# Patient Record
Sex: MALE | Race: WHITE | NOT HISPANIC OR LATINO | Employment: FULL TIME | URBAN - METROPOLITAN AREA
[De-identification: names, ages, dates, MRNs, and addresses within clinical notes are randomized per-mention and may not be internally consistent; named-entity substitution may affect disease eponyms.]

---

## 2017-02-23 ENCOUNTER — OFFICE VISIT (OUTPATIENT)
Dept: INTERNAL MEDICINE | Facility: CLINIC | Age: 52
End: 2017-02-23
Payer: COMMERCIAL

## 2017-02-23 VITALS
HEART RATE: 94 BPM | WEIGHT: 237.13 LBS | DIASTOLIC BLOOD PRESSURE: 79 MMHG | OXYGEN SATURATION: 95 % | BODY MASS INDEX: 35.01 KG/M2 | SYSTOLIC BLOOD PRESSURE: 122 MMHG | TEMPERATURE: 99 F

## 2017-02-23 DIAGNOSIS — Z12.11 SCREENING FOR COLON CANCER: ICD-10-CM

## 2017-02-23 DIAGNOSIS — E78.5 HYPERLIPIDEMIA, UNSPECIFIED HYPERLIPIDEMIA TYPE: ICD-10-CM

## 2017-02-23 DIAGNOSIS — E11.40 TYPE 2 DIABETES MELLITUS WITH DIABETIC NEUROPATHY, WITH LONG-TERM CURRENT USE OF INSULIN: Primary | ICD-10-CM

## 2017-02-23 DIAGNOSIS — Z79.4 TYPE 2 DIABETES MELLITUS WITH DIABETIC NEUROPATHY, WITH LONG-TERM CURRENT USE OF INSULIN: Primary | ICD-10-CM

## 2017-02-23 DIAGNOSIS — I10 HYPERTENSION, ESSENTIAL: ICD-10-CM

## 2017-02-23 LAB
ALBUMIN SERPL BCP-MCNC: 3.7 G/DL
ALP SERPL-CCNC: 77 U/L
ALT SERPL W/O P-5'-P-CCNC: 33 U/L
ANION GAP SERPL CALC-SCNC: 8 MMOL/L
AST SERPL-CCNC: 29 U/L
BASOPHILS # BLD AUTO: 0.01 K/UL
BASOPHILS NFR BLD: 0.1 %
BILIRUB SERPL-MCNC: 0.6 MG/DL
BUN SERPL-MCNC: 12 MG/DL
CALCIUM SERPL-MCNC: 9.5 MG/DL
CHLORIDE SERPL-SCNC: 105 MMOL/L
CHOLEST/HDLC SERPL: 4.5 {RATIO}
CO2 SERPL-SCNC: 28 MMOL/L
CREAT SERPL-MCNC: 0.9 MG/DL
CREAT UR-MCNC: 52 MG/DL
DIFFERENTIAL METHOD: ABNORMAL
EOSINOPHIL # BLD AUTO: 0.1 K/UL
EOSINOPHIL NFR BLD: 1.1 %
ERYTHROCYTE [DISTWIDTH] IN BLOOD BY AUTOMATED COUNT: 13.7 %
EST. GFR  (AFRICAN AMERICAN): >60 ML/MIN/1.73 M^2
EST. GFR  (NON AFRICAN AMERICAN): >60 ML/MIN/1.73 M^2
GLUCOSE SERPL-MCNC: 63 MG/DL
HCT VFR BLD AUTO: 45.5 %
HDL/CHOLESTEROL RATIO: 22.4 %
HDLC SERPL-MCNC: 170 MG/DL
HDLC SERPL-MCNC: 38 MG/DL
HGB BLD-MCNC: 15.8 G/DL
LDLC SERPL CALC-MCNC: 111.4 MG/DL
LYMPHOCYTES # BLD AUTO: 2.6 K/UL
LYMPHOCYTES NFR BLD: 35.7 %
MCH RBC QN AUTO: 31.7 PG
MCHC RBC AUTO-ENTMCNC: 34.7 %
MCV RBC AUTO: 91 FL
MICROALBUMIN UR DL<=1MG/L-MCNC: 4 UG/ML
MICROALBUMIN/CREATININE RATIO: 7.7 UG/MG
MONOCYTES # BLD AUTO: 0.5 K/UL
MONOCYTES NFR BLD: 6.2 %
NEUTROPHILS # BLD AUTO: 4.1 K/UL
NEUTROPHILS NFR BLD: 56.8 %
NONHDLC SERPL-MCNC: 132 MG/DL
PLATELET # BLD AUTO: 176 K/UL
PMV BLD AUTO: 12.2 FL
POTASSIUM SERPL-SCNC: 4.2 MMOL/L
PROT SERPL-MCNC: 7.3 G/DL
RBC # BLD AUTO: 4.98 M/UL
SODIUM SERPL-SCNC: 141 MMOL/L
TRIGL SERPL-MCNC: 103 MG/DL
WBC # BLD AUTO: 7.22 K/UL

## 2017-02-23 PROCEDURE — 80053 COMPREHEN METABOLIC PANEL: CPT

## 2017-02-23 PROCEDURE — 99999 PR PBB SHADOW E&M-EST. PATIENT-LVL IV: CPT | Mod: PBBFAC,,, | Performed by: FAMILY MEDICINE

## 2017-02-23 PROCEDURE — 82570 ASSAY OF URINE CREATININE: CPT

## 2017-02-23 PROCEDURE — 3074F SYST BP LT 130 MM HG: CPT | Mod: S$GLB,,, | Performed by: FAMILY MEDICINE

## 2017-02-23 PROCEDURE — 4010F ACE/ARB THERAPY RXD/TAKEN: CPT | Mod: S$GLB,,, | Performed by: FAMILY MEDICINE

## 2017-02-23 PROCEDURE — 80061 LIPID PANEL: CPT

## 2017-02-23 PROCEDURE — 85025 COMPLETE CBC W/AUTO DIFF WBC: CPT

## 2017-02-23 PROCEDURE — 3078F DIAST BP <80 MM HG: CPT | Mod: S$GLB,,, | Performed by: FAMILY MEDICINE

## 2017-02-23 PROCEDURE — 99214 OFFICE O/P EST MOD 30 MIN: CPT | Mod: S$GLB,,, | Performed by: FAMILY MEDICINE

## 2017-02-23 PROCEDURE — 3046F HEMOGLOBIN A1C LEVEL >9.0%: CPT | Mod: S$GLB,,, | Performed by: FAMILY MEDICINE

## 2017-02-23 PROCEDURE — 2022F DILAT RTA XM EVC RTNOPTHY: CPT | Mod: S$GLB,,, | Performed by: FAMILY MEDICINE

## 2017-02-23 PROCEDURE — 1160F RVW MEDS BY RX/DR IN RCRD: CPT | Mod: S$GLB,,, | Performed by: FAMILY MEDICINE

## 2017-02-23 PROCEDURE — 83036 HEMOGLOBIN GLYCOSYLATED A1C: CPT

## 2017-02-23 RX ORDER — INSULIN GLARGINE 100 [IU]/ML
INJECTION, SOLUTION SUBCUTANEOUS
Qty: 2 BOX | Refills: 5 | Status: SHIPPED | OUTPATIENT
Start: 2017-02-23 | End: 2017-09-22 | Stop reason: ALTCHOICE

## 2017-02-23 RX ORDER — INSULIN ASPART 100 [IU]/ML
INJECTION, SOLUTION INTRAVENOUS; SUBCUTANEOUS
Qty: 3 BOX | Refills: 5 | Status: SHIPPED | OUTPATIENT
Start: 2017-02-23 | End: 2018-03-02

## 2017-02-23 RX ORDER — PEN NEEDLE, DIABETIC 30 GX3/16"
NEEDLE, DISPOSABLE MISCELLANEOUS
Qty: 100 EACH | Refills: 12 | Status: SHIPPED | OUTPATIENT
Start: 2017-02-23 | End: 2018-05-06 | Stop reason: SDUPTHER

## 2017-02-23 RX ORDER — INSULIN GLULISINE 100 [IU]/ML
INJECTION, SOLUTION SUBCUTANEOUS
Qty: 15 SYRINGE | Refills: 3 | OUTPATIENT
Start: 2017-02-23
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
## 2017-02-23 NOTE — MR AVS SNAPSHOT
"    [facility]  [address]  [place] [address]  Phone: [phone]  Fax: [phone]                  Brett Garcia   2017 2:20 PM   Office Visit    Description:  Male : 1965   Provider:  Sonia Estrella MD   Department:  [facility]           Reason for Visit     Annual Exam           Diagnoses this Visit        Comments    Type 2 diabetes mellitus with diabetic neuropathy, with long-term current use of insulin    -  Primary     Hyperlipidemia, unspecified hyperlipidemia type         Screening for colon cancer                To Do List           Goals (5 Years of Data)     None       These Medications        Disp Refills Start End    pen needle, diabetic (BD INSULIN PEN NEEDLE UF MINI) 31 gauge x 3/16" Ndle 100 each 12 2017     Use 4 x daily as directed    Pharmacy: [facility]/pharmacy #[id number]  [place] - [address] [facility] [address] Ph #: [phone]       blood sugar diagnostic Strp 100 each 11 2017     1 strip by Misc.(Non-Drug; Combo Route) route 4 (four) times daily. One touch Meter Strips - Misc.(Non-Drug; Combo Route)    Pharmacy: [facility]/pharmacy #[id number] [place] - [address] [facility] Ph #: [phone]       insulin glargine (LANTUS SOLOSTAR) 100 unit/mL (3 mL) InPn pen 2 Box 5 2017     INJECT 70 UNITS UNDER THE SKIN EVERY MORNING// at night    Pharmacy: [facility]/pharmacy #[id number] [place] - [address] [facility] Ph #: [phone]       insulin aspart (NOVOLOG) 100 unit/mL InPn pen 3 Box 5 2017     25 U SQ before meals up to 3 x daily    Pharmacy: [facility]/pharmacy #[id number] [place] - [address] [address] Ph #: [phone]         [facility] On [facility]     [facility] On Call Nurse Care Line -  Assistance  Registered nurses in the [facility] provide clinical advisement, " "health education, appointment booking, and other advisory services.  Call for this free service at [phone].             Medications           Message regarding Medications     Verify the changes and/or additions to your medication regime listed below are the same as discussed with your clinician today.  If any of these changes or additions are incorrect, please notify your healthcare provider.        START taking these NEW medications        Refills    pen needle, diabetic (BD INSULIN PEN NEEDLE UF MINI) 31 gauge x 3/16" Ndle 12    Sig: Use 4 x daily as directed    Class: Normal    insulin aspart (NOVOLOG) 100 unit/mL InPn pen 5    Si U SQ before meals up to 3 x daily    Class: Normal      CHANGE how you are taking these medications     Start Taking Instead of    insulin glargine (LANTUS SOLOSTAR) 100 unit/mL (3 mL) InPn pen insulin glargine (LANTUS SOLOSTAR) 100 unit/mL (3 mL) InPn pen    Dosage:  INJECT 70 UNITS UNDER THE SKIN EVERY MORNING// at night Dosage:  INJECT 50 UNITS UNDER THE SKIN EVERY MORNING// at night    Reason for Change:  Reorder       STOP taking these medications     APIDRA SOLOSTAR 100 unit/mL InPn pen INJECT 15 UNITS BEFORE EACH MEAL           Verify that the below list of medications is an accurate representation of the medications you are currently taking.  If none reported, the list may be blank. If incorrect, please contact your healthcare provider. Carry this list with you in case of emergency.           Current Medications     amitriptyline (ELAVIL) 50 MG tablet TAKE 1 TABLET (50 MG TOTAL) BY MOUTH EVERY EVENING. MUST SEE MD FOR REFILL    aspirin (ECOTRIN) 81 MG EC tablet Take 81 mg by mouth once daily.    blood sugar diagnostic Strp 1 strip by Misc.(Non-Drug; Combo Route) route 4 (four) times daily. One touch Meter Strips    fluticasone (FLONASE) 50 mcg/actuation nasal spray 2 SPRAYS BY EACH NARE ROUTE ONCE DAILY.    FREESTYLE LANCETS 28 gauge lancets TEST BLOOD SUGAR 4 TIMES " "DAILY AS INSTRUCTED    insulin glargine (LANTUS SOLOSTAR) 100 unit/mL (3 mL) InPn pen INJECT 70 UNITS UNDER THE SKIN EVERY MORNING// at night    lisinopril (PRINIVIL,ZESTRIL) 5 MG tablet Take 1 tablet (5 mg total) by mouth once daily.    loratadine-pseudoephedrine 5-120 mg (CLARITIN-D 12-HOUR) 5-120 mg per tablet Take 1 tablet by mouth daily as needed.     metformin (GLUCOPHAGE) 1000 MG tablet TAKE 1 TABLET (1,000 MG TOTAL) BY MOUTH 2 (TWO) TIMES DAILY.    pen needle, diabetic (BD INSULIN PEN NEEDLE UF MINI) 31 gauge x 3/16" Ndle Use 4 x daily as directed    pravastatin (PRAVACHOL) 40 MG tablet TAKE 1 TABLET (40 MG TOTAL) BY MOUTH ONCE DAILY.    insulin aspart (NOVOLOG) 100 unit/mL InPn pen 25 U SQ before meals up to 3 x daily           Clinical Reference Information           Your Vitals Were     BP Pulse Temp    122/79 (BP Location: Right arm, Patient Position: Sitting, BP Method: Automatic) 94 98.6 °F (37 °C) (Tympanic)    Weight SpO2 BMI    107.6 kg (237 lb 1.7 oz) 95% 35.01 kg/m2      Blood Pressure          Most Recent Value    BP  122/79      Allergies as of 2/23/2017     No Known Allergies      Immunizations Administered on Date of Encounter - 2/23/2017     None      Orders Placed During Today's Visit      Normal Orders This Visit    Ambulatory referral to Optometry     Case request GI: COLONOSCOPY     CBC auto differential     Comprehensive metabolic panel     Hemoglobin A1c     Lipid panel     Microalbumin/creatinine urine ratio       [facility] Sign-Up     Activating your [facility] account is as easy as 1-2-3!     1) Visit [email or web address], select Sign Up Now, enter this activation code and your date of birth, then select Next.  [email or web address]  Expires: 4/9/2017  4:09 PM      2) Create a username and password to use when you visit [facility] in the future and select a security question in case you lose your password and select Next.    3) Enter your e-mail address and click Sign Up!    Additional " Information  If you have questions, please e-mail [email or web address] or call [phone] to talk to our [facility] staff. Remember, [facility] is NOT to be used for urgent needs. For medical emergencies, dial 911.         Language Assistance Services     ATTENTION: Language assistance services are available, free of charge. Please call [phone].      ATENCIÓN: Si habla español, tiene a osorio disposición servicios gratuitos de asistencia lingüística. Llame al [phone].     [facility] Ý: N?u b?n nói Ti?ng Vi?t, có các d?ch v? h? tr? ngôn ng? mi?n phí dành cho b?n. G?i s? [phone].         [facility] complies with applicable Federal civil rights laws and does not discriminate on the basis of race, color, national origin, age, disability, or sex.

## 2017-02-24 LAB
ESTIMATED AVG GLUCOSE: 301 MG/DL
HBA1C MFR BLD HPLC: 12.1 %

## 2017-02-26 DIAGNOSIS — Z79.4 TYPE 2 DIABETES MELLITUS WITH DIABETIC NEUROPATHY, WITH LONG-TERM CURRENT USE OF INSULIN: Primary | ICD-10-CM

## 2017-02-26 DIAGNOSIS — E11.40 TYPE 2 DIABETES MELLITUS WITH DIABETIC NEUROPATHY, WITH LONG-TERM CURRENT USE OF INSULIN: Primary | ICD-10-CM

## 2017-03-07 ENCOUNTER — OFFICE VISIT (OUTPATIENT)
Dept: OPHTHALMOLOGY | Facility: CLINIC | Age: 52
End: 2017-03-07
Payer: COMMERCIAL

## 2017-03-07 ENCOUNTER — TELEPHONE (OUTPATIENT)
Dept: INTERNAL MEDICINE | Facility: CLINIC | Age: 52
End: 2017-03-07

## 2017-03-07 DIAGNOSIS — H52.03 HYPEROPIA, BILATERAL: ICD-10-CM

## 2017-03-07 DIAGNOSIS — H52.4 BILATERAL PRESBYOPIA: ICD-10-CM

## 2017-03-07 DIAGNOSIS — E11.9 DIABETES MELLITUS TYPE 2 WITHOUT RETINOPATHY: Primary | ICD-10-CM

## 2017-03-07 PROCEDURE — 92014 COMPRE OPH EXAM EST PT 1/>: CPT | Mod: S$GLB,,, | Performed by: OPTOMETRIST

## 2017-03-07 PROCEDURE — 92015 DETERMINE REFRACTIVE STATE: CPT | Mod: S$GLB,,, | Performed by: OPTOMETRIST

## 2017-03-07 PROCEDURE — 99999 PR PBB SHADOW E&M-EST. PATIENT-LVL I: CPT | Mod: PBBFAC,,, | Performed by: OPTOMETRIST

## 2017-03-07 NOTE — PROGRESS NOTES
HPI     Pt states eyes are okay.  He has recently been having great fluctuations   in blood sugar.       Last edited by Tessy Bear on 3/7/2017  3:45 PM.         Assessment /Plan     For exam results, see Encounter Report.    Diabetes mellitus type 2 without retinopathy    Hyperopia, bilateral    Bilateral presbyopia      No Background Diabetic Retinopathy    Dispense Final Rx for glasses.  RTC 1 year

## 2017-03-07 NOTE — TELEPHONE ENCOUNTER
Returned pt phone call. Pt was calling in regards to his lab results. Gave pt lab results, pt verbalized understanding. Pt also scheduled follow up appt for 3/24/17 for 1:40pm

## 2017-03-07 NOTE — TELEPHONE ENCOUNTER
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

## 2017-03-07 NOTE — TELEPHONE ENCOUNTER
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

## 2017-03-09 DIAGNOSIS — E78.2 MIXED HYPERLIPIDEMIA: Primary | ICD-10-CM

## 2017-03-09 RX ORDER — ATORVASTATIN CALCIUM 40 MG/1
40 TABLET, FILM COATED ORAL DAILY
Qty: 90 TABLET | Refills: 0 | Status: SHIPPED | OUTPATIENT
Start: 2017-03-09 | End: 2017-06-09 | Stop reason: SDUPTHER

## 2017-03-09 RX ORDER — PRAVASTATIN SODIUM 40 MG/1
40 TABLET ORAL DAILY
Qty: 30 TABLET | Refills: 3 | OUTPATIENT
Start: 2017-03-09

## 2017-03-09 NOTE — TELEPHONE ENCOUNTER
----- Message from Javier Cardenas sent at 3/9/2017  4:40 PM CST -----  Contact: Patient  Pt needs refills on  insulin Lantus, (Fast-acting insulin) and  cholesterol medication- pravastatin.  Please call pt back at ..[phone] (home) [phone] (work)         ..  [facility]/pharmacy #[id number] - [place] - [address] AT [address]  [place] [address]  Phone: [phone] Fax: [phone]

## 2017-03-10 ENCOUNTER — TELEPHONE (OUTPATIENT)
Dept: INTERNAL MEDICINE | Facility: CLINIC | Age: 52
End: 2017-03-10

## 2017-03-10 NOTE — TELEPHONE ENCOUNTER
----- Message from Susana Crowe sent at 3/10/2017  9:55 AM CST -----  Contact: Patient  Patient returned call about a prescription that changed. He can be contacted at [phone].    Thanks,  Susana

## 2017-03-10 NOTE — TELEPHONE ENCOUNTER
Rx for pravastatin was refused and a new prescription for atorvastatin 40 mg is being sent (see last lab results).    pls inform pt  -

## 2017-03-13 ENCOUNTER — PATIENT OUTREACH (OUTPATIENT)
Dept: ADMINISTRATIVE | Facility: HOSPITAL | Age: 52
End: 2017-03-13

## 2017-03-13 NOTE — LETTER
March 13, 2017    Brett [place]  [address] [place] [address]             [facility]  [address]  [facility] [address]  Phone: [phone] Dear Mr. Garcia:    [facility] is committed to your overall health.  To help you get the most out of each of your visits, we will review your information to make sure you are up to date on all of your recommended tests and/or procedures.      Brandt Laurent MD has found that you may be due for   Health Maintenance Due   Topic    Colonoscopy     Influenza Vaccine         If you have had any of the above done at another facility, please bring the records or information with you so that your record at [facility] will be complete.    If you are currently taking medication, please bring it with you to your appointment for review.    We will be happy to assist you with scheduling any necessary appointments or you may contact the [facility] appointment desk at [phone] to schedule at your convenience.     Thank you for choosing [facility] for your healthcare needs.  If you have any questions or concerns, please don't hesitate to call.    Sincerely,  Maliha BARNES LPN Care Coordinator  [facility]

## 2017-03-15 RX ORDER — LISINOPRIL 5 MG/1
5 TABLET ORAL DAILY
Qty: 90 TABLET | Refills: 1 | Status: SHIPPED | OUTPATIENT
Start: 2017-03-15 | End: 2017-09-12 | Stop reason: SDUPTHER

## 2017-03-24 ENCOUNTER — OFFICE VISIT (OUTPATIENT)
Dept: INTERNAL MEDICINE | Facility: CLINIC | Age: 52
End: 2017-03-24
Payer: COMMERCIAL

## 2017-03-24 VITALS
HEIGHT: 70 IN | OXYGEN SATURATION: 98 % | BODY MASS INDEX: 33.39 KG/M2 | HEART RATE: 92 BPM | DIASTOLIC BLOOD PRESSURE: 79 MMHG | TEMPERATURE: 98 F | WEIGHT: 233.25 LBS | SYSTOLIC BLOOD PRESSURE: 120 MMHG

## 2017-03-24 DIAGNOSIS — Z12.11 SCREENING FOR COLON CANCER: ICD-10-CM

## 2017-03-24 DIAGNOSIS — J30.2 SEASONAL ALLERGIC RHINITIS, UNSPECIFIED ALLERGIC RHINITIS TRIGGER: ICD-10-CM

## 2017-03-24 DIAGNOSIS — I10 HYPERTENSION, ESSENTIAL: ICD-10-CM

## 2017-03-24 DIAGNOSIS — Z79.4 TYPE 2 DIABETES MELLITUS WITH DIABETIC NEUROPATHY, WITH LONG-TERM CURRENT USE OF INSULIN: Primary | ICD-10-CM

## 2017-03-24 DIAGNOSIS — E11.40 TYPE 2 DIABETES MELLITUS WITH DIABETIC NEUROPATHY, WITH LONG-TERM CURRENT USE OF INSULIN: Primary | ICD-10-CM

## 2017-03-24 DIAGNOSIS — E78.2 MIXED HYPERLIPIDEMIA: ICD-10-CM

## 2017-03-24 PROCEDURE — 99999 PR PBB SHADOW E&M-EST. PATIENT-LVL III: CPT | Mod: PBBFAC,,, | Performed by: FAMILY MEDICINE

## 2017-03-24 PROCEDURE — 3074F SYST BP LT 130 MM HG: CPT | Mod: S$GLB,,, | Performed by: FAMILY MEDICINE

## 2017-03-24 PROCEDURE — 3046F HEMOGLOBIN A1C LEVEL >9.0%: CPT | Mod: S$GLB,,, | Performed by: FAMILY MEDICINE

## 2017-03-24 PROCEDURE — 99214 OFFICE O/P EST MOD 30 MIN: CPT | Mod: S$GLB,,, | Performed by: FAMILY MEDICINE

## 2017-03-24 PROCEDURE — 1160F RVW MEDS BY RX/DR IN RCRD: CPT | Mod: S$GLB,,, | Performed by: FAMILY MEDICINE

## 2017-03-24 PROCEDURE — 3078F DIAST BP <80 MM HG: CPT | Mod: S$GLB,,, | Performed by: FAMILY MEDICINE

## 2017-03-24 PROCEDURE — 4010F ACE/ARB THERAPY RXD/TAKEN: CPT | Mod: S$GLB,,, | Performed by: FAMILY MEDICINE

## 2017-03-24 RX ORDER — FLUTICASONE PROPIONATE 50 MCG
2 SPRAY, SUSPENSION (ML) NASAL DAILY
Qty: 16 G | Refills: 11 | Status: SHIPPED | OUTPATIENT
Start: 2017-03-24 | End: 2018-04-06 | Stop reason: SDUPTHER

## 2017-03-24 NOTE — MR AVS SNAPSHOT
[facility]  [address]  [place] [address]  Phone: [phone]  Fax: [phone]                  Brett Garcia   3/24/2017 1:40 PM   Office Visit    Description:  Male : 1965   Provider:  Sonia Estrella MD   Department:  [facility]           Reason for Visit     Diabetes           Diagnoses this Visit        Comments    Type 2 diabetes mellitus with diabetic neuropathy, with long-term current use of insulin    -  Primary     Seasonal allergic rhinitis, unspecified allergic rhinitis trigger         Screening for colon cancer         Mixed hyperlipidemia         Hypertension, essential                To Do List           Future Appointments        Provider Department Dept Phone    2017 1:30 PM Greta Kearney, NP-C, [facility] - Diabetes Management [phone]    2017 8:20 AM PRIMARY CARE NURSE, [facility] [phone]    2017 1:00 PM Sonia Estrella MD [facility] [phone]    3/13/2018 4:00 PM Aayush Austin OD [facility] - Ophthalmology [phone]      Goals (5 Years of Data)     None       These Medications        Disp Refills Start End    fluticasone (FLONASE) 50 mcg/actuation nasal spray 16 g 11 3/24/2017     2 sprays by Each Nare route once daily. Each nare - Each Nare    Pharmacy: [facility]/pharmacy #[id number] - [place] - [address] [address] Ph #: [phone]         [facility] On Call     [facility] On Call Nurse [facility] Line -  Assistance  Registered nurses in the [facility] provide clinical advisement, health education, appointment booking, and other advisory services.  Call for this free service at [phone].             Medications           Message regarding Medications     Verify the changes and/or additions to your medication regime listed below are the same as discussed with your clinician today.  If any of these changes or  "additions are incorrect, please notify your healthcare provider.        CHANGE how you are taking these medications     Start Taking Instead of    fluticasone (FLONASE) 50 mcg/actuation nasal spray fluticasone (FLONASE) 50 mcg/actuation nasal spray    Dosage:  2 sprays by Each Nare route once daily. Each nare Dosage:  2 SPRAYS BY EACH NARE ROUTE ONCE DAILY.    Reason for Change:  Reorder            Verify that the below list of medications is an accurate representation of the medications you are currently taking.  If none reported, the list may be blank. If incorrect, please contact your healthcare provider. Carry this list with you in case of emergency.           Current Medications     amitriptyline (ELAVIL) 50 MG tablet TAKE 1 TABLET (50 MG TOTAL) BY MOUTH EVERY EVENING. MUST SEE MD FOR REFILL    aspirin (ECOTRIN) 81 MG EC tablet Take 81 mg by mouth once daily.    atorvastatin (LIPITOR) 40 MG tablet Take 1 tablet (40 mg total) by mouth once daily.    blood sugar diagnostic Strp 1 strip by Misc.(Non-Drug; Combo Route) route 4 (four) times daily. One touch Meter Strips    fluticasone (FLONASE) 50 mcg/actuation nasal spray 2 sprays by Each Nare route once daily. Each nare    FREESTYLE LANCETS 28 gauge lancets TEST BLOOD SUGAR 4 TIMES DAILY AS INSTRUCTED    insulin aspart (NOVOLOG) 100 unit/mL InPn pen 25 U SQ before meals up to 3 x daily    insulin glargine (LANTUS SOLOSTAR) 100 unit/mL (3 mL) InPn pen INJECT 70 UNITS UNDER THE SKIN EVERY MORNING// at night    lisinopril (PRINIVIL,ZESTRIL) 5 MG tablet Take 1 tablet (5 mg total) by mouth once daily.    loratadine-pseudoephedrine 5-120 mg (CLARITIN-D 12-HOUR) 5-120 mg per tablet Take 1 tablet by mouth daily as needed.     metformin (GLUCOPHAGE) 1000 MG tablet TAKE 1 TABLET (1,000 MG TOTAL) BY MOUTH 2 (TWO) TIMES DAILY.    pen needle, diabetic (BD INSULIN PEN NEEDLE UF MINI) 31 gauge x 3/16" Ndle Use 4 x daily as directed           Clinical Reference Information    " "       Your Vitals Were     BP Pulse Temp Height Weight SpO2    120/79 (BP Location: Right arm, Patient Position: Sitting, BP Method: Automatic) 92 97.8 °F (36.6 °C) (Oral) 5' 9.5" (1.765 m) 105.8 kg (233 lb 4 oz) 98%    BMI                33.95 kg/m2          Blood Pressure          Most Recent Value    BP  120/79      Allergies as of 3/24/2017     No Known Allergies      Immunizations Administered on Date of Encounter - 3/24/2017     None      Orders Placed During Today's Visit      Normal Orders This Visit    Case request GI: COLONOSCOPY     Future Labs/Procedures Expected by Expires    Hemoglobin A1c  6/23/2017 (Approximate) 3/25/2018    Lipid panel  6/23/2017 (Approximate) 3/25/2018    Basic metabolic panel  6/24/2017 3/25/2018      [facility] Sign-Up     Activating your [facility] account is as easy as 1-2-3!     1) Visit [email or web address], select Sign Up Now, enter this activation code and your date of birth, then select Next.  [email or web address]  Expires: 4/9/2017  5:09 PM      2) Create a username and password to use when you visit [facility] in the future and select a security question in case you lose your password and select Next.    3) Enter your e-mail address and click Sign Up!    Additional Information  If you have questions, please e-mail [email or web address] or call [phone] to talk to our [facility] staff. Remember, [facility] is NOT to be used for urgent needs. For medical emergencies, dial 911.         Language Assistance Services     ATTENTION: Language assistance services are available, free of charge. Please call [phone].      ATENCIÓN: Si habla español, tiene a osorio disposición servicios gratuitos de asistencia lingüística. Llame al [phone].     [facility] Ý: N?u b?n nói Ti?ng Vi?t, có các d?ch v? h? tr? ngôn ng? mi?n phí dành cho b?n. G?i s? [phone].         [facility] complies with applicable Federal civil rights laws and does not discriminate on the basis of race, " color, national origin, age, disability, or sex.

## 2017-03-24 NOTE — PROGRESS NOTES
Subjective:       Patient ID: Brett Garcia is a 51 y.o. male.    Chief Complaint: Diabetes    HPI Comments: Here for review of his recent labs and adjustment of his medication.  His Apidra is not covered and we tried to get him switched to NovoLog.  He is still taking 50 units twice a day of his Lantus.  His A1c is up to 12.1 from 10.4 at last check 1 year ago. He is using Lantus 50 daily. Going up to 65 but not regularly. Long discussion re eating habits. Difficulty due to mother's expectations and cooking habits.  His a.m. and lunch time habits were good in the past.  Now he is eating leftovers from the previous night's dinner for lunch.  Long discussion regarding how he is taking his insulin recommendations for adjusting.    Sent atorvastatin 40 to his pharmacy to replace his pravastatin 40.  I also had sent a referral to the diabetes educators for medication management.  He has an appointment scheduled 4/21/17.    Diabetes   He presents for his follow-up diabetic visit. He has type 2 diabetes mellitus. Pertinent negatives for diabetes include no fatigue. Meal planning includes avoidance of concentrated sweets. He participates in exercise daily (Running 2 miles in the a.m.). His dinner blood glucose is taken after 8 pm. His dinner blood glucose range is generally >200 mg/dl. His highest blood glucose is >200 mg/dl. An ACE inhibitor/angiotensin II receptor blocker is being taken. He does not see a podiatrist.Eye exam is current.     Review of Systems   Constitutional: Negative for fatigue.   HENT: Positive for rhinorrhea. Negative for nosebleeds.    Cardiovascular: Negative for leg swelling.   Neurological: Positive for numbness (relieved with amitriptyline).       Objective:      Physical Exam   Constitutional: He is oriented to person, place, and time. He appears well-developed.   HENT:   Head: Normocephalic and atraumatic.   Cardiovascular: Normal rate, regular rhythm and normal heart sounds.     Pulmonary/Chest: Effort normal and breath sounds normal.   Neurological: He is alert and oriented to person, place, and time.   Skin: Skin is warm and dry.   Psychiatric: He has a normal mood and affect. His behavior is normal.       Assessment:       1. Type 2 diabetes mellitus with diabetic neuropathy, with long-term current use of insulin    2. Seasonal allergic rhinitis, unspecified allergic rhinitis trigger    3. Screening for colon cancer    4. Mixed hyperlipidemia    5. Hypertension, essential        Plan:     1. Type 2 diabetes mellitus with diabetic neuropathy, with long-term current use of insulin  Hemoglobin A1c   2. Seasonal allergic rhinitis, unspecified allergic rhinitis trigger  fluticasone (FLONASE) 50 mcg/actuation nasal spray   3. Screening for colon cancer  Case request GI: COLONOSCOPY   4. Mixed hyperlipidemia  Lipid panel   5. Hypertension, essential  Basic metabolic panel     Recheck labs in June.  Reviewed changes to insulin dosing and timing.

## 2017-04-21 ENCOUNTER — LAB VISIT (OUTPATIENT)
Dept: LAB | Facility: HOSPITAL | Age: 52
End: 2017-04-21
Attending: NURSE PRACTITIONER
Payer: COMMERCIAL

## 2017-04-21 ENCOUNTER — OFFICE VISIT (OUTPATIENT)
Dept: DIABETES | Facility: CLINIC | Age: 52
End: 2017-04-21
Payer: COMMERCIAL

## 2017-04-21 VITALS
HEIGHT: 70 IN | BODY MASS INDEX: 33.89 KG/M2 | DIASTOLIC BLOOD PRESSURE: 78 MMHG | WEIGHT: 236.75 LBS | SYSTOLIC BLOOD PRESSURE: 144 MMHG

## 2017-04-21 DIAGNOSIS — E11.40 TYPE 2 DIABETES MELLITUS WITH DIABETIC NEUROPATHY, UNSPECIFIED LONG TERM INSULIN USE STATUS: Primary | ICD-10-CM

## 2017-04-21 DIAGNOSIS — E11.59 HYPERTENSION ASSOCIATED WITH DIABETES: ICD-10-CM

## 2017-04-21 DIAGNOSIS — I15.2 HYPERTENSION ASSOCIATED WITH DIABETES: ICD-10-CM

## 2017-04-21 DIAGNOSIS — E11.40 TYPE 2 DIABETES MELLITUS WITH DIABETIC NEUROPATHY, UNSPECIFIED LONG TERM INSULIN USE STATUS: ICD-10-CM

## 2017-04-21 DIAGNOSIS — E66.9 OBESITY (BMI 30-39.9): ICD-10-CM

## 2017-04-21 DIAGNOSIS — E78.5 HYPERLIPIDEMIA, UNSPECIFIED HYPERLIPIDEMIA TYPE: ICD-10-CM

## 2017-04-21 LAB — GLUCOSE SERPL-MCNC: 133 MG/DL (ref 70–110)

## 2017-04-21 PROCEDURE — 1160F RVW MEDS BY RX/DR IN RCRD: CPT | Mod: S$GLB,,, | Performed by: NURSE PRACTITIONER

## 2017-04-21 PROCEDURE — 86337 INSULIN ANTIBODIES: CPT

## 2017-04-21 PROCEDURE — 3078F DIAST BP <80 MM HG: CPT | Mod: S$GLB,,, | Performed by: NURSE PRACTITIONER

## 2017-04-21 PROCEDURE — 86341 ISLET CELL ANTIBODY: CPT

## 2017-04-21 PROCEDURE — 3046F HEMOGLOBIN A1C LEVEL >9.0%: CPT | Mod: S$GLB,,, | Performed by: NURSE PRACTITIONER

## 2017-04-21 PROCEDURE — 84681 ASSAY OF C-PEPTIDE: CPT

## 2017-04-21 PROCEDURE — 3077F SYST BP >= 140 MM HG: CPT | Mod: S$GLB,,, | Performed by: NURSE PRACTITIONER

## 2017-04-21 PROCEDURE — 82948 REAGENT STRIP/BLOOD GLUCOSE: CPT | Mod: S$GLB,,, | Performed by: NURSE PRACTITIONER

## 2017-04-21 PROCEDURE — 99214 OFFICE O/P EST MOD 30 MIN: CPT | Mod: S$GLB,,, | Performed by: NURSE PRACTITIONER

## 2017-04-21 PROCEDURE — 99999 PR PBB SHADOW E&M-EST. PATIENT-LVL III: CPT | Mod: PBBFAC,,, | Performed by: NURSE PRACTITIONER

## 2017-04-21 PROCEDURE — 4010F ACE/ARB THERAPY RXD/TAKEN: CPT | Mod: S$GLB,,, | Performed by: NURSE PRACTITIONER
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
## 2017-04-21 NOTE — MR AVS SNAPSHOT
[facility]  [address] [facility] [address]  [place] [address]  Phone: [phone]  Fax: [phone]                  Brett Garcia   2017 1:30 PM   Office Visit    Description:  Male : 1965   Provider:  GERRY Benitez, DENNY   Department:  [facility]           Reason for Visit     Diabetes           Diagnoses this Visit        Comments    Type 2 diabetes mellitus with diabetic neuropathy, unspecified long term insulin use status    -  Primary     Hyperlipidemia, unspecified hyperlipidemia type                To Do List           Future Appointments        Provider Department Dept Phone    2017 2:30 PM GERRY Benitez, DENNY [facility] [phone]    2017 8:20 AM PRIMARY CARE NURSE, JAKOB Perez  Primary Care [phone]    2017 1:00 PM Sonia Estrella MD [facility] [phone]    3/13/2018 4:00 PM Aayush Austin OD [facility] [phone]      Goals (5 Years of Data)     None      Follow-Up and Disposition     Return in about 1 month (around 2017) for [facility] teaching.       These Medications        Disp Refills Start End    dulaglutide (TRULICITY) 1.5 mg/0.5 mL PnIj 4 Syringe 0 2017     Inject 1.5 mg into the skin every 7 days. - Subcutaneous    Pharmacy: [facility]/pharmacy #[id number] - [facility] [address] [address] [facility] Ph #: [phone]         [facility]     [facility] Nurse Care Line - 24/ Assistance  Unless otherwise directed by your provider, please contact [facility], our nurse care line that is available for 24/7 assistance.     Registered nurses in the [facility] Center provide: appointment scheduling, clinical advisement, health education, and other advisory services.  Call: [phone] (toll free)               Medications           Message regarding Medications     Verify the  "changes and/or additions to your medication regime listed below are the same as discussed with your clinician today.  If any of these changes or additions are incorrect, please notify your healthcare provider.        START taking these NEW medications        Refills    dulaglutide (TRULICITY) 1.5 mg/0.5 mL PnIj 0    Sig: Inject 1.5 mg into the skin every 7 days.    Class: Print    Route: Subcutaneous           Verify that the below list of medications is an accurate representation of the medications you are currently taking.  If none reported, the list may be blank. If incorrect, please contact your healthcare provider. Carry this list with you in case of emergency.           Current Medications     amitriptyline (ELAVIL) 50 MG tablet TAKE 1 TABLET (50 MG TOTAL) BY MOUTH EVERY EVENING. MUST SEE MD FOR REFILL    aspirin (ECOTRIN) 81 MG EC tablet Take 81 mg by mouth once daily.    atorvastatin (LIPITOR) 40 MG tablet Take 1 tablet (40 mg total) by mouth once daily.    blood sugar diagnostic Strp 1 strip by Misc.(Non-Drug; Combo Route) route 4 (four) times daily. One touch Meter Strips    fluticasone (FLONASE) 50 mcg/actuation nasal spray 2 sprays by Each Nare route once daily. Each nare    FREESTYLE LANCETS 28 gauge lancets TEST BLOOD SUGAR 4 TIMES DAILY AS INSTRUCTED    insulin aspart (NOVOLOG) 100 unit/mL InPn pen 25 U SQ before meals up to 3 x daily    insulin glargine (LANTUS SOLOSTAR) 100 unit/mL (3 mL) InPn pen INJECT 70 UNITS UNDER THE SKIN EVERY MORNING// at night    lisinopril (PRINIVIL,ZESTRIL) 5 MG tablet Take 1 tablet (5 mg total) by mouth once daily.    loratadine-pseudoephedrine 5-120 mg (CLARITIN-D 12-HOUR) 5-120 mg per tablet Take 1 tablet by mouth daily as needed.     metformin (GLUCOPHAGE) 1000 MG tablet TAKE 1 TABLET (1,000 MG TOTAL) BY MOUTH 2 (TWO) TIMES DAILY.    pen needle, diabetic (BD INSULIN PEN NEEDLE UF MINI) 31 gauge x 3/16" Ndle Use 4 x daily as directed    dulaglutide (TRULICITY) 1.5 " "mg/0.5 mL PnIj Inject 1.5 mg into the skin every 7 days.           Clinical Reference Information           Your Vitals Were     BP Height Weight BMI       144/78 5' 10" (1.778 m) 107.4 kg (236 lb 12.4 oz) 33.97 kg/m2       Blood Pressure          Most Recent Value    BP  (!)  144/78      Allergies as of 4/21/2017     No Known Allergies      Immunizations Administered on Date of Encounter - 4/21/2017     None      Orders Placed During Today's Visit     Future Labs/Procedures Expected by Expires    C-peptide  4/21/2017 6/20/2018    Glutamic acid decarboxylase  4/21/2017 6/20/2018    Insulin antibody  4/21/2017 6/20/2018      Language Assistance Services     ATTENTION: Language assistance services are available, free of charge. Please call [phone].      ATENCIÓN: Si habla jacob, tiene a osorio disposición servicios gratuitos de asistencia lingüística. Llame al [phone].     CHÚ Ý: N?u b?n nói Ti?ng Vi?t, có các d?ch v? h? tr? ngôn ng? mi?n phí dành cho b?n. G?i s? [phone].         [facility] complies with applicable Federal civil rights laws and does not discriminate on the basis of race, color, national origin, age, disability, or sex.        "

## 2017-04-24 LAB — C PEPTIDE SERPL-MCNC: 2.4 NG/ML

## 2017-04-25 LAB — GAD65 AB SER-SCNC: 0 NMOL/L

## 2017-04-26 LAB — INSULIN AB SER-SCNC: 0.02 NMOL/L (ref 0–0.02)

## 2017-05-02 RX ORDER — LANCETS 28 GAUGE
1 EACH MISCELLANEOUS 3 TIMES DAILY
Qty: 100 EACH | Refills: 2 | Status: SHIPPED | OUTPATIENT
Start: 2017-05-02 | End: 2017-09-22 | Stop reason: SDUPTHER

## 2017-05-17 DIAGNOSIS — E11.40 TYPE 2 DIABETES MELLITUS WITH DIABETIC NEUROPATHY, UNSPECIFIED LONG TERM INSULIN USE STATUS: ICD-10-CM

## 2017-05-17 RX ORDER — DULAGLUTIDE 1.5 MG/.5ML
INJECTION, SOLUTION SUBCUTANEOUS
Qty: 2 SYRINGE | Refills: 0 | Status: SHIPPED | OUTPATIENT
Start: 2017-05-17 | End: 2017-06-13 | Stop reason: SDUPTHER

## 2017-06-09 DIAGNOSIS — E78.2 MIXED HYPERLIPIDEMIA: ICD-10-CM

## 2017-06-09 RX ORDER — ATORVASTATIN CALCIUM 40 MG/1
40 TABLET, FILM COATED ORAL DAILY
Qty: 90 TABLET | Refills: 0 | Status: SHIPPED | OUTPATIENT
Start: 2017-06-09 | End: 2017-11-20 | Stop reason: SDUPTHER

## 2017-06-13 DIAGNOSIS — E11.40 TYPE 2 DIABETES MELLITUS WITH DIABETIC NEUROPATHY, UNSPECIFIED LONG TERM INSULIN USE STATUS: ICD-10-CM

## 2017-06-14 RX ORDER — DULAGLUTIDE 1.5 MG/.5ML
INJECTION, SOLUTION SUBCUTANEOUS
Qty: 4 SYRINGE | Refills: 1 | Status: SHIPPED | OUTPATIENT
Start: 2017-06-14 | End: 2017-09-22 | Stop reason: SDUPTHER

## 2017-06-16 ENCOUNTER — PATIENT OUTREACH (OUTPATIENT)
Dept: ADMINISTRATIVE | Facility: HOSPITAL | Age: 52
End: 2017-06-16
Payer: COMMERCIAL

## 2017-06-16 DIAGNOSIS — E78.5 HYPERLIPIDEMIA, UNSPECIFIED HYPERLIPIDEMIA TYPE: ICD-10-CM

## 2017-06-16 DIAGNOSIS — E11.59 HYPERTENSION ASSOCIATED WITH DIABETES: ICD-10-CM

## 2017-06-16 DIAGNOSIS — E66.9 OBESITY (BMI 30-39.9): ICD-10-CM

## 2017-06-16 DIAGNOSIS — E11.40 TYPE 2 DIABETES MELLITUS WITH DIABETIC NEUROPATHY, UNSPECIFIED LONG TERM INSULIN USE STATUS: Primary | ICD-10-CM

## 2017-06-16 DIAGNOSIS — I15.2 HYPERTENSION ASSOCIATED WITH DIABETES: ICD-10-CM

## 2017-06-17 ENCOUNTER — OUTPATIENT CASE MANAGEMENT (OUTPATIENT)
Dept: ADMINISTRATIVE | Facility: OTHER | Age: 52
End: 2017-06-17

## 2017-06-17 NOTE — PROGRESS NOTES
Please note the following patient has been assigned to Shayla Cruz RN  in Outpatient Case Management for Diabetes Disease Management with a hbA1C greater than 10.0.    Please contact [facility] at Ext. [phone] with any questions.    Thank you,    Mariia Le, SSC

## 2017-06-20 ENCOUNTER — OUTPATIENT CASE MANAGEMENT (OUTPATIENT)
Dept: ADMINISTRATIVE | Facility: OTHER | Age: 52
End: 2017-06-20

## 2017-06-20 NOTE — PROGRESS NOTES
6/20/17  Call to pt home and cell phone number messages left requesting call back for OPCM services

## 2017-06-26 ENCOUNTER — OUTPATIENT CASE MANAGEMENT (OUTPATIENT)
Dept: ADMINISTRATIVE | Facility: OTHER | Age: 52
End: 2017-06-26

## 2017-06-26 ENCOUNTER — TELEPHONE (OUTPATIENT)
Dept: INTERNAL MEDICINE | Facility: CLINIC | Age: 52
End: 2017-06-26

## 2017-06-26 ENCOUNTER — PATIENT OUTREACH (OUTPATIENT)
Dept: ADMINISTRATIVE | Facility: HOSPITAL | Age: 52
End: 2017-06-26

## 2017-06-26 NOTE — LETTER
June 26, 2017        Brett Estevezno  [address] [place] [address]      Dear Mr. Garcia,    In the process of reviewing your chart for your upcoming appointment, Brandt Laurent MD has selected you for enrollment in [facility] Outpatient [facility] Management Program.  Brandt Laurent MD feels you may benefit from this ENTIRELY FREE SERVICE due to your chronic health conditions and multiple medications.     Here at [facility], we care about all of your healthcare needs.  A team of nurses and social workers will work closely with you and your doctors to develop an individualized care plan that can assist you in achieving your healthcare goals. This may include teaching you about your health conditions and medications, discussions about diet and beneficial lifestyle changes, as well as connections to helpful community resources and support services for you and your caregivers.     If you are interested in this FREE SERVICE, you may reply to this message through your [facility] portal and I will have a  you to enroll in the program.  If you have any questions or concerns, please do not hesitate to contact me or you can talk to your Physician during your upcoming appointment.     As always, thank you for choosing [facility] for your healthcare needs.     Sincerely,   Maliha BARNES LPN Care Coordinator  [facility] [place] [facility]  [phone]

## 2017-06-26 NOTE — PROGRESS NOTES
6/26/17  Call to pt home and cell phone and messages left requesting call back.  Message sent via portal as well.  Will send message to pcp as pt has appointment on 6/30/17

## 2017-06-30 ENCOUNTER — OFFICE VISIT (OUTPATIENT)
Dept: INTERNAL MEDICINE | Facility: CLINIC | Age: 52
End: 2017-06-30
Payer: COMMERCIAL

## 2017-06-30 VITALS
DIASTOLIC BLOOD PRESSURE: 79 MMHG | HEIGHT: 70 IN | HEART RATE: 72 BPM | OXYGEN SATURATION: 97 % | WEIGHT: 230.94 LBS | BODY MASS INDEX: 33.06 KG/M2 | SYSTOLIC BLOOD PRESSURE: 125 MMHG | TEMPERATURE: 97 F

## 2017-06-30 DIAGNOSIS — F43.0 STRESS REACTION: ICD-10-CM

## 2017-06-30 DIAGNOSIS — Z00.00 WELLNESS EXAMINATION: Primary | ICD-10-CM

## 2017-06-30 DIAGNOSIS — E11.40 TYPE 2 DIABETES MELLITUS WITH DIABETIC NEUROPATHY, WITH LONG-TERM CURRENT USE OF INSULIN: ICD-10-CM

## 2017-06-30 DIAGNOSIS — E78.2 MIXED HYPERLIPIDEMIA: ICD-10-CM

## 2017-06-30 DIAGNOSIS — Z79.4 TYPE 2 DIABETES MELLITUS WITH DIABETIC NEUROPATHY, WITH LONG-TERM CURRENT USE OF INSULIN: ICD-10-CM

## 2017-06-30 DIAGNOSIS — I10 HYPERTENSION, ESSENTIAL: ICD-10-CM

## 2017-06-30 LAB
ANION GAP SERPL CALC-SCNC: 8 MMOL/L
BUN SERPL-MCNC: 16 MG/DL
CALCIUM SERPL-MCNC: 9.1 MG/DL
CHLORIDE SERPL-SCNC: 107 MMOL/L
CHOLEST/HDLC SERPL: 3.9 {RATIO}
CO2 SERPL-SCNC: 25 MMOL/L
CREAT SERPL-MCNC: 0.9 MG/DL
EST. GFR  (AFRICAN AMERICAN): >60 ML/MIN/1.73 M^2
EST. GFR  (NON AFRICAN AMERICAN): >60 ML/MIN/1.73 M^2
GLUCOSE SERPL-MCNC: 132 MG/DL
HDL/CHOLESTEROL RATIO: 25.3 %
HDLC SERPL-MCNC: 150 MG/DL
HDLC SERPL-MCNC: 38 MG/DL
LDLC SERPL CALC-MCNC: 94 MG/DL
NONHDLC SERPL-MCNC: 112 MG/DL
POTASSIUM SERPL-SCNC: 4.7 MMOL/L
SODIUM SERPL-SCNC: 140 MMOL/L
TRIGL SERPL-MCNC: 90 MG/DL

## 2017-06-30 PROCEDURE — 99999 PR PBB SHADOW E&M-EST. PATIENT-LVL IV: CPT | Mod: PBBFAC,,, | Performed by: FAMILY MEDICINE

## 2017-06-30 PROCEDURE — 83036 HEMOGLOBIN GLYCOSYLATED A1C: CPT

## 2017-06-30 PROCEDURE — 80048 BASIC METABOLIC PNL TOTAL CA: CPT

## 2017-06-30 PROCEDURE — 99396 PREV VISIT EST AGE 40-64: CPT | Mod: S$GLB,,, | Performed by: FAMILY MEDICINE

## 2017-06-30 PROCEDURE — 80061 LIPID PANEL: CPT

## 2017-06-30 NOTE — PROGRESS NOTES
Subjective:       Patient ID: Brett Garcia is a 51 y.o. male.    Chief Complaint: Diabetes (follow up)    He is here for follow-up on diabetes and is due for annual exam.  He is due for labs - we will obtain today - he missed his lab appt. He is not receiving any [facility] communications - spam folder? 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 Therefore communication has been difficult. He did get to DM Ed for med mgmt x 1 and was given Trulicity. He has been unable to see her again - problems with scheduling.  Colonoscopy - he is to call them back and plans to do so.  He lives with his parents who depend on him.  It is very stressful environment.      ANNUAL EXAM:  Preventative Health Checklist 24-64 years of age    Brett Garcia presents today with no complaints for an annual exam.    Colonoscopy due on 12/26/2013    Health Maintenance Summary                Colonoscopy Overdue 12/26/2013     Influenza Vaccine Next Due 8/1/2017      Done 12/18/2015 Imm Admin: influenza - Quadrivalent - PF (ADULT)     Done 11/16/2013 Imm Admin: Influenza Split     Done 12/12/2011 Imm Admin: Influenza Split    Hemoglobin A1c Next Due 8/23/2017      Done 2/23/2017 Hemoglobin A1C (A)     Done 3/1/2016 Hemoglobin A1C (A)     Done 12/18/2015 Hemoglobin A1C (A)     Done 8/19/2015 Hemoglobin A1C (A)     Done 8/4/2014 Hemoglobin A1C (A)     Patient has more history with this topic...    Lipid Panel Next Due 2/23/2018      Done 2/23/2017 LIPID PANEL (A)     Done 6/30/2016 LIPID PANEL     Done 8/19/2015 LIPID PANEL (A)     Done 7/25/2014 LIPID PANEL     Done 11/16/2013     Eye Exam Next Due 3/7/2018      Done 3/7/2017      Done 3/7/2017 [id number] MA EYE EXAM, EST PATIENT,COMPREHESV     Done 12/18/2015 [id number] MA EYE EXAM, EST PATIENT,COMPREHESV     Done 12/18/2015      Done 12/18/2015 DR SARITA ALVAREZ. No Background Diabetic Retinopathy     Patient has more history with this topic...    Foot Exam Next Due 4/21/2018      Done  4/21/2017 [employer]: WORKFLOW - DIABETES - DIABETIC FOOT EXAM   PERFORMED     Done 2/23/2017 [employer]: WORKFLOW - DIABETES - DIABETIC FOOT EXAM   PERFORMED     Done 6/30/2016 [employer]: WORKFLOW - DIABETES - DIABETIC FOOT EXAM   PERFORMED     Done 8/19/2015      Done 11/16/2013      Patient has more history with this topic...    TETANUS VACCINE Next Due 7/25/2024      Done 7/25/2014 Imm Admin: Tdap    Pneumococcal PPSV23 (Medium Risk) Next Due 12/26/2028      Done 12/2/2011 Imm Admin: Pneumococcal Polysaccharide - 23 Valent    Hepatitis C Screening Completed      Done 8/19/2015 HEPATITIS C ANTIBODY        Counseling:  Nutrition: Encouraged to take 5-10 servings fruits and vegetables daily   Exercise:  Physical activity recommendations reviewed  Avoid Tobacco Use: reviewed  Avoid ETOH/Drug Use:  reviewed  STD Prevention/Abstinence:   Avoid High Risk Behavior:   Unintended Pregnancy:    Lap-shoulder Belts:  yes  Bike/ATV Motorcycle Helmets:  N/A  Smoke Detector:  yes  Safe Storage/Removal of Firearms: reviewed    Regular Dental Visits:  yes  Floss, Brush and Fluoride: yes      Diabetes   He presents for his follow-up diabetic visit. He has type 2 diabetes mellitus. Associated symptoms include polyuria. Pertinent negatives for diabetes include no chest pain, no foot paresthesias, no foot ulcerations and no weight loss. His home blood glucose trend is fluctuating dramatically. His breakfast blood glucose range is generally 140-180 mg/dl.     Review of Systems   Constitutional: Positive for activity change (increased activity at work). Negative for weight loss.   HENT: Positive for sinus pressure (flonase is working welloral OTCs don't seem to help).    Eyes: Positive for itching (due to allergies).   Respiratory: Negative.    Cardiovascular: Negative.  Negative for chest pain.   Gastrointestinal: Negative.    Endocrine: Positive for polyuria.   Genitourinary: Positive for frequency.   Musculoskeletal: Positive for  myalgias (from increased activity at work.).   Skin: Negative.    Allergic/Immunologic: Negative.    Neurological: Negative.    Hematological: Negative.    Psychiatric/Behavioral: Negative.        Objective:      Physical Exam   Constitutional: He is oriented to person, place, and time. He appears well-developed and well-nourished.   HENT:   Head: Normocephalic and atraumatic.   Right Ear: Tympanic membrane, external ear and ear canal normal.   Left Ear: Tympanic membrane, external ear and ear canal normal.   Nose: Nose normal.   Mouth/Throat: Oropharynx is clear and moist.   Eyes: Conjunctivae and EOM are normal.   Neck: Normal range of motion. Neck supple. No thyromegaly present.   Cardiovascular: Normal rate, regular rhythm and normal heart sounds.    Pulmonary/Chest: Effort normal and breath sounds normal.   Abdominal: Soft. He exhibits no distension. There is no tenderness.   Musculoskeletal: Normal range of motion. He exhibits no edema.   Lymphadenopathy:     He has no cervical adenopathy.   Neurological: He is alert and oriented to person, place, and time.   Skin: Skin is warm and dry.   Psychiatric: He has a normal mood and affect. His behavior is normal.         Assessment/Plan:     1. Wellness examination     2. Type 2 diabetes mellitus with diabetic neuropathy, with long-term current use of insulin  Hemoglobin A1c    Hemoglobin A1c   3. Mixed hyperlipidemia  Lipid panel   4. Hypertension, essential  Basic metabolic panel   5. Stress reaction  Ambulatory referral to Psychiatry    recommend counseling with Mr. Coronado or other counselor for tools/coping.  Colonoscopy scheduling - staff to assist with this.  He is on med mgmt with Ms Kearney - new start on trulicity. Stay on it! He will reschedule with DM ed.

## 2017-07-01 DIAGNOSIS — E78.2 MIXED HYPERLIPIDEMIA: ICD-10-CM

## 2017-07-01 LAB
ESTIMATED AVG GLUCOSE: 309 MG/DL
HBA1C MFR BLD HPLC: 12.4 %

## 2017-07-03 ENCOUNTER — OUTPATIENT CASE MANAGEMENT (OUTPATIENT)
Dept: ADMINISTRATIVE | Facility: OTHER | Age: 52
End: 2017-07-03

## 2017-07-03 RX ORDER — ATORVASTATIN CALCIUM 40 MG/1
40 TABLET, FILM COATED ORAL DAILY
Qty: 90 TABLET | Refills: 0 | Status: SHIPPED | OUTPATIENT
Start: 2017-07-03 | End: 2017-11-20 | Stop reason: SDUPTHER

## 2017-07-03 NOTE — PROGRESS NOTES
7/3/17  Chart reviewed in epic and read note from PCP that was seen last week.  He states pt cannot communicate due to cannot receive calls during work and it is a big problem.  Pt was states he is not getting information from [facility] but I am not sure why he cannot log on and view any messages in place.  Plan is for the pt to go back to diabetes nurse educator and he told pcp that he is taking the new medication trulicity.  No response from message sent via pt portal.  Call to pt today and again left message requesting call back to assist with diabetes management.  Case closed due to unable to contact    6/26/17  Call to pt home and cell phone and messages left requesting call back.  Message sent via portal as well.  Will send message to pcp as pt has appointment on 6/30/17

## 2017-09-12 RX ORDER — LISINOPRIL 5 MG/1
5 TABLET ORAL DAILY
Qty: 90 TABLET | Refills: 1 | Status: SHIPPED | OUTPATIENT
Start: 2017-09-12 | End: 2018-03-15 | Stop reason: SDUPTHER

## 2017-09-15 ENCOUNTER — PATIENT OUTREACH (OUTPATIENT)
Dept: ADMINISTRATIVE | Facility: HOSPITAL | Age: 52
End: 2017-09-15

## 2017-09-22 ENCOUNTER — CLINICAL SUPPORT (OUTPATIENT)
Dept: INTERNAL MEDICINE | Facility: CLINIC | Age: 52
End: 2017-09-22
Payer: COMMERCIAL

## 2017-09-22 ENCOUNTER — OFFICE VISIT (OUTPATIENT)
Dept: DIABETES | Facility: CLINIC | Age: 52
End: 2017-09-22
Payer: COMMERCIAL

## 2017-09-22 VITALS
BODY MASS INDEX: 33.14 KG/M2 | SYSTOLIC BLOOD PRESSURE: 138 MMHG | WEIGHT: 223.75 LBS | HEIGHT: 69 IN | DIASTOLIC BLOOD PRESSURE: 88 MMHG

## 2017-09-22 DIAGNOSIS — E11.59 HYPERTENSION ASSOCIATED WITH DIABETES: ICD-10-CM

## 2017-09-22 DIAGNOSIS — E11.40 TYPE 2 DIABETES MELLITUS WITH DIABETIC NEUROPATHY, UNSPECIFIED LONG TERM INSULIN USE STATUS: Primary | ICD-10-CM

## 2017-09-22 DIAGNOSIS — E66.9 OBESITY (BMI 30-39.9): ICD-10-CM

## 2017-09-22 DIAGNOSIS — E11.40 TYPE 2 DIABETES MELLITUS WITH DIABETIC NEUROPATHY, WITH LONG-TERM CURRENT USE OF INSULIN: ICD-10-CM

## 2017-09-22 DIAGNOSIS — I15.2 HYPERTENSION ASSOCIATED WITH DIABETES: ICD-10-CM

## 2017-09-22 DIAGNOSIS — Z79.4 TYPE 2 DIABETES MELLITUS WITH DIABETIC NEUROPATHY, WITH LONG-TERM CURRENT USE OF INSULIN: ICD-10-CM

## 2017-09-22 DIAGNOSIS — E78.5 HYPERLIPIDEMIA, UNSPECIFIED HYPERLIPIDEMIA TYPE: ICD-10-CM

## 2017-09-22 DIAGNOSIS — E11.40 TYPE 2 DIABETES MELLITUS WITH DIABETIC NEUROPATHY, UNSPECIFIED LONG TERM INSULIN USE STATUS: ICD-10-CM

## 2017-09-22 LAB — GLUCOSE SERPL-MCNC: 331 MG/DL (ref 70–110)

## 2017-09-22 PROCEDURE — 99214 OFFICE O/P EST MOD 30 MIN: CPT | Mod: S$GLB,,, | Performed by: NURSE PRACTITIONER

## 2017-09-22 PROCEDURE — 36415 COLL VENOUS BLD VENIPUNCTURE: CPT | Mod: S$GLB,,, | Performed by: FAMILY MEDICINE

## 2017-09-22 PROCEDURE — 83036 HEMOGLOBIN GLYCOSYLATED A1C: CPT

## 2017-09-22 PROCEDURE — 4010F ACE/ARB THERAPY RXD/TAKEN: CPT | Mod: S$GLB,,, | Performed by: NURSE PRACTITIONER

## 2017-09-22 PROCEDURE — 3008F BODY MASS INDEX DOCD: CPT | Mod: S$GLB,,, | Performed by: NURSE PRACTITIONER

## 2017-09-22 PROCEDURE — 99999 PR PBB SHADOW E&M-EST. PATIENT-LVL II: CPT | Mod: PBBFAC,,,

## 2017-09-22 PROCEDURE — 3075F SYST BP GE 130 - 139MM HG: CPT | Mod: S$GLB,,, | Performed by: NURSE PRACTITIONER

## 2017-09-22 PROCEDURE — 3046F HEMOGLOBIN A1C LEVEL >9.0%: CPT | Mod: S$GLB,,, | Performed by: NURSE PRACTITIONER

## 2017-09-22 PROCEDURE — 3079F DIAST BP 80-89 MM HG: CPT | Mod: S$GLB,,, | Performed by: NURSE PRACTITIONER

## 2017-09-22 PROCEDURE — 99999 PR PBB SHADOW E&M-EST. PATIENT-LVL III: CPT | Mod: PBBFAC,,, | Performed by: NURSE PRACTITIONER

## 2017-09-22 PROCEDURE — 82948 REAGENT STRIP/BLOOD GLUCOSE: CPT | Mod: S$GLB,,, | Performed by: NURSE PRACTITIONER

## 2017-09-22 RX ORDER — LANCETS 28 GAUGE
1 EACH MISCELLANEOUS 3 TIMES DAILY
Qty: 100 EACH | Refills: 11 | Status: SHIPPED | OUTPATIENT
Start: 2017-09-22 | End: 2018-08-24

## 2017-09-22 RX ORDER — INSULIN GLARGINE 300 [IU]/ML
65 INJECTION, SOLUTION SUBCUTANEOUS DAILY
Qty: 6 SYRINGE | Refills: 3 | Status: SHIPPED | OUTPATIENT
Start: 2017-09-22 | End: 2018-05-04 | Stop reason: SDUPTHER

## 2017-09-22 RX ORDER — METFORMIN HYDROCHLORIDE 1000 MG/1
1000 TABLET ORAL 2 TIMES DAILY WITH MEALS
Qty: 60 TABLET | Refills: 3 | Status: SHIPPED | OUTPATIENT
Start: 2017-09-22 | End: 2018-01-18 | Stop reason: SDUPTHER
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
## 2017-09-22 NOTE — LETTER
September 22, 2017      Sonia Estrella MD  [address] [place] [address] [address]ACI[address] [id number]           [facility]  [address] [place] [address]  [place] [address]  Phone: [phone]  Fax: [phone]          Patient: Brett Garcia   MR Number: [id number]   YOB: 1965   Date of Visit: 9/22/2017       Dear Dr. Sonia Estrella:    Thank you for referring Brett Garcia to me for evaluation. Attached you will find relevant portions of my assessment and plan of care.    If you have questions, please do not hesitate to call me. I look forward to following Brett Garcia along with you.    Sincerely,    Greta Kearney, PhD, NP-C    Enclosure  CC:  No Recipients    If you would like to receive this communication electronically, please contact [email or web address] or [phone] to request more information on [employer] access.    For providers and/or their staff who would like to refer a patient to [facility], please contact us through our one-stop-shop provider referral line, [facility], at [phone].    If you feel you have received this communication in error or would no longer like to receive these types of communications, please e-mail [email or web address]

## 2017-09-23 LAB
ESTIMATED AVG GLUCOSE: 335 MG/DL
HBA1C MFR BLD HPLC: 13.3 %

## 2017-09-27 ENCOUNTER — TELEPHONE (OUTPATIENT)
Dept: DIABETES | Facility: CLINIC | Age: 52
End: 2017-09-27

## 2017-09-27 NOTE — TELEPHONE ENCOUNTER
Initiated PA via cover my meds, and message popped up stating PA was not applicable for drug. Will contact pharmacy to follow up.

## 2017-09-27 NOTE — TELEPHONE ENCOUNTER
----- Message from Greta Kearney, PhD, NP-C sent at 9/26/2017  5:05 PM CDT -----  Contact: Pt  Will you please check on this.  Sounds like we need to do a PA.    Greta    ----- Message -----  From: Ashtyn Henry  Sent: 9/26/2017   5:00 PM  To: Greta Kearney, PhD, NP-C    Pt says he is having trouble getting his prescription    Pt says [facility] pharmacy says it was not approved     dulaglutide (TRULICITY) 1.5 mg/0.5 mL PnIj    Pt can be reached at [phone]    Thanks

## 2017-09-29 ENCOUNTER — OFFICE VISIT (OUTPATIENT)
Dept: INTERNAL MEDICINE | Facility: CLINIC | Age: 52
End: 2017-09-29
Payer: COMMERCIAL

## 2017-09-29 VITALS
DIASTOLIC BLOOD PRESSURE: 79 MMHG | OXYGEN SATURATION: 97 % | WEIGHT: 224 LBS | TEMPERATURE: 98 F | HEART RATE: 91 BPM | SYSTOLIC BLOOD PRESSURE: 129 MMHG | BODY MASS INDEX: 33.08 KG/M2

## 2017-09-29 DIAGNOSIS — Z23 IMMUNIZATION DUE: ICD-10-CM

## 2017-09-29 DIAGNOSIS — E11.42 PERIPHERAL SENSORY NEUROPATHY DUE TO TYPE 2 DIABETES MELLITUS: ICD-10-CM

## 2017-09-29 DIAGNOSIS — Z12.11 COLON CANCER SCREENING: ICD-10-CM

## 2017-09-29 DIAGNOSIS — Z79.4 TYPE 2 DIABETES MELLITUS WITH DIABETIC NEUROPATHY, WITH LONG-TERM CURRENT USE OF INSULIN: Primary | ICD-10-CM

## 2017-09-29 DIAGNOSIS — E11.40 TYPE 2 DIABETES MELLITUS WITH DIABETIC NEUROPATHY, WITH LONG-TERM CURRENT USE OF INSULIN: Primary | ICD-10-CM

## 2017-09-29 DIAGNOSIS — E78.2 MIXED HYPERLIPIDEMIA: ICD-10-CM

## 2017-09-29 PROCEDURE — 90686 IIV4 VACC NO PRSV 0.5 ML IM: CPT | Mod: S$GLB,,, | Performed by: FAMILY MEDICINE

## 2017-09-29 PROCEDURE — 99213 OFFICE O/P EST LOW 20 MIN: CPT | Mod: 25,S$GLB,, | Performed by: FAMILY MEDICINE

## 2017-09-29 PROCEDURE — 90471 IMMUNIZATION ADMIN: CPT | Mod: S$GLB,,, | Performed by: FAMILY MEDICINE

## 2017-09-29 PROCEDURE — 3008F BODY MASS INDEX DOCD: CPT | Mod: S$GLB,,, | Performed by: FAMILY MEDICINE

## 2017-09-29 PROCEDURE — 3074F SYST BP LT 130 MM HG: CPT | Mod: S$GLB,,, | Performed by: FAMILY MEDICINE

## 2017-09-29 PROCEDURE — 99999 PR PBB SHADOW E&M-EST. PATIENT-LVL IV: CPT | Mod: PBBFAC,,, | Performed by: FAMILY MEDICINE

## 2017-09-29 PROCEDURE — 3078F DIAST BP <80 MM HG: CPT | Mod: S$GLB,,, | Performed by: FAMILY MEDICINE

## 2017-09-29 NOTE — PROGRESS NOTES
Subjective:       Patient ID: Brett Garcia is a 51 y.o. male.    Chief Complaint: f/u labs    Here for recheck - he was off insulin/Trulicity for 3 months.  Had a recent visit with diabetes for medication management.  They're working on obtaining prior authorizations for history lists the and affordable short acting insulin.  His recent labs are reviewed.  A1c has climbed to 13.3.  Lab Results       Component                Value               Date                       WBC                      7.22                02/23/2017                 HGB                      15.8                02/23/2017                 HCT                      45.5                02/23/2017                 PLT                      176                 02/23/2017                 CHOL                     150                 06/30/2017                 TRIG                     90                  06/30/2017                 HDL                      38 (L)              06/30/2017                 LDLCALC                  94.0                06/30/2017                 ALT                      33                  02/23/2017                 AST                      29                  02/23/2017                 NA                       140                 06/30/2017                 K                        4.7                 06/30/2017                 CL                       107                 06/30/2017                 CALCIUM                  9.1                 06/30/2017                 CREATININE               0.9                 06/30/2017                 BUN                      16                  06/30/2017                 CO2                      25                  06/30/2017                 TSH                      0.659               06/30/2016                 PSA                      0.53                06/30/2016                 GLU                      132 (H)             06/30/2017                 ESTGFRAFRICA             >60.0                06/30/2017                 EGFRNONAA                >60.0               06/30/2017                 HGBA1C                   13.3 (H)            09/22/2017                 MICALBCREAT              7.7                 02/23/2017            Also on lipitor still, not on ACE for a month - wasn't sure what it was, and was not taking it.      Review of Systems   Cardiovascular: Negative for leg swelling.   Gastrointestinal: Negative for diarrhea.   Neurological: Positive for numbness (to toes - resolved on TCA).   Psychiatric/Behavioral: Negative for sleep disturbance.       Objective:      Physical Exam   Constitutional: He is oriented to person, place, and time. He appears well-developed.   HENT:   Head: Normocephalic and atraumatic.   Cardiovascular: Normal rate, regular rhythm and normal heart sounds.    Pulmonary/Chest: Effort normal and breath sounds normal.   Neurological: He is alert and oriented to person, place, and time.   Skin: Skin is warm and dry.   Psychiatric: He has a normal mood and affect. His behavior is normal.         Assessment/Plan:     1. Type 2 diabetes mellitus with diabetic neuropathy, with long-term current use of insulin  CBC auto differential    Comprehensive metabolic panel    Hemoglobin A1c    Microalbumin/creatinine urine ratio   2. Colon cancer screening  Case request GI: COLONOSCOPY   3. Mixed hyperlipidemia     4. Peripheral sensory neuropathy due to type 2 diabetes mellitus     5. Immunization due  Influenza - Quadrivalent (3 years & older) (PF)   DM working with him on affordable meds  He states he has plenty of amitriptyline 48 uses it nightly.  Recheck 4 months.

## 2017-10-04 ENCOUNTER — TELEPHONE (OUTPATIENT)
Dept: DIABETES | Facility: CLINIC | Age: 52
End: 2017-10-04

## 2017-10-04 NOTE — TELEPHONE ENCOUNTER
----- Message from Alma Pereira sent at 10/3/2017  4:55 PM CDT -----  Contact: Patient   Patient is calling to check on the status of the Rx Trulicity, Please call him at [phone].    Thanks  td

## 2017-11-03 ENCOUNTER — OFFICE VISIT (OUTPATIENT)
Dept: DIABETES | Facility: CLINIC | Age: 52
End: 2017-11-03
Payer: COMMERCIAL

## 2017-11-03 VITALS
BODY MASS INDEX: 33.18 KG/M2 | DIASTOLIC BLOOD PRESSURE: 70 MMHG | WEIGHT: 224 LBS | HEIGHT: 69 IN | SYSTOLIC BLOOD PRESSURE: 122 MMHG

## 2017-11-03 DIAGNOSIS — Z79.4 TYPE 2 DIABETES MELLITUS WITH DIABETIC NEUROPATHY, WITH LONG-TERM CURRENT USE OF INSULIN: Primary | ICD-10-CM

## 2017-11-03 DIAGNOSIS — E78.5 HYPERLIPIDEMIA, UNSPECIFIED HYPERLIPIDEMIA TYPE: ICD-10-CM

## 2017-11-03 DIAGNOSIS — I15.2 HYPERTENSION ASSOCIATED WITH DIABETES: ICD-10-CM

## 2017-11-03 DIAGNOSIS — E66.9 OBESITY (BMI 30-39.9): ICD-10-CM

## 2017-11-03 DIAGNOSIS — E11.40 TYPE 2 DIABETES MELLITUS WITH DIABETIC NEUROPATHY, WITH LONG-TERM CURRENT USE OF INSULIN: Primary | ICD-10-CM

## 2017-11-03 DIAGNOSIS — E11.59 HYPERTENSION ASSOCIATED WITH DIABETES: ICD-10-CM

## 2017-11-03 LAB — GLUCOSE SERPL-MCNC: 131 MG/DL (ref 70–110)

## 2017-11-03 PROCEDURE — 99999 PR PBB SHADOW E&M-EST. PATIENT-LVL III: CPT | Mod: PBBFAC,,, | Performed by: NURSE PRACTITIONER

## 2017-11-03 PROCEDURE — 82948 REAGENT STRIP/BLOOD GLUCOSE: CPT | Mod: S$GLB,,, | Performed by: NURSE PRACTITIONER

## 2017-11-03 PROCEDURE — 99214 OFFICE O/P EST MOD 30 MIN: CPT | Mod: S$GLB,,, | Performed by: NURSE PRACTITIONER
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
## 2017-11-19 DIAGNOSIS — E78.2 MIXED HYPERLIPIDEMIA: ICD-10-CM

## 2017-11-20 RX ORDER — ATORVASTATIN CALCIUM 40 MG/1
40 TABLET, FILM COATED ORAL DAILY
Qty: 90 TABLET | Refills: 0 | Status: SHIPPED | OUTPATIENT
Start: 2017-11-20 | End: 2018-03-07 | Stop reason: SDUPTHER

## 2017-12-22 ENCOUNTER — LAB VISIT (OUTPATIENT)
Dept: LAB | Facility: HOSPITAL | Age: 52
End: 2017-12-22
Attending: NURSE PRACTITIONER
Payer: COMMERCIAL

## 2017-12-22 DIAGNOSIS — Z79.4 TYPE 2 DIABETES MELLITUS WITH DIABETIC NEUROPATHY, WITH LONG-TERM CURRENT USE OF INSULIN: ICD-10-CM

## 2017-12-22 DIAGNOSIS — E11.40 TYPE 2 DIABETES MELLITUS WITH DIABETIC NEUROPATHY, WITH LONG-TERM CURRENT USE OF INSULIN: ICD-10-CM

## 2017-12-22 LAB
ALBUMIN SERPL BCP-MCNC: 3.5 G/DL
ALP SERPL-CCNC: 84 U/L
ALT SERPL W/O P-5'-P-CCNC: 44 U/L
ANION GAP SERPL CALC-SCNC: 8 MMOL/L
AST SERPL-CCNC: 34 U/L
BILIRUB SERPL-MCNC: 0.6 MG/DL
BUN SERPL-MCNC: 15 MG/DL
CALCIUM SERPL-MCNC: 9.2 MG/DL
CHLORIDE SERPL-SCNC: 104 MMOL/L
CO2 SERPL-SCNC: 26 MMOL/L
CREAT SERPL-MCNC: 0.8 MG/DL
EST. GFR  (AFRICAN AMERICAN): >60 ML/MIN/1.73 M^2
EST. GFR  (NON AFRICAN AMERICAN): >60 ML/MIN/1.73 M^2
ESTIMATED AVG GLUCOSE: 192 MG/DL
GLUCOSE SERPL-MCNC: 127 MG/DL
HBA1C MFR BLD HPLC: 8.3 %
POTASSIUM SERPL-SCNC: 4.4 MMOL/L
PROT SERPL-MCNC: 7.1 G/DL
SODIUM SERPL-SCNC: 138 MMOL/L

## 2017-12-22 PROCEDURE — 80053 COMPREHEN METABOLIC PANEL: CPT

## 2017-12-22 PROCEDURE — 83036 HEMOGLOBIN GLYCOSYLATED A1C: CPT

## 2017-12-22 PROCEDURE — 36415 COLL VENOUS BLD VENIPUNCTURE: CPT

## 2018-01-16 DIAGNOSIS — E11.40 TYPE 2 DIABETES MELLITUS WITH DIABETIC NEUROPATHY, UNSPECIFIED LONG TERM INSULIN USE STATUS: ICD-10-CM

## 2018-01-16 RX ORDER — DULAGLUTIDE 1.5 MG/.5ML
1.5 INJECTION, SOLUTION SUBCUTANEOUS WEEKLY
Qty: 2 SYRINGE | Refills: 3 | Status: SHIPPED | OUTPATIENT
Start: 2018-01-16 | End: 2018-05-17 | Stop reason: SDUPTHER

## 2018-01-18 DIAGNOSIS — E11.40 TYPE 2 DIABETES MELLITUS WITH DIABETIC NEUROPATHY, UNSPECIFIED LONG TERM INSULIN USE STATUS: ICD-10-CM

## 2018-01-18 RX ORDER — METFORMIN HYDROCHLORIDE 1000 MG/1
TABLET ORAL
Qty: 60 TABLET | Refills: 3 | Status: SHIPPED | OUTPATIENT
Start: 2018-01-18 | End: 2018-05-31 | Stop reason: SDUPTHER

## 2018-01-26 ENCOUNTER — CLINICAL SUPPORT (OUTPATIENT)
Dept: INTERNAL MEDICINE | Facility: CLINIC | Age: 53
End: 2018-01-26
Payer: COMMERCIAL

## 2018-01-26 DIAGNOSIS — Z79.4 TYPE 2 DIABETES MELLITUS WITH DIABETIC NEUROPATHY, WITH LONG-TERM CURRENT USE OF INSULIN: ICD-10-CM

## 2018-01-26 DIAGNOSIS — E11.40 TYPE 2 DIABETES MELLITUS WITH DIABETIC NEUROPATHY, WITH LONG-TERM CURRENT USE OF INSULIN: ICD-10-CM

## 2018-01-26 LAB
ALBUMIN SERPL BCP-MCNC: 3.6 G/DL
ALP SERPL-CCNC: 74 U/L
ALT SERPL W/O P-5'-P-CCNC: 41 U/L
ANION GAP SERPL CALC-SCNC: 7 MMOL/L
AST SERPL-CCNC: 33 U/L
BASOPHILS # BLD AUTO: 0.02 K/UL
BASOPHILS NFR BLD: 0.4 %
BILIRUB SERPL-MCNC: 0.6 MG/DL
BUN SERPL-MCNC: 16 MG/DL
CALCIUM SERPL-MCNC: 9.2 MG/DL
CHLORIDE SERPL-SCNC: 107 MMOL/L
CO2 SERPL-SCNC: 28 MMOL/L
CREAT SERPL-MCNC: 0.9 MG/DL
CREAT UR-MCNC: 368 MG/DL
DIFFERENTIAL METHOD: ABNORMAL
EOSINOPHIL # BLD AUTO: 0.2 K/UL
EOSINOPHIL NFR BLD: 3 %
ERYTHROCYTE [DISTWIDTH] IN BLOOD BY AUTOMATED COUNT: 14.9 %
EST. GFR  (AFRICAN AMERICAN): >60 ML/MIN/1.73 M^2
EST. GFR  (NON AFRICAN AMERICAN): >60 ML/MIN/1.73 M^2
ESTIMATED AVG GLUCOSE: 192 MG/DL
GLUCOSE SERPL-MCNC: 143 MG/DL
HBA1C MFR BLD HPLC: 8.3 %
HCT VFR BLD AUTO: 41.8 %
HGB BLD-MCNC: 14.2 G/DL
IMM GRANULOCYTES # BLD AUTO: 0.01 K/UL
IMM GRANULOCYTES NFR BLD AUTO: 0.2 %
LYMPHOCYTES # BLD AUTO: 1.8 K/UL
LYMPHOCYTES NFR BLD: 32 %
MCH RBC QN AUTO: 31.6 PG
MCHC RBC AUTO-ENTMCNC: 34 G/DL
MCV RBC AUTO: 93 FL
MICROALBUMIN UR DL<=1MG/L-MCNC: 36 UG/ML
MICROALBUMIN/CREATININE RATIO: 9.8 UG/MG
MONOCYTES # BLD AUTO: 0.4 K/UL
MONOCYTES NFR BLD: 7.2 %
NEUTROPHILS # BLD AUTO: 3.3 K/UL
NEUTROPHILS NFR BLD: 57.2 %
NRBC BLD-RTO: 0 /100 WBC
PLATELET # BLD AUTO: 179 K/UL
PMV BLD AUTO: 11.5 FL
POTASSIUM SERPL-SCNC: 4.5 MMOL/L
PROT SERPL-MCNC: 7 G/DL
RBC # BLD AUTO: 4.5 M/UL
SODIUM SERPL-SCNC: 142 MMOL/L
WBC # BLD AUTO: 5.69 K/UL

## 2018-01-26 PROCEDURE — 83036 HEMOGLOBIN GLYCOSYLATED A1C: CPT

## 2018-01-26 PROCEDURE — 85025 COMPLETE CBC W/AUTO DIFF WBC: CPT

## 2018-01-26 PROCEDURE — 82043 UR ALBUMIN QUANTITATIVE: CPT

## 2018-01-26 PROCEDURE — 99999 PR PBB SHADOW E&M-EST. PATIENT-LVL I: CPT | Mod: PBBFAC,,,

## 2018-01-26 PROCEDURE — 80053 COMPREHEN METABOLIC PANEL: CPT

## 2018-03-02 ENCOUNTER — OFFICE VISIT (OUTPATIENT)
Dept: DIABETES | Facility: CLINIC | Age: 53
End: 2018-03-02
Payer: COMMERCIAL

## 2018-03-02 ENCOUNTER — OFFICE VISIT (OUTPATIENT)
Dept: INTERNAL MEDICINE | Facility: CLINIC | Age: 53
End: 2018-03-02
Payer: COMMERCIAL

## 2018-03-02 VITALS
TEMPERATURE: 98 F | HEART RATE: 75 BPM | WEIGHT: 223.56 LBS | BODY MASS INDEX: 33.01 KG/M2 | DIASTOLIC BLOOD PRESSURE: 77 MMHG | SYSTOLIC BLOOD PRESSURE: 134 MMHG | OXYGEN SATURATION: 98 %

## 2018-03-02 VITALS
SYSTOLIC BLOOD PRESSURE: 116 MMHG | BODY MASS INDEX: 32.98 KG/M2 | HEIGHT: 69 IN | DIASTOLIC BLOOD PRESSURE: 76 MMHG | WEIGHT: 222.69 LBS

## 2018-03-02 DIAGNOSIS — Z79.4 TYPE 2 DIABETES MELLITUS WITH DIABETIC NEUROPATHY, WITH LONG-TERM CURRENT USE OF INSULIN: Primary | ICD-10-CM

## 2018-03-02 DIAGNOSIS — Z12.11 COLON CANCER SCREENING: ICD-10-CM

## 2018-03-02 DIAGNOSIS — E11.40 TYPE 2 DIABETES MELLITUS WITH DIABETIC NEUROPATHY, WITH LONG-TERM CURRENT USE OF INSULIN: Primary | ICD-10-CM

## 2018-03-02 DIAGNOSIS — E78.5 HYPERLIPIDEMIA, UNSPECIFIED HYPERLIPIDEMIA TYPE: ICD-10-CM

## 2018-03-02 DIAGNOSIS — I10 HYPERTENSION, ESSENTIAL: ICD-10-CM

## 2018-03-02 DIAGNOSIS — E11.59 HYPERTENSION ASSOCIATED WITH DIABETES: ICD-10-CM

## 2018-03-02 DIAGNOSIS — E66.9 OBESITY (BMI 30-39.9): ICD-10-CM

## 2018-03-02 DIAGNOSIS — E78.2 MIXED HYPERLIPIDEMIA: ICD-10-CM

## 2018-03-02 DIAGNOSIS — I15.2 HYPERTENSION ASSOCIATED WITH DIABETES: ICD-10-CM

## 2018-03-02 LAB — GLUCOSE SERPL-MCNC: >450 MG/DL (ref 70–110)

## 2018-03-02 PROCEDURE — 99214 OFFICE O/P EST MOD 30 MIN: CPT | Mod: S$GLB,,, | Performed by: NURSE PRACTITIONER

## 2018-03-02 PROCEDURE — 82948 REAGENT STRIP/BLOOD GLUCOSE: CPT | Mod: S$GLB,,, | Performed by: NURSE PRACTITIONER

## 2018-03-02 PROCEDURE — 3078F DIAST BP <80 MM HG: CPT | Mod: S$GLB,,, | Performed by: NURSE PRACTITIONER

## 2018-03-02 PROCEDURE — 99999 PR PBB SHADOW E&M-EST. PATIENT-LVL III: CPT | Mod: PBBFAC,,, | Performed by: NURSE PRACTITIONER

## 2018-03-02 PROCEDURE — 3078F DIAST BP <80 MM HG: CPT | Mod: S$GLB,,, | Performed by: FAMILY MEDICINE

## 2018-03-02 PROCEDURE — 3074F SYST BP LT 130 MM HG: CPT | Mod: S$GLB,,, | Performed by: NURSE PRACTITIONER

## 2018-03-02 PROCEDURE — 99213 OFFICE O/P EST LOW 20 MIN: CPT | Mod: S$GLB,,, | Performed by: FAMILY MEDICINE

## 2018-03-02 PROCEDURE — 99999 PR PBB SHADOW E&M-EST. PATIENT-LVL III: CPT | Mod: PBBFAC,,, | Performed by: FAMILY MEDICINE

## 2018-03-02 PROCEDURE — 3075F SYST BP GE 130 - 139MM HG: CPT | Mod: S$GLB,,, | Performed by: FAMILY MEDICINE

## 2018-03-02 RX ORDER — DEXTROSE 4 G
TABLET,CHEWABLE ORAL
Qty: 1 EACH | Refills: 0 | Status: SHIPPED | OUTPATIENT
Start: 2018-03-02 | End: 2023-01-30
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
## 2018-03-02 NOTE — PROGRESS NOTES
Subjective:       Patient ID: Brett Garcia is a 52 y.o. male.    Chief Complaint: f/u on lab work and diabetes check up    Patient with type 2 diabetes, hypertension, hyperlipidemia.  He has been seeing diabetes education for DM med adjustments.  He is here for follow-up to lab work obtained last month.  Reviewed his labs in depth.  Lab Results       Component                Value               Date                       WBC                      5.69                01/26/2018                 HGB                      14.2                01/26/2018                 HCT                      41.8                01/26/2018                 PLT                      179                 01/26/2018                 CHOL                     150                 06/30/2017                 TRIG                     90                  06/30/2017                 HDL                      38 (L)              06/30/2017                 LDLCALC                  94.0                06/30/2017                 ALT                      41                  01/26/2018                 AST                      33                  01/26/2018                 NA                       142                 01/26/2018                 K                        4.5                 01/26/2018                 CL                       107                 01/26/2018                 CALCIUM                  9.2                 01/26/2018                 CREATININE               0.9                 01/26/2018                 BUN                      16                  01/26/2018                 CO2                      28                  01/26/2018                 TSH                      0.659               06/30/2016                 PSA                      0.53                06/30/2016                 GLU                      143 (H)             01/26/2018                 ESTGFRAFRICA             >60.0               01/26/2018                  EGFRNONAA                >60.0               01/26/2018                 HGBA1C                   8.3 (H)             01/26/2018                 MICALBCREAT              9.8                 01/26/2018            Reviewed med changes - seeing DM Ed for med changes.  He has decreased pain to feet now.  Feels he is handling diet through portion control.  He plans to have more physical activity with warmer weather.      Diabetes   He presents for his follow-up diabetic visit. He has type 2 diabetes mellitus. No MedicAlert identification noted. The initial diagnosis of diabetes was made 8 years ago. Hypoglycemia symptoms include sweats (not assoc with low sugar - just with usual outdoor work). Pertinent negatives for hypoglycemia include no confusion, dizziness, headaches, hunger, mood changes, nervousness/anxiousness, pallor, seizures, sleepiness, speech difficulty or tremors. Associated symptoms include weight loss. Pertinent negatives for diabetes include no blurred vision, no chest pain, no fatigue, no foot paresthesias, no foot ulcerations, no polydipsia, no polyphagia, no polyuria, no visual change and no weakness. Pertinent negatives for hypoglycemia complications include no blackouts, no hospitalization, no nocturnal hypoglycemia, no required assistance and no required glucagon injection. Symptoms are improving. Pertinent negatives for diabetic complications include no autonomic neuropathy, CVA, heart disease, impotence, nephropathy, peripheral neuropathy, PVD or retinopathy. Risk factors for coronary artery disease include obesity, diabetes mellitus and male sex. Current diabetic treatment includes diet, insulin injections and oral agent (dual therapy). He is compliant with treatment all of the time. He is currently taking insulin at bedtime. Insulin injections are given by patient. Rotation sites for injection include the abdominal wall. His weight is decreasing steadily. He is following a generally healthy  diet. Meal planning includes avoidance of concentrated sweets and carbohydrate counting. He has had a previous visit with a dietitian. Exercise: Mon - Thurs. He monitors blood glucose at home 1-2 x per day. Blood glucose monitoring compliance is good. His home blood glucose trend is decreasing steadily. His breakfast blood glucose range is generally 130-140 mg/dl. (AM - 110 - 149. AC -180.  - 190.) An ACE inhibitor/angiotensin II receptor blocker is being taken. He sees a podiatrist.Eye exam is current.     Review of Systems   Constitutional: Positive for weight loss. Negative for fatigue.   Eyes: Negative for blurred vision.   Cardiovascular: Negative for chest pain.   Endocrine: Negative for polydipsia, polyphagia and polyuria.   Genitourinary: Negative for impotence.   Skin: Negative for pallor.   Neurological: Negative for dizziness, tremors, seizures, speech difficulty, weakness, numbness and headaches.   Psychiatric/Behavioral: Negative for confusion. The patient is not nervous/anxious.        Objective:      Physical Exam   Constitutional: He is oriented to person, place, and time. He appears well-developed.   HENT:   Head: Normocephalic and atraumatic.   Cardiovascular: Normal rate, regular rhythm and normal heart sounds.    Pulmonary/Chest: Effort normal and breath sounds normal.   Musculoskeletal: He exhibits no edema.   Neurological: He is alert and oriented to person, place, and time.   Skin: Skin is warm and dry.   Psychiatric: He has a normal mood and affect. His behavior is normal.         Assessment/Plan:     1. Type 2 diabetes mellitus with diabetic neuropathy, with long-term current use of insulin  blood-glucose meter Misc    blood sugar diagnostic Strp    Hemoglobin A1c   2. Colon cancer screening  Case request GI: COLONOSCOPY   3. Mixed hyperlipidemia  Lipid panel   4. Hypertension, essential      uncontrolled type 2 diabetes. He will set appt with DM ed and med adjustments through  them. Check with lipids and A1C in 4 months.  Fair control on atorvastatin 40.  Blood pressure controlled.  No changes to medications at this time.

## 2018-03-04 RX ORDER — AMITRIPTYLINE HYDROCHLORIDE 50 MG/1
TABLET, FILM COATED ORAL
Qty: 30 TABLET | Refills: 11 | Status: SHIPPED | OUTPATIENT
Start: 2018-03-04 | End: 2018-07-03 | Stop reason: RX

## 2018-03-07 ENCOUNTER — DOCUMENTATION ONLY (OUTPATIENT)
Dept: ENDOSCOPY | Facility: HOSPITAL | Age: 53
End: 2018-03-07

## 2018-03-07 DIAGNOSIS — E78.2 MIXED HYPERLIPIDEMIA: ICD-10-CM

## 2018-03-07 RX ORDER — ATORVASTATIN CALCIUM 40 MG/1
TABLET, FILM COATED ORAL
Qty: 90 TABLET | Refills: 3 | Status: SHIPPED | OUTPATIENT
Start: 2018-03-07 | End: 2019-03-16 | Stop reason: SDUPTHER

## 2018-03-07 NOTE — PROGRESS NOTES
3/7/2018 Message sent via [employer]    3/6/2018 Per [employer], Message was delivered on an answering machine

## 2018-03-15 RX ORDER — LISINOPRIL 5 MG/1
5 TABLET ORAL DAILY
Qty: 90 TABLET | Refills: 1 | Status: SHIPPED | OUTPATIENT
Start: 2018-03-15 | End: 2018-09-14 | Stop reason: SDUPTHER

## 2018-04-06 DIAGNOSIS — J30.2 SEASONAL ALLERGIC RHINITIS: ICD-10-CM

## 2018-04-06 RX ORDER — FLUTICASONE PROPIONATE 50 MCG
SPRAY, SUSPENSION (ML) NASAL
Qty: 1 BOTTLE | Refills: 12 | Status: SHIPPED | OUTPATIENT
Start: 2018-04-06 | End: 2019-05-05 | Stop reason: SDUPTHER

## 2018-05-04 DIAGNOSIS — E11.40 TYPE 2 DIABETES MELLITUS WITH DIABETIC NEUROPATHY: ICD-10-CM

## 2018-05-06 DIAGNOSIS — E11.40 TYPE 2 DIABETES MELLITUS WITH DIABETIC NEUROPATHY, WITH LONG-TERM CURRENT USE OF INSULIN: ICD-10-CM

## 2018-05-06 DIAGNOSIS — Z79.4 TYPE 2 DIABETES MELLITUS WITH DIABETIC NEUROPATHY, WITH LONG-TERM CURRENT USE OF INSULIN: ICD-10-CM

## 2018-05-06 RX ORDER — INSULIN GLARGINE 300 U/ML
65 INJECTION, SOLUTION SUBCUTANEOUS DAILY
Qty: 9 SYRINGE | Refills: 3 | Status: SHIPPED | OUTPATIENT
Start: 2018-05-06 | End: 2018-11-05 | Stop reason: SDUPTHER

## 2018-05-07 NOTE — TELEPHONE ENCOUNTER
Please contact patient.  This is for needles, not strips.  Is he actually looking for strips?    He gives himself insulin once a day, so he should have plenty of needles.  The sig is incorrect in the request.

## 2018-05-07 NOTE — TELEPHONE ENCOUNTER
Called and left a message for the patient to call the office back       My [facility] message sent

## 2018-05-08 RX ORDER — PEN NEEDLE, DIABETIC 30 GX3/16"
NEEDLE, DISPOSABLE MISCELLANEOUS
Qty: 100 EACH | Refills: 4 | Status: SHIPPED | OUTPATIENT
Start: 2018-05-08 | End: 2020-09-02 | Stop reason: SDUPTHER

## 2018-05-08 NOTE — TELEPHONE ENCOUNTER
Spoke with the patient.  This will be for needles.  I will rewrite the prescription.  It's once a day.

## 2018-05-08 NOTE — TELEPHONE ENCOUNTER
Called and left a detailed message for the patient asking to clarified which script he was needing.  We have not heard back

## 2018-05-17 DIAGNOSIS — E11.40 TYPE 2 DIABETES MELLITUS WITH DIABETIC NEUROPATHY: ICD-10-CM

## 2018-05-17 RX ORDER — DULAGLUTIDE 1.5 MG/.5ML
1.5 INJECTION, SOLUTION SUBCUTANEOUS WEEKLY
Qty: 4 SYRINGE | Refills: 3 | Status: SHIPPED | OUTPATIENT
Start: 2018-05-17 | End: 2018-05-21

## 2018-05-20 DIAGNOSIS — E11.40 TYPE 2 DIABETES MELLITUS WITH DIABETIC NEUROPATHY: ICD-10-CM

## 2018-05-21 RX ORDER — DULAGLUTIDE 1.5 MG/.5ML
1.5 INJECTION, SOLUTION SUBCUTANEOUS WEEKLY
Qty: 4 SYRINGE | Refills: 3 | Status: SHIPPED | OUTPATIENT
Start: 2018-05-21 | End: 2018-09-07 | Stop reason: SDUPTHER

## 2018-05-23 ENCOUNTER — TELEPHONE (OUTPATIENT)
Dept: DIABETES | Facility: CLINIC | Age: 53
End: 2018-05-23

## 2018-05-23 NOTE — TELEPHONE ENCOUNTER
Contacted pharmacy. Informed them fax was received from [facility] about patient's Trulicity not being covered, but med is actually covered by insurance company. Pharmacy rep said med went through, and no PA is required at this time.

## 2018-05-31 DIAGNOSIS — E11.40 TYPE 2 DIABETES MELLITUS WITH DIABETIC NEUROPATHY: ICD-10-CM

## 2018-05-31 RX ORDER — METFORMIN HYDROCHLORIDE 1000 MG/1
TABLET ORAL
Qty: 60 TABLET | Refills: 3 | Status: SHIPPED | OUTPATIENT
Start: 2018-05-31 | End: 2018-09-21 | Stop reason: SDUPTHER

## 2018-06-19 ENCOUNTER — PATIENT OUTREACH (OUTPATIENT)
Dept: ADMINISTRATIVE | Facility: HOSPITAL | Age: 53
End: 2018-06-19

## 2018-06-29 ENCOUNTER — PATIENT OUTREACH (OUTPATIENT)
Dept: ADMINISTRATIVE | Facility: HOSPITAL | Age: 53
End: 2018-06-29

## 2018-06-29 DIAGNOSIS — Z79.4 TYPE 2 DIABETES MELLITUS WITH DIABETIC NEUROPATHY, WITH LONG-TERM CURRENT USE OF INSULIN: ICD-10-CM

## 2018-06-29 DIAGNOSIS — E11.40 TYPE 2 DIABETES MELLITUS WITH DIABETIC NEUROPATHY, WITH LONG-TERM CURRENT USE OF INSULIN: ICD-10-CM

## 2018-06-29 NOTE — TELEPHONE ENCOUNTER
[facility] pharmacy sent a fax refill request for Contour Test strips.  Please advise.

## 2018-07-03 ENCOUNTER — TELEPHONE (OUTPATIENT)
Dept: INTERNAL MEDICINE | Facility: CLINIC | Age: 53
End: 2018-07-03

## 2018-07-03 DIAGNOSIS — G47.00 INSOMNIA, UNSPECIFIED TYPE: Primary | ICD-10-CM

## 2018-07-03 RX ORDER — AMITRIPTYLINE HYDROCHLORIDE 25 MG/1
50 TABLET, FILM COATED ORAL NIGHTLY PRN
Qty: 60 TABLET | Refills: 5 | Status: SHIPPED | OUTPATIENT
Start: 2018-07-03 | End: 2019-02-05 | Stop reason: SDUPTHER

## 2018-07-03 NOTE — TELEPHONE ENCOUNTER
Tried contacting the pt to inform of medication dose change. Unable to leave a message for the pt.

## 2018-07-06 ENCOUNTER — TELEPHONE (OUTPATIENT)
Dept: INTERNAL MEDICINE | Facility: CLINIC | Age: 53
End: 2018-07-06

## 2018-07-06 NOTE — TELEPHONE ENCOUNTER
----- Message from Rebeca Leslie sent at 7/5/2018  4:47 PM CDT -----  Contact: Self  Type:  Patient Returning Call    Who Called:  Patient   Who Left Message for Patient:  Sonia Cabrera  Does the patient know what this is regarding?:  No   Best Call Back Number:  [phone]   Additional Information:

## 2018-08-17 ENCOUNTER — OFFICE VISIT (OUTPATIENT)
Dept: INTERNAL MEDICINE | Facility: CLINIC | Age: 53
End: 2018-08-17
Payer: COMMERCIAL

## 2018-08-17 VITALS
DIASTOLIC BLOOD PRESSURE: 73 MMHG | OXYGEN SATURATION: 98 % | SYSTOLIC BLOOD PRESSURE: 118 MMHG | WEIGHT: 216.5 LBS | BODY MASS INDEX: 32.07 KG/M2 | TEMPERATURE: 98 F | HEART RATE: 88 BPM | HEIGHT: 69 IN

## 2018-08-17 DIAGNOSIS — E11.40 TYPE 2 DIABETES MELLITUS WITH DIABETIC NEUROPATHY, WITH LONG-TERM CURRENT USE OF INSULIN: ICD-10-CM

## 2018-08-17 DIAGNOSIS — Z79.4 TYPE 2 DIABETES MELLITUS WITH DIABETIC NEUROPATHY, WITH LONG-TERM CURRENT USE OF INSULIN: ICD-10-CM

## 2018-08-17 DIAGNOSIS — E78.2 MIXED HYPERLIPIDEMIA: Primary | ICD-10-CM

## 2018-08-17 DIAGNOSIS — Z12.11 SCREENING FOR COLORECTAL CANCER: ICD-10-CM

## 2018-08-17 DIAGNOSIS — Z12.12 SCREENING FOR COLORECTAL CANCER: ICD-10-CM

## 2018-08-17 DIAGNOSIS — L98.9 SKIN LESIONS: ICD-10-CM

## 2018-08-17 PROCEDURE — 99999 PR PBB SHADOW E&M-EST. PATIENT-LVL IV: CPT | Mod: PBBFAC,,, | Performed by: FAMILY MEDICINE

## 2018-08-17 PROCEDURE — 3074F SYST BP LT 130 MM HG: CPT | Mod: CPTII,S$GLB,, | Performed by: FAMILY MEDICINE

## 2018-08-17 PROCEDURE — 3078F DIAST BP <80 MM HG: CPT | Mod: CPTII,S$GLB,, | Performed by: FAMILY MEDICINE

## 2018-08-17 PROCEDURE — 99213 OFFICE O/P EST LOW 20 MIN: CPT | Mod: S$GLB,,, | Performed by: FAMILY MEDICINE

## 2018-08-17 PROCEDURE — 3045F PR MOST RECENT HEMOGLOBIN A1C LEVEL 7.0-9.0%: CPT | Mod: CPTII,S$GLB,, | Performed by: FAMILY MEDICINE
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
## 2018-08-24 ENCOUNTER — OFFICE VISIT (OUTPATIENT)
Dept: DIABETES | Facility: CLINIC | Age: 53
End: 2018-08-24
Payer: COMMERCIAL

## 2018-08-24 ENCOUNTER — CLINICAL SUPPORT (OUTPATIENT)
Dept: DIABETES | Facility: CLINIC | Age: 53
End: 2018-08-24
Payer: COMMERCIAL

## 2018-08-24 VITALS
WEIGHT: 212.5 LBS | HEIGHT: 69 IN | BODY MASS INDEX: 31.47 KG/M2 | SYSTOLIC BLOOD PRESSURE: 104 MMHG | DIASTOLIC BLOOD PRESSURE: 66 MMHG

## 2018-08-24 DIAGNOSIS — Z79.4 DIABETES MELLITUS DUE TO UNDERLYING CONDITION WITH DIABETIC NEUROPATHY, WITH LONG-TERM CURRENT USE OF INSULIN: Primary | ICD-10-CM

## 2018-08-24 DIAGNOSIS — E08.40 DIABETES MELLITUS DUE TO UNDERLYING CONDITION WITH DIABETIC NEUROPATHY, WITH LONG-TERM CURRENT USE OF INSULIN: Primary | ICD-10-CM

## 2018-08-24 LAB — GLUCOSE SERPL-MCNC: 125 MG/DL (ref 70–110)

## 2018-08-24 PROCEDURE — 99999 PR PBB SHADOW E&M-EST. PATIENT-LVL III: CPT | Mod: PBBFAC,,, | Performed by: PHYSICIAN ASSISTANT

## 2018-08-24 PROCEDURE — 3008F BODY MASS INDEX DOCD: CPT | Mod: CPTII,S$GLB,, | Performed by: PHYSICIAN ASSISTANT

## 2018-08-24 PROCEDURE — 3074F SYST BP LT 130 MM HG: CPT | Mod: CPTII,S$GLB,, | Performed by: PHYSICIAN ASSISTANT

## 2018-08-24 PROCEDURE — 95250 CONT GLUC MNTR PHYS/QHP EQP: CPT | Mod: S$GLB,,, | Performed by: PHYSICIAN ASSISTANT

## 2018-08-24 PROCEDURE — 3078F DIAST BP <80 MM HG: CPT | Mod: CPTII,S$GLB,, | Performed by: PHYSICIAN ASSISTANT

## 2018-08-24 PROCEDURE — 3045F PR MOST RECENT HEMOGLOBIN A1C LEVEL 7.0-9.0%: CPT | Mod: CPTII,S$GLB,, | Performed by: PHYSICIAN ASSISTANT

## 2018-08-24 PROCEDURE — 82948 REAGENT STRIP/BLOOD GLUCOSE: CPT | Mod: S$GLB,,, | Performed by: PHYSICIAN ASSISTANT

## 2018-08-24 PROCEDURE — 99214 OFFICE O/P EST MOD 30 MIN: CPT | Mod: S$GLB,,, | Performed by: PHYSICIAN ASSISTANT

## 2018-08-24 RX ORDER — LANCETS 33 GAUGE
1 EACH MISCELLANEOUS 3 TIMES DAILY
Qty: 100 EACH | Refills: 11 | Status: SHIPPED | OUTPATIENT
Start: 2018-08-24 | End: 2018-08-31 | Stop reason: SDUPTHER

## 2018-08-24 NOTE — PROGRESS NOTES
"Patient is here in clinic today for placement of continuous glucose monitoring system. Patient was provided a Freestyle Unique sensor.     A detailed explanation of CGMS was provided. Patient informed that this is a blind procedure and they will not actually see the blood sugar tracing in real time. Reviewed with the patient the Freestyle Unique Pro Sensor education handout, "What you need to know"  to review self-care during the study to avoid sensor loosening or removal ie...bathing, swimming, dressing, and exercising.      Instructed patient to check blood sugar using home glucometer and to record the following on provided patient log sheets: blood sugar taken at home, meals and snacks, activity, and diabetes medications taken and dosage      Sensor was inserted on back of left upper arm.  "

## 2018-08-24 NOTE — PROGRESS NOTES
Subjective:      Patient ID: Brett Garcia is a 52 y.o. male.    PCP: Brandt Laurent MD      Brett Garcia is a pleasant 52 y.o. male presenting to follow up on diabetes mellitus. Also, pertinent to this visit in decision making is hypertension, hyperlipidemia, and compliance. He has had diabetes for 4 or more years. His last visit in Diabetes Management was Visit date not found. Since that time he has had mild improvement in his glycemia, current A1c is 8.3%. His blood sugar range fasting has been 150-160 and fed has been 200+, and he has been monitoring 2-3 times per day. His current concerns are glycemic control.    He denies any hospital admissions, emergency room visits, hypoglycemia, syncope, diaphoresis, chest pain, or dyspnea.    He has lost 2 pounds since last visit. His BMI is 31.38 kg/m².    His blood sugar in the clinic today was:   Lab Results   Component Value Date    POCGLU 125 08/24/2018       We discussed the American diabetes Association recommendations:  hemoglobin A1c below 7.0%; all diabetics should be on statins unless contraindicated; one aspirin daily unless contraindicated; fasting blood sugar between 80 and 130 mg/dL; postprandial blood sugar below 180 mg/dl; prevention of hypoglycemia, may adjust goals to higher levels if persistent; ACE or ARB therapy if not contraindicated; and maintain in an ideal body weight with BMI below 25.    Brett is compliant most of the time with DM medications.     Brett is compliant most of the time with lifestyle modifications to include activity and meal planning.       Current Outpatient Medications:     amitriptyline (ELAVIL) 25 MG tablet, Take 2 tablets (50 mg total) by mouth nightly as needed for Insomnia., Disp: 60 tablet, Rfl: 5    aspirin (ECOTRIN) 81 MG EC tablet, Take 81 mg by mouth once daily., Disp: , Rfl:     atorvastatin (LIPITOR) 40 MG tablet, TAKE 1 TABLET EVERY DAY, Disp: 90 tablet, Rfl: 3    blood-glucose meter Misc, TID. Fill  "with any meter preferred by formulary., Disp: 1 each, Rfl: 0    fluticasone (FLONASE) 50 mcg/actuation nasal spray, USE 2 SPRAYS BY EACH NARE ROUTE ONCE DAILY. EACH NARE, Disp: 1 Bottle, Rfl: 12    lisinopril (PRINIVIL,ZESTRIL) 5 MG tablet, TAKE 1 TABLET (5 MG TOTAL) BY MOUTH ONCE DAILY., Disp: 90 tablet, Rfl: 1    metFORMIN (GLUCOPHAGE) 1000 MG tablet, TAKE 1 TABLET BY MOUTH TWICE A DAY WITH MEALS, Disp: 60 tablet, Rfl: 3    pen needle, diabetic (BD ULTRA-FINE MINI PEN NEEDLE) 31 gauge x 3/16" Ndle, Use once daily with insulin pen as directed, Disp: 100 each, Rfl: 4    TOUJEO SOLOSTAR U-300 INSULIN 300 unit/mL (1.5 mL) InPn pen, INJECT 65 UNITS INTO THE SKIN ONCE DAILY., Disp: 9 Syringe, Rfl: 3    TRULICITY 1.5 mg/0.5 mL PnIj, INJECT 1.5 MG INTO THE SKIN ONCE A WEEK., Disp: 4 Syringe, Rfl: 3    STANDARDS OF CARE:  Current Ophthalmologist / Optometrist: Dr. Austin,  Last exam as noted, 3 / 2017    Current Podiatrist:  None  Recommend regular exams and denies gums bleeding.  ACE / ARB: Yes   Statin: Yes     ACTIVITY LEVEL: Moderately Active  EXERCISE:  None  MEAL PLANNING: Patient reports number of meals per day to be 3 and number of snacks per day to be 2.   Breakfast can be light Yoplait yogurt, banana, protein bar.  Mid morning snack, celery stick, humus, carrots. Lunch can be wheat bread, 6 slices of turkey meat, rudolph, lettuce, tomato, cucumber ( vinegar, oil ).  Dinner can be leftovers OR mexican rice, potatoes.   Beverages include water, crystal light packet.     Health Maintenance   Topic Date Due    Colonoscopy  12/26/2013    Eye Exam  03/07/2018    Lipid Panel  06/30/2018    Hemoglobin A1c  07/26/2018    Influenza Vaccine  08/01/2018    Foot Exam  11/03/2018    TETANUS VACCINE  07/25/2024    Pneumococcal PPSV23 (Medium Risk) (2) 12/26/2028    Hepatitis C Screening  Completed       Diabetes Status:   Diabetes Management Status    Statin: Taking  ACE/ARB: Taking    Screening or Prevention " Patient's value Goal Complete/Controlled?   HgA1C Testing and Control   Lab Results   Component Value Date    HGBA1C 8.3 (H) 01/26/2018      Annually/Less than 8% No   Lipid profile : 06/30/2017 Annually No   LDL control Lab Results   Component Value Date    LDLCALC 94.0 06/30/2017    Annually/Less than 100 mg/dl  No   Nephropathy screening Lab Results   Component Value Date    LABMICR 36.0 01/26/2018     Lab Results   Component Value Date    PROTEINUA Trace (A) 06/30/2016    Annually Yes   Blood pressure BP Readings from Last 1 Encounters:   08/31/18 122/66    Less than 140/90 Yes   Dilated retinal exam : 03/07/2017 Annually No   Foot exam   : 08/24/2018 Annually Yes     The following results were reviewed with patient.    Lab Results   Component Value Date    WBC 5.69 01/26/2018    HGB 14.2 01/26/2018    HCT 41.8 01/26/2018     01/26/2018    CHOL 150 06/30/2017    TRIG 90 06/30/2017    HDL 38 (L) 06/30/2017    LDLCALC 94.0 06/30/2017    ALT 41 01/26/2018    AST 33 01/26/2018     01/26/2018    K 4.5 01/26/2018     01/26/2018    ANIONGAP 7 (L) 01/26/2018    CREATININE 0.9 01/26/2018    ESTGFRAFRICA >60.0 01/26/2018    EGFRNONAA >60.0 01/26/2018    BUN 16 01/26/2018    CO2 28 01/26/2018    TSH 0.659 06/30/2016    PSA 0.53 06/30/2016     (H) 01/26/2018       Lab Results   Component Value Date    HGBA1C 8.3 (H) 01/26/2018    HGBA1C 8.3 (H) 12/22/2017    HGBA1C 13.3 (H) 09/22/2017       Lab Results   Component Value Date    GLUTAMICACID 0.00 04/21/2017    CPEPTIDE 2.4 04/21/2017     Lab Results   Component Value Date    TSH 0.659 06/30/2016     Lab Results   Component Value Date    CALCIUM 9.2 01/26/2018     Review of patient's allergies indicates:  No Known Allergies    Past Medical History:   Diagnosis Date    Diabetes mellitus with neuropathy 2009    BS 57 am 12/18/2015    Hyperlipidemia 2009    Hypertension associated with diabetes 4/21/2017       Review of Systems   Constitutional:  Negative.  Negative for activity change, appetite change, chills, diaphoresis, fatigue, fever and unexpected weight change.   HENT: Negative.  Negative for dental problem, facial swelling, hearing loss, nosebleeds, trouble swallowing and voice change.    Eyes: Negative.  Negative for photophobia, pain, discharge, redness, itching and visual disturbance.   Respiratory: Negative.  Negative for cough, choking, chest tightness, shortness of breath and wheezing.    Cardiovascular: Negative.  Negative for chest pain, palpitations and leg swelling.   Gastrointestinal: Negative.  Negative for abdominal distention, abdominal pain, blood in stool, constipation, diarrhea, nausea and vomiting.   Endocrine: Negative.  Negative for cold intolerance, heat intolerance, polydipsia, polyphagia and polyuria.   Genitourinary: Negative.  Negative for decreased urine volume, difficulty urinating, dysuria, frequency, scrotal swelling, testicular pain and urgency.   Musculoskeletal: Negative.  Negative for arthralgias, back pain, gait problem, joint swelling, myalgias, neck pain and neck stiffness.   Skin: Negative.  Negative for color change, pallor, rash and wound.   Allergic/Immunologic: Negative.  Negative for environmental allergies, food allergies and immunocompromised state.   Neurological: Negative.  Negative for dizziness, tremors, seizures, syncope, facial asymmetry, speech difficulty, weakness, light-headedness, numbness and headaches.   Hematological: Negative.  Negative for adenopathy. Does not bruise/bleed easily.   Psychiatric/Behavioral: Negative.  Negative for agitation, behavioral problems, confusion, decreased concentration, dysphoric mood, hallucinations, self-injury, sleep disturbance and suicidal ideas. The patient is not nervous/anxious and is not hyperactive.       Objective:     Vitals - 1 value per visit 3/2/2018 8/17/2018 8/24/2018   SYSTOLIC 116 118 104   DIASTOLIC 76 73 66   PULSE - 88 -   TEMPERATURE - 98.1 -  "  RESPIRATIONS - - -   SPO2 - 98 -   Weight (lb) 222.66 216.49 212.52   Weight (kg) 101 98.2 96.4   HEIGHT 5' 9" 5' 9" 5' 9"   BODY MASS INDEX 32.88 31.97 31.38   VISIT REPORT - - -   Pain Score  0 0 0       Physical Exam   Constitutional: He is oriented to person, place, and time. He appears well-developed and well-nourished. He is cooperative.  Non-toxic appearance. He does not have a sickly appearance. He does not appear ill. No distress. He is not intubated.   HENT:   Head: Normocephalic and atraumatic. Not macrocephalic and not microcephalic. Head is without raccoon's eyes, without Huntley's sign, without abrasion, without contusion, without laceration, without right periorbital erythema and without left periorbital erythema. Hair is normal.   Right Ear: External ear normal. No lacerations. No drainage, swelling or tenderness. No foreign bodies. No mastoid tenderness. Tympanic membrane is not injected, not scarred, not perforated, not erythematous, not retracted and not bulging. Tympanic membrane mobility is normal. No middle ear effusion. No hemotympanum. No decreased hearing is noted.   Left Ear: External ear normal. No lacerations. No drainage, swelling or tenderness. No foreign bodies. No mastoid tenderness. Tympanic membrane is not injected, not scarred, not perforated, not erythematous, not retracted and not bulging. Tympanic membrane mobility is normal.  No middle ear effusion. No hemotympanum. No decreased hearing is noted.   Nose: Nose normal.   Mouth/Throat: Oropharynx is clear and moist.   Eyes: EOM and lids are normal. Pupils are equal, round, and reactive to light. Right eye exhibits no chemosis, no discharge, no exudate and no hordeolum. No foreign body present in the right eye. Left eye exhibits no chemosis, no discharge, no exudate and no hordeolum. No foreign body present in the left eye. Right conjunctiva is not injected. Right conjunctiva has no hemorrhage. Left conjunctiva is not injected. " Left conjunctiva has no hemorrhage. No scleral icterus. Right eye exhibits normal extraocular motion and no nystagmus. Left eye exhibits normal extraocular motion and no nystagmus. Right pupil is round and reactive. Left pupil is round and reactive. Pupils are equal.   Neck: Normal range of motion, full passive range of motion without pain and phonation normal. Neck supple. Normal carotid pulses, no hepatojugular reflux and no JVD present. No tracheal tenderness, no spinous process tenderness and no muscular tenderness present. Carotid bruit is not present. No neck rigidity. No tracheal deviation, no edema, no erythema and normal range of motion present. No thyroid mass and no thyromegaly present.   Cardiovascular: Normal rate, regular rhythm, normal heart sounds and intact distal pulses.  No extrasystoles are present. PMI is not displaced. Exam reveals no gallop, no friction rub and no decreased pulses.   No murmur heard.  Pulses:       Carotid pulses are 2+ on the right side, and 2+ on the left side.       Radial pulses are 2+ on the right side, and 2+ on the left side.        Dorsalis pedis pulses are 2+ on the right side, and 2+ on the left side.        Posterior tibial pulses are 2+ on the right side, and 2+ on the left side.   Pulmonary/Chest: Effort normal and breath sounds normal. No accessory muscle usage or stridor. No apnea, no tachypnea and no bradypnea. He is not intubated. No respiratory distress. He has no decreased breath sounds. He has no wheezes. He has no rhonchi. He has no rales. He exhibits no tenderness.   Abdominal: Soft. Bowel sounds are normal. He exhibits no shifting dullness, no distension, no pulsatile liver, no fluid wave, no abdominal bruit, no ascites, no pulsatile midline mass and no mass. There is no hepatosplenomegaly, splenomegaly or hepatomegaly. There is no tenderness. There is no rigidity, no rebound, no guarding, no CVA tenderness and no tenderness at McBurney's point. No  hernia. Hernia confirmed negative in the ventral area.   Musculoskeletal: Normal range of motion. He exhibits no edema or tenderness.        Right foot: There is normal range of motion and no deformity.        Left foot: There is normal range of motion and no deformity.   Feet:   Right Foot:   Protective Sensation: 6 sites tested. 6 sites sensed.   Skin Integrity: Negative for ulcer, blister, skin breakdown, erythema, warmth, callus or dry skin.   Left Foot:   Protective Sensation: 6 sites tested. 6 sites sensed.   Skin Integrity: Negative for ulcer, blister, skin breakdown, erythema, warmth, callus or dry skin.   Lymphadenopathy:        Head (right side): No submental, no submandibular, no tonsillar, no preauricular, no posterior auricular and no occipital adenopathy present.        Head (left side): No submental, no submandibular, no tonsillar, no preauricular, no posterior auricular and no occipital adenopathy present.     He has no cervical adenopathy.        Right cervical: No superficial cervical, no deep cervical and no posterior cervical adenopathy present.       Left cervical: No superficial cervical, no deep cervical and no posterior cervical adenopathy present.   Neurological: He is alert and oriented to person, place, and time. He has normal reflexes. He displays no atrophy and no tremor. No cranial nerve deficit or sensory deficit. He exhibits normal muscle tone. He displays no seizure activity. Coordination and gait normal.   Reflex Scores:       Bicep reflexes are 2+ on the right side and 2+ on the left side.       Brachioradialis reflexes are 2+ on the right side and 2+ on the left side.       Patellar reflexes are 2+ on the right side and 2+ on the left side.  Skin: Skin is warm and dry. No rash noted. No erythema. No pallor.   Psychiatric: He has a normal mood and affect. His mood appears not anxious. His affect is not angry, not blunt, not labile and not inappropriate. His speech is not rapid  and/or pressured, not delayed, not tangential and not slurred. He is not agitated, not aggressive, not hyperactive, not slowed, not withdrawn, not actively hallucinating and not combative. Thought content is not paranoid and not delusional. Cognition and memory are not impaired. He does not express impulsivity or inappropriate judgment. He does not exhibit a depressed mood. He expresses no homicidal and no suicidal ideation. He expresses no suicidal plans and no homicidal plans. He is communicative. He exhibits normal recent memory and normal remote memory. He is attentive.     Assessment:     1. Diabetes mellitus due to underlying condition with diabetic neuropathy, with long-term current use of insulin       Plan:     Brett Garcia is seen today for   1. Diabetes mellitus due to underlying condition with diabetic neuropathy, with long-term current use of insulin      We have discussed the etiology and treatment options associated with the diagnosis as well as alternatives. He has elected the following treatments.     Diabetes mellitus due to underlying condition with diabetic neuropathy, with long-term current use of insulin  -     POCT glucose  -     GLUCOSE MONITORING CONTINUOUS MIN 72 HOURS; Future  -     Hemoglobin A1c; Future; Expected date: 08/24/2018  - Continue with D&E, reduce portion size, and restrict carbohydrates (no more that 45 grams ) per meal.  - F/U in 3 months    1.) Patient was instructed to continue to monitor blood glucose 4 times daily. Reminded to bring BG meter or record to each visit for review.  2.) Reviewed pathophysiology of type 2 diabetes, complications related to the disease, importance of annual dilated eye exam and self daily foot examination.  3.) Continue medications as prescribed Toujeo; Trulicity; Metformin. [facility] MyChart or Phone review in 1 week with BG records for adjustment of medication.  4.) Dietician/CDE evaluation for ongoing meal planning, carbohydrate counting,  and diabetic education.  5.) Discussed activity, benefits, methods, and precautions. Recommended patient start/continue some form of exercise and increase as tolerated to 60 minutes per day to facilitate weight loss and aid in control of BGs. Also reminded patient of WHO recommendation of 10,000 steps daily as a goal.   6.) A1C, TSH, Lipid Panel, CMP/Renal panel with eGFR and Micro/Creatinine.  7.) Return to clinic in 12 weeks for follow up. Advised patient to call clinic with any questions or concerns.     A total of 30 minutes was spent in face to face time, of which 50 % was spent in counseling patient on disease process, complications, treatment, and side effects of medications.    The patient was explained the above plan and given opportunity to ask questions. He understands, chooses and consents to this plan and accepts all the risks, which include but are not limited to the risks mentioned above. He understands the alternative of having no testing, interventions or treatments at this time. He left content and without further questions.     Disclaimer:  This note is prepared using voice recognition software and as such is likely to have errors and has not been proof read. Please contact me for questions.

## 2018-08-31 ENCOUNTER — OFFICE VISIT (OUTPATIENT)
Dept: GASTROENTEROLOGY | Facility: CLINIC | Age: 53
End: 2018-08-31
Payer: COMMERCIAL

## 2018-08-31 VITALS
HEIGHT: 69 IN | HEART RATE: 77 BPM | WEIGHT: 216.25 LBS | SYSTOLIC BLOOD PRESSURE: 122 MMHG | DIASTOLIC BLOOD PRESSURE: 66 MMHG | BODY MASS INDEX: 32.03 KG/M2

## 2018-08-31 DIAGNOSIS — Z12.11 COLON CANCER SCREENING: Primary | ICD-10-CM

## 2018-08-31 PROCEDURE — 99999 PR PBB SHADOW E&M-EST. PATIENT-LVL III: CPT | Mod: PBBFAC,,, | Performed by: NURSE PRACTITIONER

## 2018-08-31 PROCEDURE — 99499 UNLISTED E&M SERVICE: CPT | Mod: S$GLB,,, | Performed by: NURSE PRACTITIONER

## 2018-08-31 RX ORDER — SODIUM, POTASSIUM,MAG SULFATES 17.5-3.13G
SOLUTION, RECONSTITUTED, ORAL ORAL
Qty: 354 ML | Refills: 0 | Status: ON HOLD | OUTPATIENT
Start: 2018-08-31 | End: 2018-11-01 | Stop reason: ALTCHOICE

## 2018-08-31 RX ORDER — BLOOD-GLUCOSE CONTROL, NORMAL
1 EACH MISCELLANEOUS 3 TIMES DAILY
Qty: 100 EACH | Refills: 11 | Status: SHIPPED | OUTPATIENT
Start: 2018-08-31 | End: 2023-01-29 | Stop reason: SDUPTHER

## 2018-08-31 NOTE — LETTER
August 31, 2018      Kushal Ramirez MD  [address] [facility] [address]           [facility]  [address] [place] [id number]  Phone: [phone]  Fax: [phone]          Patient: Brett Garcia   MR Number: [id number]   YOB: 1965   Date of Visit: 8/31/2018       Dear Dr. Kushal Ramirez:    Thank you for referring Brett Garcia to me for evaluation. Attached you will find relevant portions of my assessment and plan of care.    If you have questions, please do not hesitate to call me. I look forward to following Brett Garcia along with you.    Sincerely,    Jeniffer Barnett, NP    Enclosure  CC:  No Recipients    If you would like to receive this communication electronically, please contact [email or web address] or [phone] to request more information on [facility] [employer] access.    For providers and/or their staff who would like to refer a patient to [facility], please contact us through our one-stop-shop provider referral line, [facility], at [phone].    If you feel you have received this communication in error or would no longer like to receive these types of communications, please e-mail [email or web address]

## 2018-08-31 NOTE — TELEPHONE ENCOUNTER
----- Message from Mona Wiggins sent at 8/31/2018 10:45 AM CDT -----  Pt at [phone]//states his insurance does not cover the prescription that was sent in for him//please call//angel/lee ann

## 2018-08-31 NOTE — PROGRESS NOTES
Clinic Consult:  [facility] Consultation Note    Reason for Consult:  The encounter diagnosis was Colon cancer screening.    PCP: Brandt Laurent   [address] / [facility] [address]    HPI:  This is a 52 y.o. male here for evaluation for colon cancer screening. He was referred to me by Dr. Ramirez. He has never had a colonoscopy before. He denies having any current GI issues at this time. No abdominal pain, hematochezia, melena, nausea, vomiting, or weight loss.  No family history of colon cancer.     Review of Systems   Constitutional: Negative for fever, malaise/fatigue and weight loss.   HENT: Negative for sore throat.    Respiratory: Negative for cough and wheezing.    Cardiovascular: Negative for chest pain and palpitations.   Gastrointestinal: Negative for abdominal pain, blood in stool, constipation, diarrhea, heartburn, melena, nausea and vomiting.   Genitourinary: Negative for dysuria and frequency.   Musculoskeletal: Negative for back pain, joint pain, myalgias and neck pain.   Skin: Negative for itching and rash.   Neurological: Negative for dizziness, speech change, seizures, loss of consciousness and headaches.   Psychiatric/Behavioral: Negative for depression and substance abuse. The patient is not nervous/anxious.        Medical History:  has a past medical history of Diabetes mellitus with neuropathy (2009), Hyperlipidemia (2009), and Hypertension associated with diabetes (4/21/2017).    Surgical History:  has no past surgical history on file.    Family History: family history includes Cataracts in his father; Diabetes in his father; Heart attack in his father; Hypertension in his father, mother, and sister..     Social History:  reports that  has never smoked. He has quit using smokeless tobacco. He reports that he drinks about 1.2 oz of alcohol per week. He reports that he does not use drugs.    Allergies: Reviewed    Home Medications:   Current Outpatient Medications on File Prior  "to Visit   Medication Sig Dispense Refill    amitriptyline (ELAVIL) 25 MG tablet Take 2 tablets (50 mg total) by mouth nightly as needed for Insomnia. 60 tablet 5    aspirin (ECOTRIN) 81 MG EC tablet Take 81 mg by mouth once daily.      atorvastatin (LIPITOR) 40 MG tablet TAKE 1 TABLET EVERY DAY 90 tablet 3    blood sugar diagnostic Strp 1 strip by Misc.(Non-Drug; Combo Route) route 3 (three) times daily. True metrix test strips 100 strip 11    blood-glucose meter Misc TID. Fill with any meter preferred by formulary. 1 each 0    fluticasone (FLONASE) 50 mcg/actuation nasal spray USE 2 SPRAYS BY EACH NARE ROUTE ONCE DAILY. EACH NARE 1 Bottle 12    lancets 33 gauge Misc 1 lancet by Misc.(Non-Drug; Combo Route) route 3 (three) times daily. True metrix lancets 100 each 11    lisinopril (PRINIVIL,ZESTRIL) 5 MG tablet TAKE 1 TABLET (5 MG TOTAL) BY MOUTH ONCE DAILY. 90 tablet 1    metFORMIN (GLUCOPHAGE) 1000 MG tablet TAKE 1 TABLET BY MOUTH TWICE A DAY WITH MEALS 60 tablet 3    pen needle, diabetic (BD ULTRA-FINE MINI PEN NEEDLE) 31 gauge x 3/16" Ndle Use once daily with insulin pen as directed 100 each 4    TOUJEO SOLOSTAR U-300 INSULIN 300 unit/mL (1.5 mL) InPn pen INJECT 65 UNITS INTO THE SKIN ONCE DAILY. 9 Syringe 3    TRULICITY 1.5 mg/0.5 mL PnIj INJECT 1.5 MG INTO THE SKIN ONCE A WEEK. 4 Syringe 3     No current facility-administered medications on file prior to visit.        Physical Exam:  /66   Pulse 77   Ht 5' 9" (1.753 m)   Wt 98.1 kg (216 lb 4.3 oz)   BMI 31.94 kg/m²   Body mass index is 31.94 kg/m².  Physical Exam   Constitutional: He is oriented to person, place, and time and well-developed, well-nourished, and in no distress. No distress.   HENT:   Head: Normocephalic.   Eyes: Conjunctivae are normal. Pupils are equal, round, and reactive to light.   Cardiovascular: Normal rate, regular rhythm and normal heart sounds.   Pulmonary/Chest: Effort normal and breath sounds normal. No " respiratory distress.   Abdominal: Soft. Bowel sounds are normal. He exhibits no distension. There is no tenderness.   Neurological: He is alert and oriented to person, place, and time. No cranial nerve deficit.   Skin: Skin is warm and dry. No rash noted.   Psychiatric: Mood and affect normal.       Labs: Pertinent labs reviewed.    Assessment:  1. Colon cancer screening         Recommendations:  -     Case request GI: COLONOSCOPY  -     SUPREP BOWEL PREP KIT 17.5-3.13-1.6 gram SolR; Use as directed  Dispense: 354 mL; Refill: 0    Follow up to be determined after procedure.    Thank you so much for allowing me to participate in the care of JANE Malik

## 2018-09-07 ENCOUNTER — LAB VISIT (OUTPATIENT)
Dept: LAB | Facility: HOSPITAL | Age: 53
End: 2018-09-07
Attending: PHYSICIAN ASSISTANT
Payer: COMMERCIAL

## 2018-09-07 ENCOUNTER — CLINICAL SUPPORT (OUTPATIENT)
Dept: DIABETES | Facility: CLINIC | Age: 53
End: 2018-09-07
Payer: COMMERCIAL

## 2018-09-07 ENCOUNTER — OFFICE VISIT (OUTPATIENT)
Dept: DIABETES | Facility: CLINIC | Age: 53
End: 2018-09-07
Payer: COMMERCIAL

## 2018-09-07 ENCOUNTER — DOCUMENTATION ONLY (OUTPATIENT)
Dept: ENDOSCOPY | Facility: HOSPITAL | Age: 53
End: 2018-09-07

## 2018-09-07 VITALS
SYSTOLIC BLOOD PRESSURE: 108 MMHG | WEIGHT: 210.75 LBS | BODY MASS INDEX: 31.21 KG/M2 | DIASTOLIC BLOOD PRESSURE: 72 MMHG | HEIGHT: 69 IN

## 2018-09-07 DIAGNOSIS — Z79.4 DIABETES MELLITUS DUE TO UNDERLYING CONDITION WITH DIABETIC NEUROPATHY, WITH LONG-TERM CURRENT USE OF INSULIN: ICD-10-CM

## 2018-09-07 DIAGNOSIS — Z79.4 TYPE 2 DIABETES MELLITUS WITH DIABETIC NEUROPATHY, WITH LONG-TERM CURRENT USE OF INSULIN: ICD-10-CM

## 2018-09-07 DIAGNOSIS — E08.40 DIABETES MELLITUS DUE TO UNDERLYING CONDITION WITH DIABETIC NEUROPATHY, WITH LONG-TERM CURRENT USE OF INSULIN: ICD-10-CM

## 2018-09-07 DIAGNOSIS — E11.40 TYPE 2 DIABETES MELLITUS WITH DIABETIC NEUROPATHY, WITH LONG-TERM CURRENT USE OF INSULIN: ICD-10-CM

## 2018-09-07 LAB
ESTIMATED AVG GLUCOSE: 338 MG/DL
GLUCOSE SERPL-MCNC: 264 MG/DL (ref 70–110)
HBA1C MFR BLD HPLC: 13.4 %

## 2018-09-07 PROCEDURE — 99999 PR PBB SHADOW E&M-EST. PATIENT-LVL III: CPT | Mod: PBBFAC,,, | Performed by: PHYSICIAN ASSISTANT

## 2018-09-07 PROCEDURE — 95251 CONT GLUC MNTR ANALYSIS I&R: CPT | Mod: S$GLB,,, | Performed by: PHYSICIAN ASSISTANT

## 2018-09-07 PROCEDURE — 99214 OFFICE O/P EST MOD 30 MIN: CPT | Mod: S$GLB,,, | Performed by: PHYSICIAN ASSISTANT

## 2018-09-07 PROCEDURE — 83036 HEMOGLOBIN GLYCOSYLATED A1C: CPT

## 2018-09-07 PROCEDURE — 3074F SYST BP LT 130 MM HG: CPT | Mod: CPTII,S$GLB,, | Performed by: PHYSICIAN ASSISTANT

## 2018-09-07 PROCEDURE — 36415 COLL VENOUS BLD VENIPUNCTURE: CPT | Mod: PO

## 2018-09-07 PROCEDURE — 3046F HEMOGLOBIN A1C LEVEL >9.0%: CPT | Mod: CPTII,S$GLB,, | Performed by: PHYSICIAN ASSISTANT

## 2018-09-07 PROCEDURE — 3078F DIAST BP <80 MM HG: CPT | Mod: CPTII,S$GLB,, | Performed by: PHYSICIAN ASSISTANT

## 2018-09-07 PROCEDURE — 82948 REAGENT STRIP/BLOOD GLUCOSE: CPT | Mod: S$GLB,,, | Performed by: PHYSICIAN ASSISTANT

## 2018-09-07 PROCEDURE — 3008F BODY MASS INDEX DOCD: CPT | Mod: CPTII,S$GLB,, | Performed by: PHYSICIAN ASSISTANT

## 2018-09-07 RX ORDER — REPAGLINIDE 2 MG/1
2 TABLET ORAL
Qty: 270 TABLET | Refills: 3 | Status: SHIPPED | OUTPATIENT
Start: 2018-09-07 | End: 2020-09-04

## 2018-09-07 NOTE — PROGRESS NOTES
Patient returned to clinic today to return Glucose Sensor,.   The CGMS Sensor was scanned and downloaded. All reports were imported into the patient's electronic medical record. Physician Assistant will complete data interpretation and make recommendations at visit today.

## 2018-09-14 ENCOUNTER — OFFICE VISIT (OUTPATIENT)
Dept: PODIATRY | Facility: CLINIC | Age: 53
End: 2018-09-14
Payer: COMMERCIAL

## 2018-09-14 ENCOUNTER — TELEPHONE (OUTPATIENT)
Dept: DIABETES | Facility: CLINIC | Age: 53
End: 2018-09-14

## 2018-09-14 ENCOUNTER — INITIAL CONSULT (OUTPATIENT)
Dept: DERMATOLOGY | Facility: CLINIC | Age: 53
End: 2018-09-14
Payer: COMMERCIAL

## 2018-09-14 VITALS
SYSTOLIC BLOOD PRESSURE: 122 MMHG | HEIGHT: 69 IN | BODY MASS INDEX: 32.43 KG/M2 | HEART RATE: 90 BPM | DIASTOLIC BLOOD PRESSURE: 78 MMHG | WEIGHT: 218.94 LBS

## 2018-09-14 DIAGNOSIS — L81.4 LENTIGO: ICD-10-CM

## 2018-09-14 DIAGNOSIS — Z12.83 SCREENING, MALIGNANT NEOPLASM, SKIN: ICD-10-CM

## 2018-09-14 DIAGNOSIS — L82.1 SEBORRHEIC KERATOSES: ICD-10-CM

## 2018-09-14 DIAGNOSIS — Z79.4 TYPE 2 DIABETES MELLITUS WITH DIABETIC NEUROPATHY, WITH LONG-TERM CURRENT USE OF INSULIN: Primary | ICD-10-CM

## 2018-09-14 DIAGNOSIS — B35.3 TINEA PEDIS OF BOTH FEET: ICD-10-CM

## 2018-09-14 DIAGNOSIS — E11.40 TYPE 2 DIABETES MELLITUS WITH DIABETIC NEUROPATHY, WITH LONG-TERM CURRENT USE OF INSULIN: Primary | ICD-10-CM

## 2018-09-14 DIAGNOSIS — D22.9 BENIGN NEVUS: Primary | ICD-10-CM

## 2018-09-14 DIAGNOSIS — L30.4 INTERTRIGO: ICD-10-CM

## 2018-09-14 PROCEDURE — 3074F SYST BP LT 130 MM HG: CPT | Mod: CPTII,S$GLB,, | Performed by: STUDENT IN AN ORGANIZED HEALTH CARE EDUCATION/TRAINING PROGRAM

## 2018-09-14 PROCEDURE — 3046F HEMOGLOBIN A1C LEVEL >9.0%: CPT | Mod: CPTII,S$GLB,, | Performed by: PODIATRIST

## 2018-09-14 PROCEDURE — 3074F SYST BP LT 130 MM HG: CPT | Mod: CPTII,S$GLB,, | Performed by: PODIATRIST

## 2018-09-14 PROCEDURE — 3078F DIAST BP <80 MM HG: CPT | Mod: CPTII,S$GLB,, | Performed by: STUDENT IN AN ORGANIZED HEALTH CARE EDUCATION/TRAINING PROGRAM

## 2018-09-14 PROCEDURE — 99203 OFFICE O/P NEW LOW 30 MIN: CPT | Mod: S$GLB,,, | Performed by: PODIATRIST

## 2018-09-14 PROCEDURE — 3078F DIAST BP <80 MM HG: CPT | Mod: CPTII,S$GLB,, | Performed by: PODIATRIST

## 2018-09-14 PROCEDURE — 3008F BODY MASS INDEX DOCD: CPT | Mod: CPTII,S$GLB,, | Performed by: PODIATRIST

## 2018-09-14 PROCEDURE — 99203 OFFICE O/P NEW LOW 30 MIN: CPT | Mod: 25,S$GLB,, | Performed by: STUDENT IN AN ORGANIZED HEALTH CARE EDUCATION/TRAINING PROGRAM

## 2018-09-14 PROCEDURE — 99999 PR PBB SHADOW E&M-EST. PATIENT-LVL III: CPT | Mod: PBBFAC,,, | Performed by: STUDENT IN AN ORGANIZED HEALTH CARE EDUCATION/TRAINING PROGRAM

## 2018-09-14 PROCEDURE — 99999 PR PBB SHADOW E&M-EST. PATIENT-LVL III: CPT | Mod: PBBFAC,,, | Performed by: PODIATRIST

## 2018-09-14 PROCEDURE — 17110 DESTRUCTION B9 LES UP TO 14: CPT | Mod: S$GLB,,, | Performed by: STUDENT IN AN ORGANIZED HEALTH CARE EDUCATION/TRAINING PROGRAM

## 2018-09-14 RX ORDER — MICONAZOLE NITRATE 2 %
POWDER (GRAM) TOPICAL
Qty: 85 G | Refills: 3 | Status: SHIPPED | OUTPATIENT
Start: 2018-09-14 | End: 2022-06-14 | Stop reason: SDUPTHER

## 2018-09-14 RX ORDER — KETOCONAZOLE 20 MG/G
CREAM TOPICAL
Qty: 60 G | Refills: 3 | Status: SHIPPED | OUTPATIENT
Start: 2018-09-14 | End: 2021-08-04

## 2018-09-14 NOTE — PATIENT INSTRUCTIONS
Patient instructed to spray all shoes with Lysol disinfectant spray and let dry before wearing.   Patient instructed to wash all socks in hot water and bleach.   Patient instructed to purchase over the counter anti-fungal cream (Lotrimin or Lamisil) and apply to bottom of feet twice daily. Patient instructed not to place cream in between toes.

## 2018-09-14 NOTE — LETTER
September 22, 2018      Kushal Ramirez MD  [address]  [place] [address]           [facility]  [address]  [place] [address]  Phone: [phone]  Fax: [phone]          Patient: Brett Garcia   MR Number: [id number]   YOB: 1965   Date of Visit: 9/14/2018       Dear Dr. Kushal Ramirez:    Thank you for referring Brett Garcia to me for evaluation. Attached you will find relevant portions of my assessment and plan of care.    If you have questions, please do not hesitate to call me. I look forward to following Brett Garcia along with you.    Sincerely,    Reza Aguillon DPM    Enclosure  CC:  No Recipients    If you would like to receive this communication electronically, please contact [email or web address] or [phone] to request more information on [employer] access.    For providers and/or their staff who would like to refer a patient to [facility], please contact us through our one-stop-shop provider referral line, [facility], at [phone].    If you feel you have received this communication in error or would no longer like to receive these types of communications, please e-mail [email or web address]

## 2018-09-14 NOTE — TELEPHONE ENCOUNTER
----- Message from Rojelio Cabello sent at 9/14/2018  1:12 PM CDT -----  Contact: pt   Pt calling to give updates on blood sugar level.     Blood has gone done, pt has increased insulin to 80 units, blood sugar coming done 180/190,   Pt plan to start new script today will call in next Friday          [phone] (work)

## 2018-09-14 NOTE — PROGRESS NOTES
Subjective:       Patient ID:  Brett Garcia is a 52 y.o. male who presents for   Chief Complaint   Patient presents with    Skin Check     no concerns      History of Present Illness: The patient presents with chief complaint of skin check. Denies any rapidly growing, bleeding, or non healing skin lesions.   Location: entire body  Duration: n/a  Signs/Symptoms: n/a  Prior treatments: n/a    No history of NMSC and MM. No history of family of skin cancer. Does complain of a warty growth in groin that is often itchy, present for years.         Review of Systems   Constitutional: Negative for fever and chills.   Skin: Positive for activity-related sunscreen use (wears long sleeves when outside). Negative for itching, rash, daily sunscreen use and recent sunburn.   Hematologic/Lymphatic: Does not bruise/bleed easily.        Objective:    Physical Exam   Constitutional: He appears well-developed and well-nourished. No distress.   Neurological: He is alert and oriented to person, place, and time. He is not disoriented.   Psychiatric: He has a normal mood and affect.   Skin:   Areas Examined (abnormalities noted in diagram):   Scalp / Hair Palpated and Inspected  Head / Face Inspection Performed  Neck Inspection Performed  Chest / Axilla Inspection Performed  Abdomen Inspection Performed  Genitals / Buttocks / Groin Inspection Performed  Back Inspection Performed  RUE Inspected  LUE Inspection Performed  RLE Inspected  LLE Inspection Performed  Nails and Digits Inspection Performed                       Diagram Legend     Erythematous scaling macule/papule c/w actinic keratosis       Vascular papule c/w angioma      Pigmented verrucoid papule/plaque c/w seborrheic keratosis      Yellow umbilicated papule c/w sebaceous hyperplasia      Irregularly shaped tan macule c/w lentigo     1-2 mm smooth white papules consistent with Milia      Movable subcutaneous cyst with punctum c/w epidermal inclusion cyst       Subcutaneous movable cyst c/w pilar cyst      Firm pink to brown papule c/w dermatofibroma      Pedunculated fleshy papule(s) c/w skin tag(s)      Evenly pigmented macule c/w junctional nevus     Mildly variegated pigmented, slightly irregular-bordered macule c/w mildly atypical nevus      Flesh colored to evenly pigmented papule c/w intradermal nevus       Pink pearly papule/plaque c/w basal cell carcinoma      Erythematous hyperkeratotic cursted plaque c/w SCC      Surgical scar with no sign of skin cancer recurrence      Open and closed comedones      Inflammatory papules and pustules      Verrucoid papule consistent consistent with wart     Erythematous eczematous patches and plaques     Dystrophic onycholytic nail with subungual debris c/w onychomycosis     Umbilicated papule    Erythematous-base heme-crusted tan verrucoid plaque consistent with inflamed seborrheic keratosis     Erythematous Silvery Scaling Plaque c/w Psoriasis     See annotation      Assessment / Plan:        Benign nevus  total body skin examination performed today including at least 12 points as noted in physical examination. No lesions suspicious for malignancy noted.  Reassurance provided.  Instructed patient to observe lesion(s) for changes and follow up in clinic if changes are noted. Discussed ABCDE's of moles and brochure provided.    Lentigo  This is a benign hyperpigmented sun induced lesion. Daily sun protection will reduce the number of new lesions. Treatment of these benign lesions are considered cosmetic.    Intertrigo  -     ketoconazole (NIZORAL) 2 % cream; AAA bid  Dispense: 60 g; Refill: 3  -     miconazole NITRATE 2 % (ZEASORB AF) 2 % top powder; Apply topically as needed for Itching.  Dispense: 85 g; Refill: 3    Seborrheic keratoses  Cryosurgery Procedure Note    Verbal consent from the patient is obtained including, but not limited to, risk of hypopigmentation/hyperpigmentation, scar, recurrence of lesion. The patient is  aware of the precancerous quality and need for treatment of these lesions. Liquid nitrogen cryosurgery is applied to the 1 seborrheic keratoses, as detailed in the physical exam, to produce a freeze injury. The patient is aware that blisters may form and is instructed on wound care with gentle cleansing and use of vaseline ointment to keep moist until healed. The patient is supplied a handout on cryosurgery and is instructed to call if lesions do not completely resolve.      Screening, malignant neoplasm, skin  Total body skin examination performed today including at least 12 points as noted in physical examination. No lesions suspicious for malignancy noted.         Follow-up in about 1 year (around 9/14/2019).

## 2018-09-14 NOTE — PATIENT INSTRUCTIONS

## 2018-09-17 RX ORDER — LISINOPRIL 5 MG/1
TABLET ORAL
Qty: 90 TABLET | Refills: 1 | Status: SHIPPED | OUTPATIENT
Start: 2018-09-17 | End: 2019-03-20 | Stop reason: SDUPTHER

## 2018-09-21 ENCOUNTER — OFFICE VISIT (OUTPATIENT)
Dept: INTERNAL MEDICINE | Facility: CLINIC | Age: 53
End: 2018-09-21
Payer: COMMERCIAL

## 2018-09-21 VITALS
HEART RATE: 65 BPM | OXYGEN SATURATION: 98 % | WEIGHT: 221.44 LBS | TEMPERATURE: 98 F | BODY MASS INDEX: 32.7 KG/M2 | SYSTOLIC BLOOD PRESSURE: 131 MMHG | DIASTOLIC BLOOD PRESSURE: 76 MMHG

## 2018-09-21 DIAGNOSIS — E78.5 HYPERLIPIDEMIA ASSOCIATED WITH TYPE 2 DIABETES MELLITUS: Primary | ICD-10-CM

## 2018-09-21 DIAGNOSIS — Z79.4 TYPE 2 DIABETES MELLITUS WITH DIABETIC NEUROPATHY, WITH LONG-TERM CURRENT USE OF INSULIN: ICD-10-CM

## 2018-09-21 DIAGNOSIS — N48.89 PENILE CURVATURE, ACQUIRED: ICD-10-CM

## 2018-09-21 DIAGNOSIS — E11.42 DIABETIC POLYNEUROPATHY ASSOCIATED WITH TYPE 2 DIABETES MELLITUS: ICD-10-CM

## 2018-09-21 DIAGNOSIS — Z23 IMMUNIZATION DUE: ICD-10-CM

## 2018-09-21 DIAGNOSIS — E11.40 TYPE 2 DIABETES MELLITUS WITH DIABETIC NEUROPATHY, WITH LONG-TERM CURRENT USE OF INSULIN: ICD-10-CM

## 2018-09-21 DIAGNOSIS — F43.0 STRESS REACTION: ICD-10-CM

## 2018-09-21 DIAGNOSIS — N44.2 TESTICULAR CYST: ICD-10-CM

## 2018-09-21 DIAGNOSIS — E11.69 HYPERLIPIDEMIA ASSOCIATED WITH TYPE 2 DIABETES MELLITUS: Primary | ICD-10-CM

## 2018-09-21 LAB
ALBUMIN SERPL BCP-MCNC: 3.6 G/DL
ALP SERPL-CCNC: 70 U/L
ALT SERPL W/O P-5'-P-CCNC: 44 U/L
ANION GAP SERPL CALC-SCNC: 7 MMOL/L
AST SERPL-CCNC: 35 U/L
BILIRUB SERPL-MCNC: 0.6 MG/DL
BUN SERPL-MCNC: 12 MG/DL
CALCIUM SERPL-MCNC: 9.5 MG/DL
CHLORIDE SERPL-SCNC: 105 MMOL/L
CHOLEST SERPL-MCNC: 99 MG/DL
CHOLEST/HDLC SERPL: 2.2 {RATIO}
CO2 SERPL-SCNC: 23 MMOL/L
CREAT SERPL-MCNC: 0.8 MG/DL
EST. GFR  (AFRICAN AMERICAN): >60 ML/MIN/1.73 M^2
EST. GFR  (NON AFRICAN AMERICAN): >60 ML/MIN/1.73 M^2
GLUCOSE SERPL-MCNC: 136 MG/DL
HDLC SERPL-MCNC: 45 MG/DL
HDLC SERPL: 45.5 %
LDLC SERPL CALC-MCNC: 40.4 MG/DL
NONHDLC SERPL-MCNC: 54 MG/DL
POTASSIUM SERPL-SCNC: 4.3 MMOL/L
PROT SERPL-MCNC: 6.8 G/DL
SODIUM SERPL-SCNC: 135 MMOL/L
TRIGL SERPL-MCNC: 68 MG/DL

## 2018-09-21 PROCEDURE — 80053 COMPREHEN METABOLIC PANEL: CPT

## 2018-09-21 PROCEDURE — 3078F DIAST BP <80 MM HG: CPT | Mod: CPTII,S$GLB,, | Performed by: FAMILY MEDICINE

## 2018-09-21 PROCEDURE — 90686 IIV4 VACC NO PRSV 0.5 ML IM: CPT | Mod: S$GLB,,, | Performed by: FAMILY MEDICINE

## 2018-09-21 PROCEDURE — 99999 PR PBB SHADOW E&M-EST. PATIENT-LVL IV: CPT | Mod: PBBFAC,,, | Performed by: FAMILY MEDICINE

## 2018-09-21 PROCEDURE — 3008F BODY MASS INDEX DOCD: CPT | Mod: CPTII,S$GLB,, | Performed by: FAMILY MEDICINE

## 2018-09-21 PROCEDURE — 3075F SYST BP GE 130 - 139MM HG: CPT | Mod: CPTII,S$GLB,, | Performed by: FAMILY MEDICINE

## 2018-09-21 PROCEDURE — 80061 LIPID PANEL: CPT

## 2018-09-21 PROCEDURE — 90471 IMMUNIZATION ADMIN: CPT | Mod: S$GLB,,, | Performed by: FAMILY MEDICINE

## 2018-09-21 PROCEDURE — 99214 OFFICE O/P EST MOD 30 MIN: CPT | Mod: 25,S$GLB,, | Performed by: FAMILY MEDICINE

## 2018-09-21 PROCEDURE — 3046F HEMOGLOBIN A1C LEVEL >9.0%: CPT | Mod: CPTII,S$GLB,, | Performed by: FAMILY MEDICINE

## 2018-09-21 RX ORDER — METFORMIN HYDROCHLORIDE 1000 MG/1
1000 TABLET ORAL 2 TIMES DAILY WITH MEALS
Qty: 180 TABLET | Refills: 3 | Status: SHIPPED | OUTPATIENT
Start: 2018-09-21 | End: 2019-09-30 | Stop reason: SDUPTHER
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
## 2018-09-23 NOTE — PROGRESS NOTES
Subjective:     Patient ID: Brett Garcia is a 52 y.o. male.    Chief Complaint: Diabetic Foot Exam (PCP: MARTITA Estrella 3/2/2018 )    Brett is a 52 y.o. male who presents to the clinic for evaluation and treatment of high risk feet. Brett has a past medical history of Diabetes mellitus with neuropathy (2009), Hyperlipidemia (2009), and Hypertension associated with diabetes (4/21/2017). The patient's chief complaint is dry skin on feet. This patient has documented high risk feet requiring routine maintenance secondary to diabetes mellitis and those secondary complications of diabetes, as mentioned..    PCP: Sonia Estrella MD    Date Last Seen by PCP: 3/2/18    Current shoe gear:  Affected Foot: Tennis shoes     Unaffected Foot: Tennis shoes    Hemoglobin A1C   Date Value Ref Range Status   09/07/2018 13.4 (H) 4.0 - 5.6 % Final     Comment:     ADA Screening Guidelines:  5.7-6.4%  Consistent with prediabetes  >or=6.5%  Consistent with diabetes  High levels of fetal hemoglobin interfere with the HbA1C  assay. Heterozygous hemoglobin variants (HbS, HgC, etc)do  not significantly interfere with this assay.   However, presence of multiple variants may affect accuracy.     01/26/2018 8.3 (H) 4.0 - 5.6 % Final     Comment:     According to ADA guidelines, hemoglobin A1c <7.0% represents  optimal control in non-pregnant diabetic patients. Different  metrics may apply to specific patient populations.   Standards of Medical Care in Diabetes-2016.  For the purpose of screening for the presence of diabetes:  <5.7%     Consistent with the absence of diabetes  5.7-6.4%  Consistent with increasing risk for diabetes   (prediabetes)  >or=6.5%  Consistent with diabetes  Currently, no consensus exists for use of hemoglobin A1c  for diagnosis of diabetes for children.  This Hemoglobin A1c assay has significant interference with fetal   hemoglobin   (HbF). The results are invalid for patients with abnormal amounts of   HbF,    including those with known Hereditary Persistence   of Fetal Hemoglobin. Heterozygous hemoglobin variants (HbAS, HbAC,   HbAD, HbAE, HbA2) do not significantly interfere with this assay;   however, presence of multiple variants in a sample may impact the %   interference.     12/22/2017 8.3 (H) 4.0 - 5.6 % Final     Comment:     According to ADA guidelines, hemoglobin A1c <7.0% represents  optimal control in non-pregnant diabetic patients. Different  metrics may apply to specific patient populations.   Standards of Medical Care in Diabetes-2016.  For the purpose of screening for the presence of diabetes:  <5.7%     Consistent with the absence of diabetes  5.7-6.4%  Consistent with increasing risk for diabetes   (prediabetes)  >or=6.5%  Consistent with diabetes  Currently, no consensus exists for use of hemoglobin A1c  for diagnosis of diabetes for children.  This Hemoglobin A1c assay has significant interference with fetal   hemoglobin   (HbF). The results are invalid for patients with abnormal amounts of   HbF,   including those with known Hereditary Persistence   of Fetal Hemoglobin. Heterozygous hemoglobin variants (HbAS, HbAC,   HbAD, HbAE, HbA2) do not significantly interfere with this assay;   however, presence of multiple variants in a sample may impact the %   interference.             Patient Active Problem List   Diagnosis    Hyperlipidemia    Obesity (BMI 30-39.9)    Hypertension associated with diabetes    Skin lesions    Diabetic polyneuropathy associated with type 2 diabetes mellitus    Stress reaction    Hyperlipidemia associated with type 2 diabetes mellitus    Type 2 diabetes mellitus with diabetic neuropathy, with long-term current use of insulin          Medication List           Accurate as of 9/14/18 11:59 PM. If you have any questions, ask your nurse or doctor.               START taking these medications    ketoconazole 2 % cream  Commonly known as:  NIZORAL  APPLY TO THE AFFECTED  "AREA TWICE DAILY  Started by:  Fadi Betancourt MD     miconazole NITRATE 2 % 2 % top powder  Commonly known as:  ZEASORB AF  Apply topically as needed for Itching.  Started by:  Fadi Betancourt MD        CHANGE how you take these medications    lisinopril 5 MG tablet  Commonly known as:  PRINIVIL,ZESTRIL  TAKE 1 TABLET BY MOUTH EVERY DAY  What changed:  See the new instructions.  Changed by:  Brandt Laurent MD        CONTINUE taking these medications    amitriptyline 25 MG tablet  Commonly known as:  ELAVIL  Take 2 tablets (50 mg total) by mouth nightly as needed for Insomnia.     aspirin 81 MG EC tablet  Commonly known as:  ECOTRIN     atorvastatin 40 MG tablet  Commonly known as:  LIPITOR  TAKE 1 TABLET EVERY DAY     blood-glucose meter Misc  TID. Fill with any meter preferred by formulary.     fluticasone 50 mcg/actuation nasal spray  Commonly known as:  FLONASE  USE 2 SPRAYS BY EACH NARE ROUTE ONCE DAILY. EACH NARE     metFORMIN 1000 MG tablet  Commonly known as:  GLUCOPHAGE  TAKE 1 TABLET BY MOUTH TWICE A DAY WITH MEALS     pen needle, diabetic 31 gauge x 3/16" Ndle  Commonly known as:  BD ULTRA-FINE MINI PEN NEEDLE  Use once daily with insulin pen as directed     repaglinide 2 MG tablet  Commonly known as:  PRANDIN  Take 1 tablet (2 mg total) by mouth 3 (three) times daily before meals.     SUPREP BOWEL PREP KIT 17.5-3.13-1.6 gram Solr  Generic drug:  sodium,potassium,mag sulfates  Use as directed     TOUJEO SOLOSTAR U-300 INSULIN 300 unit/mL (1.5 mL) Inpn pen  Generic drug:  insulin glargine (TOUJEO)  INJECT 65 UNITS INTO THE SKIN ONCE DAILY.     TRUE METRIX GLUCOSE TEST STRIP Strp  Generic drug:  blood sugar diagnostic  1 strip  three times a day True metrix test strips     TRUEPLUS LANCETS 30 gauge Misc  Generic drug:  lancets  use one  3 (three) times daily.     TRULICITY 1.5 mg/0.5 mL Pnij  Generic drug:  dulaglutide  Inject 1.5 mg into the skin once a week.           Where to Get Your Medications    " "  These medications were sent to [id number] - [place] - [address] [facility] [address]  [address] [address] [place] [address]    Phone:  [phone]   · lisinopril 5 MG tablet     These medications were sent to [facility] [address] [place] [address]    Hours:  Mon-Fri, 8a-5:30p Phone:  [phone]   · ketoconazole 2 % cream  · miconazole NITRATE 2 % 2 % top powder         Review of patient's allergies indicates:  No Known Allergies    History reviewed. No pertinent surgical history.    Family History   Problem Relation Age of Onset    Diabetes Father     Hypertension Father     Heart attack Father     Cataracts Father     Hypertension Mother     Hypertension Sister        Social History     Socioeconomic History    Marital status:      Spouse name: Not on file    Number of children: 2    Years of education: Not on file    Highest education level: Not on file   Social Needs    Financial resource strain: Not on file    Food insecurity - worry: Not on file    Food insecurity - inability: Not on file    Transportation needs - medical: Not on file    Transportation needs - non-medical: Not on file   Occupational History    Occupation: Plant work      Employer: [employer]     Comment: [employer]   Tobacco Use    Smoking status: Never Smoker    Smokeless tobacco: Former User   Substance and Sexual Activity    Alcohol use: Yes     Alcohol/week: 1.2 oz     Types: 2 Cans of beer per week    Drug use: No    Sexual activity: Yes     Partners: Female   Other Topics Concern    Not on file   Social History Narrative    Not on file       Vitals:    09/14/18 0903   BP: 122/78   Pulse: 90   Weight: 99.3 kg (218 lb 14.7 oz)   Height: 5' 9" (1.753 m)   PainSc: 0-No pain       Hemoglobin A1C   Date Value Ref Range Status   09/07/2018 13.4 (H) 4.0 - 5.6 % Final     Comment:     ADA Screening Guidelines:  5.7-6.4%  " Consistent with prediabetes  >or=6.5%  Consistent with diabetes  High levels of fetal hemoglobin interfere with the HbA1C  assay. Heterozygous hemoglobin variants (HbS, HgC, etc)do  not significantly interfere with this assay.   However, presence of multiple variants may affect accuracy.     01/26/2018 8.3 (H) 4.0 - 5.6 % Final     Comment:     According to ADA guidelines, hemoglobin A1c <7.0% represents  optimal control in non-pregnant diabetic patients. Different  metrics may apply to specific patient populations.   Standards of Medical Care in Diabetes-2016.  For the purpose of screening for the presence of diabetes:  <5.7%     Consistent with the absence of diabetes  5.7-6.4%  Consistent with increasing risk for diabetes   (prediabetes)  >or=6.5%  Consistent with diabetes  Currently, no consensus exists for use of hemoglobin A1c  for diagnosis of diabetes for children.  This Hemoglobin A1c assay has significant interference with fetal   hemoglobin   (HbF). The results are invalid for patients with abnormal amounts of   HbF,   including those with known Hereditary Persistence   of Fetal Hemoglobin. Heterozygous hemoglobin variants (HbAS, HbAC,   HbAD, HbAE, HbA2) do not significantly interfere with this assay;   however, presence of multiple variants in a sample may impact the %   interference.     12/22/2017 8.3 (H) 4.0 - 5.6 % Final     Comment:     According to ADA guidelines, hemoglobin A1c <7.0% represents  optimal control in non-pregnant diabetic patients. Different  metrics may apply to specific patient populations.   Standards of Medical Care in Diabetes-2016.  For the purpose of screening for the presence of diabetes:  <5.7%     Consistent with the absence of diabetes  5.7-6.4%  Consistent with increasing risk for diabetes   (prediabetes)  >or=6.5%  Consistent with diabetes  Currently, no consensus exists for use of hemoglobin A1c  for diagnosis of diabetes for children.  This Hemoglobin A1c assay has  significant interference with fetal   hemoglobin   (HbF). The results are invalid for patients with abnormal amounts of   HbF,   including those with known Hereditary Persistence   of Fetal Hemoglobin. Heterozygous hemoglobin variants (HbAS, HbAC,   HbAD, HbAE, HbA2) do not significantly interfere with this assay;   however, presence of multiple variants in a sample may impact the %   interference.         Review of Systems   Constitutional: Negative for chills and fever.   Respiratory: Negative for shortness of breath.    Cardiovascular: Negative for chest pain, palpitations, orthopnea, claudication and leg swelling.   Gastrointestinal: Negative for diarrhea, nausea and vomiting.   Musculoskeletal: Negative for joint pain.   Skin: Negative for rash.   Neurological: Positive for sensory change. Negative for dizziness, tingling, focal weakness and weakness.   Psychiatric/Behavioral: Negative.              Objective:   PHYSICAL EXAM: Apperance: Alert and orient in no distress,well developed, and with good attention to grooming and body habits  Patient presents ambulating in tennis shoes.   LOWER EXTREMITY EXAM:  VASCULAR: Dorsalis pedis pulses 2/4 bilateral and Posterior Tibial pulses 2/4 bilateral. Capillary fill time <4 seconds bilateral. No edema observed bilateral. Varicosities absent bilateral. Skin temperature of the lower extremities is warm to warm, proximal to distal. Hair growth dim bilateral.  DERMATOLOGICAL: No skin rashes, subcutaneous nodules, lesions, or ulcers observed bilateral. Nails 1,2,3,4,5 bilateral normal length. Webspaces 1,2,3,4 bilateral clean, dry and without evidence of break in skin integrity. Dry and scaly skin noted plantarly bilateral.   NEUROLOGICAL: Light touch, sharp-dull, proprioception all present and equal bilaterally.  Vibratory sensation absent at bilateral hallux and navicular. Protective sensation absent at sites as tested with a [place] 5.07 monofilament.    MUSCULOSKELETAL: Muscle strength is 5/5 for foot inverters, everters, plantarflexors, and dorsiflexors. Muscle tone is normal.       Assessment:   Type 2 diabetes mellitus with diabetic neuropathy, with long-term current use of insulin    Tinea pedis of both feet          Plan:   Type 2 diabetes mellitus with diabetic neuropathy, with long-term current use of insulin    Tinea pedis of both feet      I counseled the patient on his conditions, regarding findings of my examination, my impressions, and usual treatment plan.   Greater than 50% of this visit spent on counseling and coordination of care.  Greater than 15 minutes of a 20 minute appointment spent on education about the diabetic foot, neuropathy, and prevention of limb loss.  Shoe inspection. Diabetic Foot Education. Patient reminded of the importance of good nutrition and blood sugar control to help prevent podiatric complications of diabetes. Patient instructed on proper foot hygeine. We discussed wearing proper shoe gear, daily foot inspections, never walking without protective shoe gear, never putting sharp instruments to feet.    Discuss treatment options for athletes feet.  I explained that fungus lives in a warm dark moist environment and therefore patient should make every attempt to keep feet clean and dry.  We discussed drying feet thoroughly after shower particularly between the toes.   Patient instructed to spray all shoes with Lysol disinfectant spray and let dry before wearing. Patient instructed to wash all socks in hot water and bleach.  Patient instructed to purchase over the counter anti-fungal cream (Lotrimin or Lamisil) and apply to bottom of feet twice daily. Patient instructed not to place cream in between toes.  Patient  will continue to monitor the areas daily, inspect feet, wear protective shoe gear when ambulatory, moisturizer to maintain skin integrity. Patient reminded of the importance of good nutrition and blood sugar control  to help prevent podiatric complications of diabetes.  Patient to return 12 months or sooner if needed.                 Reza Aguillon DPM  [facility]

## 2018-10-19 ENCOUNTER — TELEPHONE (OUTPATIENT)
Dept: UROLOGY | Facility: CLINIC | Age: 53
End: 2018-10-19

## 2018-10-19 NOTE — TELEPHONE ENCOUNTER
----- Message from Leona Ferguson sent at 10/19/2018 11:20 AM CDT -----  Contact: self  Pt was referred to urologist by Dr Estrella. Pt would like to be seen by Dr Rivera, but the next appt I can see isn't until 2019, pt would like to be seen sooner if possible. Please call pt back at [phone].    Thanks,   Leona Ferguson

## 2018-10-31 PROBLEM — Z12.11 COLON CANCER SCREENING: Status: ACTIVE | Noted: 2018-10-31

## 2018-11-01 ENCOUNTER — HOSPITAL ENCOUNTER (OUTPATIENT)
Facility: HOSPITAL | Age: 53
Discharge: HOME | End: 2018-11-01
Attending: INTERNAL MEDICINE | Admitting: INTERNAL MEDICINE
Payer: COMMERCIAL

## 2018-11-01 ENCOUNTER — ANESTHESIA (OUTPATIENT)
Dept: ENDOSCOPY | Facility: HOSPITAL | Age: 53
End: 2018-11-01
Payer: COMMERCIAL

## 2018-11-01 ENCOUNTER — ANESTHESIA EVENT (OUTPATIENT)
Dept: ENDOSCOPY | Facility: HOSPITAL | Age: 53
End: 2018-11-01
Payer: COMMERCIAL

## 2018-11-01 VITALS
WEIGHT: 226 LBS | RESPIRATION RATE: 18 BRPM | HEIGHT: 69 IN | HEART RATE: 72 BPM | TEMPERATURE: 99 F | DIASTOLIC BLOOD PRESSURE: 90 MMHG | OXYGEN SATURATION: 100 % | BODY MASS INDEX: 33.47 KG/M2 | SYSTOLIC BLOOD PRESSURE: 132 MMHG

## 2018-11-01 DIAGNOSIS — Z12.11 COLON CANCER SCREENING: Primary | ICD-10-CM

## 2018-11-01 LAB — POCT GLUCOSE: 135 MG/DL (ref 70–110)

## 2018-11-01 PROCEDURE — 45385 COLONOSCOPY W/LESION REMOVAL: CPT | Performed by: INTERNAL MEDICINE

## 2018-11-01 PROCEDURE — 63600175 PHARM REV CODE 636 W HCPCS: Performed by: NURSE ANESTHETIST, CERTIFIED REGISTERED

## 2018-11-01 PROCEDURE — 37000008 HC ANESTHESIA 1ST 15 MINUTES: Performed by: INTERNAL MEDICINE

## 2018-11-01 PROCEDURE — 45385 COLONOSCOPY W/LESION REMOVAL: CPT | Mod: 33,,, | Performed by: INTERNAL MEDICINE

## 2018-11-01 PROCEDURE — 27201089 HC SNARE, DISP (ANY): Performed by: INTERNAL MEDICINE

## 2018-11-01 PROCEDURE — 88305 TISSUE EXAM BY PATHOLOGIST: CPT | Performed by: PATHOLOGY

## 2018-11-01 PROCEDURE — 25000003 PHARM REV CODE 250: Performed by: INTERNAL MEDICINE

## 2018-11-01 PROCEDURE — 37000009 HC ANESTHESIA EA ADD 15 MINS: Performed by: INTERNAL MEDICINE

## 2018-11-01 PROCEDURE — 88305 TISSUE EXAM BY PATHOLOGIST: CPT | Mod: 26,,, | Performed by: PATHOLOGY

## 2018-11-01 RX ORDER — SODIUM CHLORIDE, SODIUM LACTATE, POTASSIUM CHLORIDE, CALCIUM CHLORIDE 600; 310; 30; 20 MG/100ML; MG/100ML; MG/100ML; MG/100ML
INJECTION, SOLUTION INTRAVENOUS CONTINUOUS
Status: DISCONTINUED | OUTPATIENT
Start: 2018-11-01 | End: 2018-11-01 | Stop reason: HOSPADM

## 2018-11-01 RX ORDER — PROPOFOL 10 MG/ML
VIAL (ML) INTRAVENOUS
Status: DISCONTINUED | OUTPATIENT
Start: 2018-11-01 | End: 2018-11-01

## 2018-11-01 RX ORDER — LIDOCAINE HCL/PF 100 MG/5ML
SYRINGE (ML) INTRAVENOUS
Status: DISCONTINUED | OUTPATIENT
Start: 2018-11-01 | End: 2018-11-01

## 2018-11-01 RX ADMIN — PROPOFOL 80 MG: 10 INJECTION, EMULSION INTRAVENOUS at 10:11

## 2018-11-01 RX ADMIN — PROPOFOL 40 MG: 10 INJECTION, EMULSION INTRAVENOUS at 10:11

## 2018-11-01 RX ADMIN — SODIUM CHLORIDE, SODIUM LACTATE, POTASSIUM CHLORIDE, AND CALCIUM CHLORIDE: .6; .31; .03; .02 INJECTION, SOLUTION INTRAVENOUS at 09:11

## 2018-11-01 RX ADMIN — LIDOCAINE HYDROCHLORIDE 80 ML: 20 INJECTION, SOLUTION INTRAVENOUS at 10:11

## 2018-11-01 NOTE — ANESTHESIA RELEASE NOTE
"Anesthesia Release from PACU Note    Patient: Brett Garcia    Procedure(s) Performed: Procedure(s) (LRB):  COLONOSCOPY (N/A)    Anesthesia type: MAC    Post pain: Adequate analgesia    Post assessment: no apparent anesthetic complications, tolerated procedure well and no evidence of recall    Last Vitals:   Visit Vitals  BP 97/64 (Patient Position: Lying)   Pulse 82   Temp 36.9 °C (98.5 °F) (Oral)   Resp 18   Ht 5' 9" (1.753 m)   Wt 102.5 kg (226 lb)   SpO2 95%   BMI 33.37 kg/m²       Post vital signs: stable    Level of consciousness: awake, alert  and oriented    Nausea/Vomiting: no nausea/no vomiting    Complications: none    Airway Patency: patent    Respiratory: unassisted, spontaneous ventilation, room air    Cardiovascular: stable and blood pressure at baseline    Hydration: euvolemic  "

## 2018-11-01 NOTE — ANESTHESIA PREPROCEDURE EVALUATION
11/01/2018  Brett Garcia is a 52 y.o., male.    Pre-op Assessment    I have reviewed the Patient Summary Reports.     I have reviewed the Nursing Notes.   I have reviewed the Medications.     Review of Systems  Anesthesia Hx:  No previous Anesthesia  Denies Family Hx of Anesthesia complications.   Denies Personal Hx of Anesthesia complications.   Social:  Non-Smoker, No Alcohol Use    Hematology/Oncology:  Hematology Normal   Oncology Normal     EENT/Dental:EENT/Dental Normal   Cardiovascular:   Hypertension, well controlled  Hypertension    Pulmonary:  Pulmonary Normal    Endocrine:   Diabetes  Diabetes, Type 2 Diabetes        Physical Exam  General:  Obesity    Airway/Jaw/Neck:  Airway Findings: Mouth Opening: Normal Tongue: Normal  General Airway Assessment: Adult  Mallampati: II  Improves to II with phonation.  TM Distance: 4 - 6 cm  Jaw/Neck Findings:  Neck ROM: Normal ROM      Dental:  Dental Findings: In tact        Mental Status:  Mental Status Findings:  Cooperative, Alert and Oriented         Anesthesia Plan  Type of Anesthesia, risks & benefits discussed:  Anesthesia Type:  MAC  Patient's Preference:   Intra-op Monitoring Plan:   Intra-op Monitoring Plan Comments:   Post Op Pain Control Plan:   Post Op Pain Control Plan Comments:   Induction:    Beta Blocker:         Informed Consent: Patient understands risks and agrees with Anesthesia plan.  Questions answered. Anesthesia consent signed with patient.  ASA Score: 2     Day of Surgery Review of History & Physical: I have interviewed and examined the patient. I have reviewed the patient's H&P dated:  There are no significant changes.

## 2018-11-01 NOTE — TRANSFER OF CARE
"Anesthesia Transfer of Care Note    Patient: Brett Garcia    Procedure(s) Performed: Procedure(s) (LRB):  COLONOSCOPY (N/A)    Post pain: adequate analgesia    Post assessment: no apparent anesthetic complications    Post vital signs: stable    Level of consciousness: awake    Nausea/Vomiting: no nausea/vomiting    Complications: none    Transfer of care protocol was followed      Last vitals:   Visit Vitals  BP 97/64 (Patient Position: Lying)   Pulse 82   Temp 36.9 °C (98.5 °F) (Oral)   Resp 18   Ht 5' 9" (1.753 m)   Wt 102.5 kg (226 lb)   SpO2 95%   BMI 33.37 kg/m²     "

## 2018-11-01 NOTE — ANESTHESIA POSTPROCEDURE EVALUATION
"Anesthesia Post Evaluation    Patient: Brett Garcia    Procedure(s) Performed: Procedure(s) (LRB):  COLONOSCOPY (N/A)    Final Anesthesia Type: MAC  Patient location during evaluation: GI PACU  Patient participation: Yes- Able to Participate  Level of consciousness: awake and alert  Post-procedure vital signs: reviewed and stable  Pain management: adequate  Airway patency: patent  PONV status at discharge: No PONV  Anesthetic complications: no      Cardiovascular status: blood pressure returned to baseline  Respiratory status: unassisted, spontaneous ventilation and room air  Hydration status: euvolemic  Follow-up not needed.        Visit Vitals  BP 97/64 (Patient Position: Lying)   Pulse 82   Temp 36.9 °C (98.5 °F) (Oral)   Resp 18   Ht 5' 9" (1.753 m)   Wt 102.5 kg (226 lb)   SpO2 95%   BMI 33.37 kg/m²       Pain/Rj Score: Pain Assessment Performed: Yes (11/1/2018 10:57 AM)  Presence of Pain: denies (11/1/2018 10:57 AM)  Rj Score: 9 (11/1/2018 10:57 AM)        "

## 2018-11-01 NOTE — OR NURSING
Final time out, anesthesia agrees, patient adequately sedated, view anesthesia for vital signs, medication/fluid documentation.

## 2018-11-01 NOTE — H&P
Short Stay Endoscopy History and Physical    PCP - Sonia Estrella MD    Procedure - Colonoscopy  ASA - II  Mallampati - per anesthesia  History of Anesthesia problems - no  Family history Anesthesia problems -  no     HPI:  This is a 52 y.o. male here for evaluation of :   Active Hospital Problems    Diagnosis  POA    *Colon cancer screening [Z12.11]  Not Applicable      Resolved Hospital Problems   No resolved problems to display.         Health Maintenance       Date Due Completion Date    Colonoscopy 12/26/2013 ---    Eye Exam 04/21/2018 4/21/2017    Override on 3/7/2017: Done (DR. SARITA ALVAREZ/No Diabetic Retinopathy)    Override on 11/27/2013: Done (Done at [facility] by Dr Flores)    Urine Microalbumin 01/26/2019 1/26/2018    Override on 5/24/2013: Done    Hemoglobin A1c 03/07/2019 9/7/2018    Override on 11/16/2013: Done    Foot Exam 09/14/2019 9/14/2018 (Done)    Override on 9/14/2018: Done    Override on 8/19/2015: Done    Override on 11/16/2013: Done    Override on 5/24/2013: Done    Override on 12/2/2011: Done    Lipid Panel 09/21/2019 9/21/2018    Override on 11/16/2013: Done    Low Dose Statin 11/01/2019 11/1/2018    TETANUS VACCINE 07/25/2024 7/25/2014    Pneumococcal PPSV23 (Medium Risk) (2) 12/26/2028 12/2/2011          Screening - no  History of polyps - no  Diarrhea - no  Anemia - no  Blood in stools - no  Abdominal pain - no  Other - no    ROS:  CONSTITUTIONAL: Denies weight change,  fatigue, fevers, chills, night sweats.  CARDIOVASCULAR: Denies chest pain, shortness of breath, orthopnea and edema.  RESPIRATORY: Denies cough, hemoptysis, dyspnea, and wheezing.  GI: See HPI.    Medical History:   Past Medical History:   Diagnosis Date    Diabetes mellitus with neuropathy 2009    BS 57 am 12/18/2015    Hyperlipidemia 2009    Hypertension associated with diabetes 4/21/2017       Surgical History:   History reviewed. No pertinent surgical history.    Family History:   Family History   Problem  Relation Age of Onset    Diabetes Father     Hypertension Father     Heart attack Father     Cataracts Father     Hypertension Mother     Hypertension Sister        Social History:   Social History     Tobacco Use    Smoking status: Never Smoker    Smokeless tobacco: Former User   Substance Use Topics    Alcohol use: Yes     Alcohol/week: 1.2 oz     Types: 2 Cans of beer per week    Drug use: No       Allergies:   Review of patient's allergies indicates:  No Known Allergies    Medications:   No current facility-administered medications on file prior to encounter.      Current Outpatient Medications on File Prior to Encounter   Medication Sig Dispense Refill    aspirin (ECOTRIN) 81 MG EC tablet Take 81 mg by mouth once daily.      blood-glucose meter Misc TID. Fill with any meter preferred by formulary. 1 each 0       Physical Exam:  Vital Signs:   Vitals:    11/01/18 1127   BP: (!) 132/90   Pulse: 72   Resp: 18   Temp:      General Appearance: Well appearing in no acute distress  ENT: OP clear  Chest: CTA B  CV: RRR, no m/r/g  Abd: s/nt/nd/nabs  Ext: no edema    Labs:Reviewed    IMP:  Active Hospital Problems    Diagnosis  POA    *Colon cancer screening [Z12.11]  Not Applicable      Resolved Hospital Problems   No resolved problems to display.         Plan:   I have explained the risks and benefits of colonoscopy to the patient including but not limited to bleeding, perforation, infection, and death. The patient wishes to proceed.

## 2018-11-01 NOTE — DISCHARGE INSTRUCTIONS
Understanding Colon and Rectal Polyps    The colon (also called the large intestine) is a muscular tube that forms the last part of the digestive tract. It absorbs water and stores food waste. The colon is about 4 to 6 feet long. The rectum is the last 6 inches of the colon. The colon and rectum have a smooth lining composed of millions of cells. Changes in these cells can lead to growths in the colon that can become cancerous and should be removed. Multiple tests are available to screen for colon cancer, but the colonoscopy is the most recommended test. During colonoscopy, these polyps can be removed. How often you need this test depends on many things including your condition, your family history, symptoms, and what the findings were at the previous colonoscopy.   When the colon lining changes  Changes that happen in the cells that line the colon or rectum can lead to growths called polyps. Over a period of years, polyps can turn cancerous. Removing polyps early may prevent cancer from ever forming.  Polyps  Polyps are fleshy clumps of tissue that form on the lining of the colon or rectum. Small polyps are usually benign (not cancerous). However, over time, cells in a polyp can change and become cancerous. Certain types of polyps known as adenomatous polyps are premalignant. The risk for invasive cancer increases with the size of the polyp and certain cell and gene features. This means that they can become cancerous if they're not removed. Hyperplastic polyps are benign. They can grow quite large and not turn cancerous.   Cancer  Almost all colorectal cancers start when polyp cells begin growing abnormally. As a cancerous tumor grows, it may involve more and more of the colon or rectum. In time, cancer can also grow beyond the colon or rectum and spread to nearby organs or to glands called lymph nodes. The cells can also travel to other parts of the body. This is known as metastasis. The earlier a cancerous  tumor is removed, the better the chance of preventing its spread.    Date Last Reviewed: 8/1/2016  © 1427-9101 [employer]. [address] [place] [address], [place] [address]. All rights reserved. This information is not intended as a substitute for professional medical care. Always follow your healthcare professional's instructions.        Diverticulosis    Diverticulosis means that small pouches have formed in the wall of your large intestine (colon). Most often, this problem causes no symptoms and is common as people age. But the pouches in the colon are at risk of becoming infected. When this happens, the condition is called diverticulitis. Although most people with diverticulosis never develop diverticulitis, it is still not uncommon. Rectal bleeding can also occur and in less common situations, a type of colon inflammation called colitis.  While most people do not have symptoms, some people with diverticulosis may have:  · Abdominal cramps and pain  · Bloating  · Constipation  · Change in bowel habits  Causes  The exact cause of diverticulosis (and diverticulitis) has not been proved, but a few things are associated with the condition:  · Low-fiber diet  · Constipation  · Lack of exercise  Your healthcare provider will talk with you about how to manage your condition. Diet changes may be all that are needed to help control diverticulosis and prevent progression to diverticulitis. If you develop diverticulitis, you will likely need other treatments.  Home care  You may be told to take fiber supplements daily. Fiber adds bulk to the stool so that it passes through the colon more easily. Stool softeners may be recommended. You may also be given medications for pain relief. Be sure to take all medications as directed.  In the past, people were told to avoid corn, nuts, and seeds. This is no longer necessary.  Follow these guidelines when caring for yourself at home:  · Eat unprocessed foods that are high in  fiber. Whole grains, fruits, and vegetables are good choices.  · Drink 6 to 8 glasses of water every day unless your healthcare provider has you limit how much fluid you should have.  · Watch for changes in your bowel movements. Tell your provider if you notice any changes.  · Begin an exercise program. Ask your provider how to get started. Generally, walking is the best.  · Get plenty of rest and sleep.  Follow-up care  Follow up with your healthcare provider, or as advised. Regular visits may be needed to check on your health. Sometimes special procedures such as colonoscopy, are needed after an episode of diverticulitis or blooding. Be sure to keep all your appointments.  If a stool sample was taken, or cultures were done, you should be told if they are positive, or if your treatment needs to be changed. You can call as directed for the results.  If X-rays were done, a radiologist will look at them. You will be told if there is a change in your treatment.  If antibiotics were prescribed, be sure to finish them all.  When to seek medical advice  Call your healthcare provider right away if any of these occur:  · Fever of 100.4°F (38°C) or higher, or as directed by your healthcare provider  · Severe cramps in the lower left side of the abdomen or pain that is getting worse  · Tenderness in the lower left side of the abdomen or worsening pain throughout the abdomen  · Diarrhea or constipation that doesn't get better within 24 hours  · Nausea and vomiting  · Bleeding from the rectum  Call 911  Call emergency services if any of the following occur:  · Trouble breathing  · Confusion  · Very drowsy or trouble awakening  · Fainting or loss of consciousness  · Rapid heart rate  · Chest pain  Date Last Reviewed: 12/30/2015  © 9350-9559 [employer]. [address] [place] [address], [place] [address]. All rights reserved. This information is not intended as a substitute for professional medical care. Always follow your  healthcare professional's instructions.

## 2018-11-01 NOTE — [employer] PATIENT INSTRUCTIONS
Discharge Summary/Instructions after an Endoscopic Procedure  Patient Name: Brett Garcia  Patient MRN: [id number]  Patient YOB: 1965 Thursday, November 01, 2018 Kristina Wilson MD  RESTRICTIONS:  During your procedure today, you received medications for sedation.  These   medications may affect your judgment, balance and coordination.  Therefore,   for 24 hours, you have the following restrictions:   - DO NOT drive a car, operate machinery, make legal/financial decisions,   sign important papers or drink alcohol.    ACTIVITY:  Today: no heavy lifting, straining or running due to procedural   sedation/anesthesia.  The following day: return to full activity including work.  DIET:  Eat and drink normally unless instructed otherwise.     TREATMENT FOR COMMON SIDE EFFECTS:  - Mild abdominal pain, nausea, belching, bloating or excessive gas:  rest,   eat lightly and use a heating pad.  - Sore Throat: treat with throat lozenges and/or gargle with warm salt   water.  - Because air was used during the procedure, expelling large amounts of air   from your rectum or belching is normal.  - If a bowel prep was taken, you may not have a bowel movement for 1-3 days.    This is normal.  SYMPTOMS TO WATCH FOR AND REPORT TO YOUR PHYSICIAN:  1. Abdominal pain or bloating, other than gas cramps.  2. Chest pain.  3. Back pain.  4. Signs of infection such as: chills or fever occurring within 24 hours   after the procedure.  5. Rectal bleeding, which would show as bright red, maroon, or black stools.   (A tablespoon of blood from the rectum is not serious, especially if   hemorrhoids are present.)  6. Vomiting.  7. Weakness or dizziness.  GO DIRECTLY TO THE NEAREST EMERGENCY ROOM IF YOU HAVE ANY OF THE FOLLOWING:      Difficulty breathing              Chills and/or fever over 101 F   Persistent vomiting and/or vomiting blood   Severe abdominal pain   Severe chest pain   Black, tarry stools   Bleeding- more than one  tablespoon   Any other symptom or condition that you feel may need urgent attention  Your doctor recommends these additional instructions:  If any biopsies were taken, your doctors clinic will contact you in 1 to 2   weeks with any results.  - Patient has a contact number available for emergencies.  The signs and   symptoms of potential delayed complications were discussed with the   patient.  Return to normal activities tomorrow.  Written discharge   instructions were provided to the patient.   - Resume previous diet today.   - Continue present medications.   - No aspirin, ibuprofen, naproxen, or other non-steroidal anti-inflammatory   drugs for 7 days after polyp removal.   - Await pathology results.   - Repeat colonoscopy in 5 years for surveillance.   - Discharge patient to home (via wheelchair).  For questions, problems or results please call your physician Kristina Wilson MD at Work:  [phone]  If you have any questions about the above instructions, call the GI   department at [phone] or call the endoscopy unit at [phone]   from 7am until 3 pm.  [facility] [place], EMERGENCY ROOM PHONE NUMBER:   [phone]  IF A COMPLICATION OR EMERGENCY SITUATION ARISES AND YOU ARE UNABLE TO REACH   YOUR PHYSICIAN - GO DIRECTLY TO THE EMERGENCY ROOM.  I have read or have had read to me these discharge instructions for my   procedure and have received a written copy.  I understand these   instructions and will follow-up with my physician if I have any questions.     __________________________________       _____________________________________  Nurse Signature                                          Patient/Designated   Responsible Party Signature  MD Kristina Arias MD  11/1/2018 11:00:09 AM  This report has been verified and signed electronically.  [employer]

## 2018-11-01 NOTE — DISCHARGE SUMMARY
Endoscopy Discharge Summary      Admit Date: 11/1/2018    Discharge Date and Time:  11/1/2018 11:01 AM    Attending Physician: Kristina Wilson MD     Discharge Physician: Kristina Wilson MD     Principal Admitting Diagnoses: Colon cancer screening         Discharge Diagnosis: The encounter diagnosis was Colon cancer screening.     Discharged Condition: Good    Indication for Admission: Colon cancer screening     Hospital Course: Patient was admitted for an inpatient procedure and tolerated the procedure well with no complications.    Significant Diagnostic Studies: colonoscopy with polypectomy     Pathology (if any):  Specimen (12h ago, onward)    Start     Ordered    11/01/18 1041  Specimen to Pathology - Surgery  Once     Comments:  #1 Ascending colon polyp     Start Status   11/01/18 1041 Collected (11/01/18 1054)       11/01/18 1053          Estimated Blood Loss: 1 ml.    Discussed with: patient.    Disposition: Home.    Follow Up/Patient Instructions:   Current Discharge Medication List      CONTINUE these medications which have NOT CHANGED    Details   amitriptyline (ELAVIL) 25 MG tablet Take 2 tablets (50 mg total) by mouth nightly as needed for Insomnia.  Qty: 60 tablet, Refills: 5    Associated Diagnoses: Insomnia, unspecified type      aspirin (ECOTRIN) 81 MG EC tablet Take 81 mg by mouth once daily.      atorvastatin (LIPITOR) 40 MG tablet TAKE 1 TABLET EVERY DAY  Qty: 90 tablet, Refills: 3    Associated Diagnoses: Mixed hyperlipidemia      blood sugar diagnostic Strp 1 strip  three times a day True metrix test strips  Qty: 100 strip, Refills: 11      blood-glucose meter Misc TID. Fill with any meter preferred by formulary.  Qty: 1 each, Refills: 0    Associated Diagnoses: Type 2 diabetes mellitus with diabetic neuropathy, with long-term current use of insulin      fluticasone (FLONASE) 50 mcg/actuation nasal spray USE 2 SPRAYS BY EACH NARE ROUTE ONCE DAILY. EACH NARE  Qty: 1 Bottle, Refills: 12     "Associated Diagnoses: Seasonal allergic rhinitis      ketoconazole (NIZORAL) 2 % cream APPLY TO THE AFFECTED AREA TWICE DAILY  Qty: 60 g, Refills: 3    Associated Diagnoses: Intertrigo      lancets 30 gauge Misc  use one  3 (three) times daily.  Qty: 100 each, Refills: 11      lisinopril (PRINIVIL,ZESTRIL) 5 MG tablet TAKE 1 TABLET BY MOUTH EVERY DAY  Qty: 90 tablet, Refills: 1      metFORMIN (GLUCOPHAGE) 1000 MG tablet Take 1 tablet (1,000 mg total) by mouth 2 (two) times daily with meals.  Qty: 180 tablet, Refills: 3    Associated Diagnoses: Type 2 diabetes mellitus with diabetic neuropathy, with long-term current use of insulin      miconazole NITRATE 2 % (ZEASORB AF) 2 % top powder Apply topically as needed for Itching.  Qty: 85 g, Refills: 3    Associated Diagnoses: Intertrigo      pen needle, diabetic (BD ULTRA-FINE MINI PEN NEEDLE) 31 gauge x 3/16" Ndle Use once daily with insulin pen as directed  Qty: 100 each, Refills: 4    Associated Diagnoses: Type 2 diabetes mellitus with diabetic neuropathy, with long-term current use of insulin      repaglinide (PRANDIN) 2 MG tablet Take 1 tablet (2 mg total) by mouth 3 (three) times daily before meals.  Qty: 270 tablet, Refills: 3    Associated Diagnoses: Uncontrolled type 2 diabetes mellitus with diabetic polyneuropathy, with long-term current use of insulin      TOUJEO SOLOSTAR U-300 INSULIN 300 unit/mL (1.5 mL) InPn pen INJECT 65 UNITS INTO THE SKIN ONCE DAILY.  Qty: 9 Syringe, Refills: 3    Associated Diagnoses: Type 2 diabetes mellitus with diabetic neuropathy      dulaglutide (TRULICITY) 1.5 mg/0.5 mL PnIj Inject 1.5 mg into the skin once a week.  Qty: 2 mL, Refills: 5    Associated Diagnoses: Type 2 diabetes mellitus with diabetic neuropathy, with long-term current use of insulin             Discharge Procedure Orders   Diet general     Call MD for:  temperature >100.4     Call MD for:  persistent nausea and vomiting     Call MD for:  severe uncontrolled pain "     Call MD for:  difficulty breathing, headache or visual disturbances     Activity as tolerated       Follow-up Information     Kristina Wilson. Call in 1 week.    Specialty:  Gastroenterology  Why:  For pathology results  Contact information:  [address]  [place] [address] [phone]

## 2018-11-01 NOTE — PROGRESS NOTES
Prior to dc, pt very adamant about driving, instructions given, safety specified to both pt and fmly, pt dc'd to vehicle via wc with mother in passenger seat. Charge nurse notified as well

## 2018-11-02 DIAGNOSIS — E11.40 TYPE 2 DIABETES MELLITUS WITH DIABETIC NEUROPATHY: ICD-10-CM

## 2018-11-02 RX ORDER — INSULIN GLARGINE 300 [IU]/ML
65 INJECTION, SOLUTION SUBCUTANEOUS DAILY
Qty: 9 SYRINGE | Refills: 3 | OUTPATIENT
Start: 2018-11-02

## 2018-11-05 ENCOUNTER — PATIENT MESSAGE (OUTPATIENT)
Dept: DIABETES | Facility: CLINIC | Age: 53
End: 2018-11-05

## 2018-11-05 DIAGNOSIS — E11.40 TYPE 2 DIABETES MELLITUS WITH DIABETIC NEUROPATHY: ICD-10-CM

## 2018-11-05 RX ORDER — INSULIN GLARGINE 300 [IU]/ML
65 INJECTION, SOLUTION SUBCUTANEOUS DAILY
Qty: 9 SYRINGE | Refills: 3 | Status: SHIPPED | OUTPATIENT
Start: 2018-11-05 | End: 2018-11-06 | Stop reason: SDUPTHER

## 2018-11-06 DIAGNOSIS — E11.40 TYPE 2 DIABETES MELLITUS WITH DIABETIC NEUROPATHY: ICD-10-CM

## 2018-11-06 RX ORDER — INSULIN GLARGINE 300 [IU]/ML
65 INJECTION, SOLUTION SUBCUTANEOUS DAILY
Qty: 9 SYRINGE | Refills: 3 | Status: SHIPPED | OUTPATIENT
Start: 2018-11-06 | End: 2019-10-29 | Stop reason: SDUPTHER

## 2018-11-30 ENCOUNTER — OFFICE VISIT (OUTPATIENT)
Dept: INTERNAL MEDICINE | Facility: CLINIC | Age: 53
End: 2018-11-30
Payer: COMMERCIAL

## 2018-11-30 VITALS
SYSTOLIC BLOOD PRESSURE: 120 MMHG | OXYGEN SATURATION: 95 % | WEIGHT: 223.44 LBS | DIASTOLIC BLOOD PRESSURE: 74 MMHG | BODY MASS INDEX: 33 KG/M2 | TEMPERATURE: 98 F | HEART RATE: 85 BPM

## 2018-11-30 DIAGNOSIS — J32.9 SINUSITIS, UNSPECIFIED CHRONICITY, UNSPECIFIED LOCATION: Primary | ICD-10-CM

## 2018-11-30 PROBLEM — Z12.11 COLON CANCER SCREENING: Status: RESOLVED | Noted: 2018-10-31 | Resolved: 2018-11-30

## 2018-11-30 PROCEDURE — 3074F SYST BP LT 130 MM HG: CPT | Mod: CPTII,S$GLB,, | Performed by: FAMILY MEDICINE

## 2018-11-30 PROCEDURE — 99999 PR PBB SHADOW E&M-EST. PATIENT-LVL III: CPT | Mod: PBBFAC,,, | Performed by: FAMILY MEDICINE

## 2018-11-30 PROCEDURE — 3078F DIAST BP <80 MM HG: CPT | Mod: CPTII,S$GLB,, | Performed by: FAMILY MEDICINE

## 2018-11-30 PROCEDURE — 3008F BODY MASS INDEX DOCD: CPT | Mod: CPTII,S$GLB,, | Performed by: FAMILY MEDICINE

## 2018-11-30 PROCEDURE — 99213 OFFICE O/P EST LOW 20 MIN: CPT | Mod: S$GLB,,, | Performed by: FAMILY MEDICINE

## 2018-11-30 NOTE — PATIENT INSTRUCTIONS
Phenylephrine oral tablet  What is this medicine?  PHENYLEPHRINE (fen il EF rin) is a decongestant. It is used to relieve a stuffy nose from allergies, colds, or sinus problems.  How should I use this medicine?  Take this medicine by mouth with a glass of water. Follow the directions on the prescription label. Take this medicine with food, water, or milk to prevent stomach upset. Take your medicine at regular intervals. Do not take it more often than directed.  Talk to your pediatrician regarding the use of this medicine in children. While this drug may be prescribed for children as young as 6 years old for selected conditions, precautions do apply.  Patients over 60 years old may have a stronger reaction and need a smaller dose.  What side effects may I notice from receiving this medicine?  Side effects that you should report to your doctor or health care professional as soon as possible:  · allergic reactions like skin rash, itching or hives, swelling of the face, lips, or tongue  · breathing problems  · chest pain  · fast, irregular heartbeat  · feeling faint or lightheaded, falls  · pain, tingling, numbness in the hands or feet  · unusually weak or tired  Side effects that usually do not require medical attention (report to your doctor or health care professional if they continue or are bothersome):  · difficulty sleeping  · headache  · loss of appetite  · nervousness  · stomach upset, nausea  What may interact with this medicine?  Do not take this medicine with any of the following medications:  · bromocriptine  · cocaine  · dihydroergotamine, ergotamine, ergoloid mesylates, methysergide, or ergot-type medication  · MAOIs like Carbex, Eldepryl, Marplan, Nardil, and Parnate  · other stimulant medicines  This medicine may also interact with the following medications:  · anesthesia  · medicines for blood pressure  · medicines for mental depression  What if I miss a dose?  If you miss a dose, take it as soon as  you can. If it is almost time for your next dose, take only that dose. Do not take double or extra doses.  Where should I keep my medicine?  Keep out of the reach of children.  Store at room temperature between 15 and 30 degrees C (59 and 86 degrees F). Protect from light. Throw away any unused medicine after the expiration date.  What should I tell my health care provider before I take this medicine?  They need to know if you have any of the following conditions:  · diabetes  · heart disease  · high blood pressure  · prostate problems  · taken an MAOI like Carbex, Eldepryl, Marplan, Nardil, or Parnate in last 14 days  · thyroid disease  · trouble passing urine or change in the amount of urine  · an unusual or allergic reaction to phenylephrine, other decongestants, other medicines, foods, dyes, or preservatives  · pregnant or trying to get pregnant  · breast-feeding  What should I watch for while using this medicine?  Tell your doctor or healthcare professional if your symptoms do not start to get better or if they get worse. See your doctor if you are not better in 7 days or if you have a fever.  NOTE:This sheet is a summary. It may not cover all possible information. If you have questions about this medicine, talk to your doctor, pharmacist, or health care provider. Copyright© 2017 [employer]

## 2018-11-30 NOTE — PROGRESS NOTES
Subjective:       Patient ID: Brett Garcia is a 52 y.o. male.    Chief Complaint: head cold    HPI  Onset 1.5 weeks ago. Denies fever but chills. Reports congestion, HA, trouble swallowing. Denies pos sick contacts. Sx worse one week ago and are more improved now having rested.    Good PO intake. Tried nyquil, dayquil, claritin-D, and flonase with moderate relief    Received influenza vaccine.    Reports bgl not elevated during this time.    Review of Systems   Constitutional: Positive for chills. Negative for activity change and unexpected weight change.   HENT: Positive for rhinorrhea and trouble swallowing. Negative for hearing loss.    Eyes: Negative for discharge and visual disturbance.   Respiratory: Positive for wheezing. Negative for chest tightness.    Cardiovascular: Negative for chest pain and palpitations.   Gastrointestinal: Negative for blood in stool, constipation, diarrhea and vomiting.   Endocrine: Negative for polydipsia and polyuria.   Genitourinary: Negative for difficulty urinating, hematuria and urgency.   Musculoskeletal: Negative for arthralgias, joint swelling and neck pain.   Neurological: Positive for headaches. Negative for weakness.   Psychiatric/Behavioral: Negative for confusion and dysphoric mood.        Objective:   /74   Pulse 85   Temp 97.7 °F (36.5 °C) (Oral)   Wt 101.4 kg (223 lb 7 oz)   SpO2 95%   BMI 33.00 kg/m²     Physical Exam   Constitutional: He appears well-developed and well-nourished.  Non-toxic appearance. He does not have a sickly appearance. He does not appear ill. No distress.   HENT:   Head: Normocephalic and atraumatic.   Right Ear: Tympanic membrane normal.   Left Ear: A middle ear effusion is present.   Nose: Mucosal edema (l>r) and rhinorrhea present.   Mouth/Throat: No oropharyngeal exudate, posterior oropharyngeal edema or posterior oropharyngeal erythema.   Eyes: Conjunctivae and EOM are normal.   Neck: Normal range of motion. Neck supple.    Cardiovascular: Normal rate and regular rhythm.   No murmur heard.  Pulmonary/Chest: Effort normal and breath sounds normal. He has no wheezes.   Abdominal: Soft. Bowel sounds are normal.   Lymphadenopathy:     He has no cervical adenopathy.   Vitals reviewed.    Assessment:     1. Sinusitis, unspecified chronicity, unspecified location      Plan:     Problem List Items Addressed This Visit     None      Visit Diagnoses     Sinusitis, unspecified chronicity, unspecified location    -  Primary      Advised correct techinque for nasal spray. No abx at this time. If sx worsen, advised RTC/call for possible abx/systemic steroids.      Follow-up if symptoms worsen or fail to improve.

## 2019-01-04 ENCOUNTER — PATIENT OUTREACH (OUTPATIENT)
Dept: ADMINISTRATIVE | Facility: HOSPITAL | Age: 54
End: 2019-01-04

## 2019-01-12 ENCOUNTER — OFFICE VISIT (OUTPATIENT)
Dept: DIABETES | Facility: CLINIC | Age: 54
End: 2019-01-12
Payer: COMMERCIAL

## 2019-01-12 DIAGNOSIS — E11.42 DIABETIC POLYNEUROPATHY ASSOCIATED WITH TYPE 2 DIABETES MELLITUS: Primary | ICD-10-CM

## 2019-01-12 DIAGNOSIS — E11.59 HYPERTENSION ASSOCIATED WITH DIABETES: ICD-10-CM

## 2019-01-12 DIAGNOSIS — E78.5 HYPERLIPIDEMIA ASSOCIATED WITH TYPE 2 DIABETES MELLITUS: ICD-10-CM

## 2019-01-12 DIAGNOSIS — E11.69 HYPERLIPIDEMIA ASSOCIATED WITH TYPE 2 DIABETES MELLITUS: ICD-10-CM

## 2019-01-12 DIAGNOSIS — I15.2 HYPERTENSION ASSOCIATED WITH DIABETES: ICD-10-CM

## 2019-01-12 LAB — GLUCOSE SERPL-MCNC: 71 MG/DL (ref 70–110)

## 2019-01-12 PROCEDURE — 99214 OFFICE O/P EST MOD 30 MIN: CPT | Mod: S$GLB,,, | Performed by: NURSE PRACTITIONER

## 2019-01-12 PROCEDURE — 82962 POCT GLUCOSE, HAND-HELD DEVICE: ICD-10-PCS | Mod: S$GLB,,, | Performed by: NURSE PRACTITIONER

## 2019-01-12 PROCEDURE — 99999 PR PBB SHADOW E&M-EST. PATIENT-LVL IV: CPT | Mod: PBBFAC,,, | Performed by: NURSE PRACTITIONER

## 2019-01-12 PROCEDURE — 99999 PR PBB SHADOW E&M-EST. PATIENT-LVL IV: ICD-10-PCS | Mod: PBBFAC,,, | Performed by: NURSE PRACTITIONER

## 2019-01-12 PROCEDURE — 99214 PR OFFICE/OUTPT VISIT, EST, LEVL IV, 30-39 MIN: ICD-10-PCS | Mod: S$GLB,,, | Performed by: NURSE PRACTITIONER

## 2019-01-12 PROCEDURE — 82962 GLUCOSE BLOOD TEST: CPT | Mod: S$GLB,,, | Performed by: NURSE PRACTITIONER
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
## 2019-01-13 VITALS
SYSTOLIC BLOOD PRESSURE: 120 MMHG | DIASTOLIC BLOOD PRESSURE: 71 MMHG | BODY MASS INDEX: 32.64 KG/M2 | HEIGHT: 69 IN | WEIGHT: 220.38 LBS

## 2019-01-18 ENCOUNTER — LAB VISIT (OUTPATIENT)
Dept: LAB | Facility: HOSPITAL | Age: 54
End: 2019-01-18
Attending: NURSE PRACTITIONER
Payer: COMMERCIAL

## 2019-01-18 DIAGNOSIS — E11.42 DIABETIC POLYNEUROPATHY ASSOCIATED WITH TYPE 2 DIABETES MELLITUS: ICD-10-CM

## 2019-01-18 LAB
ALBUMIN SERPL BCP-MCNC: 3.4 G/DL
ALP SERPL-CCNC: 73 U/L
ALT SERPL W/O P-5'-P-CCNC: 52 U/L
ANION GAP SERPL CALC-SCNC: 6 MMOL/L
AST SERPL-CCNC: 33 U/L
BILIRUB SERPL-MCNC: 0.5 MG/DL
BUN SERPL-MCNC: 18 MG/DL
CALCIUM SERPL-MCNC: 9.4 MG/DL
CHLORIDE SERPL-SCNC: 106 MMOL/L
CO2 SERPL-SCNC: 28 MMOL/L
CREAT SERPL-MCNC: 0.8 MG/DL
EST. GFR  (AFRICAN AMERICAN): >60 ML/MIN/1.73 M^2
EST. GFR  (NON AFRICAN AMERICAN): >60 ML/MIN/1.73 M^2
ESTIMATED AVG GLUCOSE: 280 MG/DL
GLUCOSE SERPL-MCNC: 159 MG/DL
HBA1C MFR BLD HPLC: 11.4 %
POTASSIUM SERPL-SCNC: 4 MMOL/L
PROT SERPL-MCNC: 6.7 G/DL
SODIUM SERPL-SCNC: 140 MMOL/L

## 2019-01-18 PROCEDURE — 80053 COMPREHEN METABOLIC PANEL: CPT

## 2019-01-18 PROCEDURE — 83036 HEMOGLOBIN GLYCOSYLATED A1C: CPT

## 2019-01-18 PROCEDURE — 36415 COLL VENOUS BLD VENIPUNCTURE: CPT

## 2019-01-21 DIAGNOSIS — E11.40 TYPE 2 DIABETES MELLITUS WITH DIABETIC NEUROPATHY: ICD-10-CM

## 2019-01-21 RX ORDER — DULAGLUTIDE 1.5 MG/.5ML
1.5 INJECTION, SOLUTION SUBCUTANEOUS WEEKLY
Qty: 4 SYRINGE | Refills: 3 | Status: SHIPPED | OUTPATIENT
Start: 2019-01-21 | End: 2019-05-29 | Stop reason: SDUPTHER

## 2019-02-04 ENCOUNTER — PATIENT MESSAGE (OUTPATIENT)
Dept: DIABETES | Facility: CLINIC | Age: 54
End: 2019-02-04

## 2019-02-05 DIAGNOSIS — G47.00 INSOMNIA, UNSPECIFIED TYPE: ICD-10-CM

## 2019-02-05 RX ORDER — AMITRIPTYLINE HYDROCHLORIDE 25 MG/1
50 TABLET, FILM COATED ORAL NIGHTLY PRN
Qty: 60 TABLET | Refills: 5 | Status: SHIPPED | OUTPATIENT
Start: 2019-02-05 | End: 2019-07-27 | Stop reason: SDUPTHER

## 2019-03-08 ENCOUNTER — PATIENT OUTREACH (OUTPATIENT)
Dept: ADMINISTRATIVE | Facility: HOSPITAL | Age: 54
End: 2019-03-08

## 2019-03-08 DIAGNOSIS — E78.5 HYPERLIPIDEMIA, UNSPECIFIED HYPERLIPIDEMIA TYPE: ICD-10-CM

## 2019-03-08 DIAGNOSIS — E11.59 HYPERTENSION ASSOCIATED WITH DIABETES: ICD-10-CM

## 2019-03-08 DIAGNOSIS — I15.2 HYPERTENSION ASSOCIATED WITH DIABETES: ICD-10-CM

## 2019-03-08 DIAGNOSIS — E11.42 DIABETIC POLYNEUROPATHY ASSOCIATED WITH TYPE 2 DIABETES MELLITUS: Primary | ICD-10-CM

## 2019-03-11 ENCOUNTER — OUTPATIENT CASE MANAGEMENT (OUTPATIENT)
Dept: ADMINISTRATIVE | Facility: OTHER | Age: 54
End: 2019-03-11

## 2019-03-11 NOTE — PROGRESS NOTES
Please note the following patients information has been forwarded to [facility] for Case Management and/or .    Please see the media section in patient's chart for additional details.    Please contact Outpatient Complex care Management at ext [phone] with any questions.    Thank you,    Mariia Le, [facility]  Outpatient Care Mgmt.  [phone]

## 2019-03-16 DIAGNOSIS — E78.2 MIXED HYPERLIPIDEMIA: ICD-10-CM

## 2019-03-16 RX ORDER — ATORVASTATIN CALCIUM 40 MG/1
TABLET, FILM COATED ORAL
Qty: 90 TABLET | Refills: 3 | Status: SHIPPED | OUTPATIENT
Start: 2019-03-16 | End: 2020-03-23

## 2019-03-20 RX ORDER — LISINOPRIL 5 MG/1
TABLET ORAL
Qty: 90 TABLET | Refills: 1 | Status: SHIPPED | OUTPATIENT
Start: 2019-03-20 | End: 2019-09-21 | Stop reason: SDUPTHER

## 2019-03-22 ENCOUNTER — OFFICE VISIT (OUTPATIENT)
Dept: INTERNAL MEDICINE | Facility: CLINIC | Age: 54
End: 2019-03-22
Payer: COMMERCIAL

## 2019-03-22 VITALS
DIASTOLIC BLOOD PRESSURE: 73 MMHG | SYSTOLIC BLOOD PRESSURE: 121 MMHG | HEART RATE: 75 BPM | OXYGEN SATURATION: 98 % | WEIGHT: 226.88 LBS | BODY MASS INDEX: 33.5 KG/M2 | TEMPERATURE: 97 F

## 2019-03-22 DIAGNOSIS — E11.69 HYPERLIPIDEMIA ASSOCIATED WITH TYPE 2 DIABETES MELLITUS: ICD-10-CM

## 2019-03-22 DIAGNOSIS — Z79.4 TYPE 2 DIABETES MELLITUS WITH DIABETIC NEUROPATHY, WITH LONG-TERM CURRENT USE OF INSULIN: Primary | ICD-10-CM

## 2019-03-22 DIAGNOSIS — E11.40 TYPE 2 DIABETES MELLITUS WITH DIABETIC NEUROPATHY, WITH LONG-TERM CURRENT USE OF INSULIN: Primary | ICD-10-CM

## 2019-03-22 DIAGNOSIS — E78.5 HYPERLIPIDEMIA ASSOCIATED WITH TYPE 2 DIABETES MELLITUS: ICD-10-CM

## 2019-03-22 PROCEDURE — 3046F HEMOGLOBIN A1C LEVEL >9.0%: CPT | Mod: CPTII,S$GLB,, | Performed by: FAMILY MEDICINE

## 2019-03-22 PROCEDURE — 99213 PR OFFICE/OUTPT VISIT, EST, LEVL III, 20-29 MIN: ICD-10-PCS | Mod: S$GLB,,, | Performed by: FAMILY MEDICINE

## 2019-03-22 PROCEDURE — 3078F PR MOST RECENT DIASTOLIC BLOOD PRESSURE < 80 MM HG: ICD-10-PCS | Mod: CPTII,S$GLB,, | Performed by: FAMILY MEDICINE

## 2019-03-22 PROCEDURE — 99999 PR PBB SHADOW E&M-EST. PATIENT-LVL III: CPT | Mod: PBBFAC,,, | Performed by: FAMILY MEDICINE

## 2019-03-22 PROCEDURE — 3008F PR BODY MASS INDEX (BMI) DOCUMENTED: ICD-10-PCS | Mod: CPTII,S$GLB,, | Performed by: FAMILY MEDICINE

## 2019-03-22 PROCEDURE — 3074F SYST BP LT 130 MM HG: CPT | Mod: CPTII,S$GLB,, | Performed by: FAMILY MEDICINE

## 2019-03-22 PROCEDURE — 99999 PR PBB SHADOW E&M-EST. PATIENT-LVL III: ICD-10-PCS | Mod: PBBFAC,,, | Performed by: FAMILY MEDICINE

## 2019-03-22 PROCEDURE — 3008F BODY MASS INDEX DOCD: CPT | Mod: CPTII,S$GLB,, | Performed by: FAMILY MEDICINE

## 2019-03-22 PROCEDURE — 3074F PR MOST RECENT SYSTOLIC BLOOD PRESSURE < 130 MM HG: ICD-10-PCS | Mod: CPTII,S$GLB,, | Performed by: FAMILY MEDICINE

## 2019-03-22 PROCEDURE — 3078F DIAST BP <80 MM HG: CPT | Mod: CPTII,S$GLB,, | Performed by: FAMILY MEDICINE

## 2019-03-22 PROCEDURE — 99213 OFFICE O/P EST LOW 20 MIN: CPT | Mod: S$GLB,,, | Performed by: FAMILY MEDICINE

## 2019-03-22 PROCEDURE — 3046F PR MOST RECENT HEMOGLOBIN A1C LEVEL > 9.0%: ICD-10-PCS | Mod: CPTII,S$GLB,, | Performed by: FAMILY MEDICINE

## 2019-03-22 NOTE — PROGRESS NOTES
Subjective:       Patient ID: Brett Garcia is a 53 y.o. male.    Chief Complaint: 6mth f/u    Here for scheduled recheck. DM, HLD. He is scheduled to see DM ed soon. He had adjustments to meds - Jardiance added -  and now is having some low BSs occurring.  Lab Results       Component                Value               Date                       WBC                      5.69                01/26/2018                 HGB                      14.2                01/26/2018                 HCT                      41.8                01/26/2018                 PLT                      179                 01/26/2018                 CHOL                     99 (L)              09/21/2018                 TRIG                     68                  09/21/2018                 HDL                      45                  09/21/2018                 LDLCALC                  40.4 (L)            09/21/2018                 ALT                      52 (H)              01/18/2019                 AST                      33                  01/18/2019                 NA                       140                 01/18/2019                 K                        4.0                 01/18/2019                 CL                       106                 01/18/2019                 CALCIUM                  9.4                 01/18/2019                 CREATININE               0.8                 01/18/2019                 BUN                      18                  01/18/2019                 CO2                      28                  01/18/2019                 TSH                      0.659               06/30/2016                 PSA                      0.53                06/30/2016                 GLU                      159 (H)             01/18/2019                 ESTGFRAFRICA             >60.0               01/18/2019                 EGFRNONAA                >60.0               01/18/2019                 HGBA1C                    11.4 (H)            01/18/2019                 MICALBCREAT              9.1                 01/18/2019            His parents have moved back into their house early Feb. This enables him to have better control of his diet/intake. Stress is decreased.  He has hx of candidal infections and is on a preventative program from derm. He has not had any increase in exacerbations since starting Jardiance.      Diabetes   He presents for his follow-up diabetic visit. He has type 2 diabetes mellitus. Hypoglycemia symptoms include tremors. Pertinent negatives for hypoglycemia include no hunger or sweats. Pertinent negatives for diabetes include no foot paresthesias and no foot ulcerations. There are no hypoglycemic complications. Symptoms are improving. Pertinent negatives for diabetic complications include no nephropathy or peripheral neuropathy. Risk factors for coronary artery disease include obesity, male sex, hypertension, diabetes mellitus and dyslipidemia. Current diabetic treatment includes insulin injections. He is following a generally healthy diet. He participates in exercise intermittently. His breakfast blood glucose range is generally 130-140 mg/dl. His dinner blood glucose range is generally 70-90 mg/dl. An ACE inhibitor/angiotensin II receptor blocker is being taken. Eye exam is current.     Review of Systems   Neurological: Positive for tremors. Negative for numbness.       Objective:      Physical Exam   Constitutional: He is oriented to person, place, and time. He appears well-developed.   HENT:   Head: Normocephalic and atraumatic.   Cardiovascular: Normal rate, regular rhythm and normal heart sounds.   Pulmonary/Chest: Effort normal and breath sounds normal.   Musculoskeletal: He exhibits no edema.   Neurological: He is alert and oriented to person, place, and time.   Skin: Skin is warm and dry.   Psychiatric: He has a normal mood and affect. His behavior is normal.         Assessment/Plan:     1.  Type 2 diabetes mellitus with diabetic neuropathy, with long-term current use of insulin  Diabetes Digital Medicine (DDMP) Enrollment Order    Diabetes Digital Medicine (DDMP): Assign Onboarding Questionnaires   2. Hyperlipidemia associated with type 2 diabetes mellitus     continue to f/u with Ms Caio.  Signing up for DIG DM.  Decrease to 60 U Tujeo until f/u with MsBates 2/2 lows.  Recheck with me in 6 mo with lipid check.

## 2019-03-31 PROBLEM — E66.9 OBESITY (BMI 30-39.9): Chronic | Status: ACTIVE | Noted: 2017-04-21
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
## 2019-04-02 ENCOUNTER — LAB VISIT (OUTPATIENT)
Dept: LAB | Facility: HOSPITAL | Age: 54
End: 2019-04-02
Attending: NURSE PRACTITIONER
Payer: COMMERCIAL

## 2019-04-02 ENCOUNTER — OFFICE VISIT (OUTPATIENT)
Dept: DIABETES | Facility: CLINIC | Age: 54
End: 2019-04-02
Payer: COMMERCIAL

## 2019-04-02 VITALS
HEIGHT: 69 IN | BODY MASS INDEX: 33.63 KG/M2 | DIASTOLIC BLOOD PRESSURE: 74 MMHG | WEIGHT: 227.06 LBS | SYSTOLIC BLOOD PRESSURE: 132 MMHG

## 2019-04-02 DIAGNOSIS — E78.5 HYPERLIPIDEMIA ASSOCIATED WITH TYPE 2 DIABETES MELLITUS: ICD-10-CM

## 2019-04-02 DIAGNOSIS — E66.9 OBESITY (BMI 30-39.9): ICD-10-CM

## 2019-04-02 DIAGNOSIS — E11.42 DIABETIC POLYNEUROPATHY ASSOCIATED WITH TYPE 2 DIABETES MELLITUS: Primary | ICD-10-CM

## 2019-04-02 DIAGNOSIS — E11.42 DIABETIC POLYNEUROPATHY ASSOCIATED WITH TYPE 2 DIABETES MELLITUS: ICD-10-CM

## 2019-04-02 DIAGNOSIS — I15.2 HYPERTENSION ASSOCIATED WITH DIABETES: ICD-10-CM

## 2019-04-02 DIAGNOSIS — E11.69 HYPERLIPIDEMIA ASSOCIATED WITH TYPE 2 DIABETES MELLITUS: ICD-10-CM

## 2019-04-02 DIAGNOSIS — E11.59 HYPERTENSION ASSOCIATED WITH DIABETES: ICD-10-CM

## 2019-04-02 LAB
ESTIMATED AVG GLUCOSE: 229 MG/DL (ref 68–131)
GLUCOSE SERPL-MCNC: 240 MG/DL (ref 70–110)
HBA1C MFR BLD HPLC: 9.6 % (ref 4–5.6)

## 2019-04-02 PROCEDURE — 83036 HEMOGLOBIN GLYCOSYLATED A1C: CPT

## 2019-04-02 PROCEDURE — 82962 POCT GLUCOSE, HAND-HELD DEVICE: ICD-10-PCS | Mod: S$GLB,,, | Performed by: NURSE PRACTITIONER

## 2019-04-02 PROCEDURE — 99999 PR PBB SHADOW E&M-EST. PATIENT-LVL III: CPT | Mod: PBBFAC,,, | Performed by: NURSE PRACTITIONER

## 2019-04-02 PROCEDURE — 82962 GLUCOSE BLOOD TEST: CPT | Mod: S$GLB,,, | Performed by: NURSE PRACTITIONER

## 2019-04-02 PROCEDURE — 99999 PR PBB SHADOW E&M-EST. PATIENT-LVL III: ICD-10-PCS | Mod: PBBFAC,,, | Performed by: NURSE PRACTITIONER

## 2019-04-02 PROCEDURE — 99214 OFFICE O/P EST MOD 30 MIN: CPT | Mod: S$GLB,,, | Performed by: NURSE PRACTITIONER

## 2019-04-02 PROCEDURE — 99214 PR OFFICE/OUTPT VISIT, EST, LEVL IV, 30-39 MIN: ICD-10-PCS | Mod: S$GLB,,, | Performed by: NURSE PRACTITIONER

## 2019-04-02 PROCEDURE — 36415 COLL VENOUS BLD VENIPUNCTURE: CPT | Mod: PO

## 2019-05-05 DIAGNOSIS — J30.2 SEASONAL ALLERGIC RHINITIS: ICD-10-CM

## 2019-05-06 RX ORDER — FLUTICASONE PROPIONATE 50 MCG
2 SPRAY, SUSPENSION (ML) NASAL DAILY
Qty: 16 ML | Refills: 12 | Status: SHIPPED | OUTPATIENT
Start: 2019-05-06 | End: 2020-05-08

## 2019-05-16 ENCOUNTER — SSC ENCOUNTER (OUTPATIENT)
Dept: ADMINISTRATIVE | Facility: OTHER | Age: 54
End: 2019-05-16

## 2019-05-16 NOTE — PROGRESS NOTES
Please see the below information from [facility] regarding case management referral      **unsuccessful outreach by nurse**  Attempt 1: 03/12/2019, 8:22 AM, left message/voicemail  Attempt 2: 03/19/2019, 10:46 AM, no answer        Thank you,  Whitney Hassan, [facility]  Outpatient Case Mgmnt  [phone]

## 2019-05-29 DIAGNOSIS — E11.40 TYPE 2 DIABETES MELLITUS WITH DIABETIC NEUROPATHY: ICD-10-CM

## 2019-05-29 RX ORDER — DULAGLUTIDE 1.5 MG/.5ML
1.5 INJECTION, SOLUTION SUBCUTANEOUS WEEKLY
Qty: 4 SYRINGE | Refills: 3 | Status: SHIPPED | OUTPATIENT
Start: 2019-05-29 | End: 2020-01-21

## 2019-06-05 ENCOUNTER — OFFICE VISIT (OUTPATIENT)
Dept: DIABETES | Facility: CLINIC | Age: 54
End: 2019-06-05
Payer: COMMERCIAL

## 2019-06-05 VITALS
SYSTOLIC BLOOD PRESSURE: 108 MMHG | BODY MASS INDEX: 33.26 KG/M2 | DIASTOLIC BLOOD PRESSURE: 80 MMHG | WEIGHT: 224.56 LBS | HEIGHT: 69 IN

## 2019-06-05 DIAGNOSIS — E11.59 HYPERTENSION ASSOCIATED WITH DIABETES: ICD-10-CM

## 2019-06-05 DIAGNOSIS — E11.69 HYPERLIPIDEMIA ASSOCIATED WITH TYPE 2 DIABETES MELLITUS: ICD-10-CM

## 2019-06-05 DIAGNOSIS — E66.9 OBESITY (BMI 30-39.9): Chronic | ICD-10-CM

## 2019-06-05 DIAGNOSIS — E11.42 DIABETIC POLYNEUROPATHY ASSOCIATED WITH TYPE 2 DIABETES MELLITUS: Primary | ICD-10-CM

## 2019-06-05 DIAGNOSIS — E78.5 HYPERLIPIDEMIA ASSOCIATED WITH TYPE 2 DIABETES MELLITUS: ICD-10-CM

## 2019-06-05 DIAGNOSIS — I15.2 HYPERTENSION ASSOCIATED WITH DIABETES: ICD-10-CM

## 2019-06-05 LAB — GLUCOSE SERPL-MCNC: 113 MG/DL (ref 70–110)

## 2019-06-05 PROCEDURE — 99214 PR OFFICE/OUTPT VISIT, EST, LEVL IV, 30-39 MIN: ICD-10-PCS | Mod: S$GLB,,, | Performed by: NURSE PRACTITIONER

## 2019-06-05 PROCEDURE — 99999 PR PBB SHADOW E&M-EST. PATIENT-LVL IV: CPT | Mod: PBBFAC,,, | Performed by: NURSE PRACTITIONER

## 2019-06-05 PROCEDURE — 82962 GLUCOSE BLOOD TEST: CPT | Mod: S$GLB,,, | Performed by: NURSE PRACTITIONER

## 2019-06-05 PROCEDURE — 99999 PR PBB SHADOW E&M-EST. PATIENT-LVL IV: ICD-10-PCS | Mod: PBBFAC,,, | Performed by: NURSE PRACTITIONER

## 2019-06-05 PROCEDURE — 99214 OFFICE O/P EST MOD 30 MIN: CPT | Mod: S$GLB,,, | Performed by: NURSE PRACTITIONER

## 2019-06-05 PROCEDURE — 82962 POCT GLUCOSE, HAND-HELD DEVICE: ICD-10-PCS | Mod: S$GLB,,, | Performed by: NURSE PRACTITIONER

## 2019-06-05 NOTE — PROGRESS NOTES
"PCP: Sonia Estrella MD    Subjective:     Chief Complaint: Diabetes Follow Up    HISTORY OF PRESENT ILLNESS: 53 y.o.  male presenting for diabetes follow up. Patient has had Type II diabetes since 2014 and has the following complications: peripheral neuropathy, recently diagnosed with periodontal disease. He  has attended diabetes education in the past.   Blood glucose testing is performed regularly, fasting 68, 99 - 157, < 189>; ac lunch 121 - 241; hs 41, 72 - 245, < 312 - Bah[place]ian food >.    He denies any recent hospital admissions or emergency room visits.  He is having occasional fasting hypoglycemia, BG < 60 s, which he treats with sugary foods.     Height: 5' 9" (175.3 cm)  //  Weight: 101.9 kg (224 lb 8.6 oz), Body mass index is 33.16 kg/m².  Home Blood Glucose reading this AM: 134 mg/dl fasting  His blood sugar in clinic today is: 113 mg/dl at 8:27 am       Labs Reviewed.       Lab Results   Component Value Date    CPEPTIDE 2.4 04/21/2017     Lab Results   Component Value Date    GLUTAMICACID 0.00 04/21/2017     Lab Results   Component Value Date    HUMANINSULIN 0.02 04/21/2017     DM MEDICATIONS:   Toujeo 60 units daily ( has not been taking routinely )  Metformin 1,000 mg BID   Trulicity 1.5 mg weekly   Jardiance 25 mg daily  Prandin 2 mg TID ac    Review of Systems   Constitutional: Negative for appetite change, fatigue and unexpected weight change.   Eyes: Negative for visual disturbance.   Gastrointestinal: Negative for abdominal pain, constipation, diarrhea and nausea.   Endocrine: Negative for polydipsia, polyphagia and polyuria.   Neurological: Positive for numbness (tingling bilateral feet). Negative for dizziness and headaches.   Psychiatric/Behavioral: Negative for confusion and decreased concentration. The patient is not nervous/anxious.        Diabetes Management Status  Statin: Taking  ACE/ARB: Taking    Screening or Prevention Patient's value Goal Complete/Controlled?   HgA1C Testing " and Control   Lab Results   Component Value Date    HGBA1C 9.6 (H) 04/02/2019      Annually/Less than 8% No   Lipid profile : 09/21/2018 Annually Yes   LDL control Lab Results   Component Value Date    LDLCALC 40.4 (L) 09/21/2018    Annually/Less than 100 mg/dl  Yes   Nephropathy screening Lab Results   Component Value Date    MICALBCREAT 9.1 01/18/2019     Lab Results   Component Value Date    PROTEINUA Trace (A) 06/30/2016    Annually Yes   Blood pressure BP Readings from Last 1 Encounters:   06/05/19 108/80    Less than 140/90 Yes   Dilated retinal exam : 10/19/2018 Annually Yes, Dr. Serrano   Foot exam   : 06/05/2019 Annually Yes     ACTIVITY LEVEL: Rarely Active. Discussed activities, benefits, methods, and precautions.  MEAL PLANNING: Patient reports number of meals per day to be 3 and number of snacks per day to be 2.   Patient is encouraged to carb count and consume no more than 45 - 60 grams of carbohydrates in each meal, and 1800 k / juanita per day.      BLOOD GLUCOSE TESTING:  True Metrix.   SOCIAL HISTORY: .  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     Objective:      Physical Exam   Constitutional: He appears well-developed and well-nourished.   HENT:   Head: Normocephalic and atraumatic.   Eyes: Pupils are equal, round, and reactive to light. EOM are normal.   Neck: Normal range of motion.   Cardiovascular: Normal rate and regular rhythm.   Pulses:       Dorsalis pedis pulses are 2+ on the right side, and 2+ on the left side.   Pulmonary/Chest: Effort normal and breath sounds normal.   Musculoskeletal: Normal range of motion.   Feet:   Right Foot:   Protective Sensation: 6 sites tested. 6 sites sensed.   Skin Integrity: Negative for ulcer, callus or dry skin.   Left Foot:   Protective Sensation: 6 sites tested. 6 sites sensed.   Skin Integrity: Negative for ulcer, blister, callus or dry skin.   Skin: Skin is warm and dry. No rash noted.   Psychiatric: He has a normal mood and  affect. His behavior is normal. Judgment and thought content normal.       Assessment / Plan:     1.) Diabetic polyneuropathy associated with type 2 diabetes mellitus  Comments:  -  Improving control per meter.  Continue Trulicity 1.5 mg weekly.  Due to periodic episodes of hypo, he has stopped taking his Toujeo routinely, but I advised to restart Toujeo 25 units daily, same time every day.  Continue  Metformin 1,000 mg BID. Continue Jardiance 25 mg daily.  He was encouraged to stay hydrated and denies symptoms of bladder or yeast infection. Continue Prandin 2 mg TID ac.     Orders:  -     POCT Glucose, Hand-Held Device  - Future Labs scheduled: A1C, CMP    2.) Hyperlipidemia associated with type 2 diabetes mellitus - continue meds as prescribed    3.) Hypertension associated with diabetes- continued meds as prescribed    4.) Obesity (BMI 30-39.9)    Additional Plan Details:    1.) Continue monitoring blood sugar 2 x daily, fasting and ac dinner or at bedtime. Discussed ADA goal for fasting blood sugar, 80 - 130 mg/dL; pp blood sugars below 180 mg/dl. Also, discussed prevention of hypoglycemia and the need to adjust goals to higher levels if persistent hypoglycemia.  Reminded to bring BG records or meter to each visit for review.  2.) Return to clinic in 3 months for follow up. The patient was explained the above plan and given opportunity to ask questions.  He understands, chooses and consents to this plan and accepts all the risks, which include but are not limited to the risks mentioned above. He understands the alternative of having no testing, interventions or treatments at this time. He left content and without further questions.     Greta Kearnye, GERRY, CDE    A total of 30 minutes was spent in face to face time, of which over 50 % was spent in counseling patient on disease process, complications, treatment, and side effects of medications.

## 2019-06-17 RX ORDER — EMPAGLIFLOZIN 25 MG/1
TABLET, FILM COATED ORAL
Qty: 30 TABLET | Refills: 3 | Status: SHIPPED | OUTPATIENT
Start: 2019-06-17 | End: 2019-11-04 | Stop reason: SDUPTHER

## 2019-06-28 DIAGNOSIS — E11.42 DIABETIC POLYNEUROPATHY ASSOCIATED WITH TYPE 2 DIABETES MELLITUS: ICD-10-CM

## 2019-07-05 ENCOUNTER — LAB VISIT (OUTPATIENT)
Dept: LAB | Facility: HOSPITAL | Age: 54
End: 2019-07-05
Attending: NURSE PRACTITIONER
Payer: COMMERCIAL

## 2019-07-05 DIAGNOSIS — E11.42 DIABETIC POLYNEUROPATHY ASSOCIATED WITH TYPE 2 DIABETES MELLITUS: ICD-10-CM

## 2019-07-05 LAB
ALBUMIN SERPL BCP-MCNC: 3.9 G/DL (ref 3.5–5.2)
ALP SERPL-CCNC: 80 U/L (ref 55–135)
ALT SERPL W/O P-5'-P-CCNC: 61 U/L (ref 10–44)
ANION GAP SERPL CALC-SCNC: 8 MMOL/L (ref 8–16)
AST SERPL-CCNC: 39 U/L (ref 10–40)
BILIRUB SERPL-MCNC: 0.3 MG/DL (ref 0.1–1)
BUN SERPL-MCNC: 23 MG/DL (ref 6–20)
CALCIUM SERPL-MCNC: 9.8 MG/DL (ref 8.7–10.5)
CHLORIDE SERPL-SCNC: 105 MMOL/L (ref 95–110)
CO2 SERPL-SCNC: 25 MMOL/L (ref 23–29)
CREAT SERPL-MCNC: 0.9 MG/DL (ref 0.5–1.4)
EST. GFR  (AFRICAN AMERICAN): >60 ML/MIN/1.73 M^2
EST. GFR  (NON AFRICAN AMERICAN): >60 ML/MIN/1.73 M^2
ESTIMATED AVG GLUCOSE: 192 MG/DL (ref 68–131)
GLUCOSE SERPL-MCNC: 119 MG/DL (ref 70–110)
HBA1C MFR BLD HPLC: 8.3 % (ref 4–5.6)
POTASSIUM SERPL-SCNC: 4.5 MMOL/L (ref 3.5–5.1)
PROT SERPL-MCNC: 7.2 G/DL (ref 6–8.4)
SODIUM SERPL-SCNC: 138 MMOL/L (ref 136–145)

## 2019-07-05 PROCEDURE — 83036 HEMOGLOBIN GLYCOSYLATED A1C: CPT

## 2019-07-05 PROCEDURE — 36415 COLL VENOUS BLD VENIPUNCTURE: CPT

## 2019-07-05 PROCEDURE — 80053 COMPREHEN METABOLIC PANEL: CPT

## 2019-07-19 ENCOUNTER — OFFICE VISIT (OUTPATIENT)
Dept: DERMATOLOGY | Facility: CLINIC | Age: 54
End: 2019-07-19
Payer: COMMERCIAL

## 2019-07-19 DIAGNOSIS — L81.4 LENTIGO: ICD-10-CM

## 2019-07-19 DIAGNOSIS — Z12.83 SCREENING, MALIGNANT NEOPLASM, SKIN: ICD-10-CM

## 2019-07-19 DIAGNOSIS — L98.9 SKIN LESION: Primary | ICD-10-CM

## 2019-07-19 DIAGNOSIS — L82.1 SEBORRHEIC KERATOSES: ICD-10-CM

## 2019-07-19 PROCEDURE — 99999 PR PBB SHADOW E&M-EST. PATIENT-LVL II: ICD-10-PCS | Mod: PBBFAC,,, | Performed by: STUDENT IN AN ORGANIZED HEALTH CARE EDUCATION/TRAINING PROGRAM

## 2019-07-19 PROCEDURE — 99999 PR PBB SHADOW E&M-EST. PATIENT-LVL II: CPT | Mod: PBBFAC,,, | Performed by: STUDENT IN AN ORGANIZED HEALTH CARE EDUCATION/TRAINING PROGRAM

## 2019-07-19 PROCEDURE — 99213 OFFICE O/P EST LOW 20 MIN: CPT | Mod: S$GLB,,, | Performed by: STUDENT IN AN ORGANIZED HEALTH CARE EDUCATION/TRAINING PROGRAM

## 2019-07-19 PROCEDURE — 99213 PR OFFICE/OUTPT VISIT, EST, LEVL III, 20-29 MIN: ICD-10-PCS | Mod: S$GLB,,, | Performed by: STUDENT IN AN ORGANIZED HEALTH CARE EDUCATION/TRAINING PROGRAM

## 2019-07-19 NOTE — PATIENT INSTRUCTIONS

## 2019-07-19 NOTE — PROGRESS NOTES
Subjective:       Patient ID:  Brett Garcia is a 53 y.o. male who presents for   Chief Complaint   Patient presents with    Skin Check     tbse     History of Present Illness: The patient presents for evaluation of a skin lesion in the groin and evaluation of skin moles. He was last seen on ___. He complains of having warty growths on the inner folds of the thighs, present for several months. Denies any symptoms associated with the growths. Has never had a growth like this in the past. Has had no treatments for the lesions. Denies any rapidly growing, bleeding, or non healing skin lesions.                Review of Systems   Skin: Negative for itching, rash and dry skin.        Objective:    Physical Exam   Constitutional: He appears well-developed and well-nourished. No distress.   Neurological: He is alert and oriented to person, place, and time. He is not disoriented.   Psychiatric: He has a normal mood and affect.   Skin:   Areas Examined (abnormalities noted in diagram):   Scalp / Hair Palpated and Inspected  Head / Face Inspection Performed  Neck Inspection Performed  Chest / Axilla Inspection Performed  Abdomen Inspection Performed  Genitals / Buttocks / Groin Inspection Performed  Back Inspection Performed  RUE Inspected  LUE Inspection Performed  RLE Inspected  LLE Inspection Performed  Nails and Digits Inspection Performed                       Diagram Legend     Erythematous scaling macule/papule c/w actinic keratosis       Vascular papule c/w angioma      Pigmented verrucoid papule/plaque c/w seborrheic keratosis      Yellow umbilicated papule c/w sebaceous hyperplasia      Irregularly shaped tan macule c/w lentigo     1-2 mm smooth white papules consistent with Milia      Movable subcutaneous cyst with punctum c/w epidermal inclusion cyst      Subcutaneous movable cyst c/w pilar cyst      Firm pink to brown papule c/w dermatofibroma      Pedunculated fleshy papule(s) c/w skin tag(s)      Evenly  pigmented macule c/w junctional nevus     Mildly variegated pigmented, slightly irregular-bordered macule c/w mildly atypical nevus      Flesh colored to evenly pigmented papule c/w intradermal nevus       Pink pearly papule/plaque c/w basal cell carcinoma      Erythematous hyperkeratotic cursted plaque c/w SCC      Surgical scar with no sign of skin cancer recurrence      Open and closed comedones      Inflammatory papules and pustules      Verrucoid papule consistent consistent with wart     Erythematous eczematous patches and plaques     Dystrophic onycholytic nail with subungual debris c/w onychomycosis     Umbilicated papule    Erythematous-base heme-crusted tan verrucoid plaque consistent with inflamed seborrheic keratosis     Erythematous Silvery Scaling Plaque c/w Psoriasis     See annotation      Assessment / Plan:        Skin lesion - concerning for verruca vs SK vs other. Counseled to use protection during sexual intercourse.   Cryosurgery procedure note:    Verbal consent from the patient is obtained including, but not limited to, risk of hypopigmentation/hyperpigmentation, scar, recurrence of lesion. Liquid nitrogen cryosurgery is applied to 3 lesions to produce a freeze injury. The patient is aware that blisters may form and is instructed on wound care with gentle cleansing and use of vaseline ointment to keep moist until healed. The patient is supplied a handout on cryosurgery and is instructed to call if lesions do not completely resolve.    Seborrheic keratoses  These are benign inherited growths without a malignant potential. Reassurance given to patient. No treatment is necessary.     Lentigo  This is a benign hyperpigmented sun induced lesion. Daily sun protection will reduce the number of new lesions. Treatment of these benign lesions are considered cosmetic.    Screening, malignant neoplasm, skin  Upper body skin examination performed today including at least 6 points as noted in physical  examination. No lesions suspicious for malignancy noted.         Follow up in about 1 year (around 7/19/2020).

## 2019-07-27 DIAGNOSIS — G47.00 INSOMNIA, UNSPECIFIED TYPE: ICD-10-CM

## 2019-07-29 RX ORDER — AMITRIPTYLINE HYDROCHLORIDE 25 MG/1
50 TABLET, FILM COATED ORAL NIGHTLY PRN
Qty: 60 TABLET | Refills: 5 | Status: SHIPPED | OUTPATIENT
Start: 2019-07-29 | End: 2020-04-28 | Stop reason: SDUPTHER

## 2019-08-20 ENCOUNTER — PATIENT OUTREACH (OUTPATIENT)
Dept: ADMINISTRATIVE | Facility: HOSPITAL | Age: 54
End: 2019-08-20

## 2019-09-22 RX ORDER — LISINOPRIL 5 MG/1
TABLET ORAL
Qty: 90 TABLET | Refills: 0 | Status: SHIPPED | OUTPATIENT
Start: 2019-09-22 | End: 2020-04-28 | Stop reason: SDUPTHER

## 2019-09-23 ENCOUNTER — TELEPHONE (OUTPATIENT)
Dept: INTERNAL MEDICINE | Facility: CLINIC | Age: 54
End: 2019-09-23

## 2019-09-30 DIAGNOSIS — E11.40 TYPE 2 DIABETES MELLITUS WITH DIABETIC NEUROPATHY, WITH LONG-TERM CURRENT USE OF INSULIN: ICD-10-CM

## 2019-09-30 DIAGNOSIS — Z79.4 TYPE 2 DIABETES MELLITUS WITH DIABETIC NEUROPATHY, WITH LONG-TERM CURRENT USE OF INSULIN: ICD-10-CM

## 2019-09-30 RX ORDER — METFORMIN HYDROCHLORIDE 1000 MG/1
TABLET ORAL
Qty: 180 TABLET | Refills: 3 | Status: SHIPPED | OUTPATIENT
Start: 2019-09-30 | End: 2020-10-01 | Stop reason: SDUPTHER

## 2019-10-17 DIAGNOSIS — E11.9 TYPE 2 DIABETES MELLITUS WITHOUT COMPLICATION: ICD-10-CM

## 2019-10-25 ENCOUNTER — OFFICE VISIT (OUTPATIENT)
Dept: DERMATOLOGY | Facility: CLINIC | Age: 54
End: 2019-10-25
Payer: COMMERCIAL

## 2019-10-25 DIAGNOSIS — D48.5 NEOPLASM OF UNCERTAIN BEHAVIOR OF SKIN: Primary | ICD-10-CM

## 2019-10-25 DIAGNOSIS — L82.1 SEBORRHEIC KERATOSES: ICD-10-CM

## 2019-10-25 PROCEDURE — 88305 TISSUE SPECIMEN TO PATHOLOGY, DERMATOLOGY: ICD-10-PCS | Mod: 26,,, | Performed by: PATHOLOGY

## 2019-10-25 PROCEDURE — 17110 DESTRUCTION B9 LES UP TO 14: CPT | Mod: S$GLB,,, | Performed by: STUDENT IN AN ORGANIZED HEALTH CARE EDUCATION/TRAINING PROGRAM

## 2019-10-25 PROCEDURE — 11102 TANGNTL BX SKIN SINGLE LES: CPT | Mod: 59,S$GLB,, | Performed by: STUDENT IN AN ORGANIZED HEALTH CARE EDUCATION/TRAINING PROGRAM

## 2019-10-25 PROCEDURE — 17110 PR DESTRUCTION BENIGN LESIONS UP TO 14: ICD-10-PCS | Mod: S$GLB,,, | Performed by: STUDENT IN AN ORGANIZED HEALTH CARE EDUCATION/TRAINING PROGRAM

## 2019-10-25 PROCEDURE — 99999 PR PBB SHADOW E&M-EST. PATIENT-LVL II: ICD-10-PCS | Mod: PBBFAC,,, | Performed by: STUDENT IN AN ORGANIZED HEALTH CARE EDUCATION/TRAINING PROGRAM

## 2019-10-25 PROCEDURE — 99999 PR PBB SHADOW E&M-EST. PATIENT-LVL II: CPT | Mod: PBBFAC,,, | Performed by: STUDENT IN AN ORGANIZED HEALTH CARE EDUCATION/TRAINING PROGRAM

## 2019-10-25 PROCEDURE — 88305 TISSUE EXAM BY PATHOLOGIST: CPT | Performed by: PATHOLOGY

## 2019-10-25 PROCEDURE — 11102 PR TANGENTIAL BIOPSY, SKIN, SINGLE LESION: ICD-10-PCS | Mod: 59,S$GLB,, | Performed by: STUDENT IN AN ORGANIZED HEALTH CARE EDUCATION/TRAINING PROGRAM

## 2019-10-25 PROCEDURE — 99213 OFFICE O/P EST LOW 20 MIN: CPT | Mod: 25,S$GLB,, | Performed by: STUDENT IN AN ORGANIZED HEALTH CARE EDUCATION/TRAINING PROGRAM

## 2019-10-25 PROCEDURE — 99213 PR OFFICE/OUTPT VISIT, EST, LEVL III, 20-29 MIN: ICD-10-PCS | Mod: 25,S$GLB,, | Performed by: STUDENT IN AN ORGANIZED HEALTH CARE EDUCATION/TRAINING PROGRAM

## 2019-10-25 PROCEDURE — 88305 TISSUE EXAM BY PATHOLOGIST: CPT | Mod: 26,,, | Performed by: PATHOLOGY

## 2019-10-25 NOTE — PATIENT INSTRUCTIONS

## 2019-10-25 NOTE — PROGRESS NOTES
Subjective:       Patient ID:  Brett Garcia is a 53 y.o. male who presents for   Chief Complaint   Patient presents with    Mole     6 weeks after mole removed on right inner thigh several have popped up      Patient complains of lesion(s) - skin lesions. Last seen on 7/19/19 where he had a lesion on the right groin treated with cryotherapy. Reports that since then, multiple other lesions have developed in the same area. Denies any symptoms associated with the lesion.         Review of Systems   Skin: Negative for itching, rash and dry skin.        Objective:    Physical Exam   Constitutional: He appears well-developed and well-nourished. No distress.   Genitourinary:         Neurological: He is alert and oriented to person, place, and time. He is not disoriented.   Psychiatric: He has a normal mood and affect.   Skin:   Areas Examined (abnormalities noted in diagram):   Head / Face Inspection Performed  Neck Inspection Performed  Chest / Axilla Inspection Performed  Abdomen Inspection Performed  Genitals / Buttocks / Groin Inspection Performed         Diagram Legend     Erythematous scaling macule/papule c/w actinic keratosis       Vascular papule c/w angioma      Pigmented verrucoid papule/plaque c/w seborrheic keratosis      Yellow umbilicated papule c/w sebaceous hyperplasia      Irregularly shaped tan macule c/w lentigo     1-2 mm smooth white papules consistent with Milia      Movable subcutaneous cyst with punctum c/w epidermal inclusion cyst      Subcutaneous movable cyst c/w pilar cyst      Firm pink to brown papule c/w dermatofibroma      Pedunculated fleshy papule(s) c/w skin tag(s)      Evenly pigmented macule c/w junctional nevus     Mildly variegated pigmented, slightly irregular-bordered macule c/w mildly atypical nevus      Flesh colored to evenly pigmented papule c/w intradermal nevus       Pink pearly papule/plaque c/w basal cell carcinoma      Erythematous hyperkeratotic cursted plaque c/w  SCC      Surgical scar with no sign of skin cancer recurrence      Open and closed comedones      Inflammatory papules and pustules      Verrucoid papule consistent consistent with wart     Erythematous eczematous patches and plaques     Dystrophic onycholytic nail with subungual debris c/w onychomycosis     Umbilicated papule    Erythematous-base heme-crusted tan verrucoid plaque consistent with inflamed seborrheic keratosis     Erythematous Silvery Scaling Plaque c/w Psoriasis     See annotation          Assessment / Plan:      Pathology Orders:     Normal Orders This Visit    Tissue Specimen To Pathology, Dermatology     Questions:    Directional Terms:  Other(comment)    Clinical Information:  Verruca vs SK    Specific Site:  groin        Neoplasm of uncertain behavior of skin - patient concerned regarding possible VV. Will shave for definitive diagnosis. Counseled on possible diagnosis.   -     Tissue Specimen To Pathology, Dermatology    Shave biopsy procedure note:    Shave biopsy performed after verbal consent including risk of infection, scar, recurrence, need for additional treatment of site. Area prepped with alcohol, anesthetized with approximately 1.0cc of 1% lidocaine with epinephrine. Lesional tissue shaved with razor blade. Hemostasis achieved with application of aluminum chloride followed by hyfrecation. No complications. Dressing applied. Wound care explained.    Cryosurgery procedure note:    Verbal consent from the patient is obtained including, but not limited to, risk of hypopigmentation/hyperpigmentation, scar, recurrence of lesion. Liquid nitrogen cryosurgery is applied to 4 lesions to produce a freeze injury. The patient is aware that blisters may form and is instructed on wound care with gentle cleansing and use of vaseline ointment to keep moist until healed. The patient is supplied a handout on cryosurgery and is instructed to call if lesions do not completely resolve.    Seborrheic  keratosis.   These are benign inherited growths without a malignant potential. Reassurance given to patient. No treatment is necessary.            Follow up in about 1 year (around 10/25/2020).

## 2019-10-29 DIAGNOSIS — E11.40 TYPE 2 DIABETES MELLITUS WITH DIABETIC NEUROPATHY: ICD-10-CM

## 2019-10-29 RX ORDER — INSULIN GLARGINE 300 [IU]/ML
65 INJECTION, SOLUTION SUBCUTANEOUS DAILY
Qty: 6.5 ML | Refills: 1 | Status: SHIPPED | OUTPATIENT
Start: 2019-10-29 | End: 2019-12-18 | Stop reason: SDUPTHER

## 2019-11-04 RX ORDER — EMPAGLIFLOZIN 25 MG/1
TABLET, FILM COATED ORAL
Qty: 30 TABLET | Refills: 0 | Status: SHIPPED | OUTPATIENT
Start: 2019-11-04 | End: 2020-04-28 | Stop reason: SDUPTHER

## 2019-11-06 ENCOUNTER — PATIENT MESSAGE (OUTPATIENT)
Dept: DERMATOLOGY | Facility: CLINIC | Age: 54
End: 2019-11-06

## 2019-11-06 DIAGNOSIS — A63.0 CONDYLOMA: Primary | ICD-10-CM

## 2019-11-06 RX ORDER — IMIQUIMOD 12.5 MG/.25G
CREAM TOPICAL
Qty: 12 PACKET | Refills: 1 | Status: SHIPPED | OUTPATIENT
Start: 2019-11-06 | End: 2020-10-31

## 2019-11-22 ENCOUNTER — OFFICE VISIT (OUTPATIENT)
Dept: INTERNAL MEDICINE | Facility: CLINIC | Age: 54
End: 2019-11-22
Payer: COMMERCIAL

## 2019-11-22 VITALS
DIASTOLIC BLOOD PRESSURE: 78 MMHG | SYSTOLIC BLOOD PRESSURE: 125 MMHG | HEART RATE: 87 BPM | HEIGHT: 69 IN | WEIGHT: 220.56 LBS | BODY MASS INDEX: 32.67 KG/M2 | TEMPERATURE: 98 F

## 2019-11-22 DIAGNOSIS — E11.69 HYPERLIPIDEMIA ASSOCIATED WITH TYPE 2 DIABETES MELLITUS: ICD-10-CM

## 2019-11-22 DIAGNOSIS — Z79.4 TYPE 2 DIABETES MELLITUS WITH DIABETIC NEUROPATHY, WITH LONG-TERM CURRENT USE OF INSULIN: ICD-10-CM

## 2019-11-22 DIAGNOSIS — Z23 FLU VACCINE NEED: ICD-10-CM

## 2019-11-22 DIAGNOSIS — E78.5 HYPERLIPIDEMIA ASSOCIATED WITH TYPE 2 DIABETES MELLITUS: ICD-10-CM

## 2019-11-22 DIAGNOSIS — E11.40 TYPE 2 DIABETES MELLITUS WITH DIABETIC NEUROPATHY, WITH LONG-TERM CURRENT USE OF INSULIN: ICD-10-CM

## 2019-11-22 DIAGNOSIS — Z00.00 WELLNESS EXAMINATION: Primary | ICD-10-CM

## 2019-11-22 LAB
CHOLEST SERPL-MCNC: 114 MG/DL (ref 120–199)
CHOLEST/HDLC SERPL: 2.4 {RATIO} (ref 2–5)
ESTIMATED AVG GLUCOSE: 200 MG/DL (ref 68–131)
HBA1C MFR BLD HPLC: 8.6 % (ref 4–5.6)
HDLC SERPL-MCNC: 47 MG/DL (ref 40–75)
HDLC SERPL: 41.2 % (ref 20–50)
LDLC SERPL CALC-MCNC: 46.2 MG/DL (ref 63–159)
NONHDLC SERPL-MCNC: 67 MG/DL
TRIGL SERPL-MCNC: 104 MG/DL (ref 30–150)

## 2019-11-22 PROCEDURE — 3078F PR MOST RECENT DIASTOLIC BLOOD PRESSURE < 80 MM HG: ICD-10-PCS | Mod: CPTII,S$GLB,, | Performed by: FAMILY MEDICINE

## 2019-11-22 PROCEDURE — 99999 PR PBB SHADOW E&M-EST. PATIENT-LVL IV: ICD-10-PCS | Mod: PBBFAC,,, | Performed by: FAMILY MEDICINE

## 2019-11-22 PROCEDURE — 99396 PR PREVENTIVE VISIT,EST,40-64: ICD-10-PCS | Mod: S$GLB,,, | Performed by: FAMILY MEDICINE

## 2019-11-22 PROCEDURE — 3074F SYST BP LT 130 MM HG: CPT | Mod: CPTII,S$GLB,, | Performed by: FAMILY MEDICINE

## 2019-11-22 PROCEDURE — 99396 PREV VISIT EST AGE 40-64: CPT | Mod: S$GLB,,, | Performed by: FAMILY MEDICINE

## 2019-11-22 PROCEDURE — 3074F PR MOST RECENT SYSTOLIC BLOOD PRESSURE < 130 MM HG: ICD-10-PCS | Mod: CPTII,S$GLB,, | Performed by: FAMILY MEDICINE

## 2019-11-22 PROCEDURE — 80061 LIPID PANEL: CPT

## 2019-11-22 PROCEDURE — 99999 PR PBB SHADOW E&M-EST. PATIENT-LVL IV: CPT | Mod: PBBFAC,,, | Performed by: FAMILY MEDICINE

## 2019-11-22 PROCEDURE — 83036 HEMOGLOBIN GLYCOSYLATED A1C: CPT

## 2019-11-22 PROCEDURE — 3078F DIAST BP <80 MM HG: CPT | Mod: CPTII,S$GLB,, | Performed by: FAMILY MEDICINE

## 2019-11-22 RX ORDER — PHENYLEPHRINE HCL 10 MG/1
10 TABLET, FILM COATED ORAL EVERY 4 HOURS PRN
COMMUNITY
End: 2020-10-06

## 2019-11-22 NOTE — PROGRESS NOTES
Subjective:       Patient ID: Brett Garcia is a 53 y.o. male.    Chief Complaint: Follow-up (march )    Patient with type 2 diabetes, hypertension, hyperlipidemia here for scheduled recheck.  Due for lipid level.  Lab Results       Component                Value               Date                       WBC                      5.69                01/26/2018                 HGB                      14.2                01/26/2018                 HCT                      41.8                01/26/2018                 PLT                      179                 01/26/2018                 CHOL                     99 (L)              09/21/2018                 TRIG                     68                  09/21/2018                 HDL                      45                  09/21/2018                 LDLCALC                  40.4 (L)            09/21/2018                 ALT                      61 (H)              07/05/2019                 AST                      39                  07/05/2019                 NA                       138                 07/05/2019                 K                        4.5                 07/05/2019                 CL                       105                 07/05/2019                 CALCIUM                  9.8                 07/05/2019                 CREATININE               0.9                 07/05/2019                 BUN                      23 (H)              07/05/2019                 CO2                      25                  07/05/2019                 TSH                      0.659               06/30/2016                 PSA                      0.53                06/30/2016                 GLU                      119 (H)             07/05/2019                 ESTGFRAFRICA             >60.0               07/05/2019                 EGFRNONAA                >60.0               07/05/2019                 HGBA1C                   8.3 (H)             07/05/2019                  MICALBCREAT              9.1                 01/18/2019  Excellent improvement from April to July with A1c now 8.3 under care of diabetes Department.  No microalbuminuria present.            Lab Results       Component                Value               Date                       HGBA1C                   8.3 (H)             07/05/2019                 HGBA1C                   9.6 (H)             04/02/2019                 HGBA1C                   11.4 (H)            01/18/2019                 HGBA1C                   13.4 (H)            09/07/2018                 HGBA1C                   8.3 (H)             01/26/2018                 HGBA1C                   8.3 (H)             12/22/2017              Diabetes Medications        insulin glargine, TOUJEO, (TOUJEO SOLOSTAR U-300 INSULIN) 300 unit/mL (1.5 mL) InPn pen Inject 65 Units into the skin once daily.    JARDIANCE 25 mg Tab TAKE 25 MG BY MOUTH ONCE DAILY.    metFORMIN (GLUCOPHAGE) 1000 MG tablet TAKE 1 TABLET BY MOUTH TWICE A DAY WITH MEALS    repaglinide (PRANDIN) 2 MG tablet Take 1 tablet (2 mg total) by mouth 3 (three) times daily before meals.    TRULICITY 1.5 mg/0.5 mL PnIj INJECT 1.5 MG INTO THE SKIN ONCE A WEEK.        BP Readings from Last 3 Encounters:  06/05/19 : 108/80  04/02/19 : 132/74  03/22/19 : 121/73    Hypertension Medications        lisinopril (PRINIVIL,ZESTRIL) 5 MG tablet TAKE 1 TABLET BY MOUTH EVERY DAY      Lipid levels excellent control as of last year on atorvastatin 40 mg.  Lab Results       Component                Value               Date                       CHOL                     99 (L)              09/21/2018                 CHOL                     150                 06/30/2017                 CHOL                     170                 02/23/2017            Lab Results       Component                Value               Date                       HDL                      45                  09/21/2018                  HDL                      38 (L)              06/30/2017                 HDL                      38 (L)              02/23/2017            Lab Results       Component                Value               Date                       LDLCALC                  40.4 (L)            09/21/2018                 LDLCALC                  94.0                06/30/2017                 LDLCALC                  111.4               02/23/2017            Lab Results       Component                Value               Date                       TRIG                     68                  09/21/2018                 TRIG                     90                  06/30/2017                 TRIG                     103                 02/23/2017            He is also due for annual wellness evaluation.    ANNUAL EXAM:  Preventative Health Checklist 24-64 years of age    Brett Garcia presents today for an annual exam.    Lipid Panel due on 09/21/2019 - today  Eye Exam due on 10/19/2019 - schedule  Urine Microalbumin due on 01/18/2020 - next visit    Health Maintenance Summary                Shingles Vaccine Overdue 12/26/2013     Influenza Vaccine Overdue 9/1/2019      Done 9/21/2018 Imm Admin: Influenza - Quadrivalent - PF     Done 9/29/2017 Imm Admin: Influenza - Quadrivalent - PF     Done 12/18/2015 Imm Admin: Influenza - Quadrivalent - PF     Done 11/16/2013 Imm Admin: Influenza Split     Done 12/12/2011 Imm Admin: Influenza Split    Lipid Panel Overdue 9/21/2019      Done 9/21/2018 LIPID PANEL     Done 6/30/2017 LIPID PANEL     Done 2/23/2017 LIPID PANEL     Done 6/30/2016 LIPID PANEL     Done 8/19/2015 LIPID PANEL     Patient has more history with this topic...    Eye Exam Overdue 10/19/2019      Done 10/19/2018  DIABETES EYE EXAM     Done 9/8/2018  DIABETES EYE EXAM     Done 9/7/2018  DIABETES EYE EXAM     Done 4/21/2017 [employer]: WORKFLOW - HEALTHY PLANET - EXTERNAL DATA -   EXTERNAL PROCEDURES DATE - EYE EXAM     Done  3/24/2017 [employer]: WORKFLOW - HEALTHY PLANET - EXTERNAL DATA -   EXTERNAL PROCEDURES DATE - EYE EXAM     Patient has more history with this topic...    Urine Microalbumin Next Due 1/18/2020      Done 1/18/2019 MICROALBUMIN / CREATININE RATIO URINE MICROALB/CREAT   RATIO      Patient has more history with this topic...    Hemoglobin A1c Next Due 1/5/2020      Done 7/5/2019 HEMOGLOBIN A1C Hemoglobin A1C External      Done 8/17/2018 [employer]: WORKFLOW - HEALTHY PLANET - EXTERNAL DATA -   EXTERNAL LAB DATE - HEMOGLOBIN A1C     Patient has more history with this topic...    Foot Exam Next Due 6/5/2020      Done 6/5/2019 [employer]: WORKFLOW - DIABETES - DIABETIC FOOT EXAM   PERFORMED     Done 4/2/2019 [employer]: WORKFLOW - DIABETES - DIABETIC FOOT EXAM   PERFORMED     Done 9/14/2018      Done 8/24/2018 [employer]: WORKFLOW - DIABETES - DIABETIC FOOT EXAM   PERFORMED     Done 11/3/2017 [employer]: WORKFLOW - DIABETES - DIABETIC FOOT EXAM   PERFORMED     Patient has more history with this topic...    Low Dose Statin Next Due 7/19/2020      Done 7/19/2019 Registry Metric: Last Current Statin Reviewed Date     Done 3/16/2019 Registry Metric: Last Current Statin Order Date    TETANUS VACCINE Next Due 7/25/2024      Done 7/25/2014 Imm Admin: Tdap    Colonoscopy Next Due 11/1/2028      Done 11/1/2018 COLONOSCOPY    Pneumococcal Vaccine (Medium Risk) This plan is no longer active.      Done 12/2/2011 Imm Admin: Pneumococcal Polysaccharide - 23 Valent    Hepatitis C Screening This plan is no longer active.      Done 8/19/2015 HEPATITIS C ANTIBODY        Counseling:  Nutrition: Encouraged to take 5-10 servings fruits and vegetables daily   Exercise:  Physical activity recommendations reviewed - good physical activity at work  Avoid Tobacco Use: reviewed  Avoid ETOH/Drug Use:  reviewed  STD Prevention/Abstinence:   Avoid High Risk Behavior:   Unintended Pregnancy:    Lap-shoulder Belts:  Yes  No text/talk while driving:  Reviewed  Bike/ATV Motorcycle Helmets:  N/A  Smoke Detector:  yes  Safe Storage/Removal of Firearms: reviewed    Regular Dental Visits:  yes  Floss, Brush and Fluoride: yes    He has completed a full 14 system review. All items are negative except as indicated.    Review of Systems   Constitutional: Negative.  Negative for activity change and unexpected weight change.   HENT: Positive for dental problem (had loose teeth and had to have deep scaling to teeth and lost two lower front teeth - has replacement) and sinus pressure. Negative for hearing loss, rhinorrhea and trouble swallowing.    Eyes: Negative.  Negative for discharge and visual disturbance.   Respiratory: Negative.  Negative for chest tightness and wheezing.    Cardiovascular: Negative.  Negative for chest pain and palpitations.   Gastrointestinal: Positive for nausea (may be 2/2 increased coffee intake - ). Negative for blood in stool, constipation, diarrhea and vomiting.   Endocrine: Negative.  Negative for polydipsia and polyuria.   Genitourinary: Negative.  Negative for difficulty urinating, hematuria and urgency.   Musculoskeletal: Negative.  Negative for arthralgias, joint swelling and neck pain.   Skin: Negative.         Seeing derm - had recent bx thigh lesion R - condyloma accuminata   Allergic/Immunologic: Negative.    Neurological: Negative.  Negative for weakness and headaches.   Hematological: Negative.    Psychiatric/Behavioral: Negative.  Negative for confusion and dysphoric mood.       Objective:      Physical Exam   Constitutional: He is oriented to person, place, and time. He appears well-developed and well-nourished.   HENT:   Head: Normocephalic and atraumatic.   Right Ear: Tympanic membrane, external ear and ear canal normal.   Left Ear: Tympanic membrane, external ear and ear canal normal.   Nose: Nose normal.   Mouth/Throat: Oropharynx is clear and moist.   Eyes: Conjunctivae and EOM are normal.   Neck: Normal range of motion. Neck  supple. No thyromegaly present.   Cardiovascular: Normal rate, regular rhythm and normal heart sounds.   Pulmonary/Chest: Effort normal and breath sounds normal.   Abdominal: Soft. He exhibits no distension. There is no tenderness.   Musculoskeletal: Normal range of motion. He exhibits no edema.   Lymphadenopathy:     He has no cervical adenopathy.   Neurological: He is alert and oriented to person, place, and time.   Skin: Skin is warm and dry.   Psychiatric: He has a normal mood and affect. His behavior is normal.         Assessment/Plan:     1. Wellness examination  Lipid panel    CANCELED: Influenza - Quadrivalent (PF)   2. Hyperlipidemia associated with type 2 diabetes mellitus  Lipid panel   3. Type 2 diabetes mellitus with diabetic neuropathy, with long-term current use of insulin  Hemoglobin A1c   4. Flu vaccine need  CANCELED: Influenza - Quadrivalent (PF)   non-fasting today - rice/red beans, 8 Xmas cookies  MICHEL signed for opto - he will make appt - due now with Dr Mercedes Jr. Hold to mid December.  Continue to f/u with DM ed - make appt with Ms Kearney. Will get a1C today to compare to July.  Recheck 6 months.

## 2019-11-22 NOTE — PATIENT INSTRUCTIONS
"Talk to your pharmacy and/or your insurance about the "Shingrix" shingles vaccine (two-shot series). Wait til after the new year.    Make appt to see Ms Kearney  "

## 2019-12-04 ENCOUNTER — TELEPHONE (OUTPATIENT)
Dept: INTERNAL MEDICINE | Facility: CLINIC | Age: 54
End: 2019-12-04

## 2019-12-04 ENCOUNTER — OFFICE VISIT (OUTPATIENT)
Dept: DIABETES | Facility: CLINIC | Age: 54
End: 2019-12-04
Payer: COMMERCIAL

## 2019-12-04 VITALS
SYSTOLIC BLOOD PRESSURE: 144 MMHG | HEIGHT: 69 IN | BODY MASS INDEX: 32.43 KG/M2 | WEIGHT: 218.94 LBS | DIASTOLIC BLOOD PRESSURE: 90 MMHG

## 2019-12-04 DIAGNOSIS — E11.59 HYPERTENSION ASSOCIATED WITH DIABETES: ICD-10-CM

## 2019-12-04 DIAGNOSIS — E66.9 OBESITY (BMI 30-39.9): Chronic | ICD-10-CM

## 2019-12-04 DIAGNOSIS — E78.5 HYPERLIPIDEMIA ASSOCIATED WITH TYPE 2 DIABETES MELLITUS: ICD-10-CM

## 2019-12-04 DIAGNOSIS — I15.2 HYPERTENSION ASSOCIATED WITH DIABETES: ICD-10-CM

## 2019-12-04 DIAGNOSIS — E11.69 HYPERLIPIDEMIA ASSOCIATED WITH TYPE 2 DIABETES MELLITUS: ICD-10-CM

## 2019-12-04 DIAGNOSIS — E11.42 DIABETIC POLYNEUROPATHY ASSOCIATED WITH TYPE 2 DIABETES MELLITUS: Primary | ICD-10-CM

## 2019-12-04 LAB — GLUCOSE SERPL-MCNC: 325 MG/DL (ref 70–110)

## 2019-12-04 PROCEDURE — 99214 PR OFFICE/OUTPT VISIT, EST, LEVL IV, 30-39 MIN: ICD-10-PCS | Mod: S$GLB,,, | Performed by: NURSE PRACTITIONER

## 2019-12-04 PROCEDURE — 99214 OFFICE O/P EST MOD 30 MIN: CPT | Mod: S$GLB,,, | Performed by: NURSE PRACTITIONER

## 2019-12-04 PROCEDURE — 99999 PR PBB SHADOW E&M-EST. PATIENT-LVL III: ICD-10-PCS | Mod: PBBFAC,,, | Performed by: NURSE PRACTITIONER

## 2019-12-04 PROCEDURE — 82962 GLUCOSE BLOOD TEST: CPT | Mod: S$GLB,,, | Performed by: NURSE PRACTITIONER

## 2019-12-04 PROCEDURE — 82962 POCT GLUCOSE, HAND-HELD DEVICE: ICD-10-PCS | Mod: S$GLB,,, | Performed by: NURSE PRACTITIONER

## 2019-12-04 PROCEDURE — 99999 PR PBB SHADOW E&M-EST. PATIENT-LVL III: CPT | Mod: PBBFAC,,, | Performed by: NURSE PRACTITIONER

## 2019-12-04 NOTE — TELEPHONE ENCOUNTER
----- Message from Leona Ferguson sent at 12/4/2019  4:19 PM CST -----  Contact: pt  Pt is calling in regards to diabetic eye exam. [facility] at 2:45. Please give pt a call back at [phone]

## 2019-12-04 NOTE — PROGRESS NOTES
"PCP: Sonia Estrella MD    Subjective:     Chief Complaint: Diabetes Follow Up    HISTORY OF PRESENT ILLNESS: 53 y.o.  male presenting for diabetes follow up. Patient has had Type II diabetes since 2014 and has the following complications: peripheral neuropathy and periodontal disease. Other pertinent conditions include: hyperlipidemia and hypertension.  He  has attended diabetes education in the past.   Blood glucose testing is performed regularly.  Per recall, fasting BGs are70 s, 120 s - 140 s; ac dinner 100 s - 150 s.    He denies any recent hospital admissions, emergency room visits, hypoglycemia, syncope, or diaphoresis.     Height: 5' 9" (175.3 cm)  //  Weight: 99.3 kg (218 lb 14.7 oz), Body mass index is 32.33 kg/m². He has lost 6 pounds since last visit     His blood sugar in clinic today is: 325 mg/dl at 3:45 pm ( just finished eating meatloaf, 2 slices of pizza, lemonade )      Labs Reviewed.       Lab Results   Component Value Date    CPEPTIDE 2.4 04/21/2017     Lab Results   Component Value Date    GLUTAMICACID 0.00 04/21/2017     Lab Results   Component Value Date    HUMANINSULIN 0.02 04/21/2017     DM MEDICATIONS:   Toujeo 70 units daily ( he adjusts the dose )  Metformin 1,000 mg BID   Trulicity 1.5 mg weekly   Jardiance 25 mg daily  Prandin 2 mg TID ac    Review of Systems   Constitutional: Negative for appetite change, fatigue and unexpected weight change.   Eyes: Negative for visual disturbance.   Gastrointestinal: Negative for abdominal pain, constipation, diarrhea and nausea.   Endocrine: Negative for polydipsia, polyphagia and polyuria.   Neurological: Positive for numbness (tingling bilateral feet). Negative for dizziness and headaches.   Psychiatric/Behavioral: Negative for confusion and decreased concentration. The patient is not nervous/anxious.        Diabetes Management Status  Statin: Taking  ACE/ARB: Taking    Screening or Prevention Patient's value Goal Complete/Controlled? "   HgA1C Testing and Control   Lab Results   Component Value Date    HGBA1C 8.6 (H) 11/22/2019      Annually/Less than 8% No   Lipid profile : 11/22/2019 Annually Yes   LDL control Lab Results   Component Value Date    LDLCALC 46.2 (L) 11/22/2019    Annually/Less than 100 mg/dl  Yes   Nephropathy screening Lab Results   Component Value Date    MICALBCREAT 9.1 01/18/2019     Lab Results   Component Value Date    PROTEINUA Trace (A) 06/30/2016    Annually Yes   Blood pressure BP Readings from Last 1 Encounters:   12/04/19 (!) 144/90    Less than 140/90 Yes   Dilated retinal exam : 10/19/2018 Annually No, Dr. Serrano    [facility]   Foot exam   : 12/04/2019 Annually Yes     ACTIVITY LEVEL: Rarely Active. Discussed activities, benefits, methods, and precautions.  MEAL PLANNING: Patient reports number of meals per day to be 3 and number of snacks per day to be 2.   Patient is encouraged to carb count and consume no more than 45 - 60 grams of carbohydrates in each meal, and 1800 k / juanita per day.      BLOOD GLUCOSE TESTING:  True Metrix  SOCIAL HISTORY:  x 2. Has a significant other.  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     Objective:      Physical Exam   Constitutional: He appears well-developed and well-nourished.   HENT:   Head: Normocephalic and atraumatic.   Eyes: Pupils are equal, round, and reactive to light. EOM are normal.   Neck: Normal range of motion.   Cardiovascular: Normal rate and regular rhythm.   Pulses:       Dorsalis pedis pulses are 2+ on the right side, and 2+ on the left side.   Pulmonary/Chest: Effort normal and breath sounds normal.   Musculoskeletal: Normal range of motion.   Feet:   Right Foot:   Protective Sensation: 6 sites tested. 6 sites sensed.   Skin Integrity: Negative for ulcer, callus or dry skin.   Left Foot:   Protective Sensation: 6 sites tested. 6 sites sensed.   Skin Integrity: Negative for ulcer,  blister, callus or dry skin.   Skin: Skin is warm and dry. No rash noted.   Psychiatric: He has a normal mood and affect. His behavior is normal. Judgment and thought content normal.       Assessment / Plan:     1.) Diabetic polyneuropathy associated with type 2 diabetes mellitus  Comments:  -  Improving control.  Continue Trulicity 1.5 mg weekly.  Continue Metformin 1,000 mg BID. Continue Jardiance 25 mg daily.  He was encouraged to stay hydrated and denies symptoms of  bladder or yeast infection. Continue Prandin 2 mg TID ac.  He has not been taking Toujeo very consistently, and frequently adjusts the dose based on blood sugar reading.     - We had a discussion about MOA of Toujeo and how to take basal insulin. I advise to take consistently EVERY day, and do not adjust dose.  He agrees to take Toujeo 50 units every day.    - Prescription for the Freestyle Unique CGM sent to pharmacy.  He plans to use the [employer] Application as the reader.     Orders:  -     POCT Glucose, Hand-Held Device  - Future Labs scheduled: A1C, CMP    2.) Hyperlipidemia associated with type 2 diabetes mellitus - continue meds as prescribed    3.) Hypertension associated with diabetes- continued meds as prescribed    4.) Obesity (BMI 30-39.9)    Additional Plan Details:    1.) Continue monitoring blood sugar 2 x daily, fasting and ac dinner or at bedtime. Discussed ADA goal for fasting blood sugar, 80 - 130 mg/dL; pp blood sugars below 180 mg/dl. Also, discussed prevention of hypoglycemia and the need to adjust goals to higher levels if persistent hypoglycemia.  Reminded to bring BG records or meter to each visit for review.  2.) Return to clinic in 3 months for follow up. The patient was explained the above plan and given opportunity to ask questions.  He understands, chooses and consents to this plan and accepts all the risks, which include but are not limited to the risks mentioned above. He understands the alternative of having no  testing, interventions or treatments at this time. He left content and without further questions.     Greta Kearney, OSVALDO-C, CDE    A total of 30 minutes was spent in face to face time, of which over 50 % was spent in counseling patient on disease process, complications, treatment, and side effects of medications.

## 2019-12-05 ENCOUNTER — PATIENT OUTREACH (OUTPATIENT)
Dept: ADMINISTRATIVE | Facility: HOSPITAL | Age: 54
End: 2019-12-05

## 2019-12-05 NOTE — PROGRESS NOTES
Spoke with Yoli at [facility] [phone]. Awaiting fax copy of DM eye exam DOS 11/27/19

## 2019-12-18 DIAGNOSIS — E11.40 TYPE 2 DIABETES MELLITUS WITH DIABETIC NEUROPATHY: ICD-10-CM

## 2019-12-18 RX ORDER — INSULIN GLARGINE 300 U/ML
INJECTION, SOLUTION SUBCUTANEOUS
Qty: 6.5 ML | Refills: 3 | Status: SHIPPED | OUTPATIENT
Start: 2019-12-18 | End: 2020-03-21 | Stop reason: SDUPTHER

## 2019-12-19 RX ORDER — LISINOPRIL 5 MG/1
TABLET ORAL
Qty: 90 TABLET | Refills: 0 | OUTPATIENT
Start: 2019-12-19

## 2020-01-21 DIAGNOSIS — E11.40 TYPE 2 DIABETES MELLITUS WITH DIABETIC NEUROPATHY: ICD-10-CM

## 2020-01-21 RX ORDER — DULAGLUTIDE 1.5 MG/.5ML
1.5 INJECTION, SOLUTION SUBCUTANEOUS WEEKLY
Qty: 4 SYRINGE | Refills: 3 | Status: SHIPPED | OUTPATIENT
Start: 2020-01-21 | End: 2020-05-31

## 2020-02-28 ENCOUNTER — PATIENT OUTREACH (OUTPATIENT)
Dept: ADMINISTRATIVE | Facility: OTHER | Age: 55
End: 2020-02-28

## 2020-03-02 ENCOUNTER — PATIENT OUTREACH (OUTPATIENT)
Dept: ADMINISTRATIVE | Facility: HOSPITAL | Age: 55
End: 2020-03-02

## 2020-03-18 ENCOUNTER — TELEPHONE (OUTPATIENT)
Dept: INTERNAL MEDICINE | Facility: CLINIC | Age: 55
End: 2020-03-18

## 2020-03-18 ENCOUNTER — PATIENT OUTREACH (OUTPATIENT)
Dept: ADMINISTRATIVE | Facility: HOSPITAL | Age: 55
End: 2020-03-18

## 2020-03-18 DIAGNOSIS — Z79.4 TYPE 2 DIABETES MELLITUS WITH DIABETIC NEUROPATHY, WITH LONG-TERM CURRENT USE OF INSULIN: Primary | ICD-10-CM

## 2020-03-18 DIAGNOSIS — I15.2 HYPERTENSION ASSOCIATED WITH DIABETES: ICD-10-CM

## 2020-03-18 DIAGNOSIS — E11.40 TYPE 2 DIABETES MELLITUS WITH DIABETIC NEUROPATHY, WITH LONG-TERM CURRENT USE OF INSULIN: Primary | ICD-10-CM

## 2020-03-18 DIAGNOSIS — E11.59 HYPERTENSION ASSOCIATED WITH DIABETES: ICD-10-CM

## 2020-03-18 NOTE — TELEPHONE ENCOUNTER
Patient has appointment scheduled for Friday morning.  However this is a recheck on diabetes and he has no A1c in the chart.  Can he get his blood work done before his visit?    I have placed lab orders blood +urine in the chart.  Please schedule these 4 orders for today or tomorrow; he does not have to be fasting.  If he is OK with coming for the visit on Friday he may do so, or, if he does get the lab run beforehand we could do virtual visit.

## 2020-03-18 NOTE — PROGRESS NOTES
Change appointment to virtual visit with Dr. Estrella on 03/20/2020 at 08:30 am. Patient in agreement and vocalize understanding.

## 2020-03-20 ENCOUNTER — TELEPHONE (OUTPATIENT)
Dept: INTERNAL MEDICINE | Facility: CLINIC | Age: 55
End: 2020-03-20

## 2020-03-20 NOTE — TELEPHONE ENCOUNTER
Called the patient to reschedule today's appointment. No answer both home and mobile number. Left message to call the clinic back.          Note: schedule lab order then reschedule virtual visit.

## 2020-03-21 DIAGNOSIS — E78.2 MIXED HYPERLIPIDEMIA: ICD-10-CM

## 2020-03-21 DIAGNOSIS — E11.40 TYPE 2 DIABETES MELLITUS WITH DIABETIC NEUROPATHY: ICD-10-CM

## 2020-03-21 RX ORDER — INSULIN GLARGINE 300 [IU]/ML
INJECTION, SOLUTION SUBCUTANEOUS
Qty: 6.5 ML | Refills: 3 | Status: SHIPPED | OUTPATIENT
Start: 2020-03-21 | End: 2020-05-31

## 2020-03-23 RX ORDER — ATORVASTATIN CALCIUM 40 MG/1
TABLET, FILM COATED ORAL
Qty: 90 TABLET | Refills: 1 | Status: SHIPPED | OUTPATIENT
Start: 2020-03-23 | End: 2020-09-27

## 2020-04-03 ENCOUNTER — LAB VISIT (OUTPATIENT)
Dept: LAB | Facility: HOSPITAL | Age: 55
End: 2020-04-03
Attending: FAMILY MEDICINE
Payer: COMMERCIAL

## 2020-04-03 ENCOUNTER — TELEPHONE (OUTPATIENT)
Dept: INTERNAL MEDICINE | Facility: CLINIC | Age: 55
End: 2020-04-03

## 2020-04-03 DIAGNOSIS — E11.59 HYPERTENSION ASSOCIATED WITH DIABETES: ICD-10-CM

## 2020-04-03 DIAGNOSIS — I15.2 HYPERTENSION ASSOCIATED WITH DIABETES: ICD-10-CM

## 2020-04-03 DIAGNOSIS — Z79.4 TYPE 2 DIABETES MELLITUS WITH DIABETIC NEUROPATHY, WITH LONG-TERM CURRENT USE OF INSULIN: ICD-10-CM

## 2020-04-03 DIAGNOSIS — E11.40 TYPE 2 DIABETES MELLITUS WITH DIABETIC NEUROPATHY, WITH LONG-TERM CURRENT USE OF INSULIN: ICD-10-CM

## 2020-04-03 LAB
ALBUMIN SERPL BCP-MCNC: 3.8 G/DL (ref 3.5–5.2)
ALBUMIN/CREAT UR: NORMAL UG/MG (ref 0–30)
ALP SERPL-CCNC: 86 U/L (ref 55–135)
ALT SERPL W/O P-5'-P-CCNC: 84 U/L (ref 10–44)
ANION GAP SERPL CALC-SCNC: 11 MMOL/L (ref 8–16)
AST SERPL-CCNC: 54 U/L (ref 10–40)
BASOPHILS # BLD AUTO: 0.04 K/UL (ref 0–0.2)
BASOPHILS NFR BLD: 0.6 % (ref 0–1.9)
BILIRUB SERPL-MCNC: 0.5 MG/DL (ref 0.1–1)
BUN SERPL-MCNC: 18 MG/DL (ref 6–20)
CALCIUM SERPL-MCNC: 9.3 MG/DL (ref 8.7–10.5)
CHLORIDE SERPL-SCNC: 102 MMOL/L (ref 95–110)
CO2 SERPL-SCNC: 26 MMOL/L (ref 23–29)
CREAT SERPL-MCNC: 1.1 MG/DL (ref 0.5–1.4)
CREAT UR-MCNC: 31 MG/DL (ref 23–375)
DIFFERENTIAL METHOD: NORMAL
EOSINOPHIL # BLD AUTO: 0.2 K/UL (ref 0–0.5)
EOSINOPHIL NFR BLD: 3.2 % (ref 0–8)
ERYTHROCYTE [DISTWIDTH] IN BLOOD BY AUTOMATED COUNT: 13.3 % (ref 11.5–14.5)
EST. GFR  (AFRICAN AMERICAN): >60 ML/MIN/1.73 M^2
EST. GFR  (NON AFRICAN AMERICAN): >60 ML/MIN/1.73 M^2
ESTIMATED AVG GLUCOSE: 298 MG/DL (ref 68–131)
GLUCOSE SERPL-MCNC: 307 MG/DL (ref 70–110)
HBA1C MFR BLD HPLC: 12 % (ref 4–5.6)
HCT VFR BLD AUTO: 44.7 % (ref 40–54)
HGB BLD-MCNC: 14.5 G/DL (ref 14–18)
IMM GRANULOCYTES # BLD AUTO: 0.01 K/UL (ref 0–0.04)
IMM GRANULOCYTES NFR BLD AUTO: 0.2 % (ref 0–0.5)
LYMPHOCYTES # BLD AUTO: 2.4 K/UL (ref 1–4.8)
LYMPHOCYTES NFR BLD: 37.3 % (ref 18–48)
MCH RBC QN AUTO: 30.9 PG (ref 27–31)
MCHC RBC AUTO-ENTMCNC: 32.4 G/DL (ref 32–36)
MCV RBC AUTO: 95 FL (ref 82–98)
MICROALBUMIN UR DL<=1MG/L-MCNC: <2.5 UG/ML
MONOCYTES # BLD AUTO: 0.5 K/UL (ref 0.3–1)
MONOCYTES NFR BLD: 7.4 % (ref 4–15)
NEUTROPHILS # BLD AUTO: 3.3 K/UL (ref 1.8–7.7)
NEUTROPHILS NFR BLD: 51.3 % (ref 38–73)
NRBC BLD-RTO: 0 /100 WBC
PLATELET # BLD AUTO: 157 K/UL (ref 150–350)
PMV BLD AUTO: 11.8 FL (ref 9.2–12.9)
POTASSIUM SERPL-SCNC: 4 MMOL/L (ref 3.5–5.1)
PROT SERPL-MCNC: 7.1 G/DL (ref 6–8.4)
RBC # BLD AUTO: 4.7 M/UL (ref 4.6–6.2)
SODIUM SERPL-SCNC: 139 MMOL/L (ref 136–145)
WBC # BLD AUTO: 6.49 K/UL (ref 3.9–12.7)

## 2020-04-03 PROCEDURE — 82043 UR ALBUMIN QUANTITATIVE: CPT

## 2020-04-03 PROCEDURE — 85025 COMPLETE CBC W/AUTO DIFF WBC: CPT

## 2020-04-03 PROCEDURE — 83036 HEMOGLOBIN GLYCOSYLATED A1C: CPT

## 2020-04-03 PROCEDURE — 36415 COLL VENOUS BLD VENIPUNCTURE: CPT

## 2020-04-03 PROCEDURE — 80053 COMPREHEN METABOLIC PANEL: CPT

## 2020-04-03 NOTE — TELEPHONE ENCOUNTER
----- Message from Sara Hunter sent at 4/3/2020  1:37 PM CDT -----  Contact: self  Pt is requesting a call back regarding orders for lab. Please call pt back at [phone]

## 2020-04-17 ENCOUNTER — PATIENT MESSAGE (OUTPATIENT)
Dept: INTERNAL MEDICINE | Facility: CLINIC | Age: 55
End: 2020-04-17

## 2020-04-17 ENCOUNTER — TELEPHONE (OUTPATIENT)
Dept: INTERNAL MEDICINE | Facility: CLINIC | Age: 55
End: 2020-04-17

## 2020-04-17 NOTE — TELEPHONE ENCOUNTER
Please call/send a letter asking patient to reappoint for a virtual visit so we can review and adjust his plan of treatment.  He got lab work early April. No show for virtual visit.

## 2020-04-28 ENCOUNTER — OFFICE VISIT (OUTPATIENT)
Dept: INTERNAL MEDICINE | Facility: CLINIC | Age: 55
End: 2020-04-28
Payer: COMMERCIAL

## 2020-04-28 DIAGNOSIS — E11.40 TYPE 2 DIABETES MELLITUS WITH DIABETIC NEUROPATHY, WITH LONG-TERM CURRENT USE OF INSULIN: Primary | ICD-10-CM

## 2020-04-28 DIAGNOSIS — R74.8 ELEVATED LIVER ENZYMES: ICD-10-CM

## 2020-04-28 DIAGNOSIS — E11.59 HYPERTENSION ASSOCIATED WITH DIABETES: ICD-10-CM

## 2020-04-28 DIAGNOSIS — E11.69 HYPERLIPIDEMIA ASSOCIATED WITH TYPE 2 DIABETES MELLITUS: ICD-10-CM

## 2020-04-28 DIAGNOSIS — G47.00 INSOMNIA, UNSPECIFIED TYPE: ICD-10-CM

## 2020-04-28 DIAGNOSIS — E78.5 HYPERLIPIDEMIA ASSOCIATED WITH TYPE 2 DIABETES MELLITUS: ICD-10-CM

## 2020-04-28 DIAGNOSIS — E11.42 DIABETIC POLYNEUROPATHY ASSOCIATED WITH TYPE 2 DIABETES MELLITUS: ICD-10-CM

## 2020-04-28 DIAGNOSIS — I15.2 HYPERTENSION ASSOCIATED WITH DIABETES: ICD-10-CM

## 2020-04-28 DIAGNOSIS — Z79.4 TYPE 2 DIABETES MELLITUS WITH DIABETIC NEUROPATHY, WITH LONG-TERM CURRENT USE OF INSULIN: Primary | ICD-10-CM

## 2020-04-28 PROCEDURE — 99214 PR OFFICE/OUTPT VISIT, EST, LEVL IV, 30-39 MIN: ICD-10-PCS | Mod: 95,,, | Performed by: FAMILY MEDICINE

## 2020-04-28 PROCEDURE — 99214 OFFICE O/P EST MOD 30 MIN: CPT | Mod: 95,,, | Performed by: FAMILY MEDICINE

## 2020-04-28 RX ORDER — LISINOPRIL 5 MG/1
5 TABLET ORAL DAILY
Qty: 90 TABLET | Refills: 1 | Status: SHIPPED | OUTPATIENT
Start: 2020-04-28 | End: 2020-12-16

## 2020-04-28 RX ORDER — AMITRIPTYLINE HYDROCHLORIDE 25 MG/1
50 TABLET, FILM COATED ORAL NIGHTLY PRN
Qty: 60 TABLET | Refills: 5 | Status: SHIPPED | OUTPATIENT
Start: 2020-04-28 | End: 2021-07-07 | Stop reason: SDUPTHER

## 2020-04-28 NOTE — PROGRESS NOTES
Subjective:       Patient ID: Brett Garcia is a 54 y.o. male.    Chief Complaint: No chief complaint on file.    Audio Only Telehealth Visit     The patient location is: personal vehicle  The chief complaint leading to consultation is: recheck DM2, HTN, HLD  Visit type: Virtual visit with audio only (telephone)   Time In/Out: 7:45 - 7:58  The reason for the audio only service rather than synchronous audio and video virtual visit was related to technical difficulties or patient preference/necessity.     Each patient to whom I provide medical services by telemedicine is:  (1) informed of the relationship between the physician and patient and the respective role of any other health care provider with respect to management of the patient; and (2) notified that they may decline to receive medical services by telemedicine and may withdraw from such care at any time. Patient verbally consented to receive this service via voice-only telephone call.     This service was not originating from a related E/M service provided within the previous 7 days nor will  to an E/M service or procedure within the next 24 hours or my soonest available appointment.  Prevailing standard of care was able to be met in this audio-only visit.        HPI: Patient with type 2 diabetes, hypertension, hyperlipidemia here for scheduled recheck with recent labs.   Patient has been unable to use the [facility] chart appt.  Therefore today's visit is audio only.    Reviewed lab results with patient.  Reviewed his current status which is that for the last 2 weeks he has not taken any medication except Trulicity.  He has been out lisinopril and is not sure whether he supposed to be on the Prandin.  That prescription ran out approximately December.  He has missed recent diabetes management visits Ms. Kearney.  States he just has not been paying attention to his diabetes due to stressful COVID-19 situation.  Lab Results       Component                Value                Date                       WBC                      6.49                04/03/2020                 HGB                      14.5                04/03/2020                 HCT                      44.7                04/03/2020                 PLT                      157                 04/03/2020                 CHOL                     114 (L)             11/22/2019                 TRIG                     104                 11/22/2019                 HDL                      47                  11/22/2019                 LDLCALC                  46.2 (L)            11/22/2019                 ALT                      84 (H)              04/03/2020                 AST                      54 (H)              04/03/2020                 NA                       139                 04/03/2020                 K                        4.0                 04/03/2020                 CL                       102                 04/03/2020                 CALCIUM                  9.3                 04/03/2020                 CREATININE               1.1                 04/03/2020                 BUN                      18                  04/03/2020                 CO2                      26                  04/03/2020                 TSH                      0.659               06/30/2016                 PSA                      0.53                06/30/2016                 GLU                      307 (H)             04/03/2020                 ESTGFRAFRICA             >60.0               04/03/2020                 EGFRNONAA                >60.0               04/03/2020                 HGBA1C                   12.0 (H)            04/03/2020                 MICALBCREAT                                  04/03/2020             Unable to calculate    Despite A1c of 12.0, there is no microalbuminuria present.  These labs were performed when he was still taking his medications.  Lab Results       Component                 Value               Date                       HGBA1C                   12.0 (H)            04/03/2020                 HGBA1C                   8.6 (H)             11/22/2019                 HGBA1C                   8.3 (H)             07/05/2019                 HGBA1C                   9.6 (H)             04/02/2019                 HGBA1C                   11.4 (H)            01/18/2019                 HGBA1C                   13.4 (H)            09/07/2018            Diabetes Medications - pt states not taking any meds last 2 weeks except weekly Trulicity.         insulin glargine, TOUJEO, (TOUJEO SOLOSTAR U-300 INSULIN) 300 unit/mL (1.5 mL) InPn pen INJECT 65 UNITS INTO THE SKIN ONCE DAILY.    JARDIANCE 25 mg Tab TAKE 25 MG BY MOUTH ONCE DAILY.    metFORMIN (GLUCOPHAGE) 1000 MG tablet TAKE 1 TABLET BY MOUTH TWICE A DAY WITH MEALS    repaglinide (PRANDIN) 2 MG tablet Take 1 tablet (2 mg total) by mouth 3 (three) times daily before meals.    TRULICITY 1.5 mg/0.5 mL PnIj INJECT 1.5 MG INTO THE SKIN ONCE A WEEK.        BP Readings from Last 3 Encounters:  12/04/19 : (!) 144/90  11/22/19 : 125/78  06/05/19 : 108/80    Hypertension Medications        lisinopril (PRINIVIL,ZESTRIL) 5 MG tablet TAKE 1 TABLET BY MOUTH EVERY DAY - out       Lab Results on atorvastatin 40:       Component                Value               Date                       CHOL                     114 (L)             11/22/2019                 CHOL                     99 (L)              09/21/2018                 CHOL                     150                 06/30/2017            Lab Results       Component                Value               Date                       HDL                      47                  11/22/2019                 HDL                      45                  09/21/2018                 HDL                      38 (L)              06/30/2017            Lab Results       Component                Value                Date                       LDLCALC                  46.2 (L)            11/22/2019                 LDLCALC                  40.4 (L)            09/21/2018                 LDLCALC                  94.0                06/30/2017            Lab Results       Component                Value               Date                       TRIG                     104                 11/22/2019                 TRIG                     68                  09/21/2018                 TRIG                     90                  06/30/2017                Diabetes   He presents for his follow-up diabetic visit. He has type 2 diabetes mellitus. His disease course has been worsening. There are no hypoglycemic associated symptoms. Associated symptoms include foot paresthesias and polyuria. There are no hypoglycemic complications. Risk factors for coronary artery disease include dyslipidemia, diabetes mellitus, hypertension, male sex and obesity. Current diabetic treatment includes insulin injections and oral agent (triple therapy). He is compliant with treatment none of the time. He is currently taking insulin at bedtime (70 U Tujeo). Insulin injections are given by patient. His weight is fluctuating minimally. He participates in exercise intermittently. Home blood sugar record trend: not checking. An ACE inhibitor/angiotensin II receptor blocker is being taken. He does not see a podiatrist.Eye exam is current.     Review of Systems   Endocrine: Positive for polyuria.   Neurological: Positive for numbness.       Objective:      Physical Exam   Constitutional: He is oriented to person, place, and time. No distress.   Pulmonary/Chest: No respiratory distress.   Neurological: He is alert and oriented to person, place, and time.   Psychiatric: He has a normal mood and affect. His behavior is normal.         Assessment/Plan:     1. Type 2 diabetes mellitus with diabetic neuropathy, with long-term current use of insulin  empagliflozin  (JARDIANCE) 25 mg Tab   2. Hypertension associated with diabetes  lisinopriL (PRINIVIL,ZESTRIL) 5 MG tablet   3. Hyperlipidemia associated with type 2 diabetes mellitus     4. Diabetic polyneuropathy associated with type 2 diabetes mellitus     5. Insomnia, unspecified type  amitriptyline (ELAVIL) 25 MG tablet    Prandin was last prescribed for a year September of 2018.  Not sure if it was still being recommended and will leave that to diabetes education.  See if he can meet with an audio only visit Ms. Kearney.  Refill for Jardiance sent.  Get back on lisinopril 5 daily.  Refill for amitriptyline which he feels helps a lot.  He states he is going to get back on his medication.  Advise 5% weight loss to address fatty liver.  Also states he has alcohol on the weekends.  rec recheck in person if possible 3 months.

## 2020-04-29 ENCOUNTER — TELEPHONE (OUTPATIENT)
Dept: DIABETES | Facility: CLINIC | Age: 55
End: 2020-04-29

## 2020-04-29 NOTE — TELEPHONE ENCOUNTER
----- Message from Greta Kearney, PhD, NP-C sent at 4/28/2020 10:06 AM CDT -----  Regarding: FW: re-schedule appt   Absolutely, we will work on getting him scheduled for an audio visit.  Hunter, will you please schedule patient for audio visit ?  Thanks !    Greta  ----- Message -----  From: Sonia Estrella MD  Sent: 4/28/2020   9:07 AM CDT  To: Greta Kearney, PhD, NP-C  Subject: re-schedule appt                                 Pt has missed virtual appts - problem not able to get into [employer].  Can you perform a virtual audio only visit with pt?   If you have any openings, he will be off work this Friday, 5/1/2020.

## 2020-05-06 ENCOUNTER — TELEPHONE (OUTPATIENT)
Dept: DIABETES | Facility: CLINIC | Age: 55
End: 2020-05-06

## 2020-05-06 NOTE — TELEPHONE ENCOUNTER
Attempted to call patient for his virtual audio visit, but no response.  I called both home and cell numbers.  Left VM to call back.

## 2020-05-08 DIAGNOSIS — J30.2 SEASONAL ALLERGIC RHINITIS: ICD-10-CM

## 2020-05-08 RX ORDER — FLUTICASONE PROPIONATE 50 MCG
2 SPRAY, SUSPENSION (ML) NASAL DAILY
Qty: 16 ML | Refills: 12 | Status: SHIPPED | OUTPATIENT
Start: 2020-05-08 | End: 2021-06-24 | Stop reason: SDUPTHER

## 2020-05-30 ENCOUNTER — OFFICE VISIT (OUTPATIENT)
Dept: DIABETES | Facility: CLINIC | Age: 55
End: 2020-05-30
Payer: COMMERCIAL

## 2020-05-30 DIAGNOSIS — E11.40 TYPE 2 DIABETES MELLITUS WITH DIABETIC NEUROPATHY, WITH LONG-TERM CURRENT USE OF INSULIN: Primary | ICD-10-CM

## 2020-05-30 DIAGNOSIS — Z79.4 TYPE 2 DIABETES MELLITUS WITH DIABETIC NEUROPATHY, WITH LONG-TERM CURRENT USE OF INSULIN: Primary | ICD-10-CM

## 2020-05-30 PROCEDURE — 99213 OFFICE O/P EST LOW 20 MIN: CPT | Mod: 95,,, | Performed by: NURSE PRACTITIONER

## 2020-05-30 PROCEDURE — 99213 PR OFFICE/OUTPT VISIT, EST, LEVL III, 20-29 MIN: ICD-10-PCS | Mod: 95,,, | Performed by: NURSE PRACTITIONER

## 2020-05-30 NOTE — PROGRESS NOTES
PCP: Sonia Estrella MD    Subjective:     Chief Complaint: Diabetes Follow Up    HISTORY OF PRESENT ILLNESS: 54 y.o.  male presenting for diabetes follow up. Patient has had Type II diabetes since 2014 and has the following complications: peripheral neuropathy and periodontal disease ( teeth are falling out ). Other pertinent conditions include: hyperlipidemia and hypertension.  He  has attended diabetes education in the past.  Blood glucose testing is performed sporadically.  Per recall, fasting BGs are mostly in the 180 s - 200 s.     He denies any recent hospital admissions, emergency room visits, hypoglycemia, syncope, or diaphoresis. On today, patient expresses concern with cost of medications.  He is not working as much since COVID and reports that he is not taking his medications due to cost.       Labs Reviewed.       Lab Results   Component Value Date    CPEPTIDE 2.4 04/21/2017     Lab Results   Component Value Date    GLUTAMICACID 0.00 04/21/2017     Lab Results   Component Value Date    HUMANINSULIN 0.02 04/21/2017     DM MEDICATIONS:   Toujeo 70 units daily   Metformin 1,000 mg BID   Trulicity 1.5 mg weekly    Jardiance 25 mg daily  Prandin 2 mg TID ac    Review of Systems   Constitutional: Negative for appetite change, fatigue and unexpected weight change.   Eyes: Negative for visual disturbance.   Gastrointestinal: Negative for abdominal pain, constipation, diarrhea and nausea.   Endocrine: Negative for polydipsia, polyphagia and polyuria.   Neurological: Positive for numbness (tingling bilateral feet). Negative for dizziness and headaches.   Psychiatric/Behavioral: Negative for confusion and decreased concentration. The patient is not nervous/anxious.        Diabetes Management Status  Statin: Taking  ACE/ARB: Taking    Screening or Prevention Patient's value Goal Complete/Controlled?   HgA1C Testing and Control   Lab Results   Component Value Date    HGBA1C 12.0 (H) 04/03/2020       Annually/Less than 8% No   Lipid profile : 11/22/2019 Annually Yes   LDL control Lab Results   Component Value Date    LDLCALC 46.2 (L) 11/22/2019    Annually/Less than 100 mg/dl  Yes   Nephropathy screening Lab Results   Component Value Date    MICALBCREAT Unable to calculate 04/03/2020     Lab Results   Component Value Date    PROTEINUA Trace (A) 06/30/2016    Annually Yes   Blood pressure BP Readings from Last 1 Encounters:   12/04/19 (!) 144/90    Less than 140/90 Yes   Dilated retinal exam : 11/27/2019 Annually Yes, Dr. Serrano     Foot exam   : 05/30/2020 Annually Yes     ACTIVITY LEVEL: Rarely Active. Discussed activities, benefits, methods, and precautions.  MEAL PLANNING: Patient reports number of meals per day to be 3 and number of snacks per day to be 2.   Patient is encouraged to carb count and consume no more than 45 - 60 grams of carbohydrates in each meal, and 1800 k / juanita per day.      BLOOD GLUCOSE TESTING:  True Metrix  SOCIAL HISTORY:  x 2.  Has a significant other.  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     Objective:      Physical Exam   Constitutional: He appears well-developed and well-nourished.   HENT:   Head: Normocephalic and atraumatic.   Eyes: Pupils are equal, round, and reactive to light. EOM are normal.   Pulmonary/Chest: Effort normal. No respiratory distress.   Psychiatric: He has a normal mood and affect. His behavior is normal. Judgment and thought content normal.       Assessment / Plan:     1.) Diabetic polyneuropathy associated with type 2 diabetes mellitus  Comments:  -  Improving control.  On today, patient expresses concern about being able to afford his medications.  Continue Metformin 1,000 mg BID. Continue Jardiance 25 mg daily.  He was encouraged to stay hydrated and denies symptoms of bladder or yeast infection. Continue Prandin 2 mg  TID ac.  D/C Trulicity and Toujeo.  We discussed starting Soliqua 50 units daily every morning.  With a co-pay  card this should be rather affordable.     Additional Plan Details:    1.) Continue monitoring blood sugar 2 x daily, fasting and ac dinner or at bedtime. Discussed ADA goal for fasting blood sugar, 80 - 130 mg/dL; pp blood sugars below 180 mg/dl. Also, discussed prevention of hypoglycemia and the need to adjust goals to higher levels if persistent hypoglycemia.  Reminded to bring BG records or meter to each visit for review.  2.) Return to clinic in 3 months for follow up. The patient was explained the above plan and given opportunity to ask questions.  He understands, chooses and consents to this plan and accepts all the risks, which include but are not limited to the risks mentioned above. He understands the alternative of having no testing, interventions or treatments at this time. He left content and without further questions.     Greta Kearney, OSVALDO-C, CDE    The patient location is: [facility]  The chief complaint leading to consultation is: Type 2 Diabetes    Visit type: audiovisual    Face to Face time with patient: 12 minutes  12 minutes of total time spent on the encounter, which includes face to face time and non-face to face time preparing to see the patient (eg, review of tests), Obtaining and/or reviewing separately obtained history, Documenting clinical information in the electronic or other health record, Independently interpreting results (not separately reported) and communicating results to the patient/family/caregiver, or Care coordination (not separately reported).     Each patient to whom he or she provides medical services by telemedicine is:  (1) informed of the relationship between the physician and patient and the respective role of any other health care provider with respect to management of the patient; and (2) notified that he or she may decline to receive medical services by telemedicine and may withdraw from such care at any time.

## 2020-06-09 ENCOUNTER — TELEPHONE (OUTPATIENT)
Dept: DIABETES | Facility: CLINIC | Age: 55
End: 2020-06-09

## 2020-09-02 DIAGNOSIS — Z79.4 TYPE 2 DIABETES MELLITUS WITH DIABETIC NEUROPATHY, WITH LONG-TERM CURRENT USE OF INSULIN: ICD-10-CM

## 2020-09-02 DIAGNOSIS — J30.2 SEASONAL ALLERGIC RHINITIS: ICD-10-CM

## 2020-09-02 DIAGNOSIS — E11.40 TYPE 2 DIABETES MELLITUS WITH DIABETIC NEUROPATHY, WITH LONG-TERM CURRENT USE OF INSULIN: ICD-10-CM

## 2020-09-02 RX ORDER — FLUTICASONE PROPIONATE 50 MCG
2 SPRAY, SUSPENSION (ML) NASAL DAILY
Qty: 16 ML | Refills: 12 | Status: CANCELLED | OUTPATIENT
Start: 2020-09-02

## 2020-09-04 ENCOUNTER — OFFICE VISIT (OUTPATIENT)
Dept: INTERNAL MEDICINE | Facility: CLINIC | Age: 55
End: 2020-09-04
Payer: COMMERCIAL

## 2020-09-04 ENCOUNTER — LAB VISIT (OUTPATIENT)
Dept: INTERNAL MEDICINE | Facility: CLINIC | Age: 55
End: 2020-09-04
Payer: COMMERCIAL

## 2020-09-04 DIAGNOSIS — R05.9 COUGH: Primary | ICD-10-CM

## 2020-09-04 DIAGNOSIS — R05.9 COUGH: ICD-10-CM

## 2020-09-04 PROCEDURE — 99213 OFFICE O/P EST LOW 20 MIN: CPT | Mod: 95,,, | Performed by: FAMILY MEDICINE

## 2020-09-04 PROCEDURE — U0003 INFECTIOUS AGENT DETECTION BY NUCLEIC ACID (DNA OR RNA); SEVERE ACUTE RESPIRATORY SYNDROME CORONAVIRUS 2 (SARS-COV-2) (CORONAVIRUS DISEASE [COVID-19]), AMPLIFIED PROBE TECHNIQUE, MAKING USE OF HIGH THROUGHPUT TECHNOLOGIES AS DESCRIBED BY CMS-2020-01-R: HCPCS

## 2020-09-04 PROCEDURE — 99213 PR OFFICE/OUTPT VISIT, EST, LEVL III, 20-29 MIN: ICD-10-PCS | Mod: 95,,, | Performed by: FAMILY MEDICINE

## 2020-09-04 RX ORDER — PEN NEEDLE, DIABETIC 30 GX3/16"
NEEDLE, DISPOSABLE MISCELLANEOUS
Qty: 100 EACH | Refills: 4 | Status: SHIPPED | OUTPATIENT
Start: 2020-09-04 | End: 2023-01-29 | Stop reason: SDUPTHER

## 2020-09-04 NOTE — PROGRESS NOTES
Subjective:       Patient ID: Brett Garcia is a 54 y.o. male.    Chief Complaint: No chief complaint on file.    The patient location is: home /[place]  The chief complaint leading to consultation is: cough congestion  Visit type: audiovisual  Total time spent with patient: 7:17 - 7:42  Each patient to whom he or she provides medical services by telemedicine is:  (1) informed of the relationship between the physician and patient and the respective role of any other health care provider with respect to management of the patient; and (2) notified that he or she may decline to receive medical services by telemedicine and may withdraw from such care at any time.    Notes: here with recent onset cough chills muscle aches - feels from getting wet and cold at work. Starting a week ago - slept three days - then head congestion.  Temps have been 98s except yest 99.1 highest.  Taste/smell are more dull. Started taking claritin D 2-3 days ago, tussin DM, he did not go to work yest - went to urgent care instead. Was checked for Covid 19 and flu, both negative. Recd steroid shot, rx zpack/steroid dose pack.  So he was negative yesterday on a rapid COVID-19 test.  He does feel better after steroid shot he says he feels 100% better.    Cough  This is a new problem. The current episode started in the past 7 days. The problem has been gradually worsening. The problem occurs every few hours. The cough is productive of brown sputum. Associated symptoms include chills, headaches, myalgias, rhinorrhea, shortness of breath (with activity) and sweats. Pertinent negatives include no chest pain or sore throat. The symptoms are aggravated by dust and exercise. Risk factors for lung disease include occupational exposure. His past medical history is significant for environmental allergies.     Review of Systems   Constitutional: Positive for chills and diaphoresis.   HENT: Positive for congestion and rhinorrhea. Negative for sore throat.     Respiratory: Positive for cough and shortness of breath (with activity). Negative for chest tightness.    Cardiovascular: Negative for chest pain.   Gastrointestinal: Negative for diarrhea and nausea.   Musculoskeletal: Positive for myalgias.   Allergic/Immunologic: Positive for environmental allergies.   Neurological: Positive for headaches.       Objective:      Physical Exam  Constitutional:       General: He is not in acute distress.     Appearance: Normal appearance.   Pulmonary:      Effort: Pulmonary effort is normal. No respiratory distress.   Neurological:      Mental Status: He is alert and oriented to person, place, and time.   Psychiatric:         Mood and Affect: Mood normal.         Behavior: Behavior normal.           Assessment/Plan:     1. Cough  COVID-19 Routine Screening    I am going to go ahead and get the full test on COVID-19.  I have asked him to hold off on a Z-Ant and is steroid pack until after gets his test today.  My staff will call him to schedule that.  His blood sugars are good he says running around 139.  We reviewed what medications he is on.  He is currently on 70 units Lantus, Trulicity, metformin.  He still could not get the Soliqua even with the card.

## 2020-09-05 DIAGNOSIS — U07.1 COVID-19 VIRUS DETECTED: ICD-10-CM

## 2020-09-05 LAB — SARS-COV-2 RNA RESP QL NAA+PROBE: DETECTED

## 2020-09-05 RX ORDER — DULAGLUTIDE 1.5 MG/.5ML
1.5 INJECTION, SOLUTION SUBCUTANEOUS WEEKLY
COMMUNITY
Start: 2020-06-23 | End: 2020-10-06 | Stop reason: ALTCHOICE

## 2020-09-06 NOTE — PATIENT INSTRUCTIONS
Your test was POSITIVE for COVID-19 (coronavirus). (placed 9/5/2020)      Please isolate yourself at home.  You may leave home and/or return to work once the following conditions are met:    If you were not hospitalized and are not severely immunocompromised*:   More than 10 days since symptoms first appeared AND   More than 24 hours fever free without medications AND   Symptoms have improved     If you were hospitalized OR are severely immunocompromised*:   More than 20 days since symptoms first appeared   More than 24 hrs hours fever free without medications   Symptoms have improved    If you had no symptoms but tested postivepositive:   More than 10 days since the date of the first positive test (20 days if severely immunocompromised).   If you develop symptoms, then use the guidelines above.     *Definition of severely immunocompromised:  - Current chemotherapy for cancer  - Untreated HIV with CD4 count less than 200  - Combined primary immunodeficiency disorder  - Prednisone more than 20 mg per day for more than 14 days  - Post-transplant patients    Additional instructions:   Separate yourself from other people and animals in your home.   Call ahead before visiting your doctor.   Wear a facemask when around others.   Cover your coughs and sneezes.   Wash your hands often with soap and water; hand  can be used, too.   Avoid sharing personal household items.   Wipe down surfaces used daily.   Monitor your symptoms. Seek prompt medical attention if your illness is worsening (e.g., difficulty breathing).    Before seeking care, call your healthcare provider.   If you have a medical emergency and need to call 911, notify the dispatch personnel that you have, or are being evaluated for COVID-19. If possible, put on a facemask before emergency medical services arrive.      Contact Tracing    As one of the next steps, you will receive a call or text from the [place] [facility]  ([facility]) COVID-19 Tracing Team. See the contact information below so you know not to ignore the health departments call. It is important that you contact them back immediately so they can help.      Contact Tracer Number:  [phone]  Caller ID for most carriers: [place] Dept Health     What is contact tracing?  · Contact tracing is a process that helps identify everyone who has been in close contact with an infected person. Contact tracers let those people know they may have been exposed and guide them on next steps. Confidentiality is important for everyone; no one will be told who may have exposed them to the virus.  · Your involvement is important. The more we know about where and how this virus is spreading, the better chance we have at stopping it from spreading further.  What does exposure mean?  · Exposure means you have been within 6 feet for more than 15 minutes with a person who has or had COVID-19.  What kind of questions do the contact tracers ask?  · A contact tracer will confirm your basic contact information including name, address, phone number, and next of kin, as well as asking about any symptoms you may have had. Theyll also ask you how you think you may have gotten sick, such as places where you may have been exposed to the virus, and people you were with. Those names will never be shared with anyone outside of that call, and will only be used to help trace and stop the spread of the virus.   I have privacy concerns. How will the state use my information?  · Your privacy about your health is important. All calls are completed using call centers that use the appropriate health privacy protection measures (HIPAA compliance), meaning that your patient information is safe. No one will ever ask you any questions related to immigration status. Your health comes first.   Do I have to participate?  · You do not have to participate, but we strongly encourage you to. Contact tracing can help us catch and  control new outbreaks as theyre developing to keep your friends and family safe.   What if I dont hear from anyone?  · If you dont receive a call within 24 hours, you can call the number above right away to inquire about your status. That line is open from 8:00 am - 8:00 p.m., 7 days a week.  Contact tracing saves lives! Together, we have the power to beat this virus and keep our loved ones and neighbors safe.    For more information see CDC link below.      https://www.cdc.gov/coronavirus/2019-ncov/hcp/guidance-prevent-spread.html#precautions        Sources:  [facility], [place] [facility] and \[facility]\""           Sincerely,     Sonia Estrella MD

## 2020-09-27 ENCOUNTER — TELEPHONE (OUTPATIENT)
Dept: INTERNAL MEDICINE | Facility: CLINIC | Age: 55
End: 2020-09-27

## 2020-09-27 DIAGNOSIS — E11.69 HYPERLIPIDEMIA ASSOCIATED WITH TYPE 2 DIABETES MELLITUS: ICD-10-CM

## 2020-09-27 DIAGNOSIS — E11.40 TYPE 2 DIABETES MELLITUS WITH DIABETIC NEUROPATHY, WITH LONG-TERM CURRENT USE OF INSULIN: ICD-10-CM

## 2020-09-27 DIAGNOSIS — E11.59 HYPERTENSION ASSOCIATED WITH DIABETES: Primary | ICD-10-CM

## 2020-09-27 DIAGNOSIS — Z79.4 TYPE 2 DIABETES MELLITUS WITH DIABETIC NEUROPATHY, WITH LONG-TERM CURRENT USE OF INSULIN: ICD-10-CM

## 2020-09-27 DIAGNOSIS — I15.2 HYPERTENSION ASSOCIATED WITH DIABETES: Primary | ICD-10-CM

## 2020-09-27 DIAGNOSIS — E78.5 HYPERLIPIDEMIA ASSOCIATED WITH TYPE 2 DIABETES MELLITUS: ICD-10-CM

## 2020-09-28 NOTE — TELEPHONE ENCOUNTER
He is due for DM labs - orders placed in chart - please sched labs followed by a visit - he recently was dxd with COVID - 9/4/2020 - but he is no longer in infectious state and can be seen in person. Can do a virtual visit if that makes it easier for him.

## 2020-10-01 DIAGNOSIS — Z79.4 TYPE 2 DIABETES MELLITUS WITH DIABETIC NEUROPATHY, WITH LONG-TERM CURRENT USE OF INSULIN: ICD-10-CM

## 2020-10-01 DIAGNOSIS — E11.40 TYPE 2 DIABETES MELLITUS WITH DIABETIC NEUROPATHY, WITH LONG-TERM CURRENT USE OF INSULIN: ICD-10-CM

## 2020-10-01 RX ORDER — METFORMIN HYDROCHLORIDE 1000 MG/1
1000 TABLET ORAL 2 TIMES DAILY WITH MEALS
Qty: 180 TABLET | Refills: 3 | Status: SHIPPED | OUTPATIENT
Start: 2020-10-01 | End: 2021-06-24 | Stop reason: SDUPTHER

## 2020-10-02 ENCOUNTER — TELEPHONE (OUTPATIENT)
Dept: INTERNAL MEDICINE | Facility: CLINIC | Age: 55
End: 2020-10-02

## 2020-10-02 ENCOUNTER — OFFICE VISIT (OUTPATIENT)
Dept: DERMATOLOGY | Facility: CLINIC | Age: 55
End: 2020-10-02
Payer: COMMERCIAL

## 2020-10-02 ENCOUNTER — LAB VISIT (OUTPATIENT)
Dept: LAB | Facility: HOSPITAL | Age: 55
End: 2020-10-02
Attending: FAMILY MEDICINE
Payer: COMMERCIAL

## 2020-10-02 DIAGNOSIS — B36.0 TINEA VERSICOLOR: Primary | ICD-10-CM

## 2020-10-02 DIAGNOSIS — E11.69 HYPERLIPIDEMIA ASSOCIATED WITH TYPE 2 DIABETES MELLITUS: ICD-10-CM

## 2020-10-02 DIAGNOSIS — E11.40 TYPE 2 DIABETES MELLITUS WITH DIABETIC NEUROPATHY, WITH LONG-TERM CURRENT USE OF INSULIN: ICD-10-CM

## 2020-10-02 DIAGNOSIS — E78.5 HYPERLIPIDEMIA ASSOCIATED WITH TYPE 2 DIABETES MELLITUS: ICD-10-CM

## 2020-10-02 DIAGNOSIS — I15.2 HYPERTENSION ASSOCIATED WITH DIABETES: ICD-10-CM

## 2020-10-02 DIAGNOSIS — Z79.4 TYPE 2 DIABETES MELLITUS WITH DIABETIC NEUROPATHY, WITH LONG-TERM CURRENT USE OF INSULIN: ICD-10-CM

## 2020-10-02 DIAGNOSIS — E11.59 HYPERTENSION ASSOCIATED WITH DIABETES: ICD-10-CM

## 2020-10-02 LAB
ALBUMIN SERPL BCP-MCNC: 3.4 G/DL (ref 3.5–5.2)
ALP SERPL-CCNC: 83 U/L (ref 55–135)
ALT SERPL W/O P-5'-P-CCNC: 50 U/L (ref 10–44)
ANION GAP SERPL CALC-SCNC: 7 MMOL/L (ref 8–16)
AST SERPL-CCNC: 46 U/L (ref 10–40)
BASOPHILS # BLD AUTO: 0.03 K/UL (ref 0–0.2)
BASOPHILS NFR BLD: 0.6 % (ref 0–1.9)
BILIRUB SERPL-MCNC: 0.4 MG/DL (ref 0.1–1)
BUN SERPL-MCNC: 13 MG/DL (ref 6–20)
CALCIUM SERPL-MCNC: 8.7 MG/DL (ref 8.7–10.5)
CHLORIDE SERPL-SCNC: 101 MMOL/L (ref 95–110)
CHOLEST SERPL-MCNC: 92 MG/DL (ref 120–199)
CHOLEST/HDLC SERPL: 2.2 {RATIO} (ref 2–5)
CO2 SERPL-SCNC: 28 MMOL/L (ref 23–29)
CREAT SERPL-MCNC: 0.8 MG/DL (ref 0.5–1.4)
DIFFERENTIAL METHOD: ABNORMAL
EOSINOPHIL # BLD AUTO: 0.2 K/UL (ref 0–0.5)
EOSINOPHIL NFR BLD: 3.9 % (ref 0–8)
ERYTHROCYTE [DISTWIDTH] IN BLOOD BY AUTOMATED COUNT: 14.3 % (ref 11.5–14.5)
EST. GFR  (AFRICAN AMERICAN): >60 ML/MIN/1.73 M^2
EST. GFR  (NON AFRICAN AMERICAN): >60 ML/MIN/1.73 M^2
ESTIMATED AVG GLUCOSE: 318 MG/DL (ref 68–131)
GLUCOSE SERPL-MCNC: 255 MG/DL (ref 70–110)
HBA1C MFR BLD HPLC: 12.7 % (ref 4–5.6)
HCT VFR BLD AUTO: 39.1 % (ref 40–54)
HDLC SERPL-MCNC: 41 MG/DL (ref 40–75)
HDLC SERPL: 44.6 % (ref 20–50)
HGB BLD-MCNC: 12.7 G/DL (ref 14–18)
IMM GRANULOCYTES # BLD AUTO: 0.01 K/UL (ref 0–0.04)
IMM GRANULOCYTES NFR BLD AUTO: 0.2 % (ref 0–0.5)
LDLC SERPL CALC-MCNC: 40 MG/DL (ref 63–159)
LYMPHOCYTES # BLD AUTO: 1.5 K/UL (ref 1–4.8)
LYMPHOCYTES NFR BLD: 30.9 % (ref 18–48)
MCH RBC QN AUTO: 30.3 PG (ref 27–31)
MCHC RBC AUTO-ENTMCNC: 32.5 G/DL (ref 32–36)
MCV RBC AUTO: 93 FL (ref 82–98)
MONOCYTES # BLD AUTO: 0.4 K/UL (ref 0.3–1)
MONOCYTES NFR BLD: 8.5 % (ref 4–15)
NEUTROPHILS # BLD AUTO: 2.7 K/UL (ref 1.8–7.7)
NEUTROPHILS NFR BLD: 55.9 % (ref 38–73)
NONHDLC SERPL-MCNC: 51 MG/DL
NRBC BLD-RTO: 0 /100 WBC
PLATELET # BLD AUTO: 182 K/UL (ref 150–350)
PMV BLD AUTO: 11.7 FL (ref 9.2–12.9)
POTASSIUM SERPL-SCNC: 4.3 MMOL/L (ref 3.5–5.1)
PROT SERPL-MCNC: 6.8 G/DL (ref 6–8.4)
RBC # BLD AUTO: 4.19 M/UL (ref 4.6–6.2)
SODIUM SERPL-SCNC: 136 MMOL/L (ref 136–145)
TRIGL SERPL-MCNC: 55 MG/DL (ref 30–150)
WBC # BLD AUTO: 4.85 K/UL (ref 3.9–12.7)

## 2020-10-02 PROCEDURE — 36415 COLL VENOUS BLD VENIPUNCTURE: CPT

## 2020-10-02 PROCEDURE — 80053 COMPREHEN METABOLIC PANEL: CPT

## 2020-10-02 PROCEDURE — 80061 LIPID PANEL: CPT

## 2020-10-02 PROCEDURE — 83036 HEMOGLOBIN GLYCOSYLATED A1C: CPT

## 2020-10-02 PROCEDURE — 85025 COMPLETE CBC W/AUTO DIFF WBC: CPT

## 2020-10-02 PROCEDURE — 99213 OFFICE O/P EST LOW 20 MIN: CPT | Mod: S$GLB,,, | Performed by: STUDENT IN AN ORGANIZED HEALTH CARE EDUCATION/TRAINING PROGRAM

## 2020-10-02 PROCEDURE — 99999 PR PBB SHADOW E&M-EST. PATIENT-LVL III: CPT | Mod: PBBFAC,,, | Performed by: STUDENT IN AN ORGANIZED HEALTH CARE EDUCATION/TRAINING PROGRAM

## 2020-10-02 PROCEDURE — 87220 PR  TISSUE EXAM BY KOH: ICD-10-PCS | Mod: S$GLB,,, | Performed by: STUDENT IN AN ORGANIZED HEALTH CARE EDUCATION/TRAINING PROGRAM

## 2020-10-02 PROCEDURE — 99999 PR PBB SHADOW E&M-EST. PATIENT-LVL III: ICD-10-PCS | Mod: PBBFAC,,, | Performed by: STUDENT IN AN ORGANIZED HEALTH CARE EDUCATION/TRAINING PROGRAM

## 2020-10-02 PROCEDURE — 87220 TISSUE EXAM FOR FUNGI: CPT | Mod: S$GLB,,, | Performed by: STUDENT IN AN ORGANIZED HEALTH CARE EDUCATION/TRAINING PROGRAM

## 2020-10-02 PROCEDURE — 99213 PR OFFICE/OUTPT VISIT, EST, LEVL III, 20-29 MIN: ICD-10-PCS | Mod: S$GLB,,, | Performed by: STUDENT IN AN ORGANIZED HEALTH CARE EDUCATION/TRAINING PROGRAM

## 2020-10-02 RX ORDER — KETOCONAZOLE 20 MG/ML
SHAMPOO, SUSPENSION TOPICAL
Qty: 120 ML | Refills: 6 | Status: SHIPPED | OUTPATIENT
Start: 2020-10-02 | End: 2021-08-04

## 2020-10-02 RX ORDER — FLUCONAZOLE 200 MG/1
200 TABLET ORAL WEEKLY
Qty: 4 TABLET | Refills: 0 | Status: SHIPPED | OUTPATIENT
Start: 2020-10-02 | End: 2021-01-19

## 2020-10-02 NOTE — TELEPHONE ENCOUNTER
----- Message from Shahrzad Zamora sent at 10/2/2020 10:17 AM CDT -----  Regarding: appt  Contact: patient  Patient was told to do labs today and make appt with Dr Estrella, but first avail with Dr Estrella is in December, please call patient back at [phone]

## 2020-10-02 NOTE — TELEPHONE ENCOUNTER
----- Message from Davina Browning sent at 10/2/2020  2:09 PM CDT -----  Regarding: pt  .Type:  Patient Returning Call    Who Called:pt   Who Left Message for Patient:Shayla   Does the patient know what this is regarding?:Unsure   Would the patient rather a call back or a response via [facility]? Call back   Best Call Back Number:.[phone]   Additional Information:       Thank you,   Davina Browning

## 2020-10-02 NOTE — PATIENT INSTRUCTIONS
Tinea Versicolor  This is a rash caused by a fungus in the top layers of the skin. This fungus is normally present in the pores of the skin and causes no symptoms. But when the fungus overgrows it causes a rash. The fungus grows more easily in hot climates, and on oily or sweaty skin. Health experts dont know why some people get this rash and others dont. Experts also dont know why the rash will suddenly appear in someone who has never had it before.  The rash is made up of irregular pale or tan spots and patches. The rash is usually on the neck, upper back, chest, and shoulders. You may have mild itching, especially if you become overheated. But it doesn't cause other symptoms. Because these spots don't change color with sun exposure like normal skin, the rash may be lighter or darker than your normal skin.  This rash is harmless and usually causes no symptoms. The only reason for treatment is to improve appearance. Follow the advice below to clear the rash. It might take several months for normal skin color to return.  Home care  · Use a medicated dandruff shampoo over your whole body while in the shower. Dont use soap. Let the shampoo stay on for at least a few minutes before rinsing off. Do this every day for 4 weeks.  · As a different treatment, you may buy an antifungal cream (miconazole or clotrimazole, both available without a prescription). Use this 2 times a day for 7 days.   · This rash is not contagious to others. It cant be spread if someone touches it. So you dont have to worry about exposing others at school, , or work.  Prevention  This fungus can come back again (recur) after treatment. To prevent return of the rash, use medicated dandruff shampoo over your whole body when in the shower. Do this once a month for the next year. This is very important to do in the summertime. That is when the rash is most likely to recur.  Other prevention tips include:  · Avoid oily skin  products  · Wear loose clothing. Try to let your skin stay cool and breathe.  · Use sunscreen and protect yourself from sunlight  · Avoid tanning beds  Follow-up care  Follow up with your healthcare provider, or as advised. Call your provider if the rash doesnt get better with the above treatment, or if new symptoms appear.  When to seek medical advice  Call your healthcare provider right away if any of these occur:  · Increasing redness of the rash  · Change in appearance of the rash  · Fever of 100.4ºF (38ºC) or higher, or as directed by your provider  Date Last Reviewed: 8/1/2016  © 9603-1929 [employer]. [address] [place] [address], [place] [address]. All rights reserved. This information is not intended as a substitute for professional medical care. Always follow your healthcare professional's instructions.

## 2020-10-02 NOTE — PROGRESS NOTES
Subjective:       Patient ID:  Brett Garcia is a 54 y.o. male who presents for   Chief Complaint   Patient presents with    Hyperpigmentation     neck and chest     History of Present Illness: The patient presents with chief complaint of a rash.  Location: chest, neck, and back  Duration: several weeks  Signs/Symptoms: lightened discolored patches  Prior treatments: none        Review of Systems   Constitutional: Negative for fever and chills.        Objective:    Physical Exam   Constitutional: He appears well-developed and well-nourished. No distress.   Neurological: He is alert and oriented to person, place, and time. He is not disoriented.   Psychiatric: He has a normal mood and affect.   Skin:   Areas Examined (abnormalities noted in diagram):   Head / Face Inspection Performed  Neck Inspection Performed  Chest / Axilla Inspection Performed  Back Inspection Performed              Diagram Legend     Erythematous scaling macule/papule c/w actinic keratosis       Vascular papule c/w angioma      Pigmented verrucoid papule/plaque c/w seborrheic keratosis      Yellow umbilicated papule c/w sebaceous hyperplasia      Irregularly shaped tan macule c/w lentigo     1-2 mm smooth white papules consistent with Milia      Movable subcutaneous cyst with punctum c/w epidermal inclusion cyst      Subcutaneous movable cyst c/w pilar cyst      Firm pink to brown papule c/w dermatofibroma      Pedunculated fleshy papule(s) c/w skin tag(s)      Evenly pigmented macule c/w junctional nevus     Mildly variegated pigmented, slightly irregular-bordered macule c/w mildly atypical nevus      Flesh colored to evenly pigmented papule c/w intradermal nevus       Pink pearly papule/plaque c/w basal cell carcinoma      Erythematous hyperkeratotic cursted plaque c/w SCC      Surgical scar with no sign of skin cancer recurrence      Open and closed comedones      Inflammatory papules and pustules      Verrucoid papule consistent  consistent with wart     Erythematous eczematous patches and plaques     Dystrophic onycholytic nail with subungual debris c/w onychomycosis     Umbilicated papule    Erythematous-base heme-crusted tan verrucoid plaque consistent with inflamed seborrheic keratosis     Erythematous Silvery Scaling Plaque c/w Psoriasis     See annotation      Assessment / Plan:        Tinea versicolor - KOH positive for fungal elements.   -     fluconazole (DIFLUCAN) 200 MG Tab; Take 1 tablet (200 mg total) by mouth once a week.  Dispense: 4 tablet; Refill: 0  -     ketoconazole (NIZORAL) 2 % shampoo; Apply topically 3 (three) times a week.  Dispense: 120 mL; Refill: 6             Follow up in about 6 months (around 4/2/2021).

## 2020-10-03 ENCOUNTER — TELEPHONE (OUTPATIENT)
Dept: DIABETES | Facility: CLINIC | Age: 55
End: 2020-10-03

## 2020-10-03 NOTE — TELEPHONE ENCOUNTER
----- Message from Joanie CINTRON LPN sent at 10/2/2020  1:38 PM CDT -----  Regarding: FW: insulin glargine-lixisenatide  Contact: patient    ----- Message -----  From: Shahrzad Zamora  Sent: 10/2/2020  10:20 AM CDT  To: [facility] Staff  Subject: insulin glargine-lixisenatide                    Patient states that the insulin glargine-lixisenatide, he is not able to find out how much it will be, kept being on hold at pharmacy, states he was given card for it the last time, would josy granados to speak to nurse about what he can do, please call him back at [phone]

## 2020-10-05 ENCOUNTER — PATIENT MESSAGE (OUTPATIENT)
Dept: INTERNAL MEDICINE | Facility: CLINIC | Age: 55
End: 2020-10-05

## 2020-10-06 ENCOUNTER — OFFICE VISIT (OUTPATIENT)
Dept: INTERNAL MEDICINE | Facility: CLINIC | Age: 55
End: 2020-10-06
Payer: COMMERCIAL

## 2020-10-06 DIAGNOSIS — E11.40 TYPE 2 DIABETES MELLITUS WITH DIABETIC NEUROPATHY, WITH LONG-TERM CURRENT USE OF INSULIN: Primary | ICD-10-CM

## 2020-10-06 DIAGNOSIS — Z79.4 TYPE 2 DIABETES MELLITUS WITH DIABETIC NEUROPATHY, WITH LONG-TERM CURRENT USE OF INSULIN: Primary | ICD-10-CM

## 2020-10-06 DIAGNOSIS — Z11.4 SCREENING FOR HIV WITHOUT PRESENCE OF RISK FACTORS: ICD-10-CM

## 2020-10-06 DIAGNOSIS — K75.81 STEATOHEPATITIS: ICD-10-CM

## 2020-10-06 DIAGNOSIS — E11.69 HYPERLIPIDEMIA ASSOCIATED WITH TYPE 2 DIABETES MELLITUS: ICD-10-CM

## 2020-10-06 DIAGNOSIS — E11.42 DIABETIC POLYNEUROPATHY ASSOCIATED WITH TYPE 2 DIABETES MELLITUS: ICD-10-CM

## 2020-10-06 DIAGNOSIS — E11.59 HYPERTENSION ASSOCIATED WITH DIABETES: ICD-10-CM

## 2020-10-06 DIAGNOSIS — I15.2 HYPERTENSION ASSOCIATED WITH DIABETES: ICD-10-CM

## 2020-10-06 DIAGNOSIS — E78.5 HYPERLIPIDEMIA ASSOCIATED WITH TYPE 2 DIABETES MELLITUS: ICD-10-CM

## 2020-10-06 PROCEDURE — 99214 OFFICE O/P EST MOD 30 MIN: CPT | Mod: 95,,, | Performed by: FAMILY MEDICINE

## 2020-10-06 PROCEDURE — 99214 PR OFFICE/OUTPT VISIT, EST, LEVL IV, 30-39 MIN: ICD-10-PCS | Mod: 95,,, | Performed by: FAMILY MEDICINE

## 2020-10-06 NOTE — PROGRESS NOTES
Subjective:       Patient ID: Brett Garcia is a 54 y.o. male.    Chief Complaint: No chief complaint on file.    Audio Only Telehealth Visit     The patient location is: home / [place]  The chief complaint leading to consultation is: recheck DM2  Visit type: Virtual visit with audio only (telephone)  Time: 25 minutes  The reason for the audio only service rather than synchronous audio and video virtual visit was related to technical difficulties or patient preference/necessity.     Each patient to whom I provide medical services by telemedicine is:  (1) informed of the relationship between the physician and patient and the respective role of any other health care provider with respect to management of the patient; and (2) notified that they may decline to receive medical services by telemedicine and may withdraw from such care at any time. Patient verbally consented to receive this service via voice-only telephone call.    This service was not originating from a related E/M service provided within the previous 7 days nor will  to an E/M service or procedure within the next 24 hours or my soonest available appointment.  Prevailing standard of care was able to be met in this audio-only visit.      HPI:   Patient with type 2 diabetes, hypertension, hyperlipidemia here for scheduled recheck with recent labs.  Continued uncontrolled DM2. States not as much exercise -   COVID infection - out from 9/2 through 9/21. Stamina first week was poor. Doing better now. Wt went down to 198. Now 208. Hopes to be 195 for new years.  He is on new Soliqua for a month. He has started checking BS again and in 200s fasting.    He is taking melatonin and an extra ASA 81, vit C, Vit D, Zinc. Post covid recommendations from his coworkers/friends.   Lab Results       Component                Value               Date                       WBC                      4.85                10/02/2020                 HGB                       12.7 (L)            10/02/2020                 HCT                      39.1 (L)            10/02/2020                 PLT                      182                 10/02/2020                 CHOL                     92 (L)              10/02/2020                 TRIG                     55                  10/02/2020                 HDL                      41                  10/02/2020                 LDLCALC                  40.0 (L)            10/02/2020                 ALT                      50 (H)              10/02/2020                 AST                      46 (H)              10/02/2020                 NA                       136                 10/02/2020                 K                        4.3                 10/02/2020                 CL                       101                 10/02/2020                 CALCIUM                  8.7                 10/02/2020                 CREATININE               0.8                 10/02/2020                 BUN                      13                  10/02/2020                 CO2                      28                  10/02/2020                 TSH                      0.659               06/30/2016                 PSA                      0.53                06/30/2016                 GLU                      255 (H)             10/02/2020                 ESTGFRAFRICA             >60.0               10/02/2020                 EGFRNONAA                >60.0               10/02/2020                 HGBA1C                   12.7 (H)            10/02/2020                 MICALBCREAT                                  04/03/2020             Unable to calculate  Lab Results       Component                Value               Date                       HGBA1C                   12.7 (H)            10/02/2020                 HGBA1C                   12.0 (H)            04/03/2020                 HGBA1C                   8.6 (H)             11/22/2019                  HGBA1C                   8.3 (H)             07/05/2019                 HGBA1C                   9.6 (H)             04/02/2019                 HGBA1C                   11.4 (H)            01/18/2019            Diabetes Medications        empagliflozin (JARDIANCE) 25 mg Tab TAKE 25 MG BY MOUTH ONCE DAILY.    insulin glargine-lixisenatide (SOLIQUA 100/33) 100 unit-33 mcg/mL InPn Inject 50 Units into the skin once daily.    metFORMIN (GLUCOPHAGE) 1000 MG tablet Take 1 tablet (1,000 mg total) by mouth 2 (two) times daily with meals.    TRULICITY 1.5 mg/0.5 mL pen injector Inject 1.5 mg into the skin once a week.        BP Readings from Last 3 Encounters:  12/04/19 : (!) 144/90  11/22/19 : 125/78  06/05/19 : 108/80  No reading today - audio only visit.  Hypertension Medications        lisinopriL (PRINIVIL,ZESTRIL) 5 MG tablet Take 1 tablet (5 mg total) by mouth once daily.        Lab Results       Component                Value               Date                       CHOL                     92 (L)              10/02/2020                 CHOL                     114 (L)             11/22/2019                 CHOL                     99 (L)              09/21/2018            Lab Results       Component                Value               Date                       HDL                      41                  10/02/2020                 HDL                      47                  11/22/2019                 HDL                      45                  09/21/2018            Lab Results       Component                Value               Date                       LDLCALC                  40.0 (L)            10/02/2020                 LDLCALC                  46.2 (L)            11/22/2019                 LDLCALC                  40.4 (L)            09/21/2018            Lab Results       Component                Value               Date                       TRIG                     55                  10/02/2020                  TRIG                     104                 11/22/2019                 TRIG                     68                  09/21/2018                Review of Systems   Constitutional: Positive for fatigue and unexpected weight change. Negative for fever.   Respiratory: Negative for shortness of breath.    Cardiovascular: Negative for chest pain.   Psychiatric/Behavioral: Negative for sleep disturbance.       Objective:      Physical Exam  Pulmonary:      Effort: Pulmonary effort is normal. No respiratory distress.   Neurological:      Mental Status: He is alert and oriented to person, place, and time.   Psychiatric:         Mood and Affect: Mood normal.         Behavior: Behavior normal.           Assessment/Plan:     1. Type 2 diabetes mellitus with diabetic neuropathy, with long-term current use of insulin  Hemoglobin A1C   2. Screening for HIV without presence of risk factors  HIV 1/2 Ag/Ab (4th Gen)   3. Diabetic polyneuropathy associated with type 2 diabetes mellitus     4. Hypertension associated with diabetes  CBC auto differential    Comprehensive Metabolic Panel   5. Hyperlipidemia associated with type 2 diabetes mellitus     6. Steatohepatitis     continue to f/u with DM mgmt.  See me in 3 onths with labs.

## 2020-10-06 NOTE — PATIENT INSTRUCTIONS
Reappoint with Ms Kearney.  Flu shot before end of October - can delay another couple weeks.  Schedule your eye exam.  Check BP 2 x week and send in your numbers.

## 2020-10-20 ENCOUNTER — TELEPHONE (OUTPATIENT)
Dept: DIABETES | Facility: CLINIC | Age: 55
End: 2020-10-20

## 2020-10-20 NOTE — TELEPHONE ENCOUNTER
----- Message from Nithin Caraballo sent at 10/20/2020  2:26 PM CDT -----  Regarding: Appt  Contact: Pt  Pt is calling the staff  regarding the pt 6 mo appt    Pt call back [phone]    thanks

## 2020-10-30 NOTE — PROGRESS NOTES
PCP: Sonia Estrella MD    Subjective:     Chief Complaint: Diabetes Follow Up    HISTORY OF PRESENT ILLNESS: 54 y.o.  male presenting virtually for diabetes follow up. Patient has had Type II diabetes since 2014 and has the following complications: peripheral neuropathy and periodontal disease ( teeth are falling out ). Other pertinent conditions include: HLD, HTN.  He  has attended diabetes education in the past.  Patient no longer taking Jardiance due to perineal itching.     Patient reports that he is currently recovering from COVID-19.  Blood glucose testing is performed sporadically.  Per recall, fasting BGs are mostly 140 s; ac dinner 260 s - 280 s.     He denies any recent hospital admissions, emergency room visits, hypoglycemia, syncope, or diaphoresis.       Labs Reviewed.       Lab Results   Component Value Date    CPEPTIDE 2.4 04/21/2017     Lab Results   Component Value Date    GLUTAMICACID 0.00 04/21/2017     Lab Results   Component Value Date    HUMANINSULIN 0.02 04/21/2017     DM MEDICATIONS:   Soliqua 50 units daily ( breakfast )  Metformin 1,000 mg BID     Review of Systems   Constitutional: Negative for appetite change, fatigue and unexpected weight change.   Eyes: Negative for visual disturbance.   Gastrointestinal: Negative for abdominal pain, constipation, diarrhea and nausea.   Endocrine: Negative for polydipsia, polyphagia and polyuria.   Neurological: Positive for numbness (tingling bilateral feet). Negative for dizziness and headaches.   Psychiatric/Behavioral: Negative for confusion and decreased concentration. The patient is not nervous/anxious.        Diabetes Management Status  Statin: Taking  ACE/ARB: Taking    Screening or Prevention Patient's value Goal Complete/Controlled?   HgA1C Testing and Control   Lab Results   Component Value Date    HGBA1C 12.7 (H) 10/02/2020      Annually/Less than 8% No   Lipid profile : 10/02/2020 Annually Yes   LDL control Lab Results    Component Value Date    LDLCALC 40.0 (L) 10/02/2020    Annually/Less than 100 mg/dl  Yes   Nephropathy screening Lab Results   Component Value Date    MICALBCREAT Unable to calculate 04/03/2020     Lab Results   Component Value Date    PROTEINUA Trace (A) 06/30/2016    Annually Yes   Blood pressure BP Readings from Last 1 Encounters:   12/04/19 (!) 144/90    Less than 140/90 Yes   Dilated retinal exam : 11/27/2019 Annually Yes, Dr. Serrano     Foot exam   : 05/30/2020 Annually Yes     ACTIVITY LEVEL: Rarely Active. Discussed activities, benefits, methods, and precautions.  MEAL PLANNING: Patient reports number of meals per day to be 3 and number of snacks per day to be 2. Lunch 1/2 sandwich and bag of chips.  Does not eat after 7 PM. Patient is encouraged to carb count and consume no more than 45 - 60 grams of carbohydrates in each meal, and 1800 k / juanita per day.      BLOOD GLUCOSE TESTING:  True Metrix  SOCIAL HISTORY:  x 2.  Has a significant other.  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     Objective:      Physical Exam  Constitutional:       Appearance: He is well-developed.   HENT:      Head: Normocephalic and atraumatic.   Eyes:      Pupils: Pupils are equal, round, and reactive to light.   Pulmonary:      Effort: Pulmonary effort is normal. No respiratory distress.   Psychiatric:         Behavior: Behavior normal.         Thought Content: Thought content normal.         Judgment: Judgment normal.         Assessment / Plan:     1.) Diabetic polyneuropathy associated with type 2 diabetes mellitus  Comments:  - Improving control.  Continue Metformin 1,000 mg BID. He is no longer taking Jardiance due to side effects ( yeast  Infection ). Restart Prandin 2 mg TID ac.  I advised to always eat within 30 minutes of  taking medication.  Skip medication if skipping meal.  Increase Soliqua 60 units daily every morning.      Additional Plan Details:    1.) Continue monitoring blood sugar 2 x  daily, fasting and ac dinner or at bedtime. Discussed ADA goal for fasting blood sugar, 80 - 130 mg/dL; pp blood sugars below 180 mg/dl. Also, discussed prevention of hypoglycemia and the need to adjust goals to higher levels if persistent hypoglycemia.  Reminded to bring BG records or meter to each visit for review.  2.) Return to clinic in 3 months for follow up. The patient was explained the above plan and given opportunity to ask questions.  He understands, chooses and consents to this plan and accepts all the risks, which include but are not limited to the risks mentioned above. He understands the alternative of having no testing, interventions or treatments at this time. He left content and without further questions.     Greta Kearney, OSVALDO-C, CDE    The patient location is: [facility]  The chief complaint leading to consultation is: Type 2 Diabetes    Visit type: audiovisual    Face to Face time with patient: 18  25 minutes of total time spent on the encounter, which includes face to face time and non-face to face time preparing to see the patient (eg, review of tests), Obtaining and/or reviewing separately obtained history, Documenting clinical information in the electronic or other health record, Independently interpreting results (not separately reported) and communicating results to the patient/family/caregiver, or Care coordination (not separately reported).     Each patient to whom he or she provides medical services by telemedicine is:  (1) informed of the relationship between the physician and patient and the respective role of any other health care provider with respect to management of the patient; and (2) notified that he or she may decline to receive medical services by telemedicine and may withdraw from such care at any time.

## 2020-10-31 ENCOUNTER — OFFICE VISIT (OUTPATIENT)
Dept: DIABETES | Facility: CLINIC | Age: 55
End: 2020-10-31
Payer: COMMERCIAL

## 2020-10-31 DIAGNOSIS — E11.40 TYPE 2 DIABETES MELLITUS WITH DIABETIC NEUROPATHY, WITH LONG-TERM CURRENT USE OF INSULIN: Primary | ICD-10-CM

## 2020-10-31 DIAGNOSIS — Z79.4 TYPE 2 DIABETES MELLITUS WITH DIABETIC NEUROPATHY, WITH LONG-TERM CURRENT USE OF INSULIN: Primary | ICD-10-CM

## 2020-10-31 PROCEDURE — 99214 OFFICE O/P EST MOD 30 MIN: CPT | Mod: 95,,, | Performed by: NURSE PRACTITIONER

## 2020-10-31 PROCEDURE — 99214 PR OFFICE/OUTPT VISIT, EST, LEVL IV, 30-39 MIN: ICD-10-PCS | Mod: 95,,, | Performed by: NURSE PRACTITIONER

## 2020-10-31 RX ORDER — REPAGLINIDE 2 MG/1
2 TABLET ORAL
Qty: 270 TABLET | Refills: 0 | Status: SHIPPED | OUTPATIENT
Start: 2020-10-31 | End: 2021-01-19

## 2020-11-04 ENCOUNTER — PATIENT OUTREACH (OUTPATIENT)
Dept: ADMINISTRATIVE | Facility: HOSPITAL | Age: 55
End: 2020-11-04

## 2020-11-04 DIAGNOSIS — E11.59 HYPERTENSION ASSOCIATED WITH DIABETES: Primary | ICD-10-CM

## 2020-11-04 DIAGNOSIS — I15.2 HYPERTENSION ASSOCIATED WITH DIABETES: Primary | ICD-10-CM

## 2020-11-04 NOTE — PROGRESS NOTES
Working HTN Report       Called pt for home BP Reading, No answer, L/M, Portal Message Sent       Aziza WILSON LPN Care Coordinator  Care Coordination Department  [facility]  [phone]

## 2020-12-30 ENCOUNTER — PATIENT OUTREACH (OUTPATIENT)
Dept: ADMINISTRATIVE | Facility: HOSPITAL | Age: 55
End: 2020-12-30

## 2020-12-30 NOTE — PROGRESS NOTES
Hemoglobin A1C Report. Patient has a lab appointment scheduled today for a 3 month recheck of Hemoglobin A1C.

## 2021-01-05 ENCOUNTER — PATIENT OUTREACH (OUTPATIENT)
Dept: ADMINISTRATIVE | Facility: OTHER | Age: 56
End: 2021-01-05

## 2021-01-06 DIAGNOSIS — Z79.4 TYPE 2 DIABETES MELLITUS WITH DIABETIC NEUROPATHY, WITH LONG-TERM CURRENT USE OF INSULIN: ICD-10-CM

## 2021-01-06 DIAGNOSIS — E11.40 TYPE 2 DIABETES MELLITUS WITH DIABETIC NEUROPATHY, WITH LONG-TERM CURRENT USE OF INSULIN: ICD-10-CM

## 2021-01-06 RX ORDER — INSULIN GLARGINE AND LIXISENATIDE 100; 33 U/ML; UG/ML
50 INJECTION, SOLUTION SUBCUTANEOUS DAILY
Qty: 5 SYRINGE | Refills: 3 | Status: SHIPPED | OUTPATIENT
Start: 2021-01-06 | End: 2021-06-24 | Stop reason: SDUPTHER

## 2021-01-12 LAB
LEFT EYE DM RETINOPATHY: NEGATIVE
RIGHT EYE DM RETINOPATHY: NEGATIVE

## 2021-01-13 ENCOUNTER — PATIENT OUTREACH (OUTPATIENT)
Dept: ADMINISTRATIVE | Facility: HOSPITAL | Age: 56
End: 2021-01-13

## 2021-02-05 ENCOUNTER — PATIENT OUTREACH (OUTPATIENT)
Dept: ADMINISTRATIVE | Facility: HOSPITAL | Age: 56
End: 2021-02-05

## 2021-03-03 ENCOUNTER — PATIENT OUTREACH (OUTPATIENT)
Dept: ADMINISTRATIVE | Facility: HOSPITAL | Age: 56
End: 2021-03-03

## 2021-03-19 ENCOUNTER — IMMUNIZATION (OUTPATIENT)
Dept: INTERNAL MEDICINE | Facility: CLINIC | Age: 56
End: 2021-03-19
Payer: COMMERCIAL

## 2021-03-19 DIAGNOSIS — Z23 NEED FOR VACCINATION: Primary | ICD-10-CM

## 2021-03-19 PROCEDURE — 91300 COVID-19, MRNA, LNP-S, PF, 30 MCG/0.3 ML DOSE VACCINE: CPT | Mod: PBBFAC | Performed by: FAMILY MEDICINE

## 2021-03-31 ENCOUNTER — PATIENT OUTREACH (OUTPATIENT)
Dept: ADMINISTRATIVE | Facility: HOSPITAL | Age: 56
End: 2021-03-31

## 2021-04-09 ENCOUNTER — IMMUNIZATION (OUTPATIENT)
Dept: INTERNAL MEDICINE | Facility: CLINIC | Age: 56
End: 2021-04-09
Payer: COMMERCIAL

## 2021-04-09 DIAGNOSIS — Z23 NEED FOR VACCINATION: Primary | ICD-10-CM

## 2021-04-09 PROCEDURE — 91300 COVID-19, MRNA, LNP-S, PF, 30 MCG/0.3 ML DOSE VACCINE: ICD-10-PCS | Mod: S$GLB,,, | Performed by: FAMILY MEDICINE

## 2021-04-09 PROCEDURE — 91300 COVID-19, MRNA, LNP-S, PF, 30 MCG/0.3 ML DOSE VACCINE: CPT | Mod: S$GLB,,, | Performed by: FAMILY MEDICINE

## 2021-04-09 PROCEDURE — 0002A COVID-19, MRNA, LNP-S, PF, 30 MCG/0.3 ML DOSE VACCINE: CPT | Mod: CV19,S$GLB,, | Performed by: FAMILY MEDICINE

## 2021-04-09 PROCEDURE — 0002A COVID-19, MRNA, LNP-S, PF, 30 MCG/0.3 ML DOSE VACCINE: ICD-10-PCS | Mod: CV19,S$GLB,, | Performed by: FAMILY MEDICINE

## 2021-06-08 ENCOUNTER — TELEPHONE (OUTPATIENT)
Dept: DIABETES | Facility: CLINIC | Age: 56
End: 2021-06-08

## 2021-06-08 DIAGNOSIS — E11.42 DIABETIC POLYNEUROPATHY ASSOCIATED WITH TYPE 2 DIABETES MELLITUS: Primary | ICD-10-CM

## 2021-06-09 ENCOUNTER — TELEPHONE (OUTPATIENT)
Dept: DIABETES | Facility: CLINIC | Age: 56
End: 2021-06-09

## 2021-06-24 DIAGNOSIS — E11.40 TYPE 2 DIABETES MELLITUS WITH DIABETIC NEUROPATHY, WITH LONG-TERM CURRENT USE OF INSULIN: ICD-10-CM

## 2021-06-24 DIAGNOSIS — J30.2 SEASONAL ALLERGIC RHINITIS: ICD-10-CM

## 2021-06-24 DIAGNOSIS — Z79.4 TYPE 2 DIABETES MELLITUS WITH DIABETIC NEUROPATHY, WITH LONG-TERM CURRENT USE OF INSULIN: ICD-10-CM

## 2021-06-25 RX ORDER — METFORMIN HYDROCHLORIDE 1000 MG/1
1000 TABLET ORAL 2 TIMES DAILY WITH MEALS
Qty: 90 TABLET | Refills: 0 | Status: SHIPPED | OUTPATIENT
Start: 2021-06-25 | End: 2021-07-20

## 2021-06-25 RX ORDER — FLUTICASONE PROPIONATE 50 MCG
2 SPRAY, SUSPENSION (ML) NASAL DAILY
Qty: 48 ML | Refills: 0 | Status: SHIPPED | OUTPATIENT
Start: 2021-06-25 | End: 2021-09-08

## 2021-07-07 DIAGNOSIS — G47.00 INSOMNIA, UNSPECIFIED TYPE: ICD-10-CM

## 2021-07-09 RX ORDER — AMITRIPTYLINE HYDROCHLORIDE 25 MG/1
50 TABLET, FILM COATED ORAL NIGHTLY
Qty: 60 TABLET | Refills: 0 | Status: SHIPPED | OUTPATIENT
Start: 2021-07-09 | End: 2021-07-15 | Stop reason: SDUPTHER

## 2021-07-13 DIAGNOSIS — E11.59 HYPERTENSION ASSOCIATED WITH DIABETES: ICD-10-CM

## 2021-07-13 DIAGNOSIS — I15.2 HYPERTENSION ASSOCIATED WITH DIABETES: ICD-10-CM

## 2021-07-15 DIAGNOSIS — I15.2 HYPERTENSION ASSOCIATED WITH DIABETES: ICD-10-CM

## 2021-07-15 DIAGNOSIS — E11.59 HYPERTENSION ASSOCIATED WITH DIABETES: ICD-10-CM

## 2021-07-15 DIAGNOSIS — G47.00 INSOMNIA, UNSPECIFIED TYPE: ICD-10-CM

## 2021-07-15 RX ORDER — LISINOPRIL 5 MG/1
5 TABLET ORAL DAILY
Qty: 90 TABLET | Refills: 0 | OUTPATIENT
Start: 2021-07-15

## 2021-07-15 RX ORDER — LISINOPRIL 5 MG/1
5 TABLET ORAL DAILY
Qty: 90 TABLET | Refills: 0 | Status: SHIPPED | OUTPATIENT
Start: 2021-07-15 | End: 2021-10-14

## 2021-07-15 RX ORDER — AMITRIPTYLINE HYDROCHLORIDE 25 MG/1
50 TABLET, FILM COATED ORAL NIGHTLY
Qty: 60 TABLET | Refills: 0 | Status: SHIPPED | OUTPATIENT
Start: 2021-07-15 | End: 2021-07-21 | Stop reason: SDUPTHER

## 2021-07-19 DIAGNOSIS — E11.40 TYPE 2 DIABETES MELLITUS WITH DIABETIC NEUROPATHY, WITH LONG-TERM CURRENT USE OF INSULIN: ICD-10-CM

## 2021-07-19 DIAGNOSIS — Z79.4 TYPE 2 DIABETES MELLITUS WITH DIABETIC NEUROPATHY, WITH LONG-TERM CURRENT USE OF INSULIN: ICD-10-CM

## 2021-07-20 RX ORDER — METFORMIN HYDROCHLORIDE 1000 MG/1
1000 TABLET ORAL 2 TIMES DAILY WITH MEALS
Qty: 180 TABLET | Refills: 0 | Status: SHIPPED | OUTPATIENT
Start: 2021-07-20 | End: 2021-12-17 | Stop reason: SDUPTHER

## 2021-07-21 ENCOUNTER — LAB VISIT (OUTPATIENT)
Dept: LAB | Facility: HOSPITAL | Age: 56
End: 2021-07-21
Attending: FAMILY MEDICINE
Payer: COMMERCIAL

## 2021-07-21 ENCOUNTER — PATIENT MESSAGE (OUTPATIENT)
Dept: PHARMACY | Facility: CLINIC | Age: 56
End: 2021-07-21

## 2021-07-21 ENCOUNTER — OFFICE VISIT (OUTPATIENT)
Dept: INTERNAL MEDICINE | Facility: CLINIC | Age: 56
End: 2021-07-21
Payer: COMMERCIAL

## 2021-07-21 ENCOUNTER — IMMUNIZATION (OUTPATIENT)
Dept: PHARMACY | Facility: CLINIC | Age: 56
End: 2021-07-21
Payer: COMMERCIAL

## 2021-07-21 VITALS
SYSTOLIC BLOOD PRESSURE: 122 MMHG | HEART RATE: 75 BPM | HEIGHT: 69 IN | BODY MASS INDEX: 32.95 KG/M2 | TEMPERATURE: 97 F | WEIGHT: 222.44 LBS | DIASTOLIC BLOOD PRESSURE: 78 MMHG | OXYGEN SATURATION: 98 %

## 2021-07-21 DIAGNOSIS — I15.2 HYPERTENSION ASSOCIATED WITH DIABETES: ICD-10-CM

## 2021-07-21 DIAGNOSIS — E11.69 HYPERLIPIDEMIA ASSOCIATED WITH TYPE 2 DIABETES MELLITUS: ICD-10-CM

## 2021-07-21 DIAGNOSIS — Z76.89 ENCOUNTER TO ESTABLISH CARE: Primary | ICD-10-CM

## 2021-07-21 DIAGNOSIS — E11.42 DIABETIC POLYNEUROPATHY ASSOCIATED WITH TYPE 2 DIABETES MELLITUS: ICD-10-CM

## 2021-07-21 DIAGNOSIS — D64.9 ANEMIA, UNSPECIFIED TYPE: ICD-10-CM

## 2021-07-21 DIAGNOSIS — E11.40 TYPE 2 DIABETES MELLITUS WITH DIABETIC NEUROPATHY, WITH LONG-TERM CURRENT USE OF INSULIN: ICD-10-CM

## 2021-07-21 DIAGNOSIS — G47.00 INSOMNIA, UNSPECIFIED TYPE: ICD-10-CM

## 2021-07-21 DIAGNOSIS — E11.59 HYPERTENSION ASSOCIATED WITH DIABETES: ICD-10-CM

## 2021-07-21 DIAGNOSIS — Z12.5 SCREENING FOR PROSTATE CANCER: ICD-10-CM

## 2021-07-21 DIAGNOSIS — E78.5 HYPERLIPIDEMIA ASSOCIATED WITH TYPE 2 DIABETES MELLITUS: ICD-10-CM

## 2021-07-21 DIAGNOSIS — Z79.4 TYPE 2 DIABETES MELLITUS WITH DIABETIC NEUROPATHY, WITH LONG-TERM CURRENT USE OF INSULIN: ICD-10-CM

## 2021-07-21 LAB
ESTIMATED AVG GLUCOSE: 266 MG/DL (ref 68–131)
HBA1C MFR BLD: 10.9 % (ref 4–5.6)

## 2021-07-21 PROCEDURE — 80053 COMPREHEN METABOLIC PANEL: CPT | Performed by: NURSE PRACTITIONER

## 2021-07-21 PROCEDURE — 99214 OFFICE O/P EST MOD 30 MIN: CPT | Mod: S$GLB,,, | Performed by: FAMILY MEDICINE

## 2021-07-21 PROCEDURE — 3046F PR MOST RECENT HEMOGLOBIN A1C LEVEL > 9.0%: ICD-10-PCS | Mod: CPTII,S$GLB,, | Performed by: FAMILY MEDICINE

## 2021-07-21 PROCEDURE — 99999 PR PBB SHADOW E&M-EST. PATIENT-LVL IV: CPT | Mod: PBBFAC,,, | Performed by: FAMILY MEDICINE

## 2021-07-21 PROCEDURE — 3046F HEMOGLOBIN A1C LEVEL >9.0%: CPT | Mod: CPTII,S$GLB,, | Performed by: FAMILY MEDICINE

## 2021-07-21 PROCEDURE — 99214 PR OFFICE/OUTPT VISIT, EST, LEVL IV, 30-39 MIN: ICD-10-PCS | Mod: S$GLB,,, | Performed by: FAMILY MEDICINE

## 2021-07-21 PROCEDURE — 3078F DIAST BP <80 MM HG: CPT | Mod: CPTII,S$GLB,, | Performed by: FAMILY MEDICINE

## 2021-07-21 PROCEDURE — 99999 PR PBB SHADOW E&M-EST. PATIENT-LVL IV: ICD-10-PCS | Mod: PBBFAC,,, | Performed by: FAMILY MEDICINE

## 2021-07-21 PROCEDURE — 3074F PR MOST RECENT SYSTOLIC BLOOD PRESSURE < 130 MM HG: ICD-10-PCS | Mod: CPTII,S$GLB,, | Performed by: FAMILY MEDICINE

## 2021-07-21 PROCEDURE — 3074F SYST BP LT 130 MM HG: CPT | Mod: CPTII,S$GLB,, | Performed by: FAMILY MEDICINE

## 2021-07-21 PROCEDURE — 36415 COLL VENOUS BLD VENIPUNCTURE: CPT | Performed by: NURSE PRACTITIONER

## 2021-07-21 PROCEDURE — 3008F BODY MASS INDEX DOCD: CPT | Mod: CPTII,S$GLB,, | Performed by: FAMILY MEDICINE

## 2021-07-21 PROCEDURE — 3008F PR BODY MASS INDEX (BMI) DOCUMENTED: ICD-10-PCS | Mod: CPTII,S$GLB,, | Performed by: FAMILY MEDICINE

## 2021-07-21 PROCEDURE — 3078F PR MOST RECENT DIASTOLIC BLOOD PRESSURE < 80 MM HG: ICD-10-PCS | Mod: CPTII,S$GLB,, | Performed by: FAMILY MEDICINE

## 2021-07-21 PROCEDURE — 83036 HEMOGLOBIN GLYCOSYLATED A1C: CPT | Performed by: NURSE PRACTITIONER

## 2021-07-21 PROCEDURE — 80061 LIPID PANEL: CPT | Performed by: NURSE PRACTITIONER

## 2021-07-21 RX ORDER — AMITRIPTYLINE HYDROCHLORIDE 25 MG/1
50 TABLET, FILM COATED ORAL NIGHTLY
Qty: 180 TABLET | Refills: 1 | Status: SHIPPED | OUTPATIENT
Start: 2021-07-21 | End: 2022-04-14

## 2021-07-22 LAB
ALBUMIN SERPL BCP-MCNC: 3.4 G/DL (ref 3.5–5.2)
ALP SERPL-CCNC: 70 U/L (ref 55–135)
ALT SERPL W/O P-5'-P-CCNC: 51 U/L (ref 10–44)
ANION GAP SERPL CALC-SCNC: 7 MMOL/L (ref 8–16)
AST SERPL-CCNC: 36 U/L (ref 10–40)
BILIRUB SERPL-MCNC: 0.3 MG/DL (ref 0.1–1)
BUN SERPL-MCNC: 11 MG/DL (ref 6–20)
CALCIUM SERPL-MCNC: 8.9 MG/DL (ref 8.7–10.5)
CHLORIDE SERPL-SCNC: 105 MMOL/L (ref 95–110)
CHOLEST SERPL-MCNC: 86 MG/DL (ref 120–199)
CHOLEST/HDLC SERPL: 2.3 {RATIO} (ref 2–5)
CO2 SERPL-SCNC: 27 MMOL/L (ref 23–29)
CREAT SERPL-MCNC: 0.8 MG/DL (ref 0.5–1.4)
EST. GFR  (AFRICAN AMERICAN): >60 ML/MIN/1.73 M^2
EST. GFR  (NON AFRICAN AMERICAN): >60 ML/MIN/1.73 M^2
GLUCOSE SERPL-MCNC: 86 MG/DL (ref 70–110)
HDLC SERPL-MCNC: 38 MG/DL (ref 40–75)
HDLC SERPL: 44.2 % (ref 20–50)
LDLC SERPL CALC-MCNC: 39 MG/DL (ref 63–159)
NONHDLC SERPL-MCNC: 48 MG/DL
POTASSIUM SERPL-SCNC: 4.1 MMOL/L (ref 3.5–5.1)
PROT SERPL-MCNC: 6.2 G/DL (ref 6–8.4)
SODIUM SERPL-SCNC: 139 MMOL/L (ref 136–145)
TRIGL SERPL-MCNC: 45 MG/DL (ref 30–150)

## 2021-08-04 ENCOUNTER — OFFICE VISIT (OUTPATIENT)
Dept: DIABETES | Facility: CLINIC | Age: 56
End: 2021-08-04
Payer: COMMERCIAL

## 2021-08-04 VITALS — HEIGHT: 69 IN | WEIGHT: 222.69 LBS | BODY MASS INDEX: 32.98 KG/M2

## 2021-08-04 DIAGNOSIS — E11.59 HYPERTENSION ASSOCIATED WITH DIABETES: ICD-10-CM

## 2021-08-04 DIAGNOSIS — E78.5 HYPERLIPIDEMIA ASSOCIATED WITH TYPE 2 DIABETES MELLITUS: ICD-10-CM

## 2021-08-04 DIAGNOSIS — E66.9 OBESITY (BMI 30-39.9): Chronic | ICD-10-CM

## 2021-08-04 DIAGNOSIS — E11.40 TYPE 2 DIABETES MELLITUS WITH DIABETIC NEUROPATHY, WITH LONG-TERM CURRENT USE OF INSULIN: Primary | ICD-10-CM

## 2021-08-04 DIAGNOSIS — E11.69 HYPERLIPIDEMIA ASSOCIATED WITH TYPE 2 DIABETES MELLITUS: ICD-10-CM

## 2021-08-04 DIAGNOSIS — Z79.4 TYPE 2 DIABETES MELLITUS WITH DIABETIC NEUROPATHY, WITH LONG-TERM CURRENT USE OF INSULIN: Primary | ICD-10-CM

## 2021-08-04 DIAGNOSIS — I15.2 HYPERTENSION ASSOCIATED WITH DIABETES: ICD-10-CM

## 2021-08-04 LAB — GLUCOSE SERPL-MCNC: 280 MG/DL (ref 70–110)

## 2021-08-04 PROCEDURE — 99999 PR PBB SHADOW E&M-EST. PATIENT-LVL IV: CPT | Mod: PBBFAC,,, | Performed by: NURSE PRACTITIONER

## 2021-08-04 PROCEDURE — 1126F PR PAIN SEVERITY QUANTIFIED, NO PAIN PRESENT: ICD-10-PCS | Mod: CPTII,S$GLB,, | Performed by: NURSE PRACTITIONER

## 2021-08-04 PROCEDURE — 3046F PR MOST RECENT HEMOGLOBIN A1C LEVEL > 9.0%: ICD-10-PCS | Mod: CPTII,S$GLB,, | Performed by: NURSE PRACTITIONER

## 2021-08-04 PROCEDURE — 82962 GLUCOSE BLOOD TEST: CPT | Mod: S$GLB,,, | Performed by: NURSE PRACTITIONER

## 2021-08-04 PROCEDURE — 3046F HEMOGLOBIN A1C LEVEL >9.0%: CPT | Mod: CPTII,S$GLB,, | Performed by: NURSE PRACTITIONER

## 2021-08-04 PROCEDURE — 99214 OFFICE O/P EST MOD 30 MIN: CPT | Mod: S$GLB,,, | Performed by: NURSE PRACTITIONER

## 2021-08-04 PROCEDURE — 1159F PR MEDICATION LIST DOCUMENTED IN MEDICAL RECORD: ICD-10-PCS | Mod: CPTII,S$GLB,, | Performed by: NURSE PRACTITIONER

## 2021-08-04 PROCEDURE — 1126F AMNT PAIN NOTED NONE PRSNT: CPT | Mod: CPTII,S$GLB,, | Performed by: NURSE PRACTITIONER

## 2021-08-04 PROCEDURE — 3008F PR BODY MASS INDEX (BMI) DOCUMENTED: ICD-10-PCS | Mod: CPTII,S$GLB,, | Performed by: NURSE PRACTITIONER

## 2021-08-04 PROCEDURE — 3008F BODY MASS INDEX DOCD: CPT | Mod: CPTII,S$GLB,, | Performed by: NURSE PRACTITIONER

## 2021-08-04 PROCEDURE — 99999 PR PBB SHADOW E&M-EST. PATIENT-LVL IV: ICD-10-PCS | Mod: PBBFAC,,, | Performed by: NURSE PRACTITIONER

## 2021-08-04 PROCEDURE — 1160F RVW MEDS BY RX/DR IN RCRD: CPT | Mod: CPTII,S$GLB,, | Performed by: NURSE PRACTITIONER

## 2021-08-04 PROCEDURE — 1159F MED LIST DOCD IN RCRD: CPT | Mod: CPTII,S$GLB,, | Performed by: NURSE PRACTITIONER

## 2021-08-04 PROCEDURE — 99214 PR OFFICE/OUTPT VISIT, EST, LEVL IV, 30-39 MIN: ICD-10-PCS | Mod: S$GLB,,, | Performed by: NURSE PRACTITIONER

## 2021-08-04 PROCEDURE — 1160F PR REVIEW ALL MEDS BY PRESCRIBER/CLIN PHARMACIST DOCUMENTED: ICD-10-PCS | Mod: CPTII,S$GLB,, | Performed by: NURSE PRACTITIONER

## 2021-08-04 PROCEDURE — 82962 POCT GLUCOSE, HAND-HELD DEVICE: ICD-10-PCS | Mod: S$GLB,,, | Performed by: NURSE PRACTITIONER

## 2021-08-04 RX ORDER — PEN NEEDLE, DIABETIC 30 GX3/16"
1 NEEDLE, DISPOSABLE MISCELLANEOUS 2 TIMES DAILY
Qty: 100 EACH | Refills: 6 | Status: SHIPPED | OUTPATIENT
Start: 2021-08-04 | End: 2022-08-26

## 2021-08-04 RX ORDER — INSULIN GLARGINE AND LIXISENATIDE 100; 33 U/ML; UG/ML
60 INJECTION, SOLUTION SUBCUTANEOUS DAILY
Qty: 10 PEN | Refills: 1 | Status: SHIPPED | OUTPATIENT
Start: 2021-08-04 | End: 2021-09-08

## 2021-08-20 ENCOUNTER — OFFICE VISIT (OUTPATIENT)
Dept: PODIATRY | Facility: CLINIC | Age: 56
End: 2021-08-20
Payer: COMMERCIAL

## 2021-08-20 VITALS
WEIGHT: 222.69 LBS | HEART RATE: 89 BPM | BODY MASS INDEX: 32.98 KG/M2 | DIASTOLIC BLOOD PRESSURE: 85 MMHG | SYSTOLIC BLOOD PRESSURE: 129 MMHG | HEIGHT: 69 IN

## 2021-08-20 DIAGNOSIS — E11.42 DIABETIC POLYNEUROPATHY ASSOCIATED WITH TYPE 2 DIABETES MELLITUS: Primary | ICD-10-CM

## 2021-08-20 DIAGNOSIS — L85.3 XEROSIS OF SKIN: ICD-10-CM

## 2021-08-20 PROCEDURE — 3079F DIAST BP 80-89 MM HG: CPT | Mod: CPTII,S$GLB,, | Performed by: PODIATRIST

## 2021-08-20 PROCEDURE — 1159F PR MEDICATION LIST DOCUMENTED IN MEDICAL RECORD: ICD-10-PCS | Mod: CPTII,S$GLB,, | Performed by: PODIATRIST

## 2021-08-20 PROCEDURE — 99214 PR OFFICE/OUTPT VISIT, EST, LEVL IV, 30-39 MIN: ICD-10-PCS | Mod: 25,S$GLB,, | Performed by: PODIATRIST

## 2021-08-20 PROCEDURE — 1160F RVW MEDS BY RX/DR IN RCRD: CPT | Mod: CPTII,S$GLB,, | Performed by: PODIATRIST

## 2021-08-20 PROCEDURE — 99214 OFFICE O/P EST MOD 30 MIN: CPT | Mod: 25,S$GLB,, | Performed by: PODIATRIST

## 2021-08-20 PROCEDURE — 3046F PR MOST RECENT HEMOGLOBIN A1C LEVEL > 9.0%: ICD-10-PCS | Mod: CPTII,S$GLB,, | Performed by: PODIATRIST

## 2021-08-20 PROCEDURE — 1159F MED LIST DOCD IN RCRD: CPT | Mod: CPTII,S$GLB,, | Performed by: PODIATRIST

## 2021-08-20 PROCEDURE — 3074F PR MOST RECENT SYSTOLIC BLOOD PRESSURE < 130 MM HG: ICD-10-PCS | Mod: CPTII,S$GLB,, | Performed by: PODIATRIST

## 2021-08-20 PROCEDURE — 99999 PR PBB SHADOW E&M-EST. PATIENT-LVL III: CPT | Mod: PBBFAC,,, | Performed by: PODIATRIST

## 2021-08-20 PROCEDURE — 3079F PR MOST RECENT DIASTOLIC BLOOD PRESSURE 80-89 MM HG: ICD-10-PCS | Mod: CPTII,S$GLB,, | Performed by: PODIATRIST

## 2021-08-20 PROCEDURE — 3046F HEMOGLOBIN A1C LEVEL >9.0%: CPT | Mod: CPTII,S$GLB,, | Performed by: PODIATRIST

## 2021-08-20 PROCEDURE — 99999 PR PBB SHADOW E&M-EST. PATIENT-LVL III: ICD-10-PCS | Mod: PBBFAC,,, | Performed by: PODIATRIST

## 2021-08-20 PROCEDURE — 3008F BODY MASS INDEX DOCD: CPT | Mod: CPTII,S$GLB,, | Performed by: PODIATRIST

## 2021-08-20 PROCEDURE — 1160F PR REVIEW ALL MEDS BY PRESCRIBER/CLIN PHARMACIST DOCUMENTED: ICD-10-PCS | Mod: CPTII,S$GLB,, | Performed by: PODIATRIST

## 2021-08-20 PROCEDURE — 3008F PR BODY MASS INDEX (BMI) DOCUMENTED: ICD-10-PCS | Mod: CPTII,S$GLB,, | Performed by: PODIATRIST

## 2021-08-20 PROCEDURE — 3074F SYST BP LT 130 MM HG: CPT | Mod: CPTII,S$GLB,, | Performed by: PODIATRIST

## 2021-09-07 DIAGNOSIS — J30.2 SEASONAL ALLERGIC RHINITIS: ICD-10-CM

## 2021-09-08 RX ORDER — FLUTICASONE PROPIONATE 50 MCG
2 SPRAY, SUSPENSION (ML) NASAL DAILY
Qty: 48 ML | Refills: 3 | Status: SHIPPED | OUTPATIENT
Start: 2021-09-08 | End: 2022-08-26 | Stop reason: SDUPTHER

## 2021-10-14 DIAGNOSIS — E11.59 HYPERTENSION ASSOCIATED WITH DIABETES: ICD-10-CM

## 2021-10-14 DIAGNOSIS — I15.2 HYPERTENSION ASSOCIATED WITH DIABETES: ICD-10-CM

## 2021-10-14 RX ORDER — LISINOPRIL 5 MG/1
TABLET ORAL
Qty: 90 TABLET | Refills: 3 | Status: SHIPPED | OUTPATIENT
Start: 2021-10-14 | End: 2022-09-05

## 2021-10-22 ENCOUNTER — OFFICE VISIT (OUTPATIENT)
Dept: DERMATOLOGY | Facility: CLINIC | Age: 56
End: 2021-10-22
Payer: COMMERCIAL

## 2021-10-22 ENCOUNTER — IMMUNIZATION (OUTPATIENT)
Dept: PHARMACY | Facility: CLINIC | Age: 56
End: 2021-10-22
Payer: COMMERCIAL

## 2021-10-22 DIAGNOSIS — Z12.83 SCREENING, MALIGNANT NEOPLASM, SKIN: ICD-10-CM

## 2021-10-22 DIAGNOSIS — L82.1 SEBORRHEIC KERATOSES: ICD-10-CM

## 2021-10-22 DIAGNOSIS — R23.8 SCALP IRRITATION: Primary | ICD-10-CM

## 2021-10-22 PROCEDURE — 3046F HEMOGLOBIN A1C LEVEL >9.0%: CPT | Mod: CPTII,S$GLB,, | Performed by: STUDENT IN AN ORGANIZED HEALTH CARE EDUCATION/TRAINING PROGRAM

## 2021-10-22 PROCEDURE — 99213 PR OFFICE/OUTPT VISIT, EST, LEVL III, 20-29 MIN: ICD-10-PCS | Mod: S$GLB,,, | Performed by: STUDENT IN AN ORGANIZED HEALTH CARE EDUCATION/TRAINING PROGRAM

## 2021-10-22 PROCEDURE — 3066F PR DOCUMENTATION OF TREATMENT FOR NEPHROPATHY: ICD-10-PCS | Mod: CPTII,S$GLB,, | Performed by: STUDENT IN AN ORGANIZED HEALTH CARE EDUCATION/TRAINING PROGRAM

## 2021-10-22 PROCEDURE — 99213 OFFICE O/P EST LOW 20 MIN: CPT | Mod: S$GLB,,, | Performed by: STUDENT IN AN ORGANIZED HEALTH CARE EDUCATION/TRAINING PROGRAM

## 2021-10-22 PROCEDURE — 99999 PR PBB SHADOW E&M-EST. PATIENT-LVL III: ICD-10-PCS | Mod: PBBFAC,,, | Performed by: STUDENT IN AN ORGANIZED HEALTH CARE EDUCATION/TRAINING PROGRAM

## 2021-10-22 PROCEDURE — 1160F PR REVIEW ALL MEDS BY PRESCRIBER/CLIN PHARMACIST DOCUMENTED: ICD-10-PCS | Mod: CPTII,S$GLB,, | Performed by: STUDENT IN AN ORGANIZED HEALTH CARE EDUCATION/TRAINING PROGRAM

## 2021-10-22 PROCEDURE — 3046F PR MOST RECENT HEMOGLOBIN A1C LEVEL > 9.0%: ICD-10-PCS | Mod: CPTII,S$GLB,, | Performed by: STUDENT IN AN ORGANIZED HEALTH CARE EDUCATION/TRAINING PROGRAM

## 2021-10-22 PROCEDURE — 1160F RVW MEDS BY RX/DR IN RCRD: CPT | Mod: CPTII,S$GLB,, | Performed by: STUDENT IN AN ORGANIZED HEALTH CARE EDUCATION/TRAINING PROGRAM

## 2021-10-22 PROCEDURE — 3061F PR NEG MICROALBUMINURIA RESULT DOCUMENTED/REVIEW: ICD-10-PCS | Mod: CPTII,S$GLB,, | Performed by: STUDENT IN AN ORGANIZED HEALTH CARE EDUCATION/TRAINING PROGRAM

## 2021-10-22 PROCEDURE — 1159F PR MEDICATION LIST DOCUMENTED IN MEDICAL RECORD: ICD-10-PCS | Mod: CPTII,S$GLB,, | Performed by: STUDENT IN AN ORGANIZED HEALTH CARE EDUCATION/TRAINING PROGRAM

## 2021-10-22 PROCEDURE — 4010F ACE/ARB THERAPY RXD/TAKEN: CPT | Mod: CPTII,S$GLB,, | Performed by: STUDENT IN AN ORGANIZED HEALTH CARE EDUCATION/TRAINING PROGRAM

## 2021-10-22 PROCEDURE — 99999 PR PBB SHADOW E&M-EST. PATIENT-LVL III: CPT | Mod: PBBFAC,,, | Performed by: STUDENT IN AN ORGANIZED HEALTH CARE EDUCATION/TRAINING PROGRAM

## 2021-10-22 PROCEDURE — 4010F PR ACE/ARB THEARPY RXD/TAKEN: ICD-10-PCS | Mod: CPTII,S$GLB,, | Performed by: STUDENT IN AN ORGANIZED HEALTH CARE EDUCATION/TRAINING PROGRAM

## 2021-10-22 PROCEDURE — 3061F NEG MICROALBUMINURIA REV: CPT | Mod: CPTII,S$GLB,, | Performed by: STUDENT IN AN ORGANIZED HEALTH CARE EDUCATION/TRAINING PROGRAM

## 2021-10-22 PROCEDURE — 3066F NEPHROPATHY DOC TX: CPT | Mod: CPTII,S$GLB,, | Performed by: STUDENT IN AN ORGANIZED HEALTH CARE EDUCATION/TRAINING PROGRAM

## 2021-10-22 PROCEDURE — 1159F MED LIST DOCD IN RCRD: CPT | Mod: CPTII,S$GLB,, | Performed by: STUDENT IN AN ORGANIZED HEALTH CARE EDUCATION/TRAINING PROGRAM

## 2021-10-22 RX ORDER — CLOBETASOL PROPIONATE 0.46 MG/ML
SOLUTION TOPICAL 2 TIMES DAILY
Qty: 50 ML | Refills: 2 | Status: SHIPPED | OUTPATIENT
Start: 2021-10-22 | End: 2023-01-30

## 2021-10-28 ENCOUNTER — PATIENT OUTREACH (OUTPATIENT)
Dept: ADMINISTRATIVE | Facility: HOSPITAL | Age: 56
End: 2021-10-28
Payer: COMMERCIAL

## 2021-10-28 DIAGNOSIS — E11.40 TYPE 2 DIABETES MELLITUS WITH DIABETIC NEUROPATHY, WITH LONG-TERM CURRENT USE OF INSULIN: ICD-10-CM

## 2021-10-28 DIAGNOSIS — Z79.4 TYPE 2 DIABETES MELLITUS WITH DIABETIC NEUROPATHY, WITH LONG-TERM CURRENT USE OF INSULIN: ICD-10-CM

## 2021-10-28 DIAGNOSIS — E11.42 DIABETIC POLYNEUROPATHY ASSOCIATED WITH TYPE 2 DIABETES MELLITUS: Primary | ICD-10-CM

## 2021-11-17 DIAGNOSIS — E11.40 TYPE 2 DIABETES MELLITUS WITH DIABETIC NEUROPATHY, WITH LONG-TERM CURRENT USE OF INSULIN: ICD-10-CM

## 2021-11-17 DIAGNOSIS — Z79.4 TYPE 2 DIABETES MELLITUS WITH DIABETIC NEUROPATHY, WITH LONG-TERM CURRENT USE OF INSULIN: ICD-10-CM

## 2021-11-18 RX ORDER — INSULIN GLARGINE AND LIXISENATIDE 100; 33 U/ML; UG/ML
60 INJECTION, SOLUTION SUBCUTANEOUS
Qty: 10 PEN | Refills: 3 | Status: SHIPPED | OUTPATIENT
Start: 2021-11-18 | End: 2021-12-17 | Stop reason: SDUPTHER

## 2021-11-23 ENCOUNTER — PATIENT OUTREACH (OUTPATIENT)
Dept: ADMINISTRATIVE | Facility: HOSPITAL | Age: 56
End: 2021-11-23
Payer: COMMERCIAL

## 2021-11-24 ENCOUNTER — LAB VISIT (OUTPATIENT)
Dept: LAB | Facility: HOSPITAL | Age: 56
End: 2021-11-24
Attending: FAMILY MEDICINE
Payer: COMMERCIAL

## 2021-11-24 DIAGNOSIS — Z79.4 TYPE 2 DIABETES MELLITUS WITH DIABETIC NEUROPATHY, WITH LONG-TERM CURRENT USE OF INSULIN: ICD-10-CM

## 2021-11-24 DIAGNOSIS — E11.42 DIABETIC POLYNEUROPATHY ASSOCIATED WITH TYPE 2 DIABETES MELLITUS: ICD-10-CM

## 2021-11-24 DIAGNOSIS — E11.40 TYPE 2 DIABETES MELLITUS WITH DIABETIC NEUROPATHY, WITH LONG-TERM CURRENT USE OF INSULIN: ICD-10-CM

## 2021-11-24 LAB
ESTIMATED AVG GLUCOSE: 289 MG/DL (ref 68–131)
HBA1C MFR BLD: 11.7 % (ref 4–5.6)

## 2021-11-24 PROCEDURE — 83036 HEMOGLOBIN GLYCOSYLATED A1C: CPT | Performed by: FAMILY MEDICINE

## 2021-11-24 PROCEDURE — 36415 COLL VENOUS BLD VENIPUNCTURE: CPT | Performed by: FAMILY MEDICINE

## 2021-11-30 ENCOUNTER — TELEPHONE (OUTPATIENT)
Dept: INTERNAL MEDICINE | Facility: CLINIC | Age: 56
End: 2021-11-30
Payer: COMMERCIAL

## 2021-12-17 ENCOUNTER — OFFICE VISIT (OUTPATIENT)
Dept: INTERNAL MEDICINE | Facility: CLINIC | Age: 56
End: 2021-12-17
Payer: COMMERCIAL

## 2021-12-17 VITALS
OXYGEN SATURATION: 98 % | DIASTOLIC BLOOD PRESSURE: 78 MMHG | WEIGHT: 226.44 LBS | TEMPERATURE: 99 F | HEIGHT: 69 IN | SYSTOLIC BLOOD PRESSURE: 130 MMHG | HEART RATE: 91 BPM | BODY MASS INDEX: 33.54 KG/M2

## 2021-12-17 DIAGNOSIS — E11.40 TYPE 2 DIABETES MELLITUS WITH DIABETIC NEUROPATHY, WITH LONG-TERM CURRENT USE OF INSULIN: ICD-10-CM

## 2021-12-17 DIAGNOSIS — Z79.4 TYPE 2 DIABETES MELLITUS WITH DIABETIC NEUROPATHY, WITH LONG-TERM CURRENT USE OF INSULIN: ICD-10-CM

## 2021-12-17 DIAGNOSIS — K75.81 STEATOHEPATITIS: Primary | ICD-10-CM

## 2021-12-17 DIAGNOSIS — E78.5 HYPERLIPIDEMIA ASSOCIATED WITH TYPE 2 DIABETES MELLITUS: ICD-10-CM

## 2021-12-17 DIAGNOSIS — E11.59 HYPERTENSION ASSOCIATED WITH DIABETES: ICD-10-CM

## 2021-12-17 DIAGNOSIS — E11.69 HYPERLIPIDEMIA ASSOCIATED WITH TYPE 2 DIABETES MELLITUS: ICD-10-CM

## 2021-12-17 DIAGNOSIS — I15.2 HYPERTENSION ASSOCIATED WITH DIABETES: ICD-10-CM

## 2021-12-17 PROCEDURE — 99999 PR PBB SHADOW E&M-EST. PATIENT-LVL III: CPT | Mod: PBBFAC,,, | Performed by: FAMILY MEDICINE

## 2021-12-17 PROCEDURE — 99214 OFFICE O/P EST MOD 30 MIN: CPT | Mod: S$GLB,,, | Performed by: FAMILY MEDICINE

## 2021-12-17 PROCEDURE — 99999 PR PBB SHADOW E&M-EST. PATIENT-LVL III: ICD-10-PCS | Mod: PBBFAC,,, | Performed by: FAMILY MEDICINE

## 2021-12-17 PROCEDURE — 3061F NEG MICROALBUMINURIA REV: CPT | Mod: CPTII,S$GLB,, | Performed by: FAMILY MEDICINE

## 2021-12-17 PROCEDURE — 3066F NEPHROPATHY DOC TX: CPT | Mod: CPTII,S$GLB,, | Performed by: FAMILY MEDICINE

## 2021-12-17 PROCEDURE — 3066F PR DOCUMENTATION OF TREATMENT FOR NEPHROPATHY: ICD-10-PCS | Mod: CPTII,S$GLB,, | Performed by: FAMILY MEDICINE

## 2021-12-17 PROCEDURE — 4010F ACE/ARB THERAPY RXD/TAKEN: CPT | Mod: CPTII,S$GLB,, | Performed by: FAMILY MEDICINE

## 2021-12-17 PROCEDURE — 99214 PR OFFICE/OUTPT VISIT, EST, LEVL IV, 30-39 MIN: ICD-10-PCS | Mod: S$GLB,,, | Performed by: FAMILY MEDICINE

## 2021-12-17 PROCEDURE — 3061F PR NEG MICROALBUMINURIA RESULT DOCUMENTED/REVIEW: ICD-10-PCS | Mod: CPTII,S$GLB,, | Performed by: FAMILY MEDICINE

## 2021-12-17 PROCEDURE — 4010F PR ACE/ARB THEARPY RXD/TAKEN: ICD-10-PCS | Mod: CPTII,S$GLB,, | Performed by: FAMILY MEDICINE

## 2021-12-17 RX ORDER — REPAGLINIDE 2 MG/1
4 TABLET ORAL
Qty: 540 TABLET | Refills: 3 | Status: SHIPPED | OUTPATIENT
Start: 2021-12-17 | End: 2023-01-29 | Stop reason: SDUPTHER

## 2021-12-17 RX ORDER — INSULIN GLARGINE AND LIXISENATIDE 100; 33 U/ML; UG/ML
60 INJECTION, SOLUTION SUBCUTANEOUS
Qty: 10 PEN | Refills: 3 | Status: SHIPPED | OUTPATIENT
Start: 2021-12-17 | End: 2022-07-11

## 2021-12-17 RX ORDER — CHOLECALCIFEROL (VITAMIN D3) 25 MCG
1000 TABLET ORAL DAILY
COMMUNITY

## 2021-12-17 RX ORDER — IBUPROFEN 100 MG/5ML
SUSPENSION, ORAL (FINAL DOSE FORM) ORAL DAILY
COMMUNITY

## 2021-12-17 RX ORDER — METFORMIN HYDROCHLORIDE 1000 MG/1
1000 TABLET ORAL 2 TIMES DAILY WITH MEALS
Qty: 180 TABLET | Refills: 3 | Status: SHIPPED | OUTPATIENT
Start: 2021-12-17 | End: 2022-03-18 | Stop reason: SDUPTHER

## 2021-12-17 RX ORDER — ZINC GLUCONATE 50 MG
50 TABLET ORAL DAILY
COMMUNITY

## 2021-12-20 ENCOUNTER — IMMUNIZATION (OUTPATIENT)
Dept: PRIMARY CARE CLINIC | Facility: CLINIC | Age: 56
End: 2021-12-20
Payer: COMMERCIAL

## 2021-12-20 DIAGNOSIS — Z23 NEED FOR VACCINATION: Primary | ICD-10-CM

## 2021-12-20 PROCEDURE — 0004A COVID-19, MRNA, LNP-S, PF, 30 MCG/0.3 ML DOSE VACCINE: CPT | Mod: CV19,PBBFAC | Performed by: FAMILY MEDICINE

## 2021-12-23 DIAGNOSIS — E78.2 MIXED HYPERLIPIDEMIA: ICD-10-CM

## 2021-12-23 RX ORDER — ATORVASTATIN CALCIUM 40 MG/1
40 TABLET, FILM COATED ORAL DAILY
Qty: 90 TABLET | Refills: 3 | Status: SHIPPED | OUTPATIENT
Start: 2021-12-23 | End: 2022-02-02 | Stop reason: SDUPTHER

## 2022-02-02 DIAGNOSIS — E78.2 MIXED HYPERLIPIDEMIA: ICD-10-CM

## 2022-02-02 NOTE — TELEPHONE ENCOUNTER
No new care gaps identified.  Powered by [employer]. Reference number: [id number].   2/02/2022 10:01:10 AM CST

## 2022-02-14 RX ORDER — ATORVASTATIN CALCIUM 40 MG/1
40 TABLET, FILM COATED ORAL DAILY
Qty: 90 TABLET | Refills: 1 | Status: SHIPPED | OUTPATIENT
Start: 2022-02-14 | End: 2022-09-08 | Stop reason: SDUPTHER

## 2022-02-14 NOTE — TELEPHONE ENCOUNTER
Refill Authorization Note   Brett Garcia  is requesting a refill authorization.  Brief Assessment and Rationale for Refill:  Approve     Medication Therapy Plan:       Medication Reconciliation Completed: No   Comments:   --->Care Gap information included below if applicable.   Orders Placed This Encounter    atorvastatin (LIPITOR) 40 MG tablet      Requested Prescriptions   Signed Prescriptions Disp Refills    atorvastatin (LIPITOR) 40 MG tablet 90 tablet 1     Sig: Take 1 tablet (40 mg total) by mouth once daily.       Cardiovascular:  Antilipid - Statins Passed - 2/2/2022 10:00 AM        Passed - Patient is at least 18 years old        Passed - Valid encounter within last 15 months     Recent Visits  Date Type Provider Dept   12/17/21 Office Visit Herb Lord MD [facility]   07/21/21 Office Visit Herb Lord MD [facility]   10/06/20 Office Visit Sonia Estrella MD [facility]   09/04/20 Office Visit Sonia Estrella MD [facility]   04/28/20 Office Visit Sonia Estrella MD [facility]   Showing recent visits within past 720 days and meeting all other requirements  Future Appointments  No visits were found meeting these conditions.  Showing future appointments within next 150 days and meeting all other requirements      Future Appointments              In 4 days LABORATORY, [facility] - Lab, [facility]    In 1 month Herb Lord MD [facility] - Internal Medicine, [facility]                Passed - ALT is 131 or below and within 360 days     ALT   Date Value Ref Range Status   07/21/2021 51 (H) 10 - 44 U/L Final   10/02/2020 50 (H) 10 - 44 U/L Final   04/03/2020 84 (H) 10 - 44 U/L Final              Passed - AST is 119 or below and within 360 days     AST   Date Value Ref Range Status   07/21/2021 36 10 - 40 U/L Final   10/02/2020 46 (H) 10 - 40 U/L Final   04/03/2020 54 (H) 10 - 40 U/L Final              Passed -  Total Cholesterol within 360 days     Lab Results   Component Value Date    CHOL 86 (L) 07/21/2021    CHOL 92 (L) 10/02/2020    CHOL 114 (L) 11/22/2019              Passed - LDL within 360 days     LDL Cholesterol   Date Value Ref Range Status   07/21/2021 39.0 (L) 63.0 - 159.0 mg/dL Final     Comment:     The National Cholesterol Education Program (NCEP) has set the  following guidelines (reference values) for LDL Cholesterol:  Optimal.......................<130 mg/dL  Borderline High...............130-159 mg/dL  High..........................160-189 mg/dL  Very High.....................>190 mg/dL              Passed - HDL within 360 days     HDL   Date Value Ref Range Status   07/21/2021 38 (L) 40 - 75 mg/dL Final     Comment:     The National Cholesterol Education Program (NCEP) has set the  following guidelines (reference values) for HDL Cholesterol:  Low...............<40 mg/dL  Optimal...........>60 mg/dL              Passed - Triglycerides within 360 days     Lab Results   Component Value Date    TRIG 45 07/21/2021    TRIG 55 10/02/2020    TRIG 104 11/22/2019                  Appointments  past 12m or future 3m with PCP    Date Provider   Last Visit   12/17/2021 Herb Lord MD   Next Visit   3/18/2022 Herb Lord MD   ED visits in past 90 days: 0     Note composed:7:52 AM 02/14/2022

## 2022-02-19 ENCOUNTER — LAB VISIT (OUTPATIENT)
Dept: LAB | Facility: HOSPITAL | Age: 57
End: 2022-02-19
Attending: FAMILY MEDICINE
Payer: COMMERCIAL

## 2022-02-19 DIAGNOSIS — Z79.4 TYPE 2 DIABETES MELLITUS WITH DIABETIC NEUROPATHY, WITH LONG-TERM CURRENT USE OF INSULIN: ICD-10-CM

## 2022-02-19 DIAGNOSIS — K75.81 STEATOHEPATITIS: ICD-10-CM

## 2022-02-19 DIAGNOSIS — E11.40 TYPE 2 DIABETES MELLITUS WITH DIABETIC NEUROPATHY, WITH LONG-TERM CURRENT USE OF INSULIN: ICD-10-CM

## 2022-02-19 LAB
ALBUMIN SERPL BCP-MCNC: 3.7 G/DL (ref 3.5–5.2)
ALP SERPL-CCNC: 63 U/L (ref 55–135)
ALT SERPL W/O P-5'-P-CCNC: 45 U/L (ref 10–44)
ANION GAP SERPL CALC-SCNC: 10 MMOL/L (ref 8–16)
AST SERPL-CCNC: 44 U/L (ref 10–40)
BILIRUB SERPL-MCNC: 0.6 MG/DL (ref 0.1–1)
BUN SERPL-MCNC: 19 MG/DL (ref 6–20)
CALCIUM SERPL-MCNC: 10.1 MG/DL (ref 8.7–10.5)
CHLORIDE SERPL-SCNC: 105 MMOL/L (ref 95–110)
CO2 SERPL-SCNC: 23 MMOL/L (ref 23–29)
CREAT SERPL-MCNC: 0.7 MG/DL (ref 0.5–1.4)
EST. GFR  (AFRICAN AMERICAN): >60 ML/MIN/1.73 M^2
EST. GFR  (NON AFRICAN AMERICAN): >60 ML/MIN/1.73 M^2
ESTIMATED AVG GLUCOSE: 214 MG/DL (ref 68–131)
GLUCOSE SERPL-MCNC: 103 MG/DL (ref 70–110)
HBA1C MFR BLD: 9.1 % (ref 4–5.6)
POTASSIUM SERPL-SCNC: 4.7 MMOL/L (ref 3.5–5.1)
PROT SERPL-MCNC: 7 G/DL (ref 6–8.4)
SODIUM SERPL-SCNC: 138 MMOL/L (ref 136–145)

## 2022-02-19 PROCEDURE — 80053 COMPREHEN METABOLIC PANEL: CPT | Performed by: FAMILY MEDICINE

## 2022-02-19 PROCEDURE — 36415 COLL VENOUS BLD VENIPUNCTURE: CPT | Performed by: FAMILY MEDICINE

## 2022-02-19 PROCEDURE — 83036 HEMOGLOBIN GLYCOSYLATED A1C: CPT | Performed by: FAMILY MEDICINE

## 2022-02-23 ENCOUNTER — PATIENT MESSAGE (OUTPATIENT)
Dept: DIABETES | Facility: CLINIC | Age: 57
End: 2022-02-23
Payer: COMMERCIAL

## 2022-02-24 ENCOUNTER — PATIENT MESSAGE (OUTPATIENT)
Dept: DIABETES | Facility: CLINIC | Age: 57
End: 2022-02-24
Payer: COMMERCIAL

## 2022-03-18 ENCOUNTER — OFFICE VISIT (OUTPATIENT)
Dept: INTERNAL MEDICINE | Facility: CLINIC | Age: 57
End: 2022-03-18
Payer: COMMERCIAL

## 2022-03-18 ENCOUNTER — TELEPHONE (OUTPATIENT)
Dept: DIABETES | Facility: CLINIC | Age: 57
End: 2022-03-18
Payer: COMMERCIAL

## 2022-03-18 ENCOUNTER — PATIENT MESSAGE (OUTPATIENT)
Dept: DIABETES | Facility: CLINIC | Age: 57
End: 2022-03-18
Payer: COMMERCIAL

## 2022-03-18 VITALS
SYSTOLIC BLOOD PRESSURE: 138 MMHG | HEART RATE: 90 BPM | OXYGEN SATURATION: 97 % | BODY MASS INDEX: 33.89 KG/M2 | HEIGHT: 69 IN | WEIGHT: 228.81 LBS | TEMPERATURE: 97 F | DIASTOLIC BLOOD PRESSURE: 84 MMHG

## 2022-03-18 DIAGNOSIS — Z79.4 TYPE 2 DIABETES MELLITUS WITH DIABETIC NEUROPATHY, WITH LONG-TERM CURRENT USE OF INSULIN: Primary | ICD-10-CM

## 2022-03-18 DIAGNOSIS — E66.9 OBESITY (BMI 30-39.9): ICD-10-CM

## 2022-03-18 DIAGNOSIS — E11.40 TYPE 2 DIABETES MELLITUS WITH DIABETIC NEUROPATHY, WITH LONG-TERM CURRENT USE OF INSULIN: Primary | ICD-10-CM

## 2022-03-18 DIAGNOSIS — Z11.4 SCREENING FOR HIV WITHOUT PRESENCE OF RISK FACTORS: ICD-10-CM

## 2022-03-18 DIAGNOSIS — E66.9 OBESITY, UNSPECIFIED CLASSIFICATION, UNSPECIFIED OBESITY TYPE, UNSPECIFIED WHETHER SERIOUS COMORBIDITY PRESENT: ICD-10-CM

## 2022-03-18 DIAGNOSIS — R79.89 ELEVATED LFTS: ICD-10-CM

## 2022-03-18 PROCEDURE — 1159F MED LIST DOCD IN RCRD: CPT | Mod: CPTII,S$GLB,, | Performed by: FAMILY MEDICINE

## 2022-03-18 PROCEDURE — 3046F PR MOST RECENT HEMOGLOBIN A1C LEVEL > 9.0%: ICD-10-PCS | Mod: CPTII,S$GLB,, | Performed by: FAMILY MEDICINE

## 2022-03-18 PROCEDURE — 3046F HEMOGLOBIN A1C LEVEL >9.0%: CPT | Mod: CPTII,S$GLB,, | Performed by: FAMILY MEDICINE

## 2022-03-18 PROCEDURE — 1159F PR MEDICATION LIST DOCUMENTED IN MEDICAL RECORD: ICD-10-PCS | Mod: CPTII,S$GLB,, | Performed by: FAMILY MEDICINE

## 2022-03-18 PROCEDURE — 3008F PR BODY MASS INDEX (BMI) DOCUMENTED: ICD-10-PCS | Mod: CPTII,S$GLB,, | Performed by: FAMILY MEDICINE

## 2022-03-18 PROCEDURE — 99214 PR OFFICE/OUTPT VISIT, EST, LEVL IV, 30-39 MIN: ICD-10-PCS | Mod: S$GLB,,, | Performed by: FAMILY MEDICINE

## 2022-03-18 PROCEDURE — 99214 OFFICE O/P EST MOD 30 MIN: CPT | Mod: S$GLB,,, | Performed by: FAMILY MEDICINE

## 2022-03-18 PROCEDURE — 3079F PR MOST RECENT DIASTOLIC BLOOD PRESSURE 80-89 MM HG: ICD-10-PCS | Mod: CPTII,S$GLB,, | Performed by: FAMILY MEDICINE

## 2022-03-18 PROCEDURE — 4010F ACE/ARB THERAPY RXD/TAKEN: CPT | Mod: CPTII,S$GLB,, | Performed by: FAMILY MEDICINE

## 2022-03-18 PROCEDURE — 99999 PR PBB SHADOW E&M-EST. PATIENT-LVL V: CPT | Mod: PBBFAC,,, | Performed by: FAMILY MEDICINE

## 2022-03-18 PROCEDURE — 3075F PR MOST RECENT SYSTOLIC BLOOD PRESS GE 130-139MM HG: ICD-10-PCS | Mod: CPTII,S$GLB,, | Performed by: FAMILY MEDICINE

## 2022-03-18 PROCEDURE — 3075F SYST BP GE 130 - 139MM HG: CPT | Mod: CPTII,S$GLB,, | Performed by: FAMILY MEDICINE

## 2022-03-18 PROCEDURE — 3008F BODY MASS INDEX DOCD: CPT | Mod: CPTII,S$GLB,, | Performed by: FAMILY MEDICINE

## 2022-03-18 PROCEDURE — 4010F PR ACE/ARB THEARPY RXD/TAKEN: ICD-10-PCS | Mod: CPTII,S$GLB,, | Performed by: FAMILY MEDICINE

## 2022-03-18 PROCEDURE — 99999 PR PBB SHADOW E&M-EST. PATIENT-LVL V: ICD-10-PCS | Mod: PBBFAC,,, | Performed by: FAMILY MEDICINE

## 2022-03-18 PROCEDURE — 3079F DIAST BP 80-89 MM HG: CPT | Mod: CPTII,S$GLB,, | Performed by: FAMILY MEDICINE

## 2022-03-18 RX ORDER — METFORMIN HYDROCHLORIDE 1000 MG/1
1000 TABLET ORAL 2 TIMES DAILY WITH MEALS
Qty: 180 TABLET | Refills: 3 | Status: SHIPPED | OUTPATIENT
Start: 2022-03-18 | End: 2023-05-15 | Stop reason: SDUPTHER

## 2022-03-18 NOTE — PROGRESS NOTES
Subjective:       Patient ID: Brett Garcia is a 56 y.o. male.    Chief Complaint: No chief complaint on file.    HPI    Patient Active Problem List   Diagnosis    Hyperlipidemia    Obesity (BMI 30-39.9)    Hypertension associated with diabetes    Skin lesions    Diabetic polyneuropathy associated with type 2 diabetes mellitus    Stress reaction    Hyperlipidemia associated with type 2 diabetes mellitus    Type 2 diabetes mellitus with diabetic neuropathy, with long-term current use of insulin       Past Medical History:   Diagnosis Date    Diabetes mellitus with neuropathy 2009    BS 57 am 12/18/2015    Hyperlipidemia 2009    Hypertension associated with diabetes 4/21/2017       Past Surgical History:   Procedure Laterality Date    COLONOSCOPY N/A 11/1/2018    Procedure: COLONOSCOPY;  Surgeon: Kristina Wilson MD;  Location: [facility];  Service: Endoscopy;  Laterality: N/A;    COLONOSCOPY W/ POLYPECTOMY  11/01/2018    Polyp x1, repeat 5 years; Dr. Wilson       Family History   Problem Relation Age of Onset    Diabetes Father     Hypertension Father     Heart attack Father     Cataracts Father     Hypertension Mother     Hypertension Sister        Social History     Tobacco Use   Smoking Status Never Smoker   Smokeless Tobacco Former User       Wt Readings from Last 5 Encounters:   03/18/22 103.8 kg (228 lb 13.4 oz)   12/17/21 102.7 kg (226 lb 6.6 oz)   08/20/21 101 kg (222 lb 10.6 oz)   08/04/21 101 kg (222 lb 10.6 oz)   07/21/21 100.9 kg (222 lb 7.1 oz)       For further HPI details, see assessment and plan.    Review of Systems    Objective:      Vitals:    03/18/22 1135   BP: 138/84   Pulse: 90   Temp: 96.9 °F (36.1 °C)       Physical Exam  Constitutional:       General: He is not in acute distress.     Appearance: Normal appearance. He is well-developed.   Neck:      Trachea: Trachea normal.   Cardiovascular:      Rate and Rhythm: Normal rate and regular rhythm.      Heart sounds: Normal  heart sounds.   Pulmonary:      Effort: Pulmonary effort is normal. No respiratory distress.      Breath sounds: Normal breath sounds. No decreased breath sounds.   Abdominal:      Palpations: Abdomen is soft.   Musculoskeletal:      Cervical back: Full passive range of motion without pain.   Neurological:      Mental Status: He is alert and oriented to person, place, and time.   Psychiatric:         Speech: Speech normal.         Behavior: Behavior normal.         Thought Content: Thought content normal.         Assessment:       1. Type 2 diabetes mellitus with diabetic neuropathy, with long-term current use of insulin    2. Screening for HIV without presence of risk factors    3. Obesity, unspecified classification, unspecified obesity type, unspecified whether serious comorbidity present    4. Obesity (BMI 30-39.9)    5. Elevated LFTs        Plan:   Type 2 diabetes mellitus with diabetic neuropathy, with long-term current use of insulin  -     Hemoglobin A1C; Future; Expected date: 03/18/2022  -     Ambulatory referral/consult to Diabetes Education; Future; Expected date: 03/25/2022  -     metFORMIN (GLUCOPHAGE) 1000 MG tablet; Take 1 tablet (1,000 mg total) by mouth 2 (two) times daily with meals.  Dispense: 180 tablet; Refill: 3    Screening for HIV without presence of risk factors  -     HIV 1/2 Ag/Ab (4th Gen); Future; Expected date: 03/18/2022    Obesity, unspecified classification, unspecified obesity type, unspecified whether serious comorbidity present  -     Testosterone; Future; Expected date: 03/18/2022    Obesity (BMI 30-39.9)    Elevated LFTs        Strong lifestyle efforts  Diet/ exercise  a1c is improving    soliqua - dropped to 45 units as was having hypoglycemic episodes  He is on metformin 1000 bid  prandin 4 mg TID  No jardiance - perineal itching.    He is motivated and wants to get this down w/ lifestyle  I would be agreeable looking at his last decline      On ace-I  On statin    Lab  Results   Component Value Date    LDLCALC 39.0 (L) 07/21/2021   at goal      Elevated lft  Mild  Continue to montior    he will see his outside eye md and get me records      Curious about T level  Will check  Advise morning time lab    3 m f/u

## 2022-03-29 ENCOUNTER — TELEPHONE (OUTPATIENT)
Dept: DIABETES | Facility: CLINIC | Age: 57
End: 2022-03-29
Payer: COMMERCIAL

## 2022-03-30 ENCOUNTER — PATIENT MESSAGE (OUTPATIENT)
Dept: DIABETES | Facility: CLINIC | Age: 57
End: 2022-03-30
Payer: COMMERCIAL

## 2022-04-11 ENCOUNTER — PATIENT MESSAGE (OUTPATIENT)
Dept: RESEARCH | Facility: HOSPITAL | Age: 57
End: 2022-04-11
Payer: COMMERCIAL

## 2022-04-13 DIAGNOSIS — G47.00 INSOMNIA, UNSPECIFIED TYPE: ICD-10-CM

## 2022-04-13 NOTE — TELEPHONE ENCOUNTER
No new care gaps identified.  Powered by [employer] by [employer] Reference number: [id number].   4/13/2022 5:10:18 PM CDT

## 2022-04-13 NOTE — TELEPHONE ENCOUNTER
Refill Routing Note   Medication(s) are not appropriate for processing by [facility] for the following reason(s):      - Indication is outside of scope for [facility]    [facility] action(s):  Route          Medication reconciliation completed: No     Appointments  past 12m or future 3m with PCP    Date Provider   Last Visit   3/18/2022 Herb Lord MD   Next Visit   6/17/2022 Herb Lord MD   ED visits in past 90 days: 0        Note composed:5:48 PM 04/13/2022

## 2022-04-14 ENCOUNTER — PATIENT OUTREACH (OUTPATIENT)
Dept: ADMINISTRATIVE | Facility: HOSPITAL | Age: 57
End: 2022-04-14
Payer: COMMERCIAL

## 2022-04-14 DIAGNOSIS — E11.42 DIABETIC POLYNEUROPATHY ASSOCIATED WITH TYPE 2 DIABETES MELLITUS: Primary | ICD-10-CM

## 2022-04-14 RX ORDER — AMITRIPTYLINE HYDROCHLORIDE 25 MG/1
50 TABLET, FILM COATED ORAL NIGHTLY
Qty: 180 TABLET | Refills: 1 | Status: SHIPPED | OUTPATIENT
Start: 2022-04-14 | End: 2022-09-08 | Stop reason: SDUPTHER

## 2022-04-14 NOTE — PROGRESS NOTES
Working Dm Report:       Reviewing chart for overdue DM labs. Scheduled micro with upcoming DM labs in May.

## 2022-04-21 ENCOUNTER — PATIENT OUTREACH (OUTPATIENT)
Dept: ADMINISTRATIVE | Facility: OTHER | Age: 57
End: 2022-04-21
Payer: COMMERCIAL

## 2022-04-22 ENCOUNTER — CLINICAL SUPPORT (OUTPATIENT)
Dept: DIABETES | Facility: CLINIC | Age: 57
End: 2022-04-22
Payer: COMMERCIAL

## 2022-04-22 VITALS — WEIGHT: 228.38 LBS | BODY MASS INDEX: 33.83 KG/M2 | HEIGHT: 69 IN

## 2022-04-22 DIAGNOSIS — Z79.4 TYPE 2 DIABETES MELLITUS WITH DIABETIC NEUROPATHY, WITH LONG-TERM CURRENT USE OF INSULIN: ICD-10-CM

## 2022-04-22 DIAGNOSIS — E11.40 TYPE 2 DIABETES MELLITUS WITH DIABETIC NEUROPATHY, WITH LONG-TERM CURRENT USE OF INSULIN: ICD-10-CM

## 2022-04-22 PROCEDURE — G0108 PR DIAB MANAGE TRN  PER INDIV: ICD-10-PCS | Mod: S$GLB,,,

## 2022-04-22 PROCEDURE — 99999 PR PBB SHADOW E&M-EST. PATIENT-LVL IV: ICD-10-PCS | Mod: PBBFAC,,,

## 2022-04-22 PROCEDURE — G0108 DIAB MANAGE TRN  PER INDIV: HCPCS | Mod: S$GLB,,,

## 2022-04-22 PROCEDURE — 99999 PR PBB SHADOW E&M-EST. PATIENT-LVL IV: CPT | Mod: PBBFAC,,,

## 2022-04-22 NOTE — PROGRESS NOTES
"Diabetes Care Specialist Progress Note  Author: Delia Kessler RN  Date: 4/22/2022    Program Intake  Reason for Diabetes Program Visit:: Initial Diabetes Assessment  Current diabetes risk level:: moderate  In the last 12 months, have you:: none  Permission to speak with others about care:: yes    Lab Results   Component Value Date    HGBA1C 9.1 (H) 02/19/2022     DM INTRO  Weight: 103.6 kg (228 lb 6.3 oz)   Height: 5' 9" (175.3 cm)   Body mass index is 33.73 kg/m².    CURRENT MEDS  · Metformin 1,000 mg BID  · Soliqua 60 units daily with breakfast  · Repaglinide 4 mg TID before meal    Clinical    Patient Health Rating  Compared to other people your age, how would you rate your health?: Excellent    Problem Review  Reviewed Problem List with Patient: yes  Active comorbidities affecting diabetes self-care.: yes  Comorbidities: Hypertension  Reviewed health maintenance: yes    Clinical Assessment  Current Diabetes Treatment: Oral Medication  Have you ever experienced hypoglycemia (low blood sugar)?: yes  In the last month, how often have you experienced low blood sugar?: more than once a week  Are you able to tell when your blood sugar is low?: Yes  What symptoms do you experience?: Shaky  Have you ever been hospitalized because your blood sugar was too low?: no  How do you treat hypoglycemia (low blood sugar)?: 1/2 can soda/fruit juice  Have you ever experienced hyperglycemia (high blood sugar)?: yes  In the last month, how often have you experienced high blood sugar?: more than once a week  Are you able to tell when your blood sugar is high?: No (comment)  Have you ever been hospitalized because your blood sugar was high?: no    Medication Information  How do you obtain your medications?: Patient drives, Family picks up  How many days a week do you miss your medications?: Never  Do you sometimes have difficulty refilling your medications?: No  Medication adherence impacting ability to self-manage diabetes?: " No    Labs  Do you have regular lab work to monitor your medications?: Yes  Type of Regular Lab Work: A1c, Cholesterol, CBC, BMP  Where do you get your labs drawn?: [facility]  Lab Compliance Barriers: No    Nutritional Status  Diet: Regular  Meal Plan 24 Hour Recall: Breakfast, Lunch, Dinner, Snack  Meal Plan 24 Hour Recall - Breakfast: Biscotti; Cup of coffee with creamer & sweet n lo  Meal Plan 24 Hour Recall - Lunch: Buccees ham & cheese kolache; Cup of coffee  Meal Plan 24 Hour Recall - Dinner: Sonic-double cheeseburger with french fries; Strawberry limeade  Meal Plan 24 Hour Recall - Snack: Nuts; Modelo or [place]ungling  Change in appetite?: Yes (D/t increased activity at walk)  Dentation:: Intact  Recent Changes in Weight: No Recent Weight Change  Current nutritional status an area of need that is impacting patient's ability to self-manage diabetes?: No    Additional Social History    Support  Does anyone support you with your diabetes care?: yes  Who supports you?: significant other, self  Who takes you to your medical appointments?: significant other, self  Does the current support meet the patient's needs?: Yes  Is Support an area impacting ability to self-manage diabetes?: No    Access to Mass Media & Technology  Does the patient have access to any of the following devices or technologies?: Smart phone  Media or technology needs impacting ability to self-manage diabetes?: No    Cognitive/Behavioral Health  Alert and Oriented: Yes  Difficulty Thinking: No  Requires Prompting: No  Requires assistance for routine expression?: No  Cognitive or behavioral barriers impacting ability to self-manage diabetes?: No    Culture/Episcopal  Culture or Cheondoism beliefs that may impact ability to access healthcare: No    Communication  Language preference: English  Hearing Problems: No  Vision Problems: No  Communication needs impacting ability to self-manage diabetes?: No    Health Literacy  Preferred Learning Method: Face to  Face, Reading Materials  How often do you need to have someone help you read instructions, pamphlets, or written material from your doctor or pharmacy?: Never  Health literacy needs impacting ability to self-manage diabetes?: No      Diabetes Self-Management Skills Assessment    Diabetes Disease Process/Treatment Options  Patient/caregiver able to state what happens when someone has diabetes.: somewhat  Patient/caregiver knows what type of diabetes they have.: yes  Diabetes Type : Type II  Patient/caregiver able to identify at least three signs and symptoms of diabetes.: no  Patient able to identify at least three risk factors for diabetes.: no  Diabetes Disease Process/Treatment Options: Skills Assessment Completed: Yes  Assessment indicates:: Knowledge deficit, Instruction Needed  Area of need?: Yes    Nutrition/Healthy Eating  Challenges to healthy eating:: portion control, eating out, going to parties  Method of carbohydrate measurement:: eyeballing/guessing, carb counting/reading labels  Patient can identify foods that impact blood sugar.: yes  Patient-identified foods:: starches (bread, pasta, rice, cereal), starchy vegetables (corn, peas, beans)  Nutrition/Healthy Eating Skills Assessment Completed:: Yes  Assessment indicates:: Knowledge deficit, Instruction Needed  Area of need?: Yes    Physical Activity/Exercise  Patient's daily activity level:: constantly moving  Patient formally exercises outside of work.: no  Reasons for not exercising:: work schedule  Patient can identify forms of physical activity.: yes  Stated forms of physical activity:: any movement performed by muscles that uses energy, manual activity at work, moving to burn calories, recreational activities, swimming  Patient can identify reasons why exercise/physical activity is important in diabetes management.: yes  Identified reasons:: improves blood circulation, keeps body and joints flexible, lowers blood glucose, blood pressure, and  cholesterol, strengthens heart, muscles, and bones  Physical Activity/Exercise Skills Assessment Completed: : Yes  Assessment indicates:: Adequate understanding  Area of need?: No    Medications  Patient is able to describe current diabetes management routine.: yes  Diabetes management routine:: injectable medications, insulin, oral medications  Patient is able to identify current diabetes medications, dosages, and appropriate timing of medications.: yes  Patient understands the purpose of the medications taken for diabetes.: no  Patient reports problems or concerns with current medication regimen.: yes  Medication regimen problems/concerns:: other (see comments) (Feels like it's too much medication; goal is to get off some meds.)  Medication Skills Assessment Completed:: Yes  Assessment indicates:: Adequate understanding  Area of need?: No    Home Blood Glucose Monitoring  Patient states that blood sugar is checked at home daily.: yes  Monitoring Method:: home glucometer  How often do you check your blood sugar?: 3 times a day  When do you check your blood sugar?: Before breakfast, Before dinner, Before bedtime  When you check what is your typical blood sugar range? : Fastin, , 200. 1 hour after dinner/before bed: 180-220  Blood glucose logs:: no, encouraged to keep logs, encouraged to bring logs to provider visits  Blood glucose logs reviewed today?: no  Home Blood Glucose Monitoring Skills Assessment Completed: : Yes  Assessment indicates:: Knowledge deficit, Instruction Needed  Area of need?: Yes    Acute Complications  Patient is able to identify types of acute complications: Yes  Patient Identified:: Hypoglycemia  Patient is able to state the basic meaning of hypoglycemia?: No  Able to state the blood sugar range for hypoglycemia?: yes  Patient stated range:: <70  Patient can identify general symptoms of hypoglycemia: yes  Patient identified:: shakiness, other (see comments) (light-headed)  Able to  state proper treatment of hypoglycemia?: yes  Patient identified:: 1/2 can soda/fruit juice  Acute Complications Skills Assessment Completed: : Yes  Assessment indicates:: Knowledge deficit, Instruction Needed  Area of need?: Yes    Chronic Complications  Patient can identify major chronic complications of diabetes.: yes  Stated chronic complications:: heart disease/heart attack, neuropathy/nerve damage, stroke  Patient can identify ways to prevent or delay diabetes complications.: yes  Stated ways to prevent complications:: controlling blood sugar, maintaining optimal blood glucose control, healthy eating and regular activity, having regular diabetic eye exams  Patient is aware that having diabetes increases risk of heart disease?: Yes  Patient is aware that heart disease is the leading cause of death and disability in people with diabetes?: No  Patient able to state risk factors for heart disease?: Yes  Patient stated risk factors for heart disease:: High blood pressure, High cholesterol, Diet, Limited activity, Medication non-adherance, Having diabetes  Patient is taking statin?: Yes  Chronic Complications Skills Assessment Completed: : Yes  Assessment indicates:: Adequate understanding  Area of need?: No    Psychosocial/Coping  Psychosocial/Coping Skills Assessment Completed: : No  Deffered due to:: Time  Area of need?:  (Deferred.)    Assessment Summary and Plan    Based on today's diabetes care assessment, the following areas of need were identified:      Social 4/22/2022   Support No   Access to Mass Media/Tech No   Cognitive/Behavioral Health No   Culture/Sikhism No   Communication No   Health Literacy No        Clinical 4/22/2022   Medication Adherence No   Lab Compliance No   Nutritional Status No        Diabetes Self-Management Skills 4/22/2022   Diabetes Disease Process/Treatment Options Yes-Reviewed pathophysiology of type 2 diabetes, risk factors, complications related to the disease, importance of  annual dilated eye exam, and treatment options.     Nutrition/Healthy Eating Yes-See care plan.  This topic was the main concern for the patient and his significant other. They wanted to know what to eat and when. He has been limiting carbs which is leading to snacking in the evening. Encouraged to add more carbs to dinner, up to 45g (60g is the max). Helped significant other identify carbs that can be added to evening meals. Patient and significant other satisfied with information learned and are eager to apply what they have learned.     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 Works 16+ hours/day. Encouraged him to find activity on the weekends that him and his significant other can enjoy together like walking or swimming to both help with weight loss and decrease stress.      Medication No     Home Blood Glucose Monitoring Yes-Patient is monitoring up to 4x/day. Needed instruction on monitoring times. Encouraged monitoring fasting & 2 hours after meals.      Acute Complications Yes-Reviewed blood glucose goals, prevention, detection, signs and symptoms, and treatment of hypoglycemia and hyperglycemia, and when to contact the clinic.     Chronic Complications No     Psychosocial/Coping Deferred.           Today's interventions were provided through individual discussion, instruction, and written materials were provided.      Patient verbalized understanding of instruction and written materials.  Pt was able to return back demonstration of instructions today. Patient understood key points, needs reinforcement and further instruction.     Diabetes Self-Management Care Plan:    Today's Diabetes Self-Management Care Plan was developed with Brett's input. Brett has agreed to work toward the following goal(s) to improve his/her overall diabetes control.      Care Plan: Diabetes Management   Updates made since 3/23/2022 12:00 AM      Problem: Healthy  Eating       Goal: Eat 3 meals daily with 45-60g of carbohydrates per meal and 15g per snack.    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     Patient only eating about 30g of carbs at dinner. Encouraged adding another 1 serving of carbohydrates to dinner to prevent post-meal hunger and subsequent nighttime snaking.     Provided patient with 25 snack ideas from www.diabetesfoodhub.org & encouraged him and his significant other to use the website for other diabetes-friendly recipes.     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      Task: Reviewed the sources and role of Carbohydrate, Protein, and Fat and how each nutrient impacts blood sugar. Completed 4/22/2022      Task: Provided visual examples using dry measuring cups, food models, and other familiar objects such as computer mouse, deck or cards, tennis ball etc. to help with visualization of portions. Completed 4/22/2022      Task: Explained how to count carbohydrates using the food label and the use of dry measuring cups for accurate carb counting. Completed 4/22/2022      Task: Recommended replacing beverages containing high sugar content with noncaloric/sugar free options and/or water. Completed 4/22/2022      Task: Review the importance of balancing carbohydrates with each meal using portion control techniques to count servings of carbohydrate and label reading to identify serving size and amount of total carbs per serving. Completed 4/22/2022      Task: Provided Sample plate method and reviewed the use of the plate to estimate amounts of carbohydrate per meal. Completed 4/22/2022          Follow Up Plan     Follow up in about 3 months (around 7/22/2022).    Today's care plan and follow up schedule  was discussed with patient.  Brett verbalized understanding of the care plan, goals, and agrees to follow up plan.        The patient was encouraged to communicate with his/her health care provider/physician and care team regarding his/her condition(s) and treatment.  I provided the patient with my contact information today and encouraged to contact me via phone or [facility] Patient Portal as needed.     Length of Visit   Total Time: 60 Minutes

## 2022-04-22 NOTE — LETTER
April 22, 2022      Herb Lord MD  [address] [address] [facility] [address]             [facility]  [address] [facility] [address] [address]  Phone: [phone]  Fax: [phone]   Patient: Brett Garcia   MR Number: [id number]   YOB: 1965   Date of Visit: 4/22/2022     Dear Dr. Lord:    Thank you for referring Brett Garcia to me for evaluation. Below are the relevant portions of my assessment and plan of care.       If you have questions, please do not hesitate to call me. I look forward to following Brett along with you.    Sincerely,      Delia Kessler RN           CC  No Recipients

## 2022-04-26 ENCOUNTER — PATIENT MESSAGE (OUTPATIENT)
Dept: ADMINISTRATIVE | Facility: HOSPITAL | Age: 57
End: 2022-04-26
Payer: COMMERCIAL

## 2022-05-21 ENCOUNTER — LAB VISIT (OUTPATIENT)
Dept: LAB | Facility: HOSPITAL | Age: 57
End: 2022-05-21
Attending: FAMILY MEDICINE
Payer: COMMERCIAL

## 2022-05-21 DIAGNOSIS — Z11.4 SCREENING FOR HIV WITHOUT PRESENCE OF RISK FACTORS: ICD-10-CM

## 2022-05-21 DIAGNOSIS — E66.9 OBESITY, UNSPECIFIED CLASSIFICATION, UNSPECIFIED OBESITY TYPE, UNSPECIFIED WHETHER SERIOUS COMORBIDITY PRESENT: ICD-10-CM

## 2022-05-21 DIAGNOSIS — E11.40 TYPE 2 DIABETES MELLITUS WITH DIABETIC NEUROPATHY, WITH LONG-TERM CURRENT USE OF INSULIN: ICD-10-CM

## 2022-05-21 DIAGNOSIS — Z79.4 TYPE 2 DIABETES MELLITUS WITH DIABETIC NEUROPATHY, WITH LONG-TERM CURRENT USE OF INSULIN: ICD-10-CM

## 2022-05-21 DIAGNOSIS — E11.42 DIABETIC POLYNEUROPATHY ASSOCIATED WITH TYPE 2 DIABETES MELLITUS: ICD-10-CM

## 2022-05-21 LAB
ALBUMIN/CREAT UR: 11.2 UG/MG (ref 0–30)
CREAT UR-MCNC: 98 MG/DL (ref 23–375)
ESTIMATED AVG GLUCOSE: 180 MG/DL (ref 68–131)
HBA1C MFR BLD: 7.9 % (ref 4–5.6)
MICROALBUMIN UR DL<=1MG/L-MCNC: 11 UG/ML
TESTOST SERPL-MCNC: 489 NG/DL (ref 304–1227)

## 2022-05-21 PROCEDURE — 36415 COLL VENOUS BLD VENIPUNCTURE: CPT | Performed by: FAMILY MEDICINE

## 2022-05-21 PROCEDURE — 84403 ASSAY OF TOTAL TESTOSTERONE: CPT | Performed by: FAMILY MEDICINE

## 2022-05-21 PROCEDURE — 83036 HEMOGLOBIN GLYCOSYLATED A1C: CPT | Performed by: FAMILY MEDICINE

## 2022-05-21 PROCEDURE — 82570 ASSAY OF URINE CREATININE: CPT | Performed by: FAMILY MEDICINE

## 2022-05-21 PROCEDURE — 82043 UR ALBUMIN QUANTITATIVE: CPT | Performed by: FAMILY MEDICINE

## 2022-05-21 PROCEDURE — 87389 HIV-1 AG W/HIV-1&-2 AB AG IA: CPT | Performed by: FAMILY MEDICINE

## 2022-05-24 LAB — HIV 1+2 AB+HIV1 P24 AG SERPL QL IA: NEGATIVE

## 2022-06-14 DIAGNOSIS — L30.4 INTERTRIGO: ICD-10-CM

## 2022-06-14 RX ORDER — MICONAZOLE NITRATE 2 G/100G
POWDER TOPICAL
Qty: 85 G | Refills: 3 | Status: SHIPPED | OUTPATIENT
Start: 2022-06-14 | End: 2023-01-30

## 2022-06-17 ENCOUNTER — TELEPHONE (OUTPATIENT)
Dept: DIABETES | Facility: CLINIC | Age: 57
End: 2022-06-17
Payer: COMMERCIAL

## 2022-06-17 ENCOUNTER — OFFICE VISIT (OUTPATIENT)
Dept: INTERNAL MEDICINE | Facility: CLINIC | Age: 57
End: 2022-06-17
Payer: COMMERCIAL

## 2022-06-17 VITALS
HEART RATE: 95 BPM | HEIGHT: 69 IN | TEMPERATURE: 98 F | WEIGHT: 230.81 LBS | BODY MASS INDEX: 34.18 KG/M2 | SYSTOLIC BLOOD PRESSURE: 132 MMHG | OXYGEN SATURATION: 97 % | DIASTOLIC BLOOD PRESSURE: 74 MMHG

## 2022-06-17 DIAGNOSIS — Z79.4 TYPE 2 DIABETES MELLITUS WITH DIABETIC NEUROPATHY, WITH LONG-TERM CURRENT USE OF INSULIN: ICD-10-CM

## 2022-06-17 DIAGNOSIS — E11.40 TYPE 2 DIABETES MELLITUS WITH DIABETIC NEUROPATHY, WITH LONG-TERM CURRENT USE OF INSULIN: ICD-10-CM

## 2022-06-17 DIAGNOSIS — I15.2 HYPERTENSION ASSOCIATED WITH DIABETES: ICD-10-CM

## 2022-06-17 DIAGNOSIS — G47.9 TROUBLE IN SLEEPING: ICD-10-CM

## 2022-06-17 DIAGNOSIS — R79.89 ELEVATED LFTS: Primary | ICD-10-CM

## 2022-06-17 DIAGNOSIS — E11.59 HYPERTENSION ASSOCIATED WITH DIABETES: ICD-10-CM

## 2022-06-17 PROCEDURE — 3008F BODY MASS INDEX DOCD: CPT | Mod: CPTII,S$GLB,, | Performed by: FAMILY MEDICINE

## 2022-06-17 PROCEDURE — 3008F PR BODY MASS INDEX (BMI) DOCUMENTED: ICD-10-PCS | Mod: CPTII,S$GLB,, | Performed by: FAMILY MEDICINE

## 2022-06-17 PROCEDURE — 3078F PR MOST RECENT DIASTOLIC BLOOD PRESSURE < 80 MM HG: ICD-10-PCS | Mod: CPTII,S$GLB,, | Performed by: FAMILY MEDICINE

## 2022-06-17 PROCEDURE — 1159F PR MEDICATION LIST DOCUMENTED IN MEDICAL RECORD: ICD-10-PCS | Mod: CPTII,S$GLB,, | Performed by: FAMILY MEDICINE

## 2022-06-17 PROCEDURE — 4010F ACE/ARB THERAPY RXD/TAKEN: CPT | Mod: CPTII,S$GLB,, | Performed by: FAMILY MEDICINE

## 2022-06-17 PROCEDURE — 3078F DIAST BP <80 MM HG: CPT | Mod: CPTII,S$GLB,, | Performed by: FAMILY MEDICINE

## 2022-06-17 PROCEDURE — 99214 PR OFFICE/OUTPT VISIT, EST, LEVL IV, 30-39 MIN: ICD-10-PCS | Mod: S$GLB,,, | Performed by: FAMILY MEDICINE

## 2022-06-17 PROCEDURE — 3051F PR MOST RECENT HEMOGLOBIN A1C LEVEL 7.0 - < 8.0%: ICD-10-PCS | Mod: CPTII,S$GLB,, | Performed by: FAMILY MEDICINE

## 2022-06-17 PROCEDURE — 3075F PR MOST RECENT SYSTOLIC BLOOD PRESS GE 130-139MM HG: ICD-10-PCS | Mod: CPTII,S$GLB,, | Performed by: FAMILY MEDICINE

## 2022-06-17 PROCEDURE — 4010F PR ACE/ARB THEARPY RXD/TAKEN: ICD-10-PCS | Mod: CPTII,S$GLB,, | Performed by: FAMILY MEDICINE

## 2022-06-17 PROCEDURE — 3066F PR DOCUMENTATION OF TREATMENT FOR NEPHROPATHY: ICD-10-PCS | Mod: CPTII,S$GLB,, | Performed by: FAMILY MEDICINE

## 2022-06-17 PROCEDURE — 3075F SYST BP GE 130 - 139MM HG: CPT | Mod: CPTII,S$GLB,, | Performed by: FAMILY MEDICINE

## 2022-06-17 PROCEDURE — 99214 OFFICE O/P EST MOD 30 MIN: CPT | Mod: S$GLB,,, | Performed by: FAMILY MEDICINE

## 2022-06-17 PROCEDURE — 99999 PR PBB SHADOW E&M-EST. PATIENT-LVL V: ICD-10-PCS | Mod: PBBFAC,,, | Performed by: FAMILY MEDICINE

## 2022-06-17 PROCEDURE — 3061F PR NEG MICROALBUMINURIA RESULT DOCUMENTED/REVIEW: ICD-10-PCS | Mod: CPTII,S$GLB,, | Performed by: FAMILY MEDICINE

## 2022-06-17 PROCEDURE — 3066F NEPHROPATHY DOC TX: CPT | Mod: CPTII,S$GLB,, | Performed by: FAMILY MEDICINE

## 2022-06-17 PROCEDURE — 3061F NEG MICROALBUMINURIA REV: CPT | Mod: CPTII,S$GLB,, | Performed by: FAMILY MEDICINE

## 2022-06-17 PROCEDURE — 1159F MED LIST DOCD IN RCRD: CPT | Mod: CPTII,S$GLB,, | Performed by: FAMILY MEDICINE

## 2022-06-17 PROCEDURE — 3051F HG A1C>EQUAL 7.0%<8.0%: CPT | Mod: CPTII,S$GLB,, | Performed by: FAMILY MEDICINE

## 2022-06-17 PROCEDURE — 99999 PR PBB SHADOW E&M-EST. PATIENT-LVL V: CPT | Mod: PBBFAC,,, | Performed by: FAMILY MEDICINE

## 2022-06-17 NOTE — PROGRESS NOTES
Subjective:       Patient ID: Brett Garcia is a 56 y.o. male.    Chief Complaint: Follow-up    HPI    Patient Active Problem List   Diagnosis    Hyperlipidemia    Obesity (BMI 30-39.9)    Hypertension associated with diabetes    Skin lesions    Diabetic polyneuropathy associated with type 2 diabetes mellitus    Stress reaction    Hyperlipidemia associated with type 2 diabetes mellitus    Type 2 diabetes mellitus with diabetic neuropathy, with long-term current use of insulin       Past Medical History:   Diagnosis Date    Diabetes mellitus with neuropathy 2009    BS 57 am 12/18/2015    Hyperlipidemia 2009    Hypertension associated with diabetes 4/21/2017       Past Surgical History:   Procedure Laterality Date    COLONOSCOPY N/A 11/1/2018    Procedure: COLONOSCOPY;  Surgeon: Kristina Wilson MD;  [facility]: [facility];  Service: Endoscopy;  Laterality: N/A;    COLONOSCOPY W/ POLYPECTOMY  11/01/2018    Polyp x1, repeat 5 years; Dr. Wilson       Family History   Problem Relation Age of Onset    Diabetes Father     Hypertension Father     Heart attack Father     Cataracts Father     Hypertension Mother     Hypertension Sister        Social History     Tobacco Use   Smoking Status Never Smoker   Smokeless Tobacco Former User       Wt Readings from Last 5 Encounters:   06/17/22 104.7 kg (230 lb 13.2 oz)   04/22/22 103.6 kg (228 lb 6.3 oz)   03/18/22 103.8 kg (228 lb 13.4 oz)   12/17/21 102.7 kg (226 lb 6.6 oz)   08/20/21 101 kg (222 lb 10.6 oz)       For further HPI details, see assessment and plan.    Review of Systems    Objective:      Vitals:    06/17/22 1508   BP: 132/74   Pulse: 95   Temp: 98 °F (36.7 °C)       Physical Exam  Constitutional:       General: He is not in acute distress.     Appearance: He is not ill-appearing.   Pulmonary:      Effort: Pulmonary effort is normal. No respiratory distress.   Neurological:      General: No focal deficit present.      Mental Status: He is alert.    Psychiatric:         Mood and Affect: Mood normal.         Behavior: Behavior normal.         Assessment:       1. Elevated LFTs    2. Hypertension associated with diabetes    3. Type 2 diabetes mellitus with diabetic neuropathy, with long-term current use of insulin    4. Trouble in sleeping        Plan:   Elevated LFTs  -     US Abdomen Limited; Future; Expected date: 06/17/2022  -     Comprehensive Metabolic Panel; Future; Expected date: 06/17/2022    Hypertension associated with diabetes    Type 2 diabetes mellitus with diabetic neuropathy, with long-term current use of insulin    Trouble in sleeping        DM II  Lab Results   Component Value Date    HGBA1C 7.9 (H) 05/21/2022     Not controlled but making clear prognosis  Stress has been a big issue lately.    On soliqua 60 units - he makes adjustments pending glucose levels - if doing well uses 50 and gives himself 10 units in the evening & metformin 1000 mg BID & prandin 4 mg 3x/day    Did not tolerate jardiance - perineal itching    Also working w/ DM mngt   Will get a f/u with Greta Kearney    Lab Results   Component Value Date    LDLCALC 39.0 (L) 07/21/2021     lipitor 40  Needs to watch LFT    He is on ACEi    Elevated LFT  Mild  Continue to monitor  Given chronic elevation will get US   Suspect fatty liver  He is not a big drinker            Trouble sleeping  Elavil at night works well. Take 50 mg           CMP in 3 mo  F/ume in 6 mo    This note was verbally dictated, please excuse any type errors.

## 2022-06-17 NOTE — TELEPHONE ENCOUNTER
----- Message from Tab Lozoya sent at 6/17/2022  3:49 PM CDT -----  Regarding: Sooner appt  Please follow up with patient for sooner appt thank you

## 2022-06-24 ENCOUNTER — HOSPITAL ENCOUNTER (OUTPATIENT)
Dept: RADIOLOGY | Facility: HOSPITAL | Age: 57
Discharge: HOME | End: 2022-06-24
Attending: FAMILY MEDICINE
Payer: COMMERCIAL

## 2022-06-24 DIAGNOSIS — R79.89 ELEVATED LFTS: ICD-10-CM

## 2022-06-24 PROCEDURE — 76705 US ABDOMEN LIMITED: ICD-10-PCS | Mod: 26,,, | Performed by: RADIOLOGY

## 2022-06-24 PROCEDURE — 76705 ECHO EXAM OF ABDOMEN: CPT | Mod: TC

## 2022-06-24 PROCEDURE — 76705 ECHO EXAM OF ABDOMEN: CPT | Mod: 26,,, | Performed by: RADIOLOGY

## 2022-07-11 ENCOUNTER — CLINICAL SUPPORT (OUTPATIENT)
Dept: DIABETES | Facility: CLINIC | Age: 57
End: 2022-07-11
Payer: COMMERCIAL

## 2022-07-11 VITALS — BODY MASS INDEX: 34.04 KG/M2 | WEIGHT: 229.81 LBS | HEIGHT: 69 IN

## 2022-07-11 DIAGNOSIS — E11.40 TYPE 2 DIABETES MELLITUS WITH DIABETIC NEUROPATHY, WITH LONG-TERM CURRENT USE OF INSULIN: ICD-10-CM

## 2022-07-11 DIAGNOSIS — Z79.4 TYPE 2 DIABETES MELLITUS WITH DIABETIC NEUROPATHY, WITH LONG-TERM CURRENT USE OF INSULIN: ICD-10-CM

## 2022-07-11 PROCEDURE — 99999 PR PBB SHADOW E&M-EST. PATIENT-LVL IV: ICD-10-PCS | Mod: PBBFAC,,,

## 2022-07-11 PROCEDURE — 99999 PR PBB SHADOW E&M-EST. PATIENT-LVL IV: CPT | Mod: PBBFAC,,,

## 2022-07-11 PROCEDURE — G0108 PR DIAB MANAGE TRN  PER INDIV: ICD-10-PCS | Mod: S$GLB,,,

## 2022-07-11 PROCEDURE — G0108 DIAB MANAGE TRN  PER INDIV: HCPCS | Mod: S$GLB,,,

## 2022-07-11 NOTE — PROGRESS NOTES
"Diabetes Care Specialist Progress Note  Author: Delia Kessler RN  Date: 7/11/2022    Program Intake  Reason for Diabetes Program Visit:: Intervention  Type of Intervention:: Individual  Individual: Education  Education: Self-Management Skill Review  Current diabetes risk level:: moderate  In the last 12 months, have you:: none  Permission to speak with others about care:: yes    Lab Results   Component Value Date    HGBA1C 7.9 (H) 05/21/2022     DM INTRO  Weight: 104.2 kg (229 lb 13.3 oz)   Height: 5' 9" (175.3 cm)   Body mass index is 33.94 kg/m².  Wt decrease of 1 lbs since last visit on 4/22/22 (pt states his scale at home reads 220 lbs)  A1C decreased from 9.1%    CURRENT MEDS  · Metformin 1,000 mg BID  · Soliqua 60 units daily with breakfast  · Repaglinide 4 mg TID before meal    Clinical    Nutritional Status  Diet: Low fat  Meal Plan 24 Hour Recall: Breakfast, Lunch, Dinner, Snack  Meal Plan 24 Hour Recall - Breakfast: During the week: Biscotti; cup of coffee  Meal Plan 24 Hour Recall - Lunch: During the week: [place], 1 oz of pretzels, pickle; Unsweet tea  Meal Plan 24 Hour Recall - Dinner: During the week: Salad, lean protein, and small carb like baked potato, tortellini  Meal Plan 24 Hour Recall - Snack: Cucumber, celery, carrots & hummus; crackers; pretzels  Change in appetite?: No    Diabetes Self-Management Skills Assessment    Nutrition/Healthy Eating  Challenges to healthy eating:: snacking between meals and at night  Method of carbohydrate measurement:: carb counting/reading labels, measuring cups/spoons, portion plate  Patient can identify foods that impact blood sugar.: yes  Nutrition/Healthy Eating Skills Assessment Completed:: Yes  Assessment indicates:: Adequate understanding  Area of need?: No         Home Blood Glucose Monitoring  Patient states that blood sugar is checked at home daily.: yes  Monitoring Method:: home glucometer  How often do you check your blood sugar?: 3 times a " day  When do you check your blood sugar?: Before breakfast, Before dinner, 2 hours after meal  When you check what is your typical blood sugar range? : Fastin-125. Before dinner: , 170-180. 2 hours after dinner: 180-250  Blood glucose logs:: no, encouraged to bring logs to provider visits  Blood glucose logs reviewed today?: no  Home Blood Glucose Monitoring Skills Assessment Completed: : Yes  Assessment indicates:: Adequate understanding  Area of need?: No    Assessment Summary and Plan    Based on today's diabetes care assessment, the following areas of need were identified:      Social 2022   Support No   Access to [employer]/Tech No   Cognitive/Behavioral Health No   Culture/Scientology No   Communication No   Health Literacy No        Clinical 2022   Medication Adherence No   Lab Compliance No   Nutritional Status No        Diabetes Self-Management Skills 2022   Nutrition/Healthy Eating No-See care plan for update.    Home Blood Glucose Monitoring No          Today's interventions were provided through individual discussion, instruction, and written materials were provided.      Patient verbalized understanding of instruction and written materials.  Pt was able to return back demonstration of instructions today. Patient understood key points, needs reinforcement and further instruction.     Diabetes Self-Management Care Plan:    Today's Diabetes Self-Management Care Plan was developed with Brett's input. Brett has agreed to work toward the following goal(s) to improve his/her overall diabetes control.      Care Plan: Diabetes Management   Updates made since 2022 12:00 AM      Problem: Healthy Eating       Goal: Eat 3 meals daily with 45-60g of carbohydrates per meal and 15g per snack.    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     Patient only eating about 30g of carbs at dinner. Encouraged adding another 1 serving of carbohydrates to dinner to prevent post-meal hunger and subsequent nighttime snaking.     Provided patient with 25 snack ideas from www.diabetesfoodhub.org & encouraged him and his significant other to use the website for other diabetes-friendly recipes.     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     7/11/22: Pt states protein drink made him gain too much weight; recommended protein bars instead. Pt is adhering to mostly 45 grams of carbs/meal but remains hungry in the evening after dinner. Will sometimes add a few extra carbs at dinner but notices blood sugar increases to 250 2 hours after meal. Pt reports he is adhering to diet and medication recommendations, may benefit from a change in medication. Will schedule with OSVALDO Miles for medication management.          Follow Up Plan     Follow up in about 6 months (around 1/11/2023).    Today's care plan and follow up schedule was discussed with patient.  Brett verbalized understanding of the care plan, goals, and agrees to follow up plan.        The patient was encouraged to communicate with his/her health care provider/physician and care team regarding his/her condition(s) and treatment.  I provided the patient with my contact information today and encouraged to contact me via phone or [facility]'s Patient Portal as needed.     Length of Visit   Total Time: 45 Minutes

## 2022-07-11 NOTE — Clinical Note
Good morning, I met with Mr. Garcia today who is a patient of Greta's. He had an appt scheduled for July, but he had to cancel. Next available for Greta is October, and he wants to see someone sooner than then. Due to availability, I scheduled him with you for September. He is doing his part as far as diet, activity, and meds so he may need a medication regimen adjustment to get his daytime blood sugars down. Let me know how I can further help! Thank you, ALONDRA Lin

## 2022-07-26 DIAGNOSIS — E11.9 TYPE 2 DIABETES MELLITUS WITHOUT COMPLICATION: ICD-10-CM

## 2022-08-01 DIAGNOSIS — Z79.4 TYPE 2 DIABETES MELLITUS WITH DIABETIC NEUROPATHY, WITH LONG-TERM CURRENT USE OF INSULIN: ICD-10-CM

## 2022-08-01 DIAGNOSIS — E11.40 TYPE 2 DIABETES MELLITUS WITH DIABETIC NEUROPATHY, WITH LONG-TERM CURRENT USE OF INSULIN: ICD-10-CM

## 2022-08-01 NOTE — TELEPHONE ENCOUNTER
No new care gaps identified.  [facility] Embedded Care Gaps. Reference number: [id number]. 8/01/2022   4:02:26 PM CDT

## 2022-08-03 RX ORDER — INSULIN GLARGINE AND LIXISENATIDE 100; 33 U/ML; UG/ML
INJECTION, SOLUTION SUBCUTANEOUS
Qty: 20 PEN | Refills: 1 | Status: SHIPPED | OUTPATIENT
Start: 2022-08-03 | End: 2022-09-12 | Stop reason: ALTCHOICE

## 2022-08-26 ENCOUNTER — PATIENT MESSAGE (OUTPATIENT)
Dept: PODIATRY | Facility: CLINIC | Age: 57
End: 2022-08-26

## 2022-08-26 ENCOUNTER — OFFICE VISIT (OUTPATIENT)
Dept: PODIATRY | Facility: CLINIC | Age: 57
End: 2022-08-26
Payer: COMMERCIAL

## 2022-08-26 VITALS — WEIGHT: 229.75 LBS | BODY MASS INDEX: 34.03 KG/M2 | HEIGHT: 69 IN

## 2022-08-26 DIAGNOSIS — J30.2 SEASONAL ALLERGIC RHINITIS: ICD-10-CM

## 2022-08-26 DIAGNOSIS — L74.512 HYPERHIDROSIS OF PALMS AND SOLES: ICD-10-CM

## 2022-08-26 DIAGNOSIS — E11.42 DIABETIC POLYNEUROPATHY ASSOCIATED WITH TYPE 2 DIABETES MELLITUS: ICD-10-CM

## 2022-08-26 DIAGNOSIS — E11.9 COMPREHENSIVE DIABETIC FOOT EXAMINATION, TYPE 2 DM, ENCOUNTER FOR: Primary | ICD-10-CM

## 2022-08-26 DIAGNOSIS — L74.513 HYPERHIDROSIS OF PALMS AND SOLES: ICD-10-CM

## 2022-08-26 PROCEDURE — 3008F PR BODY MASS INDEX (BMI) DOCUMENTED: ICD-10-PCS | Mod: CPTII,S$GLB,, | Performed by: PODIATRIST

## 2022-08-26 PROCEDURE — 1159F MED LIST DOCD IN RCRD: CPT | Mod: CPTII,S$GLB,, | Performed by: PODIATRIST

## 2022-08-26 PROCEDURE — 1159F PR MEDICATION LIST DOCUMENTED IN MEDICAL RECORD: ICD-10-PCS | Mod: CPTII,S$GLB,, | Performed by: PODIATRIST

## 2022-08-26 PROCEDURE — 99999 PR PBB SHADOW E&M-EST. PATIENT-LVL IV: CPT | Mod: PBBFAC,,, | Performed by: PODIATRIST

## 2022-08-26 PROCEDURE — 99999 PR PBB SHADOW E&M-EST. PATIENT-LVL IV: ICD-10-PCS | Mod: PBBFAC,,, | Performed by: PODIATRIST

## 2022-08-26 PROCEDURE — 4010F ACE/ARB THERAPY RXD/TAKEN: CPT | Mod: CPTII,S$GLB,, | Performed by: PODIATRIST

## 2022-08-26 PROCEDURE — 4010F PR ACE/ARB THEARPY RXD/TAKEN: ICD-10-PCS | Mod: CPTII,S$GLB,, | Performed by: PODIATRIST

## 2022-08-26 PROCEDURE — 99213 PR OFFICE/OUTPT VISIT, EST, LEVL III, 20-29 MIN: ICD-10-PCS | Mod: S$GLB,,, | Performed by: PODIATRIST

## 2022-08-26 PROCEDURE — 3066F PR DOCUMENTATION OF TREATMENT FOR NEPHROPATHY: ICD-10-PCS | Mod: CPTII,S$GLB,, | Performed by: PODIATRIST

## 2022-08-26 PROCEDURE — 99213 OFFICE O/P EST LOW 20 MIN: CPT | Mod: S$GLB,,, | Performed by: PODIATRIST

## 2022-08-26 PROCEDURE — 1160F RVW MEDS BY RX/DR IN RCRD: CPT | Mod: CPTII,S$GLB,, | Performed by: PODIATRIST

## 2022-08-26 PROCEDURE — 3008F BODY MASS INDEX DOCD: CPT | Mod: CPTII,S$GLB,, | Performed by: PODIATRIST

## 2022-08-26 PROCEDURE — 3051F PR MOST RECENT HEMOGLOBIN A1C LEVEL 7.0 - < 8.0%: ICD-10-PCS | Mod: CPTII,S$GLB,, | Performed by: PODIATRIST

## 2022-08-26 PROCEDURE — 3066F NEPHROPATHY DOC TX: CPT | Mod: CPTII,S$GLB,, | Performed by: PODIATRIST

## 2022-08-26 PROCEDURE — 3051F HG A1C>EQUAL 7.0%<8.0%: CPT | Mod: CPTII,S$GLB,, | Performed by: PODIATRIST

## 2022-08-26 PROCEDURE — 3061F PR NEG MICROALBUMINURIA RESULT DOCUMENTED/REVIEW: ICD-10-PCS | Mod: CPTII,S$GLB,, | Performed by: PODIATRIST

## 2022-08-26 PROCEDURE — 3061F NEG MICROALBUMINURIA REV: CPT | Mod: CPTII,S$GLB,, | Performed by: PODIATRIST

## 2022-08-26 PROCEDURE — 1160F PR REVIEW ALL MEDS BY PRESCRIBER/CLIN PHARMACIST DOCUMENTED: ICD-10-PCS | Mod: CPTII,S$GLB,, | Performed by: PODIATRIST

## 2022-08-26 RX ORDER — FLUTICASONE PROPIONATE 50 MCG
2 SPRAY, SUSPENSION (ML) NASAL DAILY
Qty: 48 ML | Refills: 3 | Status: SHIPPED | OUTPATIENT
Start: 2022-08-26 | End: 2023-08-21 | Stop reason: SDUPTHER

## 2022-08-26 NOTE — PROGRESS NOTES
Subjective:       Patient ID: Brett Garcia is a 56 y.o. male.    Chief Complaint: Follow-up (Coming in for a f/u, rates pain 0/10, diabetic, wearing socks and shoes, last seen PCP Dr. Lord on 06/17/2022.)      HPI: Brett Garcia presents to the office today, under referral by their Primary Care Provider, Herb Lord MD, for his annual diabetic foot assessment and risk evalution Patient is a DMII. Patient states neuropathy. This patient last saw his/her primary care provider on 6/17/22.     Hemoglobin A1C   Date Value Ref Range Status   05/21/2022 7.9 (H) 4.0 - 5.6 % Final     Comment:     ADA Screening Guidelines:  5.7-6.4%  Consistent with prediabetes  >or=6.5%  Consistent with diabetes    High levels of fetal hemoglobin interfere with the HbA1C  assay. Heterozygous hemoglobin variants (HbS, HgC, etc)do  not significantly interfere with this assay.   However, presence of multiple variants may affect accuracy.     02/19/2022 9.1 (H) 4.0 - 5.6 % Final     Comment:     ADA Screening Guidelines:  5.7-6.4%  Consistent with prediabetes  >or=6.5%  Consistent with diabetes    High levels of fetal hemoglobin interfere with the HbA1C  assay. Heterozygous hemoglobin variants (HbS, HgC, etc)do  not significantly interfere with this assay.   However, presence of multiple variants may affect accuracy.     11/24/2021 11.7 (H) 4.0 - 5.6 % Final     Comment:     ADA Screening Guidelines:  5.7-6.4%  Consistent with prediabetes  >or=6.5%  Consistent with diabetes    High levels of fetal hemoglobin interfere with the HbA1C  assay. Heterozygous hemoglobin variants (HbS, HgC, etc)do  not significantly interfere with this assay.   However, presence of multiple variants may affect accuracy.     .    Review of patient's allergies indicates:   Allergen Reactions    Hay fever and allergy relief        Past Medical History:   Diagnosis Date    Diabetes mellitus with neuropathy 2009    BS 57 am 12/18/2015    Hyperlipidemia  "2009    Hypertension associated with diabetes 4/21/2017       Family History   Problem Relation Age of Onset    Diabetes Father     Hypertension Father     Heart attack Father     Cataracts Father     Hypertension Mother     Hypertension Sister        Social History     Socioeconomic History    Marital status:     Number of children: 2   Occupational History    Occupation: Plant work      Employer: [employer]     Comment: [employer]   Tobacco Use    Smoking status: Never Smoker    Smokeless tobacco: Former User   Substance and Sexual Activity    Alcohol use: Yes     Alcohol/week: 2.0 standard drinks     Types: 2 Cans of beer per week    Drug use: No    Sexual activity: Yes     Partners: Female       Past Surgical History:   Procedure Laterality Date    COLONOSCOPY N/A 11/1/2018    Procedure: COLONOSCOPY;  Surgeon: Kristina Wilson MD;  Location: [facility];  Service: Endoscopy;  Laterality: N/A;    COLONOSCOPY W/ POLYPECTOMY  11/01/2018    Polyp x1, repeat 5 years; Dr. Wilson       Review of Systems   Constitutional: Negative for chills, fatigue and fever.   HENT: Negative for hearing loss.    Eyes: Negative for photophobia and visual disturbance.   Respiratory: Negative for cough, chest tightness, shortness of breath and wheezing.    Cardiovascular: Negative for chest pain and palpitations.   Gastrointestinal: Negative for constipation, diarrhea, nausea and vomiting.   Endocrine: Negative for cold intolerance and heat intolerance.   Genitourinary: Negative for flank pain.   Musculoskeletal: Negative for neck pain and neck stiffness.   Neurological: Positive for numbness. Negative for light-headedness and headaches.   Psychiatric/Behavioral: Negative for sleep disturbance.         Objective:   Ht 5' 9" (1.753 m)   Wt 104.2 kg (229 lb 11.5 oz)   BMI 33.92 kg/m²     Physical Exam  LOWER EXTREMITY PHYSICAL EXAMINATION    ORTHOPEDIC: Rectus foot type is noted. Equinus contracture is noted. MMT " is 5/5.    VASCULAR: Capillary refill time is less than 3s. Edema is trace. The left dorsalis pedis pulse is 1/4 and the right dorsalis pedis pulse is 1/4. The left posterior tibial pulse is 2/4 and the right posterior tibial pulse is 2/4. Varicosities are absent. Spider veins and telangiectasias are noted. Hair growth is present. Skin appearance is WNL. Proximal to distal warm to warm.     NEUROLOGY: Proprioception is intact. Sensation to light touch is intact. Sensation to pin prick is decreased. Vibratory sensation is decreased to the left and right lower extremity. Examination with 5.07 [place] monofilament reveals that protective sensation is not intact to the left and right plantar surfaces of the foot and digits, as the patient has no sensation/detection at greater than 4 distinct points of contact.     DERMATOLOGY: Skin is supple, moist, dry and intact.     Assessment:     1. Comprehensive diabetic foot examination, type 2 DM, encounter for    2. Diabetic polyneuropathy associated with type 2 diabetes mellitus    3. Hyperhidrosis of palms and soles        Plan:     Comprehensive diabetic foot examination, type 2 DM, encounter for  Diabetic polyneuropathy associated with type 2 diabetes mellitus  I counseled the patient on his/her Diabetic Mellitus regarding today's clinical examination and objection findings. We did also discuss recent medication changes, pertinent labs and imaging evaluations and other medical consultation notes and progress notes. Greater than 50% of this visit was spent on counseling and coordination of care. Greater than 20 minutes of this appt. was spent on education about the diabetic foot, in relation to PVD and/or neuropathy, and the prevention of limb loss.     Shoe gear is inspected and wear and proper fit/type. Patient is reminded of the importance of good nutrition and blood sugar control to help prevent podiatric complications of diabetes. Patient instructed on proper  foot hygeine. We discussed wearing proper shoe gear, daily foot inspections, never walking without protective shoe gear, never putting sharp instruments to feet.  Patient  will continue to monitor the areas daily, inspect feet, wear protective shoe gear when ambulatory, moisturizer to maintain skin integrity.     Patient's DMI/DMII is managed by Primary Care Provider and/or Endocrinology Advanced Practice Provider. As per the most recent glycohemoglobin level, this patient is not at goal.    Hyperhidrosis of palms and soles  Patient is educated as to the use and the effectiveness of Robinul (Glycopyrrolate) in relation to hyperhidrosis.  Patient is also educated as to the potential side effects, which may include, but is not limited to, decreased sweating, dry mouth, constipation, dizziness, drowsiness, headache, loss of taste, nervousness, mild weakness, difficulty falling asleep or staying asleep, upset stomach, vomiting, feeling bloated, nasal congestion, (dry, hot, or flushed skin), diarrhea, signs of anaphylaxis (a severe allergic reaction (may include hives, rash, itching, chest tightness, or swelling of the face, lips, tongue, or throat)), chest pain, decreased sexual ability, confusion, difficulty urinating, fainting, fast, slow, or irregular heartbeat, fever, seizures, severe or persistent dizziness or headache, shortness of breath, unusual weakness, severe vomiting and/or blurred vision or vision problems.         Protective Sensation (w/ 10 gram monofilament):  Right: Absent  Left: Absent    Visual Inspection:  Normal -  Bilateral    Pedal Pulses:   Right: Diminished  Left: Diminished    Posterior tibialis:   Right:Present  Left: Present            Future Appointments   Date Time Provider Department Center   9/12/2022 10:30 AM Ginger Franz NP [facility] DIABETE  Medical C   9/16/2022 10:00 AM LABORATORY, RUY BRUNSON [facility]   12/16/2022  9:40 AM Herb Lord MD [facility] Medical C    1/9/2023  9:00 AM Delia Kessler RN [facility]

## 2022-08-26 NOTE — TELEPHONE ENCOUNTER
Care Due:                  Date            Visit Type   Department     Provider  --------------------------------------------------------------------------------                                EP -                              PRIMARY      [facility] INTERNAL  Last Visit: 06-      CARE ([facility])   OH Lord                              EP -                              PRIMARY      [facility] INTERNAL  Next Visit: 12-      CARE ([facility])   OH Lord                                                            Last  Test          Frequency    Reason                     Performed    Due Date  --------------------------------------------------------------------------------    HBA1C.......  6 months...  insulin, metFORMIN,        05- 11-                             repaglinide..............    [employer] Embedded Care Gaps. Reference number: [id number]. 8/26/2022   10:42:54 AM CDT

## 2022-09-08 DIAGNOSIS — G47.00 INSOMNIA, UNSPECIFIED TYPE: ICD-10-CM

## 2022-09-08 DIAGNOSIS — E78.2 MIXED HYPERLIPIDEMIA: ICD-10-CM

## 2022-09-08 NOTE — TELEPHONE ENCOUNTER
No new care gaps identified.  [facility] Embedded Care Gaps. Reference number: [id number]. 9/08/2022   5:02:54 PM CDT

## 2022-09-09 RX ORDER — ATORVASTATIN CALCIUM 40 MG/1
40 TABLET, FILM COATED ORAL DAILY
Qty: 90 TABLET | Refills: 1 | Status: SHIPPED | OUTPATIENT
Start: 2022-09-09 | End: 2023-01-29 | Stop reason: SDUPTHER

## 2022-09-09 RX ORDER — AMITRIPTYLINE HYDROCHLORIDE 25 MG/1
50 TABLET, FILM COATED ORAL NIGHTLY
Qty: 180 TABLET | Refills: 1 | Status: SHIPPED | OUTPATIENT
Start: 2022-09-09 | End: 2022-11-25

## 2022-09-09 NOTE — PROGRESS NOTES
PCP: Herb Lord MD    Subjective:     Chief Complaint: Diabetes Consult    History of Present Illness: 56 y.o.  male for diabetes consult.   Previously managed by JANE Ross  Last clinic visit 8/4/2021.  Type II diabetes since 2014 .  Comorbidities: HTN and HLD    Known diabetes complications:  DKA/HHS: no  Retinopathy: yes  Peripheral neuropathy: yes, numbness and tingling bilateral feet  Nephropathy: yes    Denies recent hospital admissions or ER visits.   Denies having hypoglycemia.   Endorses diabetes related symptoms: Numbness and Tingling in both feet    Blood glucose monitoring: fingerstick: 2 x/day   Per patient's recall, over the last 4 weeks   Fasting -150, 160's at times   AC dinner 113-145     No BG log for review.  Did not bring meter.    Activity Level: Walking 4x/day  Meal Planning:3 meals/day;1 snacks/day.    Medication compliance all of the time, diet compliance most of the time.   Has attended diabetes education in the past.     Previous regimen: Trulicity - cost prohibitive    Past failed treatment: Jardiance - yeast infection    Current DM Medications:  Diabetes Medications               insulin glargine-lixisenatide (SOLIQUA 100/33) 100 unit-33 mcg/mL InPn pen INJECT 60 UNITS INTO THE SKIN DAILY WITH BREAKFAST.    metFORMIN (GLUCOPHAGE) 1000 MG tablet Take 1 tablet (1,000 mg total) by mouth 2 (two) times daily with meals.    repaglinide (PRANDIN) 2 MG tablet Take 2 tablets (4 mg total) by mouth 3 (three) times daily before meals.       Allergies  Review of patient's allergies indicates:   Allergen Reactions    Hay fever and allergy relief        Labs Reviewed:  His most recent A1C is:  Lab Results   Component Value Date    HGBA1C 7.9 (H) 05/21/2022    HGBA1C 9.1 (H) 02/19/2022    HGBA1C 11.7 (H) 11/24/2021       His most recent BMP is:  Lab Results   Component Value Date     02/19/2022    K 4.7 02/19/2022     02/19/2022    CO2 23 02/19/2022    BUN 19  02/19/2022    CREATININE 0.7 02/19/2022    CALCIUM 10.1 02/19/2022    ANIONGAP 10 02/19/2022    ESTGFRAFRICA >60.0 02/19/2022    EGFRNONAA >60.0 02/19/2022       Lab Results   Component Value Date    CPEPTIDE 2.4 04/21/2017     Lab Results   Component Value Date    GLUTAMICACID 0.00 04/21/2017       Body mass index is 34.74 kg/m².    Wt Readings from Last 3 Encounters:   09/12/22 1102 106.7 kg (235 lb 3.7 oz)   08/26/22 0900 104.2 kg (229 lb 11.5 oz)   07/11/22 0904 104.2 kg (229 lb 13.3 oz)        His blood sugar in clinic today is:  Lab Results   Component Value Date    POCGLU 198 (A) 09/12/2022       Diabetes Management Status  Statin: Taking  ACE/ARB: Taking    Screening or Prevention Patient's value Goal Complete/Controlled?   HgA1C Testing and Control   Lab Results   Component Value Date    HGBA1C 7.9 (H) 05/21/2022      Annually/Less than 8% Yes     Lipid profile : 07/21/2021 Annually No     LDL control Lab Results   Component Value Date    LDLCALC 39.0 (L) 07/21/2021    Annually/Less than 100 mg/dl  No     Nephropathy screening Lab Results   Component Value Date    MICALBCREAT 11.2 05/21/2022     Lab Results   Component Value Date    PROTEINUA Trace (A) 06/30/2016    Annually Yes     Blood pressure BP Readings from Last 1 Encounters:   09/12/22 130/75    Less than 140/90 Yes     Dilated retinal exam : 01/12/2021 Annually Yes    Foot exam   : 09/12/2022 Annually Yes       Social History     Socioeconomic History    Marital status:     Number of children: 2   Occupational History    Occupation: Plant work      Employer: [employer]     Comment: [employer]   Tobacco Use    Smoking status: Never    Smokeless tobacco: Former   Substance and Sexual Activity    Alcohol use: Yes     Alcohol/week: 2.0 standard drinks     Types: 2 Cans of beer per week    Drug use: No    Sexual activity: Yes     Partners: Female     Past Medical History:   Diagnosis Date    Diabetes mellitus with neuropathy 2009    BS 57 am  12/18/2015    Hyperlipidemia 2009    Hypertension associated with diabetes 4/21/2017     Review of Systems   Constitutional:  Negative for activity change, appetite change, fatigue and unexpected weight change.   HENT:  Negative for dental problem and trouble swallowing.    Eyes:  Negative for visual disturbance.   Respiratory:  Negative for chest tightness and shortness of breath.    Cardiovascular:  Negative for chest pain, palpitations and leg swelling.   Gastrointestinal:  Negative for abdominal pain, constipation, diarrhea, nausea and vomiting.   Endocrine: Negative for polydipsia, polyphagia and polyuria.   Genitourinary:  Negative for difficulty urinating, dysuria, frequency and urgency.        Negative yeast infection   Musculoskeletal:  Negative for gait problem, myalgias and neck pain.   Integumentary:  Negative for rash and wound.   Allergic/Immunologic: Negative.    Neurological:  Positive for numbness (tingling BLE). Negative for dizziness, syncope, weakness and headaches.   Hematological: Negative.    Psychiatric/Behavioral:  Negative for confusion and sleep disturbance.    All other systems reviewed and are negative.     Objective:      Physical Exam  Vitals reviewed.   Constitutional:       Appearance: Normal appearance.   HENT:      Head: Normocephalic and atraumatic.   Eyes:      General: Vision grossly intact.      Extraocular Movements: Extraocular movements intact.      Pupils: Pupils are equal, round, and reactive to light.   Neck:      Thyroid: No thyroid mass or thyroid tenderness.   Cardiovascular:      Rate and Rhythm: Normal rate and regular rhythm.      Pulses: Normal pulses.           Radial pulses are 2+ on the right side and 2+ on the left side.        Dorsalis pedis pulses are 2+ on the right side and 2+ on the left side.      Heart sounds: Normal heart sounds.   Pulmonary:      Effort: Pulmonary effort is normal.      Breath sounds: Normal breath sounds.   Abdominal:      General:  Bowel sounds are normal.      Palpations: Abdomen is soft.   Musculoskeletal:         General: Normal range of motion.      Cervical back: Normal range of motion and neck supple.      Right lower leg: No edema.      Left lower leg: No edema.      Right foot: No deformity or bunion.      Left foot: No deformity or bunion.   Feet:      Right foot:      Protective Sensation: 6 sites tested.  6 sites sensed.      Skin integrity: Dry skin present. No ulcer, skin breakdown or callus.      Toenail Condition: Right toenails are normal.      Left foot:      Protective Sensation: 6 sites tested.  6 sites sensed.      Skin integrity: Dry skin present. No ulcer, skin breakdown or callus.      Toenail Condition: Left toenails are normal.      Comments: Pinprick exam completed with 10-g monofilament. Vibration sensation intact.  Lymphadenopathy:      Cervical: No cervical adenopathy.   Skin:     General: Skin is warm and dry.      Findings: No rash or wound.      Comments: Negative for acanthosis nigricans. Well-healed SQ injection sites.   Neurological:      Mental Status: He is alert and oriented to person, place, and time.      Coordination: Coordination is intact.      Gait: Gait is intact.   Psychiatric:         Attention and Perception: Attention normal.         Mood and Affect: Mood normal.         Speech: Speech normal.         Behavior: Behavior normal. Behavior is cooperative.         Thought Content: Thought content normal.         Cognition and Memory: Cognition normal.       Assessment / Plan:     Type 2 diabetes mellitus with diabetic neuropathy, with long-term current use of insulin  -     POCT Glucose, Hand-Held Device  -     Hemoglobin A1C; Future; Expected date: 09/16/2022  -     semaglutide (OZEMPIC) 0.25 mg or 0.5 mg(2 mg/1.5 mL) pen injector; Inject 0.25 mg into the skin every 7 days.  Dispense: 1 pen; Refill: 1  -     insulin degludec (TRESIBA FLEXTOUCH U-200) 200 unit/mL (3 mL) insulin pen; Inject 60 Units  into the skin every morning.  Dispense: 3 pen; Refill: 4    Hyperlipidemia associated with type 2 diabetes mellitus    Hypertension associated with diabetes    BMI 33.0-33.9,adult    Additional Plan Details:  - Condition:  Sub optimal control , most recent A1C of 7.9% was completed 3 months ago.   - Monitor blood glucose:  4x daily.Goals reviewed. Maintain BG log.   - Hypoglycemia protocol: Reviewed and risk discussed.  Notify if BG readings below 80. Protocol printout provided.   - Meal Planning: Limit carbohydrate intake 30-45 grams/meal, 3x daily and 15 grams/snack, limit 2/day.   - Activity level: Recommend at least 150 minutes of exercise in a week.  - BP and LDL: Recommend lifestyle modifications and follow up with PCP for management.   - Medication Regimen:   - Stop Soliqua  - Continue Metformin 1000 mg twice daily with meals  - Continue Prandin 2 mg, 2 tablets three times daily before meals  - Begin taking Ozempic 0.25 mg weekly  - Begin taking Tresiba 60 units every morning    - Medication consideration: On max dose of Soliqua, discussed with pt converting to GLP and basal insulin and pt agreeable to plan. Titrate GLP/basal to goal BG.   - Health Maintenance standards addressed today: Foot Exam - completed today and documented in physical exam with feedback to patient about proper diabetic foot care and findings and A1c to be scheduled. [facility] Eye exam  [facility] Guillermo  - Risks of uncontrolled DM explained. Importance of routine foot and eye exams reviewed. Scheduled as appropriate.  -The patient was explained the above plan and given opportunity to ask questions.  He understands, chooses and consents to this plan and accepts all the risks, which include but are not limited to the risks mentioned above.   - Labs ordered as above.  - CDE follow up: Scheduled  - Follow up: 4- 6 weeks        Charlotte T. Delacruz, FNP-C [facility]    A total of 60 minutes was spent in face to face  time, of which over 50 % was spent in counseling patient on disease process, complications, treatment, and side effects of medications.

## 2022-09-12 ENCOUNTER — OFFICE VISIT (OUTPATIENT)
Dept: DIABETES | Facility: CLINIC | Age: 57
End: 2022-09-12
Payer: COMMERCIAL

## 2022-09-12 VITALS
SYSTOLIC BLOOD PRESSURE: 130 MMHG | DIASTOLIC BLOOD PRESSURE: 75 MMHG | BODY MASS INDEX: 34.74 KG/M2 | WEIGHT: 235.25 LBS | HEART RATE: 91 BPM

## 2022-09-12 DIAGNOSIS — E11.59 HYPERTENSION ASSOCIATED WITH DIABETES: ICD-10-CM

## 2022-09-12 DIAGNOSIS — Z79.4 TYPE 2 DIABETES MELLITUS WITH DIABETIC NEUROPATHY, WITH LONG-TERM CURRENT USE OF INSULIN: Primary | ICD-10-CM

## 2022-09-12 DIAGNOSIS — E11.69 HYPERLIPIDEMIA ASSOCIATED WITH TYPE 2 DIABETES MELLITUS: ICD-10-CM

## 2022-09-12 DIAGNOSIS — E78.5 HYPERLIPIDEMIA ASSOCIATED WITH TYPE 2 DIABETES MELLITUS: ICD-10-CM

## 2022-09-12 DIAGNOSIS — E11.40 TYPE 2 DIABETES MELLITUS WITH DIABETIC NEUROPATHY, WITH LONG-TERM CURRENT USE OF INSULIN: Primary | ICD-10-CM

## 2022-09-12 DIAGNOSIS — I15.2 HYPERTENSION ASSOCIATED WITH DIABETES: ICD-10-CM

## 2022-09-12 LAB — GLUCOSE SERPL-MCNC: 198 MG/DL (ref 70–110)

## 2022-09-12 PROCEDURE — 3061F PR NEG MICROALBUMINURIA RESULT DOCUMENTED/REVIEW: ICD-10-PCS | Mod: CPTII,S$GLB,, | Performed by: NURSE PRACTITIONER

## 2022-09-12 PROCEDURE — 99999 PR PBB SHADOW E&M-EST. PATIENT-LVL V: ICD-10-PCS | Mod: PBBFAC,,, | Performed by: NURSE PRACTITIONER

## 2022-09-12 PROCEDURE — 99999 PR PBB SHADOW E&M-EST. PATIENT-LVL V: CPT | Mod: PBBFAC,,, | Performed by: NURSE PRACTITIONER

## 2022-09-12 PROCEDURE — 3061F NEG MICROALBUMINURIA REV: CPT | Mod: CPTII,S$GLB,, | Performed by: NURSE PRACTITIONER

## 2022-09-12 PROCEDURE — 1160F PR REVIEW ALL MEDS BY PRESCRIBER/CLIN PHARMACIST DOCUMENTED: ICD-10-PCS | Mod: CPTII,S$GLB,, | Performed by: NURSE PRACTITIONER

## 2022-09-12 PROCEDURE — 4010F ACE/ARB THERAPY RXD/TAKEN: CPT | Mod: CPTII,S$GLB,, | Performed by: NURSE PRACTITIONER

## 2022-09-12 PROCEDURE — 82962 GLUCOSE BLOOD TEST: CPT | Mod: S$GLB,,, | Performed by: NURSE PRACTITIONER

## 2022-09-12 PROCEDURE — 1160F RVW MEDS BY RX/DR IN RCRD: CPT | Mod: CPTII,S$GLB,, | Performed by: NURSE PRACTITIONER

## 2022-09-12 PROCEDURE — 3075F SYST BP GE 130 - 139MM HG: CPT | Mod: CPTII,S$GLB,, | Performed by: NURSE PRACTITIONER

## 2022-09-12 PROCEDURE — 3075F PR MOST RECENT SYSTOLIC BLOOD PRESS GE 130-139MM HG: ICD-10-PCS | Mod: CPTII,S$GLB,, | Performed by: NURSE PRACTITIONER

## 2022-09-12 PROCEDURE — 99205 PR OFFICE/OUTPT VISIT, NEW, LEVL V, 60-74 MIN: ICD-10-PCS | Mod: S$GLB,,, | Performed by: NURSE PRACTITIONER

## 2022-09-12 PROCEDURE — 3066F NEPHROPATHY DOC TX: CPT | Mod: CPTII,S$GLB,, | Performed by: NURSE PRACTITIONER

## 2022-09-12 PROCEDURE — 3008F PR BODY MASS INDEX (BMI) DOCUMENTED: ICD-10-PCS | Mod: CPTII,S$GLB,, | Performed by: NURSE PRACTITIONER

## 2022-09-12 PROCEDURE — 99205 OFFICE O/P NEW HI 60 MIN: CPT | Mod: S$GLB,,, | Performed by: NURSE PRACTITIONER

## 2022-09-12 PROCEDURE — 3051F PR MOST RECENT HEMOGLOBIN A1C LEVEL 7.0 - < 8.0%: ICD-10-PCS | Mod: CPTII,S$GLB,, | Performed by: NURSE PRACTITIONER

## 2022-09-12 PROCEDURE — 3066F PR DOCUMENTATION OF TREATMENT FOR NEPHROPATHY: ICD-10-PCS | Mod: CPTII,S$GLB,, | Performed by: NURSE PRACTITIONER

## 2022-09-12 PROCEDURE — 4010F PR ACE/ARB THEARPY RXD/TAKEN: ICD-10-PCS | Mod: CPTII,S$GLB,, | Performed by: NURSE PRACTITIONER

## 2022-09-12 PROCEDURE — 3008F BODY MASS INDEX DOCD: CPT | Mod: CPTII,S$GLB,, | Performed by: NURSE PRACTITIONER

## 2022-09-12 PROCEDURE — 82962 POCT GLUCOSE, HAND-HELD DEVICE: ICD-10-PCS | Mod: S$GLB,,, | Performed by: NURSE PRACTITIONER

## 2022-09-12 PROCEDURE — 3078F DIAST BP <80 MM HG: CPT | Mod: CPTII,S$GLB,, | Performed by: NURSE PRACTITIONER

## 2022-09-12 PROCEDURE — 1159F PR MEDICATION LIST DOCUMENTED IN MEDICAL RECORD: ICD-10-PCS | Mod: CPTII,S$GLB,, | Performed by: NURSE PRACTITIONER

## 2022-09-12 PROCEDURE — 1159F MED LIST DOCD IN RCRD: CPT | Mod: CPTII,S$GLB,, | Performed by: NURSE PRACTITIONER

## 2022-09-12 PROCEDURE — 3051F HG A1C>EQUAL 7.0%<8.0%: CPT | Mod: CPTII,S$GLB,, | Performed by: NURSE PRACTITIONER

## 2022-09-12 PROCEDURE — 3078F PR MOST RECENT DIASTOLIC BLOOD PRESSURE < 80 MM HG: ICD-10-PCS | Mod: CPTII,S$GLB,, | Performed by: NURSE PRACTITIONER

## 2022-09-12 RX ORDER — INSULIN DEGLUDEC 200 U/ML
60 INJECTION, SOLUTION SUBCUTANEOUS EVERY MORNING
Qty: 3 PEN | Refills: 4 | Status: SHIPPED | OUTPATIENT
Start: 2022-09-12 | End: 2023-01-30

## 2022-09-12 RX ORDER — INSULIN DEGLUDEC 200 U/ML
60 INJECTION, SOLUTION SUBCUTANEOUS NIGHTLY
Qty: 3 PEN | Refills: 4 | Status: SHIPPED | OUTPATIENT
Start: 2022-09-12 | End: 2022-09-12

## 2022-09-12 RX ORDER — SEMAGLUTIDE 1.34 MG/ML
0.25 INJECTION, SOLUTION SUBCUTANEOUS
Qty: 1 PEN | Refills: 1 | Status: SHIPPED | OUTPATIENT
Start: 2022-09-12 | End: 2022-10-28 | Stop reason: ALTCHOICE

## 2022-09-12 NOTE — PATIENT INSTRUCTIONS
Medication Regimen:   - Stop Soliqua  - Continue Metformin 1000 mg twice daily with meals  - Continue Prandin 2 mg, 2 tablets three times daily before meals  - Begin taking Ozempic 0.25 mg weekly  - Begin taking Tresiba 60 units every morning    Patient Instructions:  Carbohydrate Count: 30-45 grams/meal and 15 grams/snack with limit of 2 snacks per day.  Exercise: Goal is 150 minutes or more per week.  Bring meter and blood sugar log to each appointment.     Goals for Blood Sugar:  Fastin-130 mg/dl  2 hours after meal:  mg/dl  Before Bed: 100-150 mg/dl  If Blood sugar is below 70 mg/dl, DO NOT take diabetes medication. Eat first and then recheck blood sugar in 20 minutes.  Foods to eat if blood sugar is below 70 mg/dl  1/2 glass of orange juice   1/2 regular cola can   3-4 hard candies   3-4 small glucose tabs.   Foods to eat if blood sugar is below 50 mg/dl  1 glass of orange juice  1 regular cola can  8 hard candies  8 small glucose tabs.   If you have repeated eating/blood sugar recheck process 2 times and blood sugar still below 70 mg/dl, call 911.

## 2022-09-23 ENCOUNTER — LAB VISIT (OUTPATIENT)
Dept: LAB | Facility: HOSPITAL | Age: 57
End: 2022-09-23
Attending: FAMILY MEDICINE
Payer: COMMERCIAL

## 2022-09-23 DIAGNOSIS — E11.9 TYPE 2 DIABETES MELLITUS WITHOUT COMPLICATION: ICD-10-CM

## 2022-09-23 DIAGNOSIS — R79.89 ELEVATED LFTS: ICD-10-CM

## 2022-09-23 DIAGNOSIS — Z79.4 TYPE 2 DIABETES MELLITUS WITH DIABETIC NEUROPATHY, WITH LONG-TERM CURRENT USE OF INSULIN: ICD-10-CM

## 2022-09-23 DIAGNOSIS — E11.40 TYPE 2 DIABETES MELLITUS WITH DIABETIC NEUROPATHY, WITH LONG-TERM CURRENT USE OF INSULIN: ICD-10-CM

## 2022-09-23 LAB
ALBUMIN SERPL BCP-MCNC: 4.1 G/DL (ref 3.5–5.2)
ALP SERPL-CCNC: 76 U/L (ref 55–135)
ALT SERPL W/O P-5'-P-CCNC: 69 U/L (ref 10–44)
ANION GAP SERPL CALC-SCNC: 10 MMOL/L (ref 8–16)
AST SERPL-CCNC: 57 U/L (ref 10–40)
BILIRUB SERPL-MCNC: 0.4 MG/DL (ref 0.1–1)
BUN SERPL-MCNC: 17 MG/DL (ref 6–20)
CALCIUM SERPL-MCNC: 8.9 MG/DL (ref 8.7–10.5)
CHLORIDE SERPL-SCNC: 105 MMOL/L (ref 95–110)
CHOLEST SERPL-MCNC: 105 MG/DL (ref 120–199)
CHOLEST/HDLC SERPL: 2.6 {RATIO} (ref 2–5)
CO2 SERPL-SCNC: 24 MMOL/L (ref 23–29)
CREAT SERPL-MCNC: 0.9 MG/DL (ref 0.5–1.4)
EST. GFR  (NO RACE VARIABLE): >60 ML/MIN/1.73 M^2
ESTIMATED AVG GLUCOSE: 246 MG/DL (ref 68–131)
GLUCOSE SERPL-MCNC: 242 MG/DL (ref 70–110)
HBA1C MFR BLD: 10.2 % (ref 4–5.6)
HDLC SERPL-MCNC: 40 MG/DL (ref 40–75)
HDLC SERPL: 38.1 % (ref 20–50)
LDLC SERPL CALC-MCNC: 53.2 MG/DL (ref 63–159)
NONHDLC SERPL-MCNC: 65 MG/DL
POTASSIUM SERPL-SCNC: 4 MMOL/L (ref 3.5–5.1)
PROT SERPL-MCNC: 6.8 G/DL (ref 6–8.4)
SODIUM SERPL-SCNC: 139 MMOL/L (ref 136–145)
TRIGL SERPL-MCNC: 59 MG/DL (ref 30–150)

## 2022-09-23 PROCEDURE — 36415 COLL VENOUS BLD VENIPUNCTURE: CPT | Mod: PO | Performed by: FAMILY MEDICINE

## 2022-09-23 PROCEDURE — 80053 COMPREHEN METABOLIC PANEL: CPT | Performed by: FAMILY MEDICINE

## 2022-09-23 PROCEDURE — 80061 LIPID PANEL: CPT | Performed by: FAMILY MEDICINE

## 2022-09-23 PROCEDURE — 83036 HEMOGLOBIN GLYCOSYLATED A1C: CPT | Performed by: NURSE PRACTITIONER

## 2022-10-07 LAB
LEFT EYE DM RETINOPATHY: NEGATIVE
RIGHT EYE DM RETINOPATHY: NEGATIVE

## 2022-10-18 ENCOUNTER — PATIENT MESSAGE (OUTPATIENT)
Dept: ADMINISTRATIVE | Facility: HOSPITAL | Age: 57
End: 2022-10-18
Payer: COMMERCIAL

## 2022-10-18 ENCOUNTER — PATIENT OUTREACH (OUTPATIENT)
Dept: ADMINISTRATIVE | Facility: HOSPITAL | Age: 57
End: 2022-10-18
Payer: COMMERCIAL

## 2022-10-18 DIAGNOSIS — E11.40 TYPE 2 DIABETES MELLITUS WITH DIABETIC NEUROPATHY, WITH LONG-TERM CURRENT USE OF INSULIN: Primary | ICD-10-CM

## 2022-10-18 DIAGNOSIS — Z79.4 TYPE 2 DIABETES MELLITUS WITH DIABETIC NEUROPATHY, WITH LONG-TERM CURRENT USE OF INSULIN: Primary | ICD-10-CM

## 2022-10-21 ENCOUNTER — PATIENT OUTREACH (OUTPATIENT)
Dept: ADMINISTRATIVE | Facility: HOSPITAL | Age: 57
End: 2022-10-21
Payer: COMMERCIAL

## 2022-10-21 NOTE — LETTER
AUTHORIZATION FOR RELEASE OF   CONFIDENTIAL INFORMATION    Dear Jermaine Guillermo Jr., OD,    We are seeing Brett Garcia, date of birth 1965, in the clinic at [facility] INTER[facility]. Herb Lord MD is the patient's PCP. Brett Garcia has an outstanding lab/procedure at the time we reviewed his chart. In order to help keep his health information updated, he has authorized us to request the following medical record(s):        (  )  MAMMOGRAM                                      (  )  COLONOSCOPY      (  )  PAP SMEAR                                          (  )  OUTSIDE LAB RESULTS     (  )  DEXA SCAN                                          (x) DIABETIC EYE EXAM            (  )  FOOT EXAM                                          (  )  ENTIRE RECORD     (  )  OUTSIDE IMMUNIZATIONS                 (  )  _______________         Please fax records to [facility], Timothy M Durel, MD, [phone].     If you have any questions, please contact  Grace LR LPN   Care Coordination   [facility]  Phone [phone]      Patient Name: Brett Garcia  : 1965  N [id number]  Patient Phone #: [phone]

## 2022-10-21 NOTE — PROGRESS NOTES
Pt responded to portal outreach regarding eye exam.  Said it was completed Oct 7 at [facility] by Dr Jermaine Guillermo Jr., OD.  Faxed request for copy of report.

## 2022-10-28 ENCOUNTER — OFFICE VISIT (OUTPATIENT)
Dept: DIABETES | Facility: CLINIC | Age: 57
End: 2022-10-28
Payer: COMMERCIAL

## 2022-10-28 DIAGNOSIS — E78.5 HYPERLIPIDEMIA ASSOCIATED WITH TYPE 2 DIABETES MELLITUS: ICD-10-CM

## 2022-10-28 DIAGNOSIS — E11.40 TYPE 2 DIABETES MELLITUS WITH DIABETIC NEUROPATHY, WITH LONG-TERM CURRENT USE OF INSULIN: Primary | ICD-10-CM

## 2022-10-28 DIAGNOSIS — I15.2 HYPERTENSION ASSOCIATED WITH DIABETES: ICD-10-CM

## 2022-10-28 DIAGNOSIS — Z79.4 TYPE 2 DIABETES MELLITUS WITH DIABETIC NEUROPATHY, WITH LONG-TERM CURRENT USE OF INSULIN: Primary | ICD-10-CM

## 2022-10-28 DIAGNOSIS — E11.69 HYPERLIPIDEMIA ASSOCIATED WITH TYPE 2 DIABETES MELLITUS: ICD-10-CM

## 2022-10-28 DIAGNOSIS — E11.59 HYPERTENSION ASSOCIATED WITH DIABETES: ICD-10-CM

## 2022-10-28 PROCEDURE — 3046F PR MOST RECENT HEMOGLOBIN A1C LEVEL > 9.0%: ICD-10-PCS | Mod: CPTII,95,, | Performed by: NURSE PRACTITIONER

## 2022-10-28 PROCEDURE — 1160F RVW MEDS BY RX/DR IN RCRD: CPT | Mod: CPTII,95,, | Performed by: NURSE PRACTITIONER

## 2022-10-28 PROCEDURE — 3061F PR NEG MICROALBUMINURIA RESULT DOCUMENTED/REVIEW: ICD-10-PCS | Mod: CPTII,95,, | Performed by: NURSE PRACTITIONER

## 2022-10-28 PROCEDURE — 3066F NEPHROPATHY DOC TX: CPT | Mod: CPTII,95,, | Performed by: NURSE PRACTITIONER

## 2022-10-28 PROCEDURE — 99214 PR OFFICE/OUTPT VISIT, EST, LEVL IV, 30-39 MIN: ICD-10-PCS | Mod: 95,,, | Performed by: NURSE PRACTITIONER

## 2022-10-28 PROCEDURE — 3066F PR DOCUMENTATION OF TREATMENT FOR NEPHROPATHY: ICD-10-PCS | Mod: CPTII,95,, | Performed by: NURSE PRACTITIONER

## 2022-10-28 PROCEDURE — 1160F PR REVIEW ALL MEDS BY PRESCRIBER/CLIN PHARMACIST DOCUMENTED: ICD-10-PCS | Mod: CPTII,95,, | Performed by: NURSE PRACTITIONER

## 2022-10-28 PROCEDURE — 3061F NEG MICROALBUMINURIA REV: CPT | Mod: CPTII,95,, | Performed by: NURSE PRACTITIONER

## 2022-10-28 PROCEDURE — 99214 OFFICE O/P EST MOD 30 MIN: CPT | Mod: 95,,, | Performed by: NURSE PRACTITIONER

## 2022-10-28 PROCEDURE — 1159F MED LIST DOCD IN RCRD: CPT | Mod: CPTII,95,, | Performed by: NURSE PRACTITIONER

## 2022-10-28 PROCEDURE — 1159F PR MEDICATION LIST DOCUMENTED IN MEDICAL RECORD: ICD-10-PCS | Mod: CPTII,95,, | Performed by: NURSE PRACTITIONER

## 2022-10-28 PROCEDURE — 4010F PR ACE/ARB THEARPY RXD/TAKEN: ICD-10-PCS | Mod: CPTII,95,, | Performed by: NURSE PRACTITIONER

## 2022-10-28 PROCEDURE — 3046F HEMOGLOBIN A1C LEVEL >9.0%: CPT | Mod: CPTII,95,, | Performed by: NURSE PRACTITIONER

## 2022-10-28 PROCEDURE — 4010F ACE/ARB THERAPY RXD/TAKEN: CPT | Mod: CPTII,95,, | Performed by: NURSE PRACTITIONER

## 2022-10-28 RX ORDER — DULAGLUTIDE 0.75 MG/.5ML
0.75 INJECTION, SOLUTION SUBCUTANEOUS
Qty: 4 PEN | Refills: 2 | Status: SHIPPED | OUTPATIENT
Start: 2022-10-28 | End: 2022-12-19 | Stop reason: DRUGHIGH

## 2022-10-28 NOTE — PATIENT INSTRUCTIONS
Medication Regimen:   - Continue Metformin 1000 mg twice daily with meals  - Continue Prandin 2 mg, 2 tablets three times daily before meals  - Stop Ozempic   - Continue Tresiba 60 units every morning  - Begin taking Trulicity 0.75 mg weekly    Patient Instructions:  Carbohydrate Count: 30-45 grams/meal and 15 grams/snack with limit of 2 snacks per day.  Exercise: Goal is 150 minutes or more per week.  Bring meter and blood sugar log to each appointment.     Goals for Blood Sugar:  Fastin-130 mg/dl  2 hours after meal:  mg/dl  Before Bed: 100-150 mg/dl  If Blood sugar is below 70 mg/dl, DO NOT take diabetes medication. Eat first and then recheck blood sugar in 20 minutes.  Foods to eat if blood sugar is below 70 mg/dl  1/2 glass of orange juice   1/2 regular cola can   3-4 hard candies   3-4 small glucose tabs.   Foods to eat if blood sugar is below 50 mg/dl  1 glass of orange juice  1 regular cola can  8 hard candies  8 small glucose tabs.   If you have repeated eating/blood sugar recheck process 2 times and blood sugar still below 70 mg/dl, call 911.

## 2022-10-28 NOTE — PROGRESS NOTES
Established Patient - Virtual Telehealth Visit     The patient location is: Home ([place])  The chief complaint leading to consultation is: Diabetes Follow-up  Visit type: Virtual visit with synchronous audio and video via Epic [facility]  Total time spent with patient: 15 minutes     Each patient to whom I provide medical services by telemedicine is:  (1) informed of the relationship between the physician and patient and the respective role of any other health care provider with respect to management of the patient; and (2) notified that they may decline to receive medical services by telemedicine and may withdraw from such care at any time. Patient verbally consented to receive this service via voice-only telephone call.    PCP: Herb Lord MD    Subjective:     Chief Complaint: Diabetes follow up.  Last visit with me: 9/16/2022    History of Present Illness: 56 y.o.  male for diabetes follow up.   Previously managed by JANE Ross  Last clinic visit 8/4/2021.  Type II diabetes since 2014 .  Comorbidities: HTN and HLD    Known diabetes complications:  DKA/HHS: no  Retinopathy: yes  Peripheral neuropathy: yes, numbness and tingling bilateral feet  Nephropathy: yes    Interval history:  Since last visit, patient was on max dose of Soliqua.  He was switched to Ozempic and Tresiba.  Admits to tolerating Tresiba well.  However, reports diarrhea with low-dose Ozempic.    Denies recent hospital admissions or ER visits.   Denies having hypoglycemia.   Endorses diabetes related symptoms: Numbness and Tingling in both feet    Blood glucose monitoring: fingerstick: 2 x/day   Per patient's review of BG log , over the last 1 week   Fasting BG    AC dinner range 150-180     No BG log for review.    Activity Level: Walking 4x/day, 1400 steps/day  Meal Planning:3 meals/day;1 snacks/day.    Medication compliance all of the time, diet compliance most of the time.   Has attended diabetes education in the  past.     Previous regimen: Trulicity - cost prohibitive, Soliqua    Past failed treatment: Jardiance - yeast infection, Ozempic - diarrhea    Current DM Medications:  Diabetes Medications               insulin degludec (TRESIBA FLEXTOUCH U-200) 200 unit/mL (3 mL) insulin pen Inject 60 Units into the skin every morning.     metFORMIN (GLUCOPHAGE) 1000 MG tablet Take 1 tablet (1,000 mg total) by mouth 2 (two) times daily with meals.    repaglinide (PRANDIN) 2 MG tablet Take 2 tablets (4 mg total) by mouth 3 (three) times daily before meals.    semaglutide (OZEMPIC) 0.25 mg or 0.5 mg(2 mg/1.5 mL) pen injector Inject 0.25 mg into the skin every 7 days.     Allergies  Review of patient's allergies indicates:   Allergen Reactions    Hay fever and allergy relief        Labs Reviewed:  His most recent A1C is:  Lab Results   Component Value Date    HGBA1C 10.2 (H) 09/23/2022    HGBA1C 7.9 (H) 05/21/2022    HGBA1C 9.1 (H) 02/19/2022       His most recent BMP is:  Lab Results   Component Value Date     09/23/2022    K 4.0 09/23/2022     09/23/2022    CO2 24 09/23/2022    BUN 17 09/23/2022    CREATININE 0.9 09/23/2022    CALCIUM 8.9 09/23/2022    ANIONGAP 10 09/23/2022    ESTGFRAFRICA >60.0 02/19/2022    EGFRNONAA >60.0 02/19/2022       Lab Results   Component Value Date    CPEPTIDE 2.4 04/21/2017     Lab Results   Component Value Date    GLUTAMICACID 0.00 04/21/2017       There is no height or weight on file to calculate BMI.    Wt Readings from Last 3 Encounters:   09/12/22 1102 106.7 kg (235 lb 3.7 oz)   08/26/22 0900 104.2 kg (229 lb 11.5 oz)   07/11/22 0904 104.2 kg (229 lb 13.3 oz)        His blood sugar in clinic today is: Not assessed due to type of visit.    Diabetes Management Status  Statin: Taking  ACE/ARB: Taking    Screening or Prevention Patient's value Goal Complete/Controlled?   HgA1C Testing and Control   Lab Results   Component Value Date    HGBA1C 10.2 (H) 09/23/2022      Annually/Less than 8%  No     Lipid profile : 09/23/2022 Annually Yes     LDL control Lab Results   Component Value Date    LDLCALC 53.2 (L) 09/23/2022    Annually/Less than 100 mg/dl  Yes     Nephropathy screening Lab Results   Component Value Date    MICALBCREAT 11.2 05/21/2022     Lab Results   Component Value Date    PROTEINUA Trace (A) 06/30/2016    Annually Yes     Blood pressure BP Readings from Last 1 Encounters:   09/12/22 130/75    Less than 140/90 Yes     Dilated retinal exam : 01/12/2021 Annually Yes    Foot exam   : 09/12/2022 Annually Yes       Social History     Socioeconomic History    Marital status:     Number of children: 2   Occupational History    Occupation: Plant work      Employer: [employer]     Comment: [employer]   Tobacco Use    Smoking status: Never    Smokeless tobacco: Former   Substance and Sexual Activity    Alcohol use: Yes     Alcohol/week: 2.0 standard drinks     Types: 2 Cans of beer per week    Drug use: No    Sexual activity: Yes     Partners: Female     Past Medical History:   Diagnosis Date    Diabetes mellitus with neuropathy 2009    BS 57 am 12/18/2015    Hyperlipidemia 2009    Hypertension associated with diabetes 4/21/2017       Review of Systems   Constitutional:  Negative for activity change, appetite change, fatigue and unexpected weight change.   HENT:  Negative for dental problem and trouble swallowing.    Eyes:  Negative for visual disturbance.   Respiratory:  Negative for chest tightness and shortness of breath.    Cardiovascular:  Negative for chest pain, palpitations and leg swelling.   Gastrointestinal:  Negative for abdominal pain, constipation, diarrhea, nausea and vomiting.   Endocrine: Negative for polydipsia, polyphagia and polyuria.   Genitourinary:  Negative for difficulty urinating, dysuria, frequency and urgency.        Negative yeast infection   Musculoskeletal:  Negative for gait problem, myalgias and neck pain.   Integumentary:  Negative for rash and wound.    Allergic/Immunologic: Negative.    Neurological:  Positive for numbness (tingling BLE). Negative for dizziness, syncope, weakness and headaches.   Hematological: Negative.    Psychiatric/Behavioral:  Negative for confusion and sleep disturbance.    All other systems reviewed and are negative.     Objective:      Physical Exam  Constitutional:       General: She is awake.      Appearance: Normal appearance. She is well-developed and well-groomed.   HENT:      Head: Normocephalic and atraumatic.   Eyes:      General: Lids are normal. Gaze aligned appropriately.   Pulmonary:      Effort: Pulmonary effort is normal. No respiratory distress.   Neurological:      Mental Status: She is alert and oriented to person, place, and time.   Psychiatric:         Attention and Perception: Attention normal.         Mood and Affect: Mood and affect normal.         Speech: Speech normal.         Behavior: Behavior normal.         Thought Content: Thought content normal.         Cognition and Memory: Cognition normal.         Judgment: Judgment normal.       Assessment / Plan:     Diagnoses and all orders for this visit:    Type 2 diabetes mellitus with diabetic neuropathy, with long-term current use of insulin  -     dulaglutide (TRULICITY) 0.75 mg/0.5 mL pen injector; Inject 0.75 mg into the skin every 7 days.    Hyperlipidemia associated with type 2 diabetes mellitus    Hypertension associated with diabetes     Additional Plan Details:  - Condition: Uncontrolled, most recent A1C of 10.2% was completed 1 month ago.   - Monitor blood glucose:  4x daily.Goals reviewed. Maintain BG log.   - Hypoglycemia protocol: Reviewed and risk discussed.  Notify if BG readings below 80. Protocol printout provided.   - Meal Planning: Limit carbohydrate intake 30-45 grams/meal, 3x daily and 15 grams/snack, limit 2/day.   - Activity level: Recommend at least 150 minutes of exercise in a week.  - BP and LDL: Recommend lifestyle modifications and  follow up with PCP for management.   - Medication Regimen:   - Continue Metformin 1000 mg twice daily with meals  - Continue Prandin 2 mg, 2 tablets three times daily before meals  - Stop Ozempic   - Continue Tresiba 60 units every morning  - Begin taking Trulicity 0.75 mg weekly    - Medication consideration:  Unable to tolerate Ozempic, we will switch to Trulicity. Taken in the past, admits to having good tolerance with medication  Plan to titrate GLP/basal to goal BG.   - Health Maintenance standards addressed today:  A1c to be scheduled. [facility] Eye exam  [facility]  - Risks of uncontrolled DM explained. Importance of routine foot and eye exams reviewed. Scheduled as appropriate.  -The patient was explained the above plan and given opportunity to ask questions.  He understands, chooses and consents to this plan and accepts all the risks, which include but are not limited to the risks mentioned above.   - Labs ordered as above.  - CDE follow up: Scheduled  - Follow up: 6-8 weeks, virtual.         Ginger Franz, FNP-C [facility]    A total of 15 minutes was spent in face to face time, of which over 50 % was spent in counseling patient on disease process, complications, treatment, and side effects of medications.

## 2022-10-29 ENCOUNTER — PATIENT MESSAGE (OUTPATIENT)
Dept: ADMINISTRATIVE | Facility: HOSPITAL | Age: 57
End: 2022-10-29
Payer: COMMERCIAL

## 2022-11-25 DIAGNOSIS — G47.00 INSOMNIA, UNSPECIFIED TYPE: ICD-10-CM

## 2022-11-25 RX ORDER — AMITRIPTYLINE HYDROCHLORIDE 25 MG/1
TABLET, FILM COATED ORAL
Qty: 180 TABLET | Refills: 1 | Status: SHIPPED | OUTPATIENT
Start: 2022-11-25 | End: 2023-08-21 | Stop reason: SDUPTHER

## 2022-11-25 NOTE — TELEPHONE ENCOUNTER
Refill Decision Note   Brett Jose  is requesting a refill authorization.  Brief Assessment and Rationale for Refill:  Approve     Medication Therapy Plan:       Medication Reconciliation Completed: No   Comments:     No Care Gaps recommended.     Note composed:1:45 PM 11/25/2022

## 2022-11-25 NOTE — TELEPHONE ENCOUNTER
No new care gaps identified.  [facility] Embedded Care Gaps. Reference number: [id number]. 11/25/2022   9:25:32 AM CST

## 2022-11-28 ENCOUNTER — TELEPHONE (OUTPATIENT)
Dept: INTERNAL MEDICINE | Facility: CLINIC | Age: 57
End: 2022-11-28
Payer: COMMERCIAL

## 2022-11-28 NOTE — TELEPHONE ENCOUNTER
----- Message from Leticia August sent at 11/28/2022  2:36 PM CST -----  Contact: Brett  .Type:  Sooner Apoointment Request    Caller is requesting a sooner appointment.  Caller declined first available appointment listed below.  Caller will not accept being placed on the waitlist and is requesting a message be sent to doctor.  Name of Caller: Brett   When is the first available appointment? 2023  Symptoms:Hospital f/u  Would the patient rather a call back or a response via [facility]? Call   Best Call Back Number: .[phone]  Additional Information: Reports being instructed to see pcp within 7 days due to urgency

## 2022-11-28 NOTE — TELEPHONE ENCOUNTER
Spoke with the patient. Asking a sooner appointment for a follow up from urgent care visit. States he been told that xray test came back abnormal and need to follow up with his pcp. Appointment scheduled with PA. Patient agreed.

## 2022-11-29 ENCOUNTER — TELEPHONE (OUTPATIENT)
Dept: INTERNAL MEDICINE | Facility: CLINIC | Age: 57
End: 2022-11-29
Payer: COMMERCIAL

## 2022-11-29 ENCOUNTER — HOSPITAL ENCOUNTER (OUTPATIENT)
Dept: RADIOLOGY | Facility: HOSPITAL | Age: 57
Discharge: HOME | End: 2022-11-29
Attending: PHYSICIAN ASSISTANT
Payer: COMMERCIAL

## 2022-11-29 ENCOUNTER — OFFICE VISIT (OUTPATIENT)
Dept: INTERNAL MEDICINE | Facility: CLINIC | Age: 57
End: 2022-11-29
Payer: COMMERCIAL

## 2022-11-29 VITALS
RESPIRATION RATE: 19 BRPM | WEIGHT: 226.31 LBS | HEIGHT: 69 IN | OXYGEN SATURATION: 96 % | HEART RATE: 100 BPM | TEMPERATURE: 98 F | DIASTOLIC BLOOD PRESSURE: 74 MMHG | BODY MASS INDEX: 33.52 KG/M2 | SYSTOLIC BLOOD PRESSURE: 132 MMHG

## 2022-11-29 DIAGNOSIS — J98.6 ELEVATED HEMIDIAPHRAGM: ICD-10-CM

## 2022-11-29 DIAGNOSIS — J06.9 UPPER RESPIRATORY TRACT INFECTION, UNSPECIFIED TYPE: Primary | ICD-10-CM

## 2022-11-29 PROCEDURE — 1159F MED LIST DOCD IN RCRD: CPT | Mod: CPTII,S$GLB,, | Performed by: PHYSICIAN ASSISTANT

## 2022-11-29 PROCEDURE — 3046F PR MOST RECENT HEMOGLOBIN A1C LEVEL > 9.0%: ICD-10-PCS | Mod: CPTII,S$GLB,, | Performed by: PHYSICIAN ASSISTANT

## 2022-11-29 PROCEDURE — 71046 XR CHEST PA AND LATERAL: ICD-10-PCS | Mod: 26,,, | Performed by: RADIOLOGY

## 2022-11-29 PROCEDURE — 3008F PR BODY MASS INDEX (BMI) DOCUMENTED: ICD-10-PCS | Mod: CPTII,S$GLB,, | Performed by: PHYSICIAN ASSISTANT

## 2022-11-29 PROCEDURE — 4010F ACE/ARB THERAPY RXD/TAKEN: CPT | Mod: CPTII,S$GLB,, | Performed by: PHYSICIAN ASSISTANT

## 2022-11-29 PROCEDURE — 99999 PR PBB SHADOW E&M-EST. PATIENT-LVL V: ICD-10-PCS | Mod: PBBFAC,,, | Performed by: PHYSICIAN ASSISTANT

## 2022-11-29 PROCEDURE — 3078F DIAST BP <80 MM HG: CPT | Mod: CPTII,S$GLB,, | Performed by: PHYSICIAN ASSISTANT

## 2022-11-29 PROCEDURE — 3008F BODY MASS INDEX DOCD: CPT | Mod: CPTII,S$GLB,, | Performed by: PHYSICIAN ASSISTANT

## 2022-11-29 PROCEDURE — 4010F PR ACE/ARB THEARPY RXD/TAKEN: ICD-10-PCS | Mod: CPTII,S$GLB,, | Performed by: PHYSICIAN ASSISTANT

## 2022-11-29 PROCEDURE — 99213 OFFICE O/P EST LOW 20 MIN: CPT | Mod: S$GLB,,, | Performed by: PHYSICIAN ASSISTANT

## 2022-11-29 PROCEDURE — 3075F SYST BP GE 130 - 139MM HG: CPT | Mod: CPTII,S$GLB,, | Performed by: PHYSICIAN ASSISTANT

## 2022-11-29 PROCEDURE — 3075F PR MOST RECENT SYSTOLIC BLOOD PRESS GE 130-139MM HG: ICD-10-PCS | Mod: CPTII,S$GLB,, | Performed by: PHYSICIAN ASSISTANT

## 2022-11-29 PROCEDURE — 3078F PR MOST RECENT DIASTOLIC BLOOD PRESSURE < 80 MM HG: ICD-10-PCS | Mod: CPTII,S$GLB,, | Performed by: PHYSICIAN ASSISTANT

## 2022-11-29 PROCEDURE — 99213 PR OFFICE/OUTPT VISIT, EST, LEVL III, 20-29 MIN: ICD-10-PCS | Mod: S$GLB,,, | Performed by: PHYSICIAN ASSISTANT

## 2022-11-29 PROCEDURE — 99999 PR PBB SHADOW E&M-EST. PATIENT-LVL V: CPT | Mod: PBBFAC,,, | Performed by: PHYSICIAN ASSISTANT

## 2022-11-29 PROCEDURE — 3046F HEMOGLOBIN A1C LEVEL >9.0%: CPT | Mod: CPTII,S$GLB,, | Performed by: PHYSICIAN ASSISTANT

## 2022-11-29 PROCEDURE — 3061F PR NEG MICROALBUMINURIA RESULT DOCUMENTED/REVIEW: ICD-10-PCS | Mod: CPTII,S$GLB,, | Performed by: PHYSICIAN ASSISTANT

## 2022-11-29 PROCEDURE — 1159F PR MEDICATION LIST DOCUMENTED IN MEDICAL RECORD: ICD-10-PCS | Mod: CPTII,S$GLB,, | Performed by: PHYSICIAN ASSISTANT

## 2022-11-29 PROCEDURE — 1160F RVW MEDS BY RX/DR IN RCRD: CPT | Mod: CPTII,S$GLB,, | Performed by: PHYSICIAN ASSISTANT

## 2022-11-29 PROCEDURE — 71046 X-RAY EXAM CHEST 2 VIEWS: CPT | Mod: 26,,, | Performed by: RADIOLOGY

## 2022-11-29 PROCEDURE — 71046 X-RAY EXAM CHEST 2 VIEWS: CPT | Mod: TC

## 2022-11-29 PROCEDURE — 3066F NEPHROPATHY DOC TX: CPT | Mod: CPTII,S$GLB,, | Performed by: PHYSICIAN ASSISTANT

## 2022-11-29 PROCEDURE — 3061F NEG MICROALBUMINURIA REV: CPT | Mod: CPTII,S$GLB,, | Performed by: PHYSICIAN ASSISTANT

## 2022-11-29 PROCEDURE — 1160F PR REVIEW ALL MEDS BY PRESCRIBER/CLIN PHARMACIST DOCUMENTED: ICD-10-PCS | Mod: CPTII,S$GLB,, | Performed by: PHYSICIAN ASSISTANT

## 2022-11-29 PROCEDURE — 3066F PR DOCUMENTATION OF TREATMENT FOR NEPHROPATHY: ICD-10-PCS | Mod: CPTII,S$GLB,, | Performed by: PHYSICIAN ASSISTANT

## 2022-11-29 RX ORDER — AZITHROMYCIN 500 MG/1
500 TABLET, FILM COATED ORAL
COMMUNITY
Start: 2022-11-26 | End: 2022-12-05

## 2022-11-29 NOTE — TELEPHONE ENCOUNTER
----- Message from Gin Banegas PA-C sent at 11/29/2022  1:16 PM CST -----  Please assist with scheduling

## 2022-12-05 ENCOUNTER — OFFICE VISIT (OUTPATIENT)
Dept: PULMONOLOGY | Facility: CLINIC | Age: 57
End: 2022-12-05
Payer: COMMERCIAL

## 2022-12-05 VITALS
BODY MASS INDEX: 33.86 KG/M2 | OXYGEN SATURATION: 98 % | DIASTOLIC BLOOD PRESSURE: 82 MMHG | HEART RATE: 93 BPM | RESPIRATION RATE: 17 BRPM | SYSTOLIC BLOOD PRESSURE: 124 MMHG | HEIGHT: 69 IN | WEIGHT: 228.63 LBS

## 2022-12-05 DIAGNOSIS — J98.6 ELEVATED DIAPHRAGM: Chronic | ICD-10-CM

## 2022-12-05 DIAGNOSIS — J98.6 ELEVATED HEMIDIAPHRAGM: ICD-10-CM

## 2022-12-05 PROCEDURE — 3079F PR MOST RECENT DIASTOLIC BLOOD PRESSURE 80-89 MM HG: ICD-10-PCS | Mod: CPTII,S$GLB,, | Performed by: INTERNAL MEDICINE

## 2022-12-05 PROCEDURE — 3061F PR NEG MICROALBUMINURIA RESULT DOCUMENTED/REVIEW: ICD-10-PCS | Mod: CPTII,S$GLB,, | Performed by: INTERNAL MEDICINE

## 2022-12-05 PROCEDURE — 99203 PR OFFICE/OUTPT VISIT, NEW, LEVL III, 30-44 MIN: ICD-10-PCS | Mod: S$GLB,,, | Performed by: INTERNAL MEDICINE

## 2022-12-05 PROCEDURE — 1159F PR MEDICATION LIST DOCUMENTED IN MEDICAL RECORD: ICD-10-PCS | Mod: CPTII,S$GLB,, | Performed by: INTERNAL MEDICINE

## 2022-12-05 PROCEDURE — 1160F RVW MEDS BY RX/DR IN RCRD: CPT | Mod: CPTII,S$GLB,, | Performed by: INTERNAL MEDICINE

## 2022-12-05 PROCEDURE — 99999 PR PBB SHADOW E&M-EST. PATIENT-LVL V: ICD-10-PCS | Mod: PBBFAC,,,

## 2022-12-05 PROCEDURE — 3046F HEMOGLOBIN A1C LEVEL >9.0%: CPT | Mod: CPTII,S$GLB,, | Performed by: INTERNAL MEDICINE

## 2022-12-05 PROCEDURE — 3074F SYST BP LT 130 MM HG: CPT | Mod: CPTII,S$GLB,, | Performed by: INTERNAL MEDICINE

## 2022-12-05 PROCEDURE — 3008F PR BODY MASS INDEX (BMI) DOCUMENTED: ICD-10-PCS | Mod: CPTII,S$GLB,, | Performed by: INTERNAL MEDICINE

## 2022-12-05 PROCEDURE — 3079F DIAST BP 80-89 MM HG: CPT | Mod: CPTII,S$GLB,, | Performed by: INTERNAL MEDICINE

## 2022-12-05 PROCEDURE — 4010F ACE/ARB THERAPY RXD/TAKEN: CPT | Mod: CPTII,S$GLB,, | Performed by: INTERNAL MEDICINE

## 2022-12-05 PROCEDURE — 3066F PR DOCUMENTATION OF TREATMENT FOR NEPHROPATHY: ICD-10-PCS | Mod: CPTII,S$GLB,, | Performed by: INTERNAL MEDICINE

## 2022-12-05 PROCEDURE — 99999 PR PBB SHADOW E&M-EST. PATIENT-LVL V: CPT | Mod: PBBFAC,,,

## 2022-12-05 PROCEDURE — 4010F PR ACE/ARB THEARPY RXD/TAKEN: ICD-10-PCS | Mod: CPTII,S$GLB,, | Performed by: INTERNAL MEDICINE

## 2022-12-05 PROCEDURE — 1159F MED LIST DOCD IN RCRD: CPT | Mod: CPTII,S$GLB,, | Performed by: INTERNAL MEDICINE

## 2022-12-05 PROCEDURE — 3061F NEG MICROALBUMINURIA REV: CPT | Mod: CPTII,S$GLB,, | Performed by: INTERNAL MEDICINE

## 2022-12-05 PROCEDURE — 3066F NEPHROPATHY DOC TX: CPT | Mod: CPTII,S$GLB,, | Performed by: INTERNAL MEDICINE

## 2022-12-05 PROCEDURE — 3046F PR MOST RECENT HEMOGLOBIN A1C LEVEL > 9.0%: ICD-10-PCS | Mod: CPTII,S$GLB,, | Performed by: INTERNAL MEDICINE

## 2022-12-05 PROCEDURE — 1160F PR REVIEW ALL MEDS BY PRESCRIBER/CLIN PHARMACIST DOCUMENTED: ICD-10-PCS | Mod: CPTII,S$GLB,, | Performed by: INTERNAL MEDICINE

## 2022-12-05 PROCEDURE — 3008F BODY MASS INDEX DOCD: CPT | Mod: CPTII,S$GLB,, | Performed by: INTERNAL MEDICINE

## 2022-12-05 PROCEDURE — 99203 OFFICE O/P NEW LOW 30 MIN: CPT | Mod: S$GLB,,, | Performed by: INTERNAL MEDICINE

## 2022-12-05 PROCEDURE — 3074F PR MOST RECENT SYSTOLIC BLOOD PRESSURE < 130 MM HG: ICD-10-PCS | Mod: CPTII,S$GLB,, | Performed by: INTERNAL MEDICINE

## 2022-12-05 RX ORDER — PROMETHAZINE HYDROCHLORIDE AND DEXTROMETHORPHAN HYDROBROMIDE 6.25; 15 MG/5ML; MG/5ML
10 SYRUP ORAL EVERY 6 HOURS PRN
COMMUNITY
Start: 2022-11-28 | End: 2023-01-30

## 2022-12-05 NOTE — LETTER
December 5, 2022    Brett Garcia  [address]  [address]  [facility] [address]         The [address]  [address]  [place] [address]  Phone: [phone]  Fax: [phone] December 5, 2022     Patient: Brett Garcia   YOB: 1965   Date of Visit: 12/5/2022       To Whom It May Concern:    It is my medical opinion that Brett Garcia may return to full duty immediately with no restrictions.    If you have any questions or concerns, please don't hesitate to call.    Sincerely,        Willy Pressley MD

## 2022-12-05 NOTE — PROGRESS NOTES
Subjective:       Patient ID: Brett Garcia is a 56 y.o. male.    Chief Complaint: Elevated Hemidiaphragm    HPI  Patient is a 56-year-old male with a history of hypertension, diabetes, obesity who was evaluated recently on the 29th by primary care for symptoms of upper respiratory/sinus infection.  He was treated with Zithromax and oral decongestants and antitussives.  Symptoms have resolved completely.  As part of that evaluation he had a chest radiograph that showed elevated right hemidiaphragm.  He has no symptoms of diaphragmatic paralysis.  He specifically denies any dyspnea on exertion or at rest.  No history of chest injury or trauma.  Has a very physical strenuous job and has no difficulty climbing multiple flights of stairs or performing his duties.  He is here today for evaluation of elevated right hemidiaphragm    Past Medical History:   Diagnosis Date    Diabetes mellitus with neuropathy 2009    BS 57 am 12/18/2015    Hyperlipidemia 2009    Hypertension associated with diabetes 4/21/2017     Past Surgical History:   Procedure Laterality Date    COLONOSCOPY N/A 11/1/2018    Procedure: COLONOSCOPY;  Surgeon: Kristina Wilson MD;  Location: [facility];  Service: Endoscopy;  Laterality: N/A;    COLONOSCOPY W/ POLYPECTOMY  11/01/2018    Polyp x1, repeat 5 years; Dr. Wilson     Social History     Tobacco Use    Smoking status: Never    Smokeless tobacco: Former   Substance Use Topics    Alcohol use: Yes     Alcohol/week: 2.0 standard drinks     Types: 2 Cans of beer per week    Drug use: No     Family History   Problem Relation Age of Onset    Diabetes Father     Hypertension Father     Heart attack Father     Cataracts Father     Hypertension Mother     Hypertension Sister      Current Outpatient Medications   Medication Instructions    amitriptyline (ELAVIL) 25 MG tablet TAKE 2 TABLETS BY MOUTH EVERY EVENING    ascorbic acid, vitamin C, (VITAMIN C) 1000 MG tablet Oral, Daily    aspirin (ECOTRIN) 81 mg,  "Oral, Daily    atorvastatin (LIPITOR) 40 mg, Oral, Daily    blood sugar diagnostic Strp Test blood sugar three times daily    blood-glucose meter Misc TID. Fill with any meter preferred by formulary.    clobetasoL (TEMOVATE) 0.05 % external solution Topical (Top), 2 times daily    fluticasone propionate (FLONASE) 100 mcg, Each Nostril, Daily, Seasonal allergic rhinitis [J30.2]    lancets 30 gauge Misc  use one  3 (three) times daily.    lisinopriL (PRINIVIL,ZESTRIL) 5 MG tablet TAKE 1 TABLET BY MOUTH EVERY DAY    metFORMIN (GLUCOPHAGE) 1,000 mg, Oral, 2 times daily with meals    miconazole NITRATE 2 % (REMEDY PHYTOPLEX ANTIFUNGAL) 2 % top powder Topical (Top), As needed (PRN)    pedi multivit no.12 w-fluoride (MULTIVITAMINS-FLUORIDE-FOLIC A ORAL) Oral    pen needle, diabetic (BD ULTRA-FINE MINI PEN NEEDLE) 31 gauge x 3/16" Ndle Use once daily with insulin pen as directed    promethazine-dextromethorphan (PROMETHAZINE-DM) 6.25-15 mg/5 mL Syrp 10 mLs, Oral, Every 6 hours PRN    psyllium husk (METAMUCIL ORAL) Oral    repaglinide (PRANDIN) 4 mg, Oral, 3 times daily before meals    TRESIBA FLEXTOUCH U-200 60 Units, Subcutaneous, Every morning    TRULICITY 0.75 mg, Subcutaneous, Every 7 days    vitamin D (VITAMIN D3) 1,000 Units, Oral, Daily    zinc gluconate 50 mg, Oral, Daily       Review of Systems  as per history of present illness otherwise negative  Objective:      Physical Exam   Constitutional: He is oriented to person, place, and time. He appears well-developed and well-nourished. No distress.   HENT:   Head: Normocephalic.   Mouth/Throat: Mallampati Score: III.   Neck: No JVD present.   Cardiovascular: Normal rate and regular rhythm.   No murmur heard.  Pulmonary/Chest: Normal expansion, symmetric chest wall expansion, effort normal and breath sounds normal. He has no wheezes.   Abdominal: Soft. He exhibits no distension. There is no abdominal tenderness.   Musculoskeletal:         General: No edema.      " Cervical back: Neck supple.   Neurological: He is alert and oriented to person, place, and time.   Psychiatric: He has a normal mood and affect.   Nursing note and vitals reviewed.    Vitals:    12/05/22 0836   BP: 124/82   Pulse: 93   Resp: 17           Assessment:       1. Elevated hemidiaphragm          I personally reviewed chest radiograph images.  My interpretation is:  Clear lungs with elevated right hemidiaphragm otherwise no acute findings      Plan:       Incidentally noted elevated right hemidiaphragm, can be a normal finding.  No evidence symptomatically this suggests phrenic nerve paralysis or restrictive physiology based on this.  I cleared him to return to work.  No further imaging indicated unless symptoms develop.  He can return to see us p.r.n..

## 2022-12-14 ENCOUNTER — OFFICE VISIT (OUTPATIENT)
Dept: DIABETES | Facility: CLINIC | Age: 57
End: 2022-12-14
Payer: COMMERCIAL

## 2022-12-14 ENCOUNTER — TELEPHONE (OUTPATIENT)
Dept: DIABETES | Facility: CLINIC | Age: 57
End: 2022-12-14
Payer: COMMERCIAL

## 2022-12-14 DIAGNOSIS — Z53.21 PATIENT LEFT WITHOUT BEING SEEN: Primary | ICD-10-CM

## 2022-12-14 PROCEDURE — 3061F NEG MICROALBUMINURIA REV: CPT | Mod: CPTII,95,, | Performed by: NURSE PRACTITIONER

## 2022-12-14 PROCEDURE — 3046F PR MOST RECENT HEMOGLOBIN A1C LEVEL > 9.0%: ICD-10-PCS | Mod: CPTII,95,, | Performed by: NURSE PRACTITIONER

## 2022-12-14 PROCEDURE — 99499 UNLISTED E&M SERVICE: CPT | Mod: 95,,, | Performed by: NURSE PRACTITIONER

## 2022-12-14 PROCEDURE — 4010F PR ACE/ARB THEARPY RXD/TAKEN: ICD-10-PCS | Mod: CPTII,95,, | Performed by: NURSE PRACTITIONER

## 2022-12-14 PROCEDURE — 1159F MED LIST DOCD IN RCRD: CPT | Mod: CPTII,95,, | Performed by: NURSE PRACTITIONER

## 2022-12-14 PROCEDURE — 1160F RVW MEDS BY RX/DR IN RCRD: CPT | Mod: CPTII,95,, | Performed by: NURSE PRACTITIONER

## 2022-12-14 PROCEDURE — 1159F PR MEDICATION LIST DOCUMENTED IN MEDICAL RECORD: ICD-10-PCS | Mod: CPTII,95,, | Performed by: NURSE PRACTITIONER

## 2022-12-14 PROCEDURE — 1160F PR REVIEW ALL MEDS BY PRESCRIBER/CLIN PHARMACIST DOCUMENTED: ICD-10-PCS | Mod: CPTII,95,, | Performed by: NURSE PRACTITIONER

## 2022-12-14 PROCEDURE — 3066F NEPHROPATHY DOC TX: CPT | Mod: CPTII,95,, | Performed by: NURSE PRACTITIONER

## 2022-12-14 PROCEDURE — 3061F PR NEG MICROALBUMINURIA RESULT DOCUMENTED/REVIEW: ICD-10-PCS | Mod: CPTII,95,, | Performed by: NURSE PRACTITIONER

## 2022-12-14 PROCEDURE — 3046F HEMOGLOBIN A1C LEVEL >9.0%: CPT | Mod: CPTII,95,, | Performed by: NURSE PRACTITIONER

## 2022-12-14 PROCEDURE — 3066F PR DOCUMENTATION OF TREATMENT FOR NEPHROPATHY: ICD-10-PCS | Mod: CPTII,95,, | Performed by: NURSE PRACTITIONER

## 2022-12-14 PROCEDURE — 99499 NO LOS: ICD-10-PCS | Mod: 95,,, | Performed by: NURSE PRACTITIONER

## 2022-12-14 PROCEDURE — 4010F ACE/ARB THERAPY RXD/TAKEN: CPT | Mod: CPTII,95,, | Performed by: NURSE PRACTITIONER

## 2022-12-14 NOTE — PROGRESS NOTES
Established Patient - Virtual Telehealth Visit     The patient location is: Home ([place])  The chief complaint leading to consultation is: Diabetes Follow-up  Visit type: Virtual visit with synchronous audio and video via Epic Haiku isabela  Total time spent with patient: 20 minutes     Each patient to whom I provide medical services by telemedicine is:  (1) informed of the relationship between the physician and patient and the respective role of any other health care provider with respect to management of the patient; and (2) notified that they may decline to receive medical services by telemedicine and may withdraw from such care at any time. Patient verbally consented to receive this service via voice-only telephone call.    PCP: Herb Lord MD    Subjective:     Chief Complaint: Diabetes follow up.  Last visit with me: 10/28/2022    History of Present Illness: 56 y.o.  male for diabetes follow up.   Previously managed by JANE Ross  Last clinic visit 8/4/2021.  Type II diabetes since 2014 .  Comorbidities: HTN and HLD    Known diabetes complications:  DKA/HHS: no  Retinopathy: yes  Peripheral neuropathy: yes, numbness and tingling bilateral feet  Nephropathy: yes    Interval history:  Since last visit, switched to Trulicity from Ozempic.  Reported diarrhea with low-dose Ozempic.  Tolerating Trulicity well.    Denies recent hospital admissions or ER visits.   Denies having hypoglycemia.   Endorses diabetes related symptoms: Numbness and Tingling in both feet    Blood glucose monitoring: fingerstick: 1x/day   Per patient's review of his BG log , over the last 1 week   Fasting , 121, 131, 205, 129, 166   AC dinner 120-140'S    No BG log for review.    Activity Level: Walking 4x/day, 1400 steps/day  Meal Planning:3 meals/day;1 snacks/day.    Medication compliance all of the time, diet noncompliance most of the time.   Has attended diabetes education in the past.     Previous regimen:  Trulicity - cost prohibitive, Soliqua - on max dose    Past failed treatment: Jardiance - yeast infection, Ozempic - diarrhea    Current DM Medications:  Diabetes Medications               dulaglutide (TRULICITY) 0.75 mg/0.5 mL pen injector Inject 0.75 mg into the skin every 7 days.    insulin degludec (TRESIBA FLEXTOUCH U-200) 200 unit/mL (3 mL) insulin pen Inject 60 Units into the skin every morning.    metFORMIN (GLUCOPHAGE) 1000 MG tablet Take 1 tablet (1,000 mg total) by mouth 2 (two) times daily with meals.    repaglinide (PRANDIN) 2 MG tablet Take 2 tablets (4 mg total) by mouth 3 (three) times daily before meals.       Allergies  Review of patient's allergies indicates:   Allergen Reactions    Hay fever and allergy relief        Labs Reviewed:  His most recent A1C is:  Lab Results   Component Value Date    HGBA1C 10.2 (H) 09/23/2022    HGBA1C 7.9 (H) 05/21/2022    HGBA1C 9.1 (H) 02/19/2022       His most recent BMP is:  Lab Results   Component Value Date     09/23/2022    K 4.0 09/23/2022     09/23/2022    CO2 24 09/23/2022    BUN 17 09/23/2022    CREATININE 0.9 09/23/2022    CALCIUM 8.9 09/23/2022    ANIONGAP 10 09/23/2022    ESTGFRAFRICA >60.0 02/19/2022    EGFRNONAA >60.0 02/19/2022       Lab Results   Component Value Date    CPEPTIDE 2.4 04/21/2017     Lab Results   Component Value Date    GLUTAMICACID 0.00 04/21/2017       There is no height or weight on file to calculate BMI.    Wt Readings from Last 3 Encounters:   12/05/22 0836 103.7 kg (228 lb 9.9 oz)   11/29/22 1315 104.5 kg (230 lb 6.1 oz)   11/29/22 0923 102.7 kg (226 lb 4.8 oz)        His blood sugar in clinic today is: Not assessed due to type of visit.    Diabetes Management Status  Statin: Taking  ACE/ARB: Taking    Screening or Prevention Patient's value Goal Complete/Controlled?   HgA1C Testing and Control   Lab Results   Component Value Date    HGBA1C 10.2 (H) 09/23/2022      Annually/Less than 8% No     Lipid profile :  09/23/2022 Annually Yes     LDL control Lab Results   Component Value Date    LDLCALC 53.2 (L) 09/23/2022    Annually/Less than 100 mg/dl  Yes     Nephropathy screening Lab Results   Component Value Date    MICALBCREAT 11.2 05/21/2022     Lab Results   Component Value Date    PROTEINUA Trace (A) 06/30/2016    Annually Yes     Blood pressure BP Readings from Last 1 Encounters:   12/05/22 124/82    Less than 140/90 Yes     Dilated retinal exam : 10/07/2022 Annually Yes    Foot exam   : 09/12/2022 Annually Yes       Social History     Socioeconomic History    Marital status:     Number of children: 2   Occupational History    Occupation: Plant work      Employer: [employer]     Comment: [employer]   Tobacco Use    Smoking status: Never    Smokeless tobacco: Former   Substance and Sexual Activity    Alcohol use: Yes     Alcohol/week: 2.0 standard drinks     Types: 2 Cans of beer per week    Drug use: No    Sexual activity: Yes     Partners: Female     Past Medical History:   Diagnosis Date    Diabetes mellitus with neuropathy 2009    BS 57 am 12/18/2015    Hyperlipidemia 2009    Hypertension associated with diabetes 4/21/2017     Review of Systems   Constitutional:  Negative for activity change, appetite change, fatigue and unexpected weight change.   HENT:  Negative for dental problem and trouble swallowing.    Eyes:  Negative for visual disturbance.   Respiratory:  Negative for chest tightness and shortness of breath.    Cardiovascular:  Negative for chest pain, palpitations and leg swelling.   Gastrointestinal:  Negative for abdominal pain, constipation, diarrhea, nausea and vomiting.   Endocrine: Negative for polydipsia, polyphagia and polyuria.   Genitourinary:  Negative for difficulty urinating, dysuria, frequency and urgency.   Musculoskeletal:  Negative for gait problem, myalgias and neck pain.   Integumentary:  Negative for rash and wound.   Allergic/Immunologic: Negative.    Neurological:  Positive for  numbness (tingling BLE). Negative for dizziness, syncope, weakness and headaches.   Hematological: Negative.    Psychiatric/Behavioral:  Negative for confusion and sleep disturbance.    All other systems reviewed and are negative.     Objective:      Physical Exam  Constitutional:       General: She is awake.      Appearance: Normal appearance. She is well-developed and well-groomed.   HENT:      Head: Normocephalic and atraumatic.   Eyes:      General: Lids are normal. Gaze aligned appropriately.   Pulmonary:      Effort: Pulmonary effort is normal. No respiratory distress.   Neurological:      Mental Status: She is alert and oriented to person, place, and time.   Psychiatric:         Attention and Perception: Attention normal.         Mood and Affect: Mood and affect normal.         Speech: Speech normal.         Behavior: Behavior normal.         Thought Content: Thought content normal.         Cognition and Memory: Cognition normal.         Judgment: Judgment normal.       Assessment / Plan:     Diagnoses and all orders for this visit:    Type 2 diabetes mellitus with diabetic neuropathy, with long-term current use of insulin  -     Hemoglobin A1C; Future  -     dulaglutide (TRULICITY) 1.5 mg/0.5 mL pen injector; Inject 1.5 mg into the skin every 7 days.    Hyperlipidemia associated with type 2 diabetes mellitus    Hypertension associated with diabetes    Additional Plan Details:  - Condition: Uncontrolled, most recent A1C of 10.2% was completed 2 months ago.   - Monitor blood glucose:  4x daily.Goals reviewed. Maintain BG log.   - Hypoglycemia protocol: Reviewed and risk discussed.  Notify if BG readings below 80. Protocol printout provided.   - Meal Planning: Limit carbohydrate intake 30-45 grams/meal, 3x daily and 15 grams/snack, limit 2/day.   - Activity level: Recommend at least 150 minutes of exercise in a week.  - BP and LDL: Recommend lifestyle modifications and follow up with PCP for management.   -  Medication Regimen:   Continue Metformin 1000 mg twice daily with meals  Continue Prandin 2 mg, 2 tablets three times daily before meals   Continue Tresiba 60 units every morning  Increase Trulicity 1.5 mg weekly    - Medication consideration:  Tolerating Trulicity well, increased dose.  Titrate GLP/basal to goal BG.   - Health Maintenance standards addressed today:  A1c to be scheduled. [facility] Eye exam  [facility] Guillermo  - Risks of uncontrolled DM explained. Importance of routine foot and eye exams reviewed. Scheduled as appropriate.  -The patient was explained the above plan and given opportunity to ask questions.  He understands, chooses and consents to this plan and accepts all the risks, which include but are not limited to the risks mentioned above.   - Labs ordered as above.  - CDE follow up: Scheduled  - Follow up: 6-8 weeks, virtual.         Charlotte T. Delacruz, FNP-C [facility]    A total of 20 minutes was spent in face to face time, of which over 50 % was spent in counseling patient on disease process, complications, treatment, and side effects of medications.

## 2022-12-16 ENCOUNTER — LAB VISIT (OUTPATIENT)
Dept: LAB | Facility: HOSPITAL | Age: 57
End: 2022-12-16
Attending: FAMILY MEDICINE
Payer: COMMERCIAL

## 2022-12-16 ENCOUNTER — OFFICE VISIT (OUTPATIENT)
Dept: INTERNAL MEDICINE | Facility: CLINIC | Age: 57
End: 2022-12-16
Payer: COMMERCIAL

## 2022-12-16 VITALS
RESPIRATION RATE: 18 BRPM | HEART RATE: 87 BPM | DIASTOLIC BLOOD PRESSURE: 82 MMHG | WEIGHT: 227.31 LBS | SYSTOLIC BLOOD PRESSURE: 128 MMHG | BODY MASS INDEX: 33.67 KG/M2 | TEMPERATURE: 97 F | HEIGHT: 69 IN | OXYGEN SATURATION: 100 %

## 2022-12-16 DIAGNOSIS — Z12.5 SCREENING FOR PROSTATE CANCER: ICD-10-CM

## 2022-12-16 DIAGNOSIS — Z79.4 TYPE 2 DIABETES MELLITUS WITH DIABETIC NEUROPATHY, WITH LONG-TERM CURRENT USE OF INSULIN: ICD-10-CM

## 2022-12-16 DIAGNOSIS — R79.89 ELEVATED LFTS: Primary | ICD-10-CM

## 2022-12-16 DIAGNOSIS — E11.40 TYPE 2 DIABETES MELLITUS WITH DIABETIC NEUROPATHY, WITH LONG-TERM CURRENT USE OF INSULIN: ICD-10-CM

## 2022-12-16 DIAGNOSIS — R79.89 ELEVATED LFTS: ICD-10-CM

## 2022-12-16 LAB
ALBUMIN SERPL BCP-MCNC: 3.8 G/DL (ref 3.5–5.2)
ALP SERPL-CCNC: 74 U/L (ref 55–135)
ALT SERPL W/O P-5'-P-CCNC: 68 U/L (ref 10–44)
AST SERPL-CCNC: 63 U/L (ref 10–40)
BILIRUB DIRECT SERPL-MCNC: 0.2 MG/DL (ref 0.1–0.3)
BILIRUB SERPL-MCNC: 0.5 MG/DL (ref 0.1–1)
COMPLEXED PSA SERPL-MCNC: 1.2 NG/ML (ref 0–4)
ESTIMATED AVG GLUCOSE: 246 MG/DL (ref 68–131)
HBA1C MFR BLD: 10.2 % (ref 4–5.6)
PROT SERPL-MCNC: 7 G/DL (ref 6–8.4)

## 2022-12-16 PROCEDURE — 3061F PR NEG MICROALBUMINURIA RESULT DOCUMENTED/REVIEW: ICD-10-PCS | Mod: CPTII,S$GLB,, | Performed by: FAMILY MEDICINE

## 2022-12-16 PROCEDURE — 84153 ASSAY OF PSA TOTAL: CPT | Performed by: FAMILY MEDICINE

## 2022-12-16 PROCEDURE — 3066F NEPHROPATHY DOC TX: CPT | Mod: CPTII,S$GLB,, | Performed by: FAMILY MEDICINE

## 2022-12-16 PROCEDURE — 99396 PR PREVENTIVE VISIT,EST,40-64: ICD-10-PCS | Mod: S$GLB,,, | Performed by: FAMILY MEDICINE

## 2022-12-16 PROCEDURE — 1159F MED LIST DOCD IN RCRD: CPT | Mod: CPTII,S$GLB,, | Performed by: FAMILY MEDICINE

## 2022-12-16 PROCEDURE — 3061F NEG MICROALBUMINURIA REV: CPT | Mod: CPTII,S$GLB,, | Performed by: FAMILY MEDICINE

## 2022-12-16 PROCEDURE — 99999 PR PBB SHADOW E&M-EST. PATIENT-LVL V: ICD-10-PCS | Mod: PBBFAC,,, | Performed by: FAMILY MEDICINE

## 2022-12-16 PROCEDURE — 4010F PR ACE/ARB THEARPY RXD/TAKEN: ICD-10-PCS | Mod: CPTII,S$GLB,, | Performed by: FAMILY MEDICINE

## 2022-12-16 PROCEDURE — 3046F PR MOST RECENT HEMOGLOBIN A1C LEVEL > 9.0%: ICD-10-PCS | Mod: CPTII,S$GLB,, | Performed by: FAMILY MEDICINE

## 2022-12-16 PROCEDURE — 3046F HEMOGLOBIN A1C LEVEL >9.0%: CPT | Mod: CPTII,S$GLB,, | Performed by: FAMILY MEDICINE

## 2022-12-16 PROCEDURE — 99999 PR PBB SHADOW E&M-EST. PATIENT-LVL V: CPT | Mod: PBBFAC,,, | Performed by: FAMILY MEDICINE

## 2022-12-16 PROCEDURE — 36415 COLL VENOUS BLD VENIPUNCTURE: CPT | Performed by: FAMILY MEDICINE

## 2022-12-16 PROCEDURE — 3079F PR MOST RECENT DIASTOLIC BLOOD PRESSURE 80-89 MM HG: ICD-10-PCS | Mod: CPTII,S$GLB,, | Performed by: FAMILY MEDICINE

## 2022-12-16 PROCEDURE — 1159F PR MEDICATION LIST DOCUMENTED IN MEDICAL RECORD: ICD-10-PCS | Mod: CPTII,S$GLB,, | Performed by: FAMILY MEDICINE

## 2022-12-16 PROCEDURE — 3008F PR BODY MASS INDEX (BMI) DOCUMENTED: ICD-10-PCS | Mod: CPTII,S$GLB,, | Performed by: FAMILY MEDICINE

## 2022-12-16 PROCEDURE — 83036 HEMOGLOBIN GLYCOSYLATED A1C: CPT | Performed by: FAMILY MEDICINE

## 2022-12-16 PROCEDURE — 3008F BODY MASS INDEX DOCD: CPT | Mod: CPTII,S$GLB,, | Performed by: FAMILY MEDICINE

## 2022-12-16 PROCEDURE — 4010F ACE/ARB THERAPY RXD/TAKEN: CPT | Mod: CPTII,S$GLB,, | Performed by: FAMILY MEDICINE

## 2022-12-16 PROCEDURE — 3066F PR DOCUMENTATION OF TREATMENT FOR NEPHROPATHY: ICD-10-PCS | Mod: CPTII,S$GLB,, | Performed by: FAMILY MEDICINE

## 2022-12-16 PROCEDURE — 99396 PREV VISIT EST AGE 40-64: CPT | Mod: S$GLB,,, | Performed by: FAMILY MEDICINE

## 2022-12-16 PROCEDURE — 80076 HEPATIC FUNCTION PANEL: CPT | Performed by: FAMILY MEDICINE

## 2022-12-16 PROCEDURE — 3074F SYST BP LT 130 MM HG: CPT | Mod: CPTII,S$GLB,, | Performed by: FAMILY MEDICINE

## 2022-12-16 PROCEDURE — 3079F DIAST BP 80-89 MM HG: CPT | Mod: CPTII,S$GLB,, | Performed by: FAMILY MEDICINE

## 2022-12-16 PROCEDURE — 3074F PR MOST RECENT SYSTOLIC BLOOD PRESSURE < 130 MM HG: ICD-10-PCS | Mod: CPTII,S$GLB,, | Performed by: FAMILY MEDICINE

## 2022-12-16 NOTE — PROGRESS NOTES
Subjective:       Patient ID: Brett Garcia is a 56 y.o. male.    Chief Complaint: Annual Exam    HPI    Patient Active Problem List   Diagnosis    Hyperlipidemia    BMI 33.0-33.9,adult    Hypertension associated with diabetes    Skin lesions    Diabetic polyneuropathy associated with type 2 diabetes mellitus    Stress reaction    Hyperlipidemia associated with type 2 diabetes mellitus    Type 2 diabetes mellitus with diabetic neuropathy, with long-term current use of insulin    Elevated diaphragm       Past Medical History:   Diagnosis Date    Diabetes mellitus with neuropathy 2009    BS 57 am 12/18/2015    Hyperlipidemia 2009    Hypertension associated with diabetes 4/21/2017       Past Surgical History:   Procedure Laterality Date    COLONOSCOPY N/A 11/1/2018    Procedure: COLONOSCOPY;  Surgeon: Kristina Wilson MD;  Location: [facility];  Service: Endoscopy;  Laterality: N/A;    COLONOSCOPY W/ POLYPECTOMY  11/01/2018    Polyp x1, repeat 5 years; Dr. Wilson       Family History   Problem Relation Age of Onset    Diabetes Father     Hypertension Father     Heart attack Father     Cataracts Father     Hypertension Mother     Hypertension Sister        Social History     Tobacco Use   Smoking Status Never   Smokeless Tobacco Former       Wt Readings from Last 5 Encounters:   12/16/22 103.1 kg (227 lb 4.7 oz)   12/05/22 103.7 kg (228 lb 9.9 oz)   11/29/22 104.5 kg (230 lb 6.1 oz)   11/29/22 102.7 kg (226 lb 4.8 oz)   09/12/22 106.7 kg (235 lb 3.7 oz)       For further HPI details, see assessment and plan.    Review of Systems    Objective:      Vitals:    12/16/22 0940   BP: 128/82   Pulse: 87   Resp: 18   Temp: 97.3 °F (36.3 °C)       Physical Exam  Constitutional:       General: He is not in acute distress.     Appearance: Normal appearance. He is well-developed.   Neck:      Trachea: Trachea normal.   Cardiovascular:      Heart sounds: Normal heart sounds.   Pulmonary:      Effort: Pulmonary effort is normal. No  respiratory distress.      Breath sounds: Normal breath sounds. No decreased breath sounds.   Musculoskeletal:      Cervical back: Full passive range of motion without pain.   Neurological:      Mental Status: He is alert and oriented to person, place, and time.   Psychiatric:         Speech: Speech normal.         Behavior: Behavior normal.         Thought Content: Thought content normal.       Assessment:       1. Elevated LFTs    2. Screening for prostate cancer        Plan:   Elevated LFTs  -     Hepatic Function Panel; Future; Expected date: 12/16/2022    Screening for prostate cancer  -     PSA, SCREENING; Future; Expected date: 12/16/2022          Annual  Diabetes not controlled  Working w DM mngt  Recent change to trulicity  Reports was doing quite well with it - but he got sick and had elevation in glucose readings during that time.    Doing an a1c later today  LDL at goal  Adding hepatic function   This note was verbally dictated, please excuse any type errors.    BMI 33  Aware needs to cut portions    Walking a lot / doing a lot of stairs.    He has been on trulicity for 5 weeks  If not getting better can consider mounjaro    Abnormal cxr  Elevated diaphram - r side  I want to see if he can get old cxr from work to see if chronic  Saw pulm - no active concern and I agree - it is howeve quite remarkable  No SOB  Uncertain - but doubt clinically significant or concerning.    6m

## 2022-12-19 ENCOUNTER — OFFICE VISIT (OUTPATIENT)
Dept: DIABETES | Facility: CLINIC | Age: 57
End: 2022-12-19
Payer: COMMERCIAL

## 2022-12-19 DIAGNOSIS — E11.59 HYPERTENSION ASSOCIATED WITH DIABETES: ICD-10-CM

## 2022-12-19 DIAGNOSIS — E11.40 TYPE 2 DIABETES MELLITUS WITH DIABETIC NEUROPATHY, WITH LONG-TERM CURRENT USE OF INSULIN: Primary | ICD-10-CM

## 2022-12-19 DIAGNOSIS — E11.69 HYPERLIPIDEMIA ASSOCIATED WITH TYPE 2 DIABETES MELLITUS: ICD-10-CM

## 2022-12-19 DIAGNOSIS — Z79.4 TYPE 2 DIABETES MELLITUS WITH DIABETIC NEUROPATHY, WITH LONG-TERM CURRENT USE OF INSULIN: Primary | ICD-10-CM

## 2022-12-19 DIAGNOSIS — E78.5 HYPERLIPIDEMIA ASSOCIATED WITH TYPE 2 DIABETES MELLITUS: ICD-10-CM

## 2022-12-19 DIAGNOSIS — I15.2 HYPERTENSION ASSOCIATED WITH DIABETES: ICD-10-CM

## 2022-12-19 PROCEDURE — 3061F NEG MICROALBUMINURIA REV: CPT | Mod: CPTII,95,, | Performed by: NURSE PRACTITIONER

## 2022-12-19 PROCEDURE — 3061F PR NEG MICROALBUMINURIA RESULT DOCUMENTED/REVIEW: ICD-10-PCS | Mod: CPTII,95,, | Performed by: NURSE PRACTITIONER

## 2022-12-19 PROCEDURE — 3066F PR DOCUMENTATION OF TREATMENT FOR NEPHROPATHY: ICD-10-PCS | Mod: CPTII,95,, | Performed by: NURSE PRACTITIONER

## 2022-12-19 PROCEDURE — 99214 OFFICE O/P EST MOD 30 MIN: CPT | Mod: 95,,, | Performed by: NURSE PRACTITIONER

## 2022-12-19 PROCEDURE — 3046F HEMOGLOBIN A1C LEVEL >9.0%: CPT | Mod: CPTII,95,, | Performed by: NURSE PRACTITIONER

## 2022-12-19 PROCEDURE — 99214 PR OFFICE/OUTPT VISIT, EST, LEVL IV, 30-39 MIN: ICD-10-PCS | Mod: 95,,, | Performed by: NURSE PRACTITIONER

## 2022-12-19 PROCEDURE — 4010F ACE/ARB THERAPY RXD/TAKEN: CPT | Mod: CPTII,95,, | Performed by: NURSE PRACTITIONER

## 2022-12-19 PROCEDURE — 4010F PR ACE/ARB THEARPY RXD/TAKEN: ICD-10-PCS | Mod: CPTII,95,, | Performed by: NURSE PRACTITIONER

## 2022-12-19 PROCEDURE — 3066F NEPHROPATHY DOC TX: CPT | Mod: CPTII,95,, | Performed by: NURSE PRACTITIONER

## 2022-12-19 PROCEDURE — 1159F PR MEDICATION LIST DOCUMENTED IN MEDICAL RECORD: ICD-10-PCS | Mod: CPTII,95,, | Performed by: NURSE PRACTITIONER

## 2022-12-19 PROCEDURE — 3046F PR MOST RECENT HEMOGLOBIN A1C LEVEL > 9.0%: ICD-10-PCS | Mod: CPTII,95,, | Performed by: NURSE PRACTITIONER

## 2022-12-19 PROCEDURE — 1160F RVW MEDS BY RX/DR IN RCRD: CPT | Mod: CPTII,95,, | Performed by: NURSE PRACTITIONER

## 2022-12-19 PROCEDURE — 1159F MED LIST DOCD IN RCRD: CPT | Mod: CPTII,95,, | Performed by: NURSE PRACTITIONER

## 2022-12-19 PROCEDURE — 1160F PR REVIEW ALL MEDS BY PRESCRIBER/CLIN PHARMACIST DOCUMENTED: ICD-10-PCS | Mod: CPTII,95,, | Performed by: NURSE PRACTITIONER

## 2022-12-19 RX ORDER — DULAGLUTIDE 1.5 MG/.5ML
1.5 INJECTION, SOLUTION SUBCUTANEOUS
Qty: 4 PEN | Refills: 3 | Status: SHIPPED | OUTPATIENT
Start: 2022-12-19 | End: 2023-01-23

## 2022-12-19 NOTE — PATIENT INSTRUCTIONS
Medication Regimen:   Continue Metformin 1000 mg twice daily with meals  Continue Prandin 2 mg, 2 tablets three times daily before meals   Continue Tresiba 60 units every morning  Increase Trulicity 1.5 mg weekly    Patient Instructions:  Carbohydrate Count: 30-45 grams/meal and 15 grams/snack with limit of 2 snacks per day.  Exercise: Goal is 150 minutes or more per week.  Bring meter and blood sugar log to each appointment.     Goals for Blood Sugar:  Fastin-130 mg/dl  2 hours after meal:  mg/dl  Before Bed: 100-150 mg/dl  If Blood sugar is below 70 mg/dl, DO NOT take diabetes medication. Eat first and then recheck blood sugar in 20 minutes.  Foods to eat if blood sugar is below 70 mg/dl  1/2 glass of orange juice   1/2 regular cola can   3-4 hard candies   3-4 small glucose tabs.   Foods to eat if blood sugar is below 50 mg/dl  1 glass of orange juice  1 regular cola can  8 hard candies  8 small glucose tabs.   If you have repeated eating/blood sugar recheck process 2 times and blood sugar still below 70 mg/dl, call 911.

## 2023-01-19 ENCOUNTER — LAB VISIT (OUTPATIENT)
Dept: LAB | Facility: HOSPITAL | Age: 58
End: 2023-01-19
Attending: NURSE PRACTITIONER
Payer: COMMERCIAL

## 2023-01-19 DIAGNOSIS — E11.40 TYPE 2 DIABETES MELLITUS WITH DIABETIC NEUROPATHY, WITH LONG-TERM CURRENT USE OF INSULIN: ICD-10-CM

## 2023-01-19 DIAGNOSIS — Z79.4 TYPE 2 DIABETES MELLITUS WITH DIABETIC NEUROPATHY, WITH LONG-TERM CURRENT USE OF INSULIN: ICD-10-CM

## 2023-01-19 PROCEDURE — 83036 HEMOGLOBIN GLYCOSYLATED A1C: CPT | Performed by: NURSE PRACTITIONER

## 2023-01-19 PROCEDURE — 36415 COLL VENOUS BLD VENIPUNCTURE: CPT | Performed by: NURSE PRACTITIONER

## 2023-01-20 LAB
ESTIMATED AVG GLUCOSE: 223 MG/DL (ref 68–131)
HBA1C MFR BLD: 9.4 % (ref 4–5.6)

## 2023-01-23 DIAGNOSIS — Z79.4 TYPE 2 DIABETES MELLITUS WITH DIABETIC NEUROPATHY, WITH LONG-TERM CURRENT USE OF INSULIN: ICD-10-CM

## 2023-01-23 DIAGNOSIS — E11.40 TYPE 2 DIABETES MELLITUS WITH DIABETIC NEUROPATHY, WITH LONG-TERM CURRENT USE OF INSULIN: ICD-10-CM

## 2023-01-29 ENCOUNTER — PATIENT MESSAGE (OUTPATIENT)
Dept: INTERNAL MEDICINE | Facility: CLINIC | Age: 58
End: 2023-01-29
Payer: COMMERCIAL

## 2023-01-29 ENCOUNTER — PATIENT MESSAGE (OUTPATIENT)
Dept: DIABETES | Facility: CLINIC | Age: 58
End: 2023-01-29
Payer: COMMERCIAL

## 2023-01-30 ENCOUNTER — PATIENT MESSAGE (OUTPATIENT)
Dept: INTERNAL MEDICINE | Facility: CLINIC | Age: 58
End: 2023-01-30
Payer: COMMERCIAL

## 2023-02-02 ENCOUNTER — PATIENT MESSAGE (OUTPATIENT)
Dept: INTERNAL MEDICINE | Facility: CLINIC | Age: 58
End: 2023-02-02
Payer: COMMERCIAL

## 2023-02-06 ENCOUNTER — OFFICE VISIT (OUTPATIENT)
Dept: DIABETES | Facility: CLINIC | Age: 58
End: 2023-02-06
Payer: COMMERCIAL

## 2023-02-06 DIAGNOSIS — E11.40 TYPE 2 DIABETES MELLITUS WITH DIABETIC NEUROPATHY, WITH LONG-TERM CURRENT USE OF INSULIN: Primary | ICD-10-CM

## 2023-02-06 DIAGNOSIS — Z79.4 TYPE 2 DIABETES MELLITUS WITH DIABETIC NEUROPATHY, WITH LONG-TERM CURRENT USE OF INSULIN: Primary | ICD-10-CM

## 2023-02-06 DIAGNOSIS — I15.2 HYPERTENSION ASSOCIATED WITH DIABETES: ICD-10-CM

## 2023-02-06 DIAGNOSIS — E11.69 HYPERLIPIDEMIA ASSOCIATED WITH TYPE 2 DIABETES MELLITUS: ICD-10-CM

## 2023-02-06 DIAGNOSIS — E11.59 HYPERTENSION ASSOCIATED WITH DIABETES: ICD-10-CM

## 2023-02-06 DIAGNOSIS — E78.5 HYPERLIPIDEMIA ASSOCIATED WITH TYPE 2 DIABETES MELLITUS: ICD-10-CM

## 2023-02-06 PROCEDURE — 3046F HEMOGLOBIN A1C LEVEL >9.0%: CPT | Mod: CPTII,95,, | Performed by: NURSE PRACTITIONER

## 2023-02-06 PROCEDURE — 1159F PR MEDICATION LIST DOCUMENTED IN MEDICAL RECORD: ICD-10-PCS | Mod: CPTII,95,, | Performed by: NURSE PRACTITIONER

## 2023-02-06 PROCEDURE — 1160F PR REVIEW ALL MEDS BY PRESCRIBER/CLIN PHARMACIST DOCUMENTED: ICD-10-PCS | Mod: CPTII,95,, | Performed by: NURSE PRACTITIONER

## 2023-02-06 PROCEDURE — 99214 PR OFFICE/OUTPT VISIT, EST, LEVL IV, 30-39 MIN: ICD-10-PCS | Mod: 95,,, | Performed by: NURSE PRACTITIONER

## 2023-02-06 PROCEDURE — 99214 OFFICE O/P EST MOD 30 MIN: CPT | Mod: 95,,, | Performed by: NURSE PRACTITIONER

## 2023-02-06 PROCEDURE — 3046F PR MOST RECENT HEMOGLOBIN A1C LEVEL > 9.0%: ICD-10-PCS | Mod: CPTII,95,, | Performed by: NURSE PRACTITIONER

## 2023-02-06 PROCEDURE — 1159F MED LIST DOCD IN RCRD: CPT | Mod: CPTII,95,, | Performed by: NURSE PRACTITIONER

## 2023-02-06 PROCEDURE — 1160F RVW MEDS BY RX/DR IN RCRD: CPT | Mod: CPTII,95,, | Performed by: NURSE PRACTITIONER

## 2023-02-06 RX ORDER — DULAGLUTIDE 3 MG/.5ML
3 INJECTION, SOLUTION SUBCUTANEOUS
Qty: 4 PEN | Refills: 5 | Status: SHIPPED | OUTPATIENT
Start: 2023-02-06 | End: 2023-05-26 | Stop reason: DRUGHIGH

## 2023-02-06 RX ORDER — INSULIN DEGLUDEC 200 U/ML
45 INJECTION, SOLUTION SUBCUTANEOUS NIGHTLY
Qty: 3 PEN | Refills: 5
Start: 2023-02-06 | End: 2023-03-31

## 2023-02-06 RX ORDER — REPAGLINIDE 2 MG/1
2 TABLET ORAL
Qty: 270 TABLET | Refills: 1
Start: 2023-02-06 | End: 2023-03-31

## 2023-02-06 NOTE — PROGRESS NOTES
Established Patient - Virtual Telehealth Visit     The patient location is: Home ([place])  The chief complaint leading to consultation is: Diabetes Follow-up  Visit type: Virtual visit with synchronous audio and video via Epic Haiku isabela  Total time spent with patient: 15 minutes     Each patient to whom I provide medical services by telemedicine is:  (1) informed of the relationship between the physician and patient and the respective role of any other health care provider with respect to management of the patient; and (2) notified that they may decline to receive medical services by telemedicine and may withdraw from such care at any time. Patient verbally consented to receive this service via voice-only telephone call.    PCP: Herb Lord MD    Subjective:     Chief Complaint: Diabetes follow up.  Last visit with me: 12/19/2022    History of Present Illness:   57 y.o.  male for diabetes follow up.   Previously managed by JANE Ross  Last clinic visit 8/4/2021.  Type II diabetes since 2014 .  Comorbidities: HTN and HLD    Known diabetes complications:  DKA/HHS: no  Retinopathy: yes  Peripheral neuropathy: yes, numbness and tingling bilateral feet  Nephropathy: yes    Interval history:  Since last visit, switched to Trulicity from Ozempic.  Tolerating Trulicity well.  Admits to have self-adjusted Tresiba and Prandin dose to keep fasting BG between .    Denies recent hospital admissions or ER visits.   Denies hypoglycemia.  Endorses diabetes related symptoms: Numbness and Tingling in both feet    Blood glucose monitoring: fingerstick: 1x/day   Per patient's review of his BG log , over the last 4 weeks   Fasting BG    AC dinner 120-140'S    No BG log for review.    Activity Level: Walking 4x/day, 1400 steps/day  Meal Planning:3 meals/day;1 snacks/day.    Medication compliance all of the time, diet noncompliance some of the time.   Has attended diabetes education in the past.      Previous regimen: Trulicity - cost prohibitive, Soliqua - on max dose    Past failed treatment: Jardiance - yeast infection, Ozempic - diarrhea      Current DM Medications:  Diabetes Medications               dulaglutide (TRULICITY) 1.5 mg/0.5 mL pen injector Inject 1.5 mg into the skin every 7 days.    insulin degludec (TRESIBA FLEXTOUCH U-200) 200 unit/mL (3 mL) insulin pen INJECT 60 UNITS INTO THE SKIN EVERY EVENING.  Self- adjusted, taking 50 units    metFORMIN (GLUCOPHAGE) 1000 MG tablet Take 1 tablet (1,000 mg total) by mouth 2 (two) times daily with meals.    repaglinide (PRANDIN) 2 MG tablet Take 2 tablets (4 mg total) by mouth 3 (three) times daily before meals.  Self- adjusted, Taking 1 tablet tid ac meals       Allergies  Review of patient's allergies indicates:   Allergen Reactions    Hay fever and allergy relief        Labs Reviewed:  His most recent A1C is:  Lab Results   Component Value Date    HGBA1C 9.4 (H) 01/19/2023    HGBA1C 10.2 (H) 12/16/2022    HGBA1C 10.2 (H) 09/23/2022       His most recent BMP is:  Lab Results   Component Value Date     09/23/2022    K 4.0 09/23/2022     09/23/2022    CO2 24 09/23/2022    BUN 17 09/23/2022    CREATININE 0.9 09/23/2022    CALCIUM 8.9 09/23/2022    ANIONGAP 10 09/23/2022    ESTGFRAFRICA >60.0 02/19/2022    EGFRNONAA >60.0 02/19/2022       Lab Results   Component Value Date    CPEPTIDE 2.4 04/21/2017     Lab Results   Component Value Date    GLUTAMICACID 0.00 04/21/2017       There is no height or weight on file to calculate BMI.    Wt Readings from Last 3 Encounters:   12/16/22 0940 103.1 kg (227 lb 4.7 oz)   12/05/22 0836 103.7 kg (228 lb 9.9 oz)   11/29/22 1315 104.5 kg (230 lb 6.1 oz)        His blood sugar in clinic today is: Not assessed due to type of visit.    Diabetes Management Status  Statin: Taking  ACE/ARB: Taking    Screening or Prevention Patient's value Goal Complete/Controlled?   HgA1C Testing and Control   Lab Results    Component Value Date    HGBA1C 9.4 (H) 01/19/2023      Annually/Less than 8% No     Lipid profile : 09/23/2022 Annually Yes     LDL control Lab Results   Component Value Date    LDLCALC 53.2 (L) 09/23/2022    Annually/Less than 100 mg/dl  Yes     Nephropathy screening Lab Results   Component Value Date    MICALBCREAT 11.2 05/21/2022     Lab Results   Component Value Date    PROTEINUA Trace (A) 06/30/2016    Annually Yes     Blood pressure BP Readings from Last 1 Encounters:   12/16/22 128/82    Less than 140/90 Yes     Dilated retinal exam : 10/07/2022 Annually Yes    Foot exam   : 09/12/2022 Annually Yes       Social History     Socioeconomic History    Marital status:     Number of children: 2   Occupational History    Occupation: Plant work      Employer: [employer]     Comment: [employer]   Tobacco Use    Smoking status: Never    Smokeless tobacco: Former   Substance and Sexual Activity    Alcohol use: Yes     Alcohol/week: 2.0 standard drinks     Types: 2 Cans of beer per week    Drug use: No    Sexual activity: Yes     Partners: Female     Past Medical History:   Diagnosis Date    Diabetes mellitus with neuropathy 2009    BS 57 am 12/18/2015    Hyperlipidemia 2009    Hypertension associated with diabetes 4/21/2017     Review of Systems   Constitutional:  Negative for activity change, appetite change, fatigue and unexpected weight change.   HENT:  Negative for dental problem and trouble swallowing.    Eyes:  Negative for visual disturbance.   Respiratory:  Negative for chest tightness and shortness of breath.    Cardiovascular:  Negative for chest pain, palpitations and leg swelling.   Gastrointestinal:  Negative for abdominal pain, constipation, diarrhea, nausea and vomiting.   Endocrine: Negative for polydipsia, polyphagia and polyuria.   Genitourinary:  Negative for difficulty urinating, dysuria, frequency and urgency.   Musculoskeletal:  Negative for gait problem, myalgias and neck pain.    Integumentary:  Negative for rash and wound.   Allergic/Immunologic: Negative.    Neurological:  Positive for numbness (tingling BLE). Negative for dizziness, syncope, weakness and headaches.   Hematological: Negative.    Psychiatric/Behavioral:  Negative for confusion and sleep disturbance.    All other systems reviewed and are negative.     Objective:      Physical Exam  Constitutional:       General: She is awake.      Appearance: Normal appearance. She is well-developed and well-groomed.   HENT:      Head: Normocephalic and atraumatic.   Eyes:      General: Lids are normal. Gaze aligned appropriately.   Pulmonary:      Effort: Pulmonary effort is normal. No respiratory distress.   Neurological:      Mental Status: She is alert and oriented to person, place, and time.   Psychiatric:         Attention and Perception: Attention normal.         Mood and Affect: Mood and affect normal.         Speech: Speech normal.         Behavior: Behavior normal.         Thought Content: Thought content normal.         Cognition and Memory: Cognition normal.         Judgment: Judgment normal.       Assessment / Plan:     Diagnoses and all orders for this visit:    Type 2 diabetes mellitus with diabetic neuropathy, with long-term current use of insulin  -     dulaglutide (TRULICITY) 3 mg/0.5 mL pen injector; Inject 3 mg into the skin every 7 days.  -     Hemoglobin A1C; Future  -     insulin degludec (TRESIBA FLEXTOUCH U-200) 200 unit/mL (3 mL) insulin pen; Inject 46 Units into the skin every evening.    Hyperlipidemia associated with type 2 diabetes mellitus    Hypertension associated with diabetes    Other orders  -     repaglinide (PRANDIN) 2 MG tablet; Take 1 tablet (2 mg total) by mouth 3 (three) times daily before meals.    Additional Plan Details:  - Condition: Uncontrolled, most recent A1C of 9.4% was completed 2 weeks ago.   - Monitor blood glucose:  4x daily.Goals reviewed. Maintain BG log.   - Hypoglycemia protocol:  Reviewed and risk discussed.  Notify if BG readings below 80. Protocol printout provided.   - Meal Planning: Limit carbohydrate intake 30-45 grams/meal, 3x daily and 15 grams/snack, limit 2/day.   - Activity level: Recommend at least 150 minutes of exercise in a week.  - BP and LDL: Recommend lifestyle modifications and follow up with PCP for management.   - Medication Regimen:     Continue Metformin 1000 mg twice daily with meals  Decrease Prandin 2 mg, 1 tablets three times daily before meals   Decrease Tresiba 45 units every morning  Increase Trulicity 3 mg weekly    - Medication consideration: Titrate GLP/basal to goal BG.   - Health Maintenance standards addressed today:  A1c to be scheduled. [facility] Eye exam  [facility]  - Risks of uncontrolled DM explained. Importance of routine foot and eye exams reviewed. Scheduled as appropriate.  -The patient was explained the above plan and given opportunity to ask questions.  He understands, chooses and consents to this plan and accepts all the risks, which include but are not limited to the risks mentioned above.   - Labs ordered as above.  - CDE follow up: Scheduled  - Follow up: 8 weeks, virtual.         Charlotte T. Delacruz, FNP-C [facility]    A total of 15 minutes was spent in face to face time, of which over 50 % was spent in counseling patient on disease process, complications, treatment, and side effects of medications.

## 2023-02-06 NOTE — PATIENT INSTRUCTIONS
Medication Regimen:   Continue Metformin 1000 mg twice daily with meals  Decrease Prandin 2 mg, 1 tablets three times daily before meals   Decrease Tresiba 45 units every morning  Increase Trulicity 3 mg weekly    Patient Instructions:  Carbohydrate Count: 30-45 grams/meal and 15 grams/snack with limit of 2 snacks per day.  Exercise: Goal is 150 minutes or more per week.  Bring meter and blood sugar log to each appointment.     Goals for Blood Sugar:  Fastin-130 mg/dl  2 hours after meal:  mg/dl  Before Bed: 100-150 mg/dl  If Blood sugar is below 70 mg/dl, DO NOT take diabetes medication. Eat first and then recheck blood sugar in 20 minutes.  Foods to eat if blood sugar is below 70 mg/dl  1/2 glass of orange juice   1/2 regular cola can   3-4 hard candies   3-4 small glucose tabs.   Foods to eat if blood sugar is below 50 mg/dl  1 glass of orange juice  1 regular cola can  8 hard candies  8 small glucose tabs.   If you have repeated eating/blood sugar recheck process 2 times and blood sugar still below 70 mg/dl, call 911.

## 2023-02-16 ENCOUNTER — TELEPHONE (OUTPATIENT)
Dept: DIABETES | Facility: CLINIC | Age: 58
End: 2023-02-16
Payer: COMMERCIAL

## 2023-03-31 ENCOUNTER — OFFICE VISIT (OUTPATIENT)
Dept: DIABETES | Facility: CLINIC | Age: 58
End: 2023-03-31
Payer: COMMERCIAL

## 2023-03-31 ENCOUNTER — PATIENT MESSAGE (OUTPATIENT)
Dept: DIABETES | Facility: CLINIC | Age: 58
End: 2023-03-31

## 2023-03-31 DIAGNOSIS — Z79.4 TYPE 2 DIABETES MELLITUS WITH DIABETIC NEUROPATHY, WITH LONG-TERM CURRENT USE OF INSULIN: Primary | ICD-10-CM

## 2023-03-31 DIAGNOSIS — E11.69 HYPERLIPIDEMIA ASSOCIATED WITH TYPE 2 DIABETES MELLITUS: ICD-10-CM

## 2023-03-31 DIAGNOSIS — E11.40 TYPE 2 DIABETES MELLITUS WITH DIABETIC NEUROPATHY, WITH LONG-TERM CURRENT USE OF INSULIN: Primary | ICD-10-CM

## 2023-03-31 DIAGNOSIS — E11.59 HYPERTENSION ASSOCIATED WITH DIABETES: ICD-10-CM

## 2023-03-31 DIAGNOSIS — I15.2 HYPERTENSION ASSOCIATED WITH DIABETES: ICD-10-CM

## 2023-03-31 DIAGNOSIS — E78.5 HYPERLIPIDEMIA ASSOCIATED WITH TYPE 2 DIABETES MELLITUS: ICD-10-CM

## 2023-03-31 PROCEDURE — 1159F MED LIST DOCD IN RCRD: CPT | Mod: CPTII,95,, | Performed by: NURSE PRACTITIONER

## 2023-03-31 PROCEDURE — 1160F PR REVIEW ALL MEDS BY PRESCRIBER/CLIN PHARMACIST DOCUMENTED: ICD-10-PCS | Mod: CPTII,95,, | Performed by: NURSE PRACTITIONER

## 2023-03-31 PROCEDURE — 1159F PR MEDICATION LIST DOCUMENTED IN MEDICAL RECORD: ICD-10-PCS | Mod: CPTII,95,, | Performed by: NURSE PRACTITIONER

## 2023-03-31 PROCEDURE — 99214 PR OFFICE/OUTPT VISIT, EST, LEVL IV, 30-39 MIN: ICD-10-PCS | Mod: 95,,, | Performed by: NURSE PRACTITIONER

## 2023-03-31 PROCEDURE — 4010F ACE/ARB THERAPY RXD/TAKEN: CPT | Mod: CPTII,95,, | Performed by: NURSE PRACTITIONER

## 2023-03-31 PROCEDURE — 1160F RVW MEDS BY RX/DR IN RCRD: CPT | Mod: CPTII,95,, | Performed by: NURSE PRACTITIONER

## 2023-03-31 PROCEDURE — 4010F PR ACE/ARB THEARPY RXD/TAKEN: ICD-10-PCS | Mod: CPTII,95,, | Performed by: NURSE PRACTITIONER

## 2023-03-31 PROCEDURE — 3046F HEMOGLOBIN A1C LEVEL >9.0%: CPT | Mod: CPTII,95,, | Performed by: NURSE PRACTITIONER

## 2023-03-31 PROCEDURE — 3046F PR MOST RECENT HEMOGLOBIN A1C LEVEL > 9.0%: ICD-10-PCS | Mod: CPTII,95,, | Performed by: NURSE PRACTITIONER

## 2023-03-31 PROCEDURE — 99214 OFFICE O/P EST MOD 30 MIN: CPT | Mod: 95,,, | Performed by: NURSE PRACTITIONER

## 2023-03-31 RX ORDER — INSULIN DEGLUDEC 200 U/ML
50 INJECTION, SOLUTION SUBCUTANEOUS NIGHTLY
Qty: 3 PEN | Refills: 5
Start: 2023-03-31 | End: 2023-05-26

## 2023-03-31 RX ORDER — BLOOD SUGAR DIAGNOSTIC
STRIP MISCELLANEOUS
Qty: 100 STRIP | Refills: 11 | Status: SHIPPED | OUTPATIENT
Start: 2023-03-31 | End: 2023-07-28 | Stop reason: ALTCHOICE

## 2023-03-31 NOTE — PROGRESS NOTES
Established Patient - Virtual Telehealth Visit     The patient location is: Home ([place])  The chief complaint leading to consultation is: Diabetes Follow-up  Visit type: Virtual visit with synchronous audio and video via Epic [facility] is[facility]a  Total time spent with patient: 18 minutes     Each patient to whom I provide medical services by telemedicine is:  (1) informed of the relationship between the physician and patient and the respective role of any other health care provider with respect to management of the patient; and (2) notified that they may decline to receive medical services by telemedicine and may withdraw from such care at any time. Patient verbally consented to receive this service via voice-only telephone call.    PCP: Herb Lord MD    Subjective:     Chief Complaint: Diabetes follow up.  Last visit (virtual) with me: 2/6/2023    History of Present Illness:   57 y.o.  male for diabetes follow up.   Previously managed by JANE Ross  Last clinic visit 8/4/2021.  Type II diabetes since 2014 .  Comorbidities: HTN and HLD    Known diabetes complications:  DKA/HHS: no  Retinopathy: yes  Peripheral neuropathy: yes, numbness and tingling bilateral feet  Nephropathy: yes    Interval history:  Since last visit, increased Trulicity dose.  Tolerating well.    Denies recent hospital admissions or ER visits.   Denies hypoglycemia.  Endorses diabetes related symptoms: Numbness and Tingling in both feet    Blood glucose monitoring: fingerstick: 1x/day   Per patient's recall, over the last 2 weeks   Fasting 's   AC dinner 150's    No BG log for review.    Activity Level: Walking 4x/day, 1400 steps/day  Meal Planning:3 meals/day;1 snacks/day.    Medication compliance all of the time, diet noncompliance some of the time.   Has attended diabetes education in the past.     Previous regimen: Trulicity - cost prohibitive, Soliqua - on max dose    Past failed treatment: Jardiance - yeast infection,  Ozempic - diarrhea    Current DM Medications:  Diabetes Medications               dulaglutide (TRULICITY) 3 mg/0.5 mL pen injector Inject 3 mg into the skin every 7 days.  Taken 3 doses    insulin degludec (TRESIBA FLEXTOUCH U-200) 200 unit/mL (3 mL) insulin pen Inject 46 Units into the skin every evening.  Taking 50 units    metFORMIN (GLUCOPHAGE) 1000 MG tablet Take 1 tablet (1,000 mg total) by mouth 2 (two) times daily with meals.    repaglinide (PRANDIN) 2 MG tablet Take 1 tablet (2 mg total) by mouth 3 (three) times daily before meals.     Allergies  Review of patient's allergies indicates:   Allergen Reactions    Hay fever and allergy relief        Labs Reviewed:  His most recent A1C is:  Lab Results   Component Value Date    HGBA1C 9.4 (H) 01/19/2023    HGBA1C 10.2 (H) 12/16/2022    HGBA1C 10.2 (H) 09/23/2022       His most recent BMP is:  Lab Results   Component Value Date     09/23/2022    K 4.0 09/23/2022     09/23/2022    CO2 24 09/23/2022    BUN 17 09/23/2022    CREATININE 0.9 09/23/2022    CALCIUM 8.9 09/23/2022    ANIONGAP 10 09/23/2022    ESTGFRAFRICA >60.0 02/19/2022    EGFRNONAA >60.0 02/19/2022       Lab Results   Component Value Date    CPEPTIDE 2.4 04/21/2017     Lab Results   Component Value Date    GLUTAMICACID 0.00 04/21/2017       There is no height or weight on file to calculate BMI.    Wt Readings from Last 3 Encounters:   12/16/22 0940 103.1 kg (227 lb 4.7 oz)   12/05/22 0836 103.7 kg (228 lb 9.9 oz)   11/29/22 1315 104.5 kg (230 lb 6.1 oz)        His blood sugar in clinic today is: Not assessed due to type of visit.    Diabetes Management Status  Statin: Taking  ACE/ARB: Taking    Screening or Prevention Patient's value Goal Complete/Controlled?   HgA1C Testing and Control   Lab Results   Component Value Date    HGBA1C 9.4 (H) 01/19/2023      Annually/Less than 8% No     Lipid profile : 09/23/2022 Annually Yes     LDL control Lab Results   Component Value Date    LDLCALC 53.2  (L) 09/23/2022    Annually/Less than 100 mg/dl  Yes     Nephropathy screening Lab Results   Component Value Date    MICALBCREAT 11.2 05/21/2022     Lab Results   Component Value Date    PROTEINUA Trace (A) 06/30/2016    Annually Yes     Blood pressure BP Readings from Last 1 Encounters:   12/16/22 128/82    Less than 140/90 Yes     Dilated retinal exam : 10/07/2022 Annually Yes    Foot exam   : 09/12/2022 Annually Yes       Social History     Socioeconomic History    Marital status:     Number of children: 2   Occupational History    Occupation: Plant work      Employer: [employer]     Comment: [employer]   Tobacco Use    Smoking status: Never    Smokeless tobacco: Former   Substance and Sexual Activity    Alcohol use: Yes     Alcohol/week: 2.0 standard drinks     Types: 2 Cans of beer per week    Drug use: No    Sexual activity: Yes     Partners: Female     Past Medical History:   Diagnosis Date    Diabetes mellitus with neuropathy 2009    BS 57 am 12/18/2015    Hyperlipidemia 2009    Hypertension associated with diabetes 4/21/2017     Review of Systems   Constitutional:  Negative for activity change, appetite change, fatigue and unexpected weight change.   HENT:  Negative for dental problem and trouble swallowing.    Eyes:  Negative for visual disturbance.   Respiratory:  Negative for chest tightness and shortness of breath.    Cardiovascular:  Negative for chest pain, palpitations and leg swelling.   Gastrointestinal:  Negative for abdominal pain, constipation, diarrhea, nausea and vomiting.   Endocrine: Negative for polydipsia, polyphagia and polyuria.   Genitourinary:  Negative for difficulty urinating, dysuria, frequency and urgency.   Musculoskeletal:  Negative for gait problem, myalgias and neck pain.   Integumentary:  Negative for rash and wound.   Allergic/Immunologic: Negative.    Neurological:  Positive for numbness (tingling BLE). Negative for dizziness, syncope, weakness and headaches.    Hematological: Negative.    Psychiatric/Behavioral:  Negative for confusion and sleep disturbance.    All other systems reviewed and are negative.     Objective:      Physical Exam  Constitutional:       General: She is awake.      Appearance: Normal appearance. She is well-developed and well-groomed.   HENT:      Head: Normocephalic and atraumatic.   Eyes:      General: Lids are normal. Gaze aligned appropriately.   Pulmonary:      Effort: Pulmonary effort is normal. No respiratory distress.   Neurological:      Mental Status: She is alert and oriented to person, place, and time.   Psychiatric:         Attention and Perception: Attention normal.         Mood and Affect: Mood and affect normal.         Speech: Speech normal.         Behavior: Behavior normal.         Thought Content: Thought content normal.         Cognition and Memory: Cognition normal.         Judgment: Judgment normal.       Assessment / Plan:     Diagnoses and all orders for this visit:    Type 2 diabetes mellitus with diabetic neuropathy, with long-term current use of insulin  -     insulin degludec (TRESIBA FLEXTOUCH U-200) 200 unit/mL (3 mL) insulin pen; Inject 50 Units into the skin every evening.    Hyperlipidemia associated with type 2 diabetes mellitus    Hypertension associated with diabetes    Additional Plan Details:  - Condition: Uncontrolled, most recent A1C of 9.4% was completed 2 months ago.   - Monitor blood glucose:  4x daily.Goals reviewed. Maintain BG log.   - Hypoglycemia protocol: Reviewed and risk discussed.  Notify if BG readings below 80. Protocol printout provided.   - Meal Planning: Limit carbohydrate intake 30-45 grams/meal, 3x daily and 15 grams/snack, limit 2/day.   - Activity level: Recommend at least 150 minutes of exercise in a week.  - BP and LDL: Recommend lifestyle modifications and follow up with PCP for management.   - Medication Regimen:     Continue Metformin 1000 mg twice daily with meals  Continue  Prandin 2 mg, 1 tablets three times daily before meals   Continue Tresiba 50 units every morning  Continue Trulicity 3 mg weekly    - Medication consideration: Continue regimen. Titrate GLP/basal to goal BG.   - Health Maintenance standards addressed today:  A1c scheduled. [facility] exam  [facility]  - Risks of uncontrolled DM explained. Importance of routine foot and eye exams reviewed. Scheduled as appropriate.  -The patient was explained the above plan and given opportunity to ask questions.  He understands, chooses and consents to this plan and accepts all the risks, which include but are not limited to the risks mentioned above.   - Labs ordered as above.  - CDE follow up: Scheduled  - Follow up: 8 weeks, virtual.         Charlotte T. Delacruz, FNP-C [facility]    A total of 18 minutes was spent in face to face time, of which over 50 % was spent in counseling patient on disease process, complications, treatment, and side effects of medications.

## 2023-03-31 NOTE — PATIENT INSTRUCTIONS
Medication Regimen:   Continue Metformin 1000 mg twice daily with meals  Continue Prandin 2 mg, 1 tablets three times daily before meals   Continue Tresiba 50 units every morning  Continue Trulicity 3 mg weekly    Patient Instructions:  Carbohydrate Count: 30-45 grams/meal and 15 grams/snack with limit of 2 snacks per day.  Exercise: Goal is 150 minutes or more per week.  Bring meter and blood sugar log to each appointment.     Goals for Blood Sugar:  Fastin-130 mg/dl  2 hours after meal:  mg/dl  Before Bed: 100-150 mg/dl  If Blood sugar is below 70 mg/dl, DO NOT take diabetes medication. Eat first and then recheck blood sugar in 20 minutes.  Foods to eat if blood sugar is below 70 mg/dl  1/2 glass of orange juice   1/2 regular cola can   3-4 hard candies   3-4 small glucose tabs.   Foods to eat if blood sugar is below 50 mg/dl  1 glass of orange juice  1 regular cola can  8 hard candies  8 small glucose tabs.   If you have repeated eating/blood sugar recheck process 2 times and blood sugar still below 70 mg/dl, call 911.

## 2023-04-14 ENCOUNTER — CLINICAL SUPPORT (OUTPATIENT)
Dept: DIABETES | Facility: CLINIC | Age: 58
End: 2023-04-14
Payer: COMMERCIAL

## 2023-04-14 VITALS — WEIGHT: 232.81 LBS | BODY MASS INDEX: 34.38 KG/M2

## 2023-04-14 DIAGNOSIS — Z79.4 TYPE 2 DIABETES MELLITUS WITH DIABETIC NEUROPATHY, WITH LONG-TERM CURRENT USE OF INSULIN: ICD-10-CM

## 2023-04-14 DIAGNOSIS — E11.40 TYPE 2 DIABETES MELLITUS WITH DIABETIC NEUROPATHY, WITH LONG-TERM CURRENT USE OF INSULIN: ICD-10-CM

## 2023-04-14 PROCEDURE — 99999 PR PBB SHADOW E&M-EST. PATIENT-LVL III: CPT | Mod: PBBFAC,,,

## 2023-04-14 PROCEDURE — G0108 DIAB MANAGE TRN  PER INDIV: HCPCS | Mod: S$GLB,,,

## 2023-04-14 PROCEDURE — 99999 PR PBB SHADOW E&M-EST. PATIENT-LVL III: ICD-10-PCS | Mod: PBBFAC,,,

## 2023-04-14 PROCEDURE — G0108 PR DIAB MANAGE TRN  PER INDIV: ICD-10-PCS | Mod: S$GLB,,,

## 2023-04-14 NOTE — PROGRESS NOTES
Diabetes Care Specialist Progress Note  Author: Delia Lora RN  Date: 4/14/2023    Program Intake  Reason for Diabetes Program Visit:: Initial Diabetes Assessment  Current diabetes risk level:: high  In the last 12 months, have you:: none  Permission to speak with others about care:: yes    Lab Results   Component Value Date    HGBA1C 9.4 (H) 01/19/2023     DM INTRO  Weight: 105.6 kg (232 lb 12.9 oz)       Body mass index is 34.38 kg/m².    CURRENT MEDS  Trulicity 3 mg weekly  Tresiba 50 units daily  Metformin 1,000 mg BID  Prandin 2 mg TID before meals      Clinical    Patient Health Rating  Compared to other people your age, how would you rate your health?: Excellent    Problem Review  Reviewed Problem List with Patient: yes  Active comorbidities affecting diabetes self-care.: yes  Comorbidities: Hypertension  Reviewed health maintenance: yes    Clinical Assessment  Current Diabetes Treatment: Oral Medication  Have you ever experienced hypoglycemia (low blood sugar)?: yes  In the last month, how often have you experienced low blood sugar?:  (Has been rare recently)  Are you able to tell when your blood sugar is low?: Yes  What symptoms do you experience?: Shaky  Have you ever been hospitalized because your blood sugar was too low?: no  How do you treat hypoglycemia (low blood sugar)?: 1/2 can soda/fruit juice  Have you ever experienced hyperglycemia (high blood sugar)?: yes  In the last month, how often have you experienced high blood sugar?: more than once a week  Are you able to tell when your blood sugar is high?: No (comment)  Have you ever been hospitalized because your blood sugar was high?: no    Medication Information  How do you obtain your medications?: Patient drives  How many days a week do you miss your medications?: 2 (Misses lunch dose of Prandin)  Do you sometimes have difficulty refilling your medications?: No  Medication adherence impacting ability to self-manage diabetes?: No    Labs  Do  you have regular lab work to monitor your medications?: Yes  Type of Regular Lab Work: A1c, Cholesterol, Microalbumin, CBC, BMP  Where do you get your labs drawn?: [facility]  Lab Compliance Barriers: No    Nutritional Status  Diet: Regular  Meal Plan 24 Hour Recall: Breakfast, Lunch, Dinner, Snack  Meal Plan 24 Hour Recall - Breakfast: 2 pieces of toast or bagel @ 5; 2 boiled eggs at 6:30 am; Water  Meal Plan 24 Hour Recall - Lunch: Chicken or turkey sandwich, 1 oz of prezels; Unsweet Tea  Meal Plan 24 Hour Recall - Dinner: 2 baked pork chops, small potato, & salad with SF dressing; 2 lemon drop martinis  Meal Plan 24 Hour Recall - Snack: 12 shrimp, 1 potato, small chunk of corn  Change in appetite?: Yes (Slight decrease with Trulicity)  Dentation:: Intact  Recent Changes in Weight: Weight Gain  Current nutritional status an area of need that is impacting patient's ability to self-manage diabetes?: No    Additional Social History    Support  Does anyone support you with your diabetes care?: yes  Who supports you?: significant other, self  Who takes you to your medical appointments?: self  Does the current support meet the patient's needs?: Yes  Is Support an area impacting ability to self-manage diabetes?: No    Access to Mass Media & Technology  Does the patient have access to any of the following devices or technologies?: Smart phone  Media or technology needs impacting ability to self-manage diabetes?: No    Cognitive/Behavioral Health  Alert and Oriented: Yes  Difficulty Thinking: No  Requires Prompting: No  Requires assistance for routine expression?: No  Cognitive or behavioral barriers impacting ability to self-manage diabetes?: No    Culture/Anabaptist  Culture or Lutheran beliefs that may impact ability to access healthcare: No    Communication  Language preference: English  Hearing Problems: No  Vision Problems: No  Communication needs impacting ability to self-manage diabetes?: No    Health  Literacy  Preferred Learning Method: Face to Face, Reading Materials  How often do you need to have someone help you read instructions, pamphlets, or written material from your doctor or pharmacy?: Never  Health literacy needs impacting ability to self-manage diabetes?: No      Diabetes Self-Management Skills Assessment    Diabetes Disease Process/Treatment Options  Patient/caregiver able to state what happens when someone has diabetes.: somewhat  Patient/caregiver knows what type of diabetes they have.: yes  Diabetes Type : Type II  Patient/caregiver able to identify at least three signs and symptoms of diabetes.: yes  Patient able to identify at least three risk factors for diabetes.: yes  Diabetes Disease Process/Treatment Options: Skills Assessment Completed: Yes  Assessment indicates:: Adequate understanding  Area of need?: No    Nutrition/Healthy Eating  Challenges to healthy eating:: snacking between meals and at night  Method of carbohydrate measurement:: carb counting/reading labels, portion plate, measuring cups/spoons  Patient can identify foods that impact blood sugar.: yes  Patient-identified foods:: starchy vegetables (corn, peas, beans), starches (bread, pasta, rice, cereal), fruit/fruit juice, sweets  Nutrition/Healthy Eating Skills Assessment Completed:: Yes  Assessment indicates:: Knowledge deficit  Area of need?: Yes    Physical Activity/Exercise  Patient's daily activity level:: constantly moving  Patient formally exercises outside of work.: no  Reasons for not exercising:: work schedule  Patient can identify forms of physical activity.: yes  Stated forms of physical activity:: any movement performed by muscles that uses energy, manual activity at work, moving to burn calories  Patient can identify reasons why exercise/physical activity is important in diabetes management.: yes  Identified reasons:: helps insulin work better, improves blood circulation, lowers blood glucose, blood pressure, and  cholesterol, strengthens heart, muscles, and bones, tones muscles, relieves stress, lowers risk of heart disease and stroke  Physical Activity/Exercise Skills Assessment Completed: : Yes  Assessment indicates:: Adequate understanding  Area of need?: No    Medications  Patient is able to describe current diabetes management routine.: yes  Diabetes management routine:: diet, injectable medications, insulin, oral medications  Patient is able to identify current diabetes medications, dosages, and appropriate timing of medications.: yes  Patient understands the purpose of the medications taken for diabetes.: yes  Patient reports problems or concerns with current medication regimen.: no  Medication Skills Assessment Completed:: Yes  Assessment indicates:: Knowledge deficit, Instruction Needed  Area of need?: Yes    Home Blood Glucose Monitoring  Patient states that blood sugar is checked at home daily.: yes  Monitoring Method:: home glucometer  How often do you check your blood sugar?: 3 times a day  When do you check your blood sugar?: Before breakfast, Before dinner, 2 hours after meal  When you check what is your typical blood sugar range? : Fastins-160s; Before dinner 160s-170s; 2 hours after dinner: 230s  Blood glucose logs:: no, encouraged to keep logs, encouraged to bring logs to provider visits  Blood glucose logs reviewed today?: no  Home Blood Glucose Monitoring Skills Assessment Completed: : Yes  Assessment indicates:: Knowledge deficit, Instruction Needed  Area of need?: Yes    Acute Complications  Patient is able to identify types of acute complications: Yes  Patient Identified:: Hypoglycemia  Patient is able to state the basic meaning of hypoglycemia?: Yes  Able to state the blood sugar range for hypoglycemia?: yes  Patient stated range:: <70  Patient can identify general symptoms of hypoglycemia: yes  Patient identified:: dizziness, shakiness  Able to state proper treatment of hypoglycemia?:  yes  Patient identified:: 1/2 can soda/fruit juice  Acute Complications Skills Assessment Completed: : Yes  Assessment indicates:: Adequate understanding  Area of need?: No    Chronic Complications  Patient can identify major chronic complications of diabetes.: yes  Stated chronic complications:: heart disease/heart attack, neuropathy/nerve damage, stroke  Patient can identify ways to prevent or delay diabetes complications.: yes  Stated ways to prevent complications:: avoiding smoking and other types of tobacco, controlling blood sugar, controlling cholesterol and triglycerides, healthy eating and regular activity, maintaining optimal blood glucose control  Patient is aware that having diabetes increases risk of heart disease?: Yes  Patient is aware that heart disease is the leading cause of death and disability in people with diabetes?: Yes  Patient able to state risk factors for heart disease?: Yes  Patient stated risk factors for heart disease:: High blood pressure, Limited activity, Medication non-adherance, Having diabetes  Patient is taking statin?: Yes  Chronic Complications Skills Assessment Completed: : Yes  Assessment indicates:: Adequate understanding  Area of need?: No    Psychosocial/Coping  Patient can identify ways of coping with chronic disease.: yes  Patient-stated ways of coping with chronic disease:: support from loved ones  Psychosocial/Coping Skills Assessment Completed: : Yes  Assessment indicates:: Adequate understanding  Area of need?: No    Assessment Summary and Plan    Based on today's diabetes care assessment, the following areas of need were identified:      Social 4/14/2023   Support No   Access to Mass Media/Tech No   Cognitive/Behavioral Health No   Culture/Mu-ism No   Communication No   Health Literacy No        Clinical 4/14/2023   Medication Adherence No   Lab Compliance No   Nutritional Status No        Diabetes Self-Management Skills 4/14/2023   Diabetes Disease  Process/Treatment Options No     Nutrition/Healthy Eating Yes-See care plan.     Physical Activity/Exercise No-Walks 10-15k steps per day at work.      Medication Yes-See care plan.     Home Blood Glucose Monitoring Yes-Educated to continue monitoring blood sugars and re-educated fasting goals of  mg/dL & 2 hours PP <180 mg/dL.      Acute Complications No     Chronic Complications No     Psychosocial/Coping No            Today's interventions were provided through individual discussion, instruction, and written materials were provided.      Patient verbalized understanding of instruction and written materials.  Pt was able to return back demonstration of instructions today. Patient understood key points, needs reinforcement and further instruction.     Diabetes Self-Management Care Plan:    Today's Diabetes Self-Management Care Plan was developed with Brett's input. Brett has agreed to work toward the following goal(s) to improve his/her overall diabetes control.      Care Plan: Diabetes Management   Updates made since 3/15/2023 12:00 AM        Problem: Healthy Eating         Goal: Eat 3 meals daily with 45-60g of carbohydrates per meal and 15g per snack.    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   -Patient only eating about 30g of carbs at dinner. Encouraged adding another 1 serving of carbohydrates to dinner to prevent post-meal hunger and subsequent nighttime snaking.   -Provided patient with 25 snack ideas from www.diabetesfoodhub.org & encouraged him and his significant other to use the website for other diabetes-friendly recipes.   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     7/11/22: Pt states protein drink made him gain too much weight;  recommended protein bars instead. Pt is adhering to mostly 45 grams of carbs/meal but remains hungry in the evening after dinner. Will sometimes add a few extra carbs at dinner but notices blood sugar increases to 250 2 hours after meal. Pt reports he is adhering to diet and medication recommendations, may benefit from a change in medication. Will schedule with OSVALDO Miles for medication management.     4/14/23: Patient has been adhering to balanced meals and staying with carb recommendations except for in the evening. When he gets back from work, he is very hungry, eats dinner, then continues to graze. Educated on eating a balanced dinner, increasing carb intake slightly, if necessary then waiting 1-2 hours after dinner and have a well-balanced snack. Also educated on eating a snack at 3 pm (peanut butter crackers, cheese and crackers, protein bar) while at work which may help him feel less hungry at dinner time.        Problem: Medications         Goal: Patient agrees to take Prandin as prescribed.    Start Date: 4/14/2023   Expected End Date: 4/14/2024   Priority: Medium   Barriers: No Barriers Identified   Note:    Educated on importance of taking lunch dose of Prandin.  He states he will set an alarm on his phone to help him remember.          Follow Up Plan     Follow up in about 8 weeks (around 6/9/2023).    Today's care plan and follow up schedule was discussed with patient.  Brett verbalized understanding of the care plan, goals, and agrees to follow up plan.        The patient was encouraged to communicate with his/her health care provider/physician and care team regarding his/her condition(s) and treatment.  I provided the patient with my contact information today and encouraged to contact me via phone or [facility] Patient Portal as needed.     Length of Visit   Total Time: 60 Minutes

## 2023-05-01 ENCOUNTER — PATIENT OUTREACH (OUTPATIENT)
Dept: ADMINISTRATIVE | Facility: HOSPITAL | Age: 58
End: 2023-05-01
Payer: COMMERCIAL

## 2023-05-05 ENCOUNTER — LAB VISIT (OUTPATIENT)
Dept: LAB | Facility: HOSPITAL | Age: 58
End: 2023-05-05
Attending: NURSE PRACTITIONER
Payer: COMMERCIAL

## 2023-05-05 DIAGNOSIS — E11.40 TYPE 2 DIABETES MELLITUS WITH DIABETIC NEUROPATHY, WITH LONG-TERM CURRENT USE OF INSULIN: ICD-10-CM

## 2023-05-05 DIAGNOSIS — Z79.4 TYPE 2 DIABETES MELLITUS WITH DIABETIC NEUROPATHY, WITH LONG-TERM CURRENT USE OF INSULIN: ICD-10-CM

## 2023-05-05 LAB
ESTIMATED AVG GLUCOSE: 192 MG/DL (ref 68–131)
HBA1C MFR BLD: 8.3 % (ref 4–5.6)

## 2023-05-05 PROCEDURE — 36415 COLL VENOUS BLD VENIPUNCTURE: CPT | Mod: PO | Performed by: NURSE PRACTITIONER

## 2023-05-05 PROCEDURE — 83036 HEMOGLOBIN GLYCOSYLATED A1C: CPT | Performed by: NURSE PRACTITIONER

## 2023-05-12 ENCOUNTER — HOSPITAL ENCOUNTER (OUTPATIENT)
Dept: RADIOLOGY | Facility: HOSPITAL | Age: 58
Discharge: HOME | End: 2023-05-12
Attending: NURSE PRACTITIONER
Payer: COMMERCIAL

## 2023-05-12 ENCOUNTER — PATIENT MESSAGE (OUTPATIENT)
Dept: INTERNAL MEDICINE | Facility: CLINIC | Age: 58
End: 2023-05-12
Payer: COMMERCIAL

## 2023-05-12 ENCOUNTER — OFFICE VISIT (OUTPATIENT)
Dept: INTERNAL MEDICINE | Facility: CLINIC | Age: 58
End: 2023-05-12
Payer: COMMERCIAL

## 2023-05-12 VITALS
SYSTOLIC BLOOD PRESSURE: 122 MMHG | HEIGHT: 69 IN | HEART RATE: 92 BPM | DIASTOLIC BLOOD PRESSURE: 82 MMHG | WEIGHT: 228.38 LBS | OXYGEN SATURATION: 98 % | RESPIRATION RATE: 18 BRPM | BODY MASS INDEX: 33.83 KG/M2 | TEMPERATURE: 97 F

## 2023-05-12 DIAGNOSIS — M54.50 ACUTE MIDLINE LOW BACK PAIN WITHOUT SCIATICA: ICD-10-CM

## 2023-05-12 DIAGNOSIS — M25.562 ACUTE PAIN OF LEFT KNEE: ICD-10-CM

## 2023-05-12 DIAGNOSIS — M54.50 ACUTE MIDLINE LOW BACK PAIN WITHOUT SCIATICA: Primary | ICD-10-CM

## 2023-05-12 DIAGNOSIS — M25.462 KNEE EFFUSION, LEFT: Primary | ICD-10-CM

## 2023-05-12 PROCEDURE — 1159F MED LIST DOCD IN RCRD: CPT | Mod: CPTII,S$GLB,, | Performed by: NURSE PRACTITIONER

## 2023-05-12 PROCEDURE — 99999 PR PBB SHADOW E&M-EST. PATIENT-LVL V: CPT | Mod: PBBFAC,,, | Performed by: NURSE PRACTITIONER

## 2023-05-12 PROCEDURE — 3052F HG A1C>EQUAL 8.0%<EQUAL 9.0%: CPT | Mod: CPTII,S$GLB,, | Performed by: NURSE PRACTITIONER

## 2023-05-12 PROCEDURE — 73560 XR KNEE ORTHO LEFT: ICD-10-PCS | Mod: 26,RT,, | Performed by: RADIOLOGY

## 2023-05-12 PROCEDURE — 3079F DIAST BP 80-89 MM HG: CPT | Mod: CPTII,S$GLB,, | Performed by: NURSE PRACTITIONER

## 2023-05-12 PROCEDURE — 73562 XR KNEE ORTHO LEFT: ICD-10-PCS | Mod: 26,LT,, | Performed by: RADIOLOGY

## 2023-05-12 PROCEDURE — 1160F PR REVIEW ALL MEDS BY PRESCRIBER/CLIN PHARMACIST DOCUMENTED: ICD-10-PCS | Mod: CPTII,S$GLB,, | Performed by: NURSE PRACTITIONER

## 2023-05-12 PROCEDURE — 3074F PR MOST RECENT SYSTOLIC BLOOD PRESSURE < 130 MM HG: ICD-10-PCS | Mod: CPTII,S$GLB,, | Performed by: NURSE PRACTITIONER

## 2023-05-12 PROCEDURE — 3052F PR MOST RECENT HEMOGLOBIN A1C LEVEL 8.0 - < 9.0%: ICD-10-PCS | Mod: CPTII,S$GLB,, | Performed by: NURSE PRACTITIONER

## 2023-05-12 PROCEDURE — 72110 X-RAY EXAM L-2 SPINE 4/>VWS: CPT | Mod: 26,,, | Performed by: RADIOLOGY

## 2023-05-12 PROCEDURE — 4010F ACE/ARB THERAPY RXD/TAKEN: CPT | Mod: CPTII,S$GLB,, | Performed by: NURSE PRACTITIONER

## 2023-05-12 PROCEDURE — 3074F SYST BP LT 130 MM HG: CPT | Mod: CPTII,S$GLB,, | Performed by: NURSE PRACTITIONER

## 2023-05-12 PROCEDURE — 72110 X-RAY EXAM L-2 SPINE 4/>VWS: CPT | Mod: TC

## 2023-05-12 PROCEDURE — 96372 PR INJECTION,THERAP/PROPH/DIAG2ST, IM OR SUBCUT: ICD-10-PCS | Mod: S$GLB,,, | Performed by: NURSE PRACTITIONER

## 2023-05-12 PROCEDURE — 73560 X-RAY EXAM OF KNEE 1 OR 2: CPT | Mod: 59,TC,RT

## 2023-05-12 PROCEDURE — 99214 OFFICE O/P EST MOD 30 MIN: CPT | Mod: 25,S$GLB,, | Performed by: NURSE PRACTITIONER

## 2023-05-12 PROCEDURE — 73560 X-RAY EXAM OF KNEE 1 OR 2: CPT | Mod: 26,RT,, | Performed by: RADIOLOGY

## 2023-05-12 PROCEDURE — 3008F BODY MASS INDEX DOCD: CPT | Mod: CPTII,S$GLB,, | Performed by: NURSE PRACTITIONER

## 2023-05-12 PROCEDURE — 4010F PR ACE/ARB THEARPY RXD/TAKEN: ICD-10-PCS | Mod: CPTII,S$GLB,, | Performed by: NURSE PRACTITIONER

## 2023-05-12 PROCEDURE — 1159F PR MEDICATION LIST DOCUMENTED IN MEDICAL RECORD: ICD-10-PCS | Mod: CPTII,S$GLB,, | Performed by: NURSE PRACTITIONER

## 2023-05-12 PROCEDURE — 1160F RVW MEDS BY RX/DR IN RCRD: CPT | Mod: CPTII,S$GLB,, | Performed by: NURSE PRACTITIONER

## 2023-05-12 PROCEDURE — 96372 THER/PROPH/DIAG INJ SC/IM: CPT | Mod: S$GLB,,, | Performed by: NURSE PRACTITIONER

## 2023-05-12 PROCEDURE — 72110 XR LUMBAR SPINE COMPLETE 5 VIEW: ICD-10-PCS | Mod: 26,,, | Performed by: RADIOLOGY

## 2023-05-12 PROCEDURE — 3079F PR MOST RECENT DIASTOLIC BLOOD PRESSURE 80-89 MM HG: ICD-10-PCS | Mod: CPTII,S$GLB,, | Performed by: NURSE PRACTITIONER

## 2023-05-12 PROCEDURE — 99214 PR OFFICE/OUTPT VISIT, EST, LEVL IV, 30-39 MIN: ICD-10-PCS | Mod: 25,S$GLB,, | Performed by: NURSE PRACTITIONER

## 2023-05-12 PROCEDURE — 73562 X-RAY EXAM OF KNEE 3: CPT | Mod: 26,LT,, | Performed by: RADIOLOGY

## 2023-05-12 PROCEDURE — 3008F PR BODY MASS INDEX (BMI) DOCUMENTED: ICD-10-PCS | Mod: CPTII,S$GLB,, | Performed by: NURSE PRACTITIONER

## 2023-05-12 PROCEDURE — 99999 PR PBB SHADOW E&M-EST. PATIENT-LVL V: ICD-10-PCS | Mod: PBBFAC,,, | Performed by: NURSE PRACTITIONER

## 2023-05-12 RX ORDER — IBUPROFEN 800 MG/1
800 TABLET ORAL EVERY 6 HOURS PRN
Qty: 30 TABLET | Refills: 3 | Status: SHIPPED | OUTPATIENT
Start: 2023-05-12 | End: 2023-05-26

## 2023-05-12 RX ORDER — KETOROLAC TROMETHAMINE 30 MG/ML
60 INJECTION, SOLUTION INTRAMUSCULAR; INTRAVENOUS
Status: COMPLETED | OUTPATIENT
Start: 2023-05-12 | End: 2023-05-12

## 2023-05-12 RX ADMIN — KETOROLAC TROMETHAMINE 60 MG: 30 INJECTION, SOLUTION INTRAMUSCULAR; INTRAVENOUS at 11:05

## 2023-05-12 NOTE — PROGRESS NOTES
Brett Estevezno  05/15/2023  [id number]    Herb Lord MD  Patient Care Team:  Herb Lord MD as PCP - General (Family Medicine)  Delia Lora RN as Diabetes Educator (Diabetes)  Jermaine Guillermo Jr., STEVEN (Ophthalmology)          Visit Type:an urgent visit for a new problem    Chief Complaint:  Chief Complaint   Patient presents with    Foot Pain    Knee Pain       History of Present Illness:    58 yo male presents with co pain behind his left knee pt reports he walks up and down stairs for work. Pt has three pair of steel toe boots he rotates. Purchased new insoles that caused pain from ankle to knee and lower back. Reports stiffness in left knee when bending and walking.  Denies injury or trauma   OTC aleve for pain relief     History:  Past Medical History:   Diagnosis Date    Diabetes mellitus with neuropathy 2009    BS 57 am 12/18/2015    Hyperlipidemia 2009    Hypertension associated with diabetes 4/21/2017     Past Surgical History:   Procedure Laterality Date    COLONOSCOPY N/A 11/1/2018    Procedure: COLONOSCOPY;  Surgeon: Kristina Wilson MD;  Location: [facility];  Service: Endoscopy;  Laterality: N/A;    COLONOSCOPY W/ POLYPECTOMY  11/01/2018    Polyp x1, repeat 5 years; Dr. Wilson     Family History   Problem Relation Age of Onset    Diabetes Father     Hypertension Father     Heart attack Father     Cataracts Father     Hypertension Mother     Hypertension Sister      Social History     Socioeconomic History    Marital status:     Number of children: 2   Occupational History    Occupation: Plant work      Employer: [employer]     Comment: [employer]   Tobacco Use    Smoking status: Never    Smokeless tobacco: Former   Substance and Sexual Activity    Alcohol use: Yes     Alcohol/week: 2.0 standard drinks     Types: 2 Cans of beer per week    Drug use: No    Sexual activity: Yes     Partners: Female     Patient Active Problem List   Diagnosis    Hyperlipidemia  "   BMI 33.0-33.9,adult    Hypertension associated with diabetes    Skin lesions    Diabetic polyneuropathy associated with type 2 diabetes mellitus    Stress reaction    Hyperlipidemia associated with type 2 diabetes mellitus    Type 2 diabetes mellitus with diabetic neuropathy, with long-term current use of insulin    Elevated diaphragm     Review of patient's allergies indicates:   Allergen Reactions    Hay fever and allergy relief        The following were reviewed at this visit: active problem list, medication list, allergies, family history, social history, and health maintenance.    Medications:  Current Outpatient Medications on File Prior to Visit   Medication Sig Dispense Refill    amitriptyline (ELAVIL) 25 MG tablet TAKE 2 TABLETS BY MOUTH EVERY EVENING 180 tablet 1    ascorbic acid, vitamin C, (VITAMIN C) 1000 MG tablet Take by mouth once daily.      aspirin (ECOTRIN) 81 MG EC tablet Take 81 mg by mouth once daily.      atorvastatin (LIPITOR) 40 MG tablet Take 1 tablet (40 mg total) by mouth once daily. 90 tablet 2    blood sugar diagnostic (ONETOUCH ULTRA TEST) Strp TEST BLOOD SUGAR THREE TIMES DAILY 100 strip 11    dulaglutide (TRULICITY) 3 mg/0.5 mL pen injector Inject 3 mg into the skin every 7 days. 4 pen 5    fluticasone propionate (FLONASE) 50 mcg/actuation nasal spray 2 sprays (100 mcg total) by Each Nostril route once daily. Seasonal allergic rhinitis [J30.2] 48 mL 3    lisinopriL (PRINIVIL,ZESTRIL) 5 MG tablet TAKE 1 TABLET BY MOUTH EVERY DAY 90 tablet 1    metFORMIN (GLUCOPHAGE) 1000 MG tablet Take 1 tablet (1,000 mg total) by mouth 2 (two) times daily with meals. 180 tablet 3    pedi multivit no.12 w-fluoride (MULTIVITAMINS-FLUORIDE-FOLIC A ORAL) Take by mouth.      pen needle, diabetic (BD ULTRA-FINE MINI PEN NEEDLE) 31 gauge x 3/16" Ndle Use once daily with insulin pen as directed 100 each 4    psyllium husk (METAMUCIL ORAL) Take by mouth.      repaglinide (PRANDIN) 2 " MG tablet TAKE 2 TABLETS BY MOUTH 3 (THREE) TIMES DAILY BEFORE MEALS. 540 tablet 0    vitamin D (VITAMIN D3) 1000 units Tab Take 1,000 Units by mouth once daily.      zinc gluconate 50 mg tablet Take 50 mg by mouth once daily.      insulin degludec (TRESIBA FLEXTOUCH U-200) 200 unit/mL (3 mL) insulin pen Inject 50 Units into the skin every evening. 3 pen 5    lancets 30 gauge Misc 1 lancet by Misc.(Non-Drug; Combo Route) route 3 (three) times daily. 100 each 11     No current facility-administered medications on file prior to visit.       Medications have been reviewed and reconciled with patient at this visit.  Barriers to medications reviewed with patient.    Adverse reactions to current medications reviewed with patient..    Over the counter medications reviewed and reconciled with patient.    Exam:  Wt Readings from Last 3 Encounters:   05/12/23 103.6 kg (228 lb 6.3 oz)   04/14/23 105.6 kg (232 lb 12.9 oz)   12/16/22 103.1 kg (227 lb 4.7 oz)     Temp Readings from Last 3 Encounters:   05/12/23 96.6 °F (35.9 °C) (Temporal)   12/16/22 97.3 °F (36.3 °C) (Tympanic)   11/29/22 97.7 °F (36.5 °C)     BP Readings from Last 3 Encounters:   05/12/23 122/82   12/16/22 128/82   12/05/22 124/82     Pulse Readings from Last 3 Encounters:   05/12/23 92   12/16/22 87   12/05/22 93     Body mass index is 33.73 kg/m².      Review of Systems   Constitutional:  Negative for fever.   Respiratory:  Negative for cough, shortness of breath and wheezing.    Cardiovascular:  Negative for chest pain and palpitations.   Gastrointestinal:  Negative for nausea.   Musculoskeletal:  Positive for back pain.   Neurological:  Negative for speech change, weakness and headaches.   All other systems reviewed and are negative.  Physical Exam  Vitals and nursing note reviewed.   Constitutional:       Appearance: Normal appearance. He is normal weight.   HENT:      Head: Normocephalic and atraumatic.      Right Ear: Tympanic membrane, ear canal and  external ear normal.      Left Ear: Tympanic membrane, ear canal and external ear normal.      Nose: Nose normal.      Mouth/Throat:      Mouth: Mucous membranes are moist.      Pharynx: Oropharynx is clear.   Eyes:      Extraocular Movements: Extraocular movements intact.      Conjunctiva/sclera: Conjunctivae normal.      Pupils: Pupils are equal, round, and reactive to light.   Cardiovascular:      Rate and Rhythm: Normal rate and regular rhythm.      Pulses: Normal pulses.      Heart sounds: Normal heart sounds.   Pulmonary:      Effort: Pulmonary effort is normal.      Breath sounds: Normal breath sounds.   Abdominal:      General: Bowel sounds are normal.      Palpations: Abdomen is soft.   Musculoskeletal:         General: Normal range of motion.        Arms:       Cervical back: Normal range of motion and neck supple.        Legs:    Skin:     General: Skin is warm and dry.      Capillary Refill: Capillary refill takes less than 2 seconds.   Neurological:      General: No focal deficit present.      Mental Status: He is alert and oriented to person, place, and time.   Psychiatric:         Mood and Affect: Mood normal.         Behavior: Behavior normal.         Thought Content: Thought content normal.         Judgment: Judgment normal.       Laboratory Reviewed ({Yes)  Lab Results   Component Value Date    WBC 5.35 07/21/2021    HGB 13.7 (L) 07/21/2021    HCT 40.4 07/21/2021     07/21/2021    CHOL 105 (L) 09/23/2022    TRIG 59 09/23/2022    HDL 40 09/23/2022    ALT 68 (H) 12/16/2022    AST 63 (H) 12/16/2022     09/23/2022    K 4.0 09/23/2022     09/23/2022    CREATININE 0.9 09/23/2022    BUN 17 09/23/2022    CO2 24 09/23/2022    TSH 0.659 06/30/2016    PSA 1.2 12/16/2022    HGBA1C 8.3 (H) 05/05/2023       Brett was seen today for foot pain and knee pain.    Diagnoses and all orders for this visit:    Acute midline low back pain without sciatica  -     Ambulatory referral/consult to Back &  Spine Clinic; Future  -     Ambulatory referral/consult to Physical/Occupational Therapy; Future  -     ketorolac injection 60 mg  -     ibuprofen (ADVIL,MOTRIN) 800 MG tablet; Take 1 tablet (800 mg total) by mouth every 6 (six) hours as needed for Pain.  -     X-Ray Lumbar Spine 5 View; Future    Acute pain of left knee  -     Cancel: X-Ray Knee 3 View Left; Future  -     Ambulatory referral/consult to Physical/Occupational Therapy; Future  -     ketorolac injection 60 mg  -     ibuprofen (ADVIL,MOTRIN) 800 MG tablet; Take 1 tablet (800 mg total) by mouth every 6 (six) hours as needed for Pain.  -     X-Ray Lumbar Spine 5 View; Future        Care Plan/Goals: Reviewed    Goals    None       Brett was seen today for foot pain and knee pain.    Diagnoses and all orders for this visit:    Acute midline low back pain without sciatica  -     Ambulatory referral/consult to Back & Spine Clinic; Future  -     Ambulatory referral/consult to Physical/Occupational Therapy; Future  -     ketorolac injection 60 mg  -     ibuprofen (ADVIL,MOTRIN) 800 MG tablet; Take 1 tablet (800 mg total) by mouth every 6 (six) hours as needed for Pain.  -     X-Ray Lumbar Spine 5 View; Future    Acute pain of left knee  -     Cancel: X-Ray Knee 3 View Left; Future  -     Ambulatory referral/consult to Physical/Occupational Therapy; Future  -     ketorolac injection 60 mg  -     ibuprofen (ADVIL,MOTRIN) 800 MG tablet; Take 1 tablet (800 mg total) by mouth every 6 (six) hours as needed for Pain.  -     X-Ray Lumbar Spine 5 View; Future       Follow up: Follow up for back and spine clinic and physical theraphy for back pain and right knee pain.    After visit summary was printed and given to patient upon discharge today.  Patient goals and care plan are included in After Visit Summary.

## 2023-05-15 ENCOUNTER — TELEPHONE (OUTPATIENT)
Dept: PAIN MEDICINE | Facility: CLINIC | Age: 58
End: 2023-05-15
Payer: COMMERCIAL

## 2023-05-15 DIAGNOSIS — E11.59 HYPERTENSION ASSOCIATED WITH DIABETES: ICD-10-CM

## 2023-05-15 DIAGNOSIS — I15.2 HYPERTENSION ASSOCIATED WITH DIABETES: ICD-10-CM

## 2023-05-15 DIAGNOSIS — E11.40 TYPE 2 DIABETES MELLITUS WITH DIABETIC NEUROPATHY, WITH LONG-TERM CURRENT USE OF INSULIN: ICD-10-CM

## 2023-05-15 DIAGNOSIS — Z79.4 TYPE 2 DIABETES MELLITUS WITH DIABETIC NEUROPATHY, WITH LONG-TERM CURRENT USE OF INSULIN: ICD-10-CM

## 2023-05-15 NOTE — TELEPHONE ENCOUNTER
----- Message from Mya Coleman sent at 5/15/2023  4:06 PM CDT -----  Contact: [phone]  Patient needs a refill on lisinopriL (PRINIVIL,ZESTRIL) 5 MG tablet,  metFORMIN (GLUCOPHAGE) 1000 MG tabletcalled into [facility]  pharmacy at [phone] .  Please call patient at [phone] if you have any questions.             [facility]/pharmacy #[id number] - [place] - [address] AT [facility]  [address]  [place] [address]  Phone: [phone] Fax: [phone]             Thanks KB

## 2023-05-15 NOTE — TELEPHONE ENCOUNTER
No care due was identified.  [facility] Embedded Care Due Messages. Reference number: [id number].   5/15/2023 4:21:34 PM CDT

## 2023-05-16 RX ORDER — LISINOPRIL 5 MG/1
5 TABLET ORAL DAILY
Qty: 90 TABLET | Refills: 1 | Status: SHIPPED | OUTPATIENT
Start: 2023-05-16 | End: 2023-11-16 | Stop reason: SDUPTHER

## 2023-05-16 RX ORDER — METFORMIN HYDROCHLORIDE 1000 MG/1
1000 TABLET ORAL 2 TIMES DAILY WITH MEALS
Qty: 180 TABLET | Refills: 3 | Status: SHIPPED | OUTPATIENT
Start: 2023-05-16

## 2023-05-17 DIAGNOSIS — M25.562 LEFT KNEE PAIN, UNSPECIFIED CHRONICITY: Primary | ICD-10-CM

## 2023-05-19 ENCOUNTER — OFFICE VISIT (OUTPATIENT)
Dept: SPORTS MEDICINE | Facility: CLINIC | Age: 58
End: 2023-05-19
Payer: COMMERCIAL

## 2023-05-19 ENCOUNTER — CLINICAL SUPPORT (OUTPATIENT)
Dept: REHABILITATION | Facility: HOSPITAL | Age: 58
End: 2023-05-19
Payer: COMMERCIAL

## 2023-05-19 ENCOUNTER — HOSPITAL ENCOUNTER (OUTPATIENT)
Dept: RADIOLOGY | Facility: HOSPITAL | Age: 58
Discharge: HOME | End: 2023-05-19
Attending: ORTHOPAEDIC SURGERY
Payer: COMMERCIAL

## 2023-05-19 VITALS — BODY MASS INDEX: 33.77 KG/M2 | WEIGHT: 228 LBS | HEIGHT: 69 IN

## 2023-05-19 DIAGNOSIS — M25.662 DECREASED RANGE OF MOTION (ROM) OF LEFT KNEE: ICD-10-CM

## 2023-05-19 DIAGNOSIS — M25.562 ACUTE PAIN OF LEFT KNEE: ICD-10-CM

## 2023-05-19 DIAGNOSIS — E66.9 CLASS 1 OBESITY WITH BODY MASS INDEX (BMI) OF 33.0 TO 33.9 IN ADULT, UNSPECIFIED OBESITY TYPE, UNSPECIFIED WHETHER SERIOUS COMORBIDITY PRESENT: ICD-10-CM

## 2023-05-19 DIAGNOSIS — E13.65 UNCONTROLLED OTHER SPECIFIED DIABETES MELLITUS WITH HYPERGLYCEMIA: ICD-10-CM

## 2023-05-19 DIAGNOSIS — M17.12 PRIMARY OSTEOARTHRITIS OF LEFT KNEE: Primary | ICD-10-CM

## 2023-05-19 DIAGNOSIS — M25.462 KNEE EFFUSION, LEFT: ICD-10-CM

## 2023-05-19 DIAGNOSIS — M54.50 ACUTE MIDLINE LOW BACK PAIN WITHOUT SCIATICA: ICD-10-CM

## 2023-05-19 DIAGNOSIS — M62.81 WEAKNESS OF TRUNK MUSCULATURE: ICD-10-CM

## 2023-05-19 DIAGNOSIS — M25.562 LEFT KNEE PAIN, UNSPECIFIED CHRONICITY: ICD-10-CM

## 2023-05-19 PROCEDURE — 3008F PR BODY MASS INDEX (BMI) DOCUMENTED: ICD-10-PCS | Mod: CPTII,S$GLB,, | Performed by: ORTHOPAEDIC SURGERY

## 2023-05-19 PROCEDURE — 3052F PR MOST RECENT HEMOGLOBIN A1C LEVEL 8.0 - < 9.0%: ICD-10-PCS | Mod: CPTII,S$GLB,, | Performed by: ORTHOPAEDIC SURGERY

## 2023-05-19 PROCEDURE — 99204 PR OFFICE/OUTPT VISIT, NEW, LEVL IV, 45-59 MIN: ICD-10-PCS | Mod: S$GLB,,, | Performed by: ORTHOPAEDIC SURGERY

## 2023-05-19 PROCEDURE — 97110 THERAPEUTIC EXERCISES: CPT | Mod: GP,59,S$GLB, | Performed by: ORTHOPAEDIC SURGERY

## 2023-05-19 PROCEDURE — 97760 ORTHOTIC MGMT&TRAING 1ST ENC: CPT | Mod: GP,S$GLB,, | Performed by: ORTHOPAEDIC SURGERY

## 2023-05-19 PROCEDURE — 97530 THERAPEUTIC ACTIVITIES: CPT | Mod: PN

## 2023-05-19 PROCEDURE — 73560 XR KNEE 1 OR 2 VIEW LEFT: ICD-10-PCS | Mod: 26,LT,, | Performed by: RADIOLOGY

## 2023-05-19 PROCEDURE — 4010F ACE/ARB THERAPY RXD/TAKEN: CPT | Mod: CPTII,S$GLB,, | Performed by: ORTHOPAEDIC SURGERY

## 2023-05-19 PROCEDURE — 97760 PR ORTHOTIC MGMT&TRAINJ INITIAL ENC EA 15 MINS: ICD-10-PCS | Mod: GP,S$GLB,, | Performed by: ORTHOPAEDIC SURGERY

## 2023-05-19 PROCEDURE — 3052F HG A1C>EQUAL 8.0%<EQUAL 9.0%: CPT | Mod: CPTII,S$GLB,, | Performed by: ORTHOPAEDIC SURGERY

## 2023-05-19 PROCEDURE — 99204 OFFICE O/P NEW MOD 45 MIN: CPT | Mod: S$GLB,,, | Performed by: ORTHOPAEDIC SURGERY

## 2023-05-19 PROCEDURE — 97110 PR THERAPEUTIC EXERCISES: ICD-10-PCS | Mod: GP,59,S$GLB, | Performed by: ORTHOPAEDIC SURGERY

## 2023-05-19 PROCEDURE — 99999 PR PBB SHADOW E&M-EST. PATIENT-LVL IV: ICD-10-PCS | Mod: PBBFAC,,, | Performed by: ORTHOPAEDIC SURGERY

## 2023-05-19 PROCEDURE — 99999 PR PBB SHADOW E&M-EST. PATIENT-LVL IV: CPT | Mod: PBBFAC,,, | Performed by: ORTHOPAEDIC SURGERY

## 2023-05-19 PROCEDURE — 1159F MED LIST DOCD IN RCRD: CPT | Mod: CPTII,S$GLB,, | Performed by: ORTHOPAEDIC SURGERY

## 2023-05-19 PROCEDURE — 3008F BODY MASS INDEX DOCD: CPT | Mod: CPTII,S$GLB,, | Performed by: ORTHOPAEDIC SURGERY

## 2023-05-19 PROCEDURE — 97110 THERAPEUTIC EXERCISES: CPT | Mod: PN

## 2023-05-19 PROCEDURE — 73560 X-RAY EXAM OF KNEE 1 OR 2: CPT | Mod: TC,LT

## 2023-05-19 PROCEDURE — 97161 PT EVAL LOW COMPLEX 20 MIN: CPT | Mod: PN

## 2023-05-19 PROCEDURE — 1159F PR MEDICATION LIST DOCUMENTED IN MEDICAL RECORD: ICD-10-PCS | Mod: CPTII,S$GLB,, | Performed by: ORTHOPAEDIC SURGERY

## 2023-05-19 PROCEDURE — 97112 NEUROMUSCULAR REEDUCATION: CPT | Mod: PN

## 2023-05-19 PROCEDURE — 73560 X-RAY EXAM OF KNEE 1 OR 2: CPT | Mod: 26,LT,, | Performed by: RADIOLOGY

## 2023-05-19 PROCEDURE — 4010F PR ACE/ARB THEARPY RXD/TAKEN: ICD-10-PCS | Mod: CPTII,S$GLB,, | Performed by: ORTHOPAEDIC SURGERY

## 2023-05-19 RX ORDER — NAPROXEN 500 MG/1
500 TABLET ORAL 2 TIMES DAILY
Qty: 60 TABLET | Refills: 2 | Status: SHIPPED | OUTPATIENT
Start: 2023-05-19 | End: 2023-07-28

## 2023-05-19 NOTE — PATIENT INSTRUCTIONS
Assessment:  Brett Garcia is a  57 y.o. male    with a chief complaint of Pain of the Left Knee    Left chronic knee pain, effusion  Left knee primary osteoarthritis  Diabetes, uncontrolled  8.3 A1c (5/3/23)    Encounter Diagnoses   Name Primary?    Knee effusion, left     Primary osteoarthritis of left knee Yes    Uncontrolled other specified diabetes mellitus with hyperglycemia     Class 1 obesity with body mass index (BMI) of 33.0 to 33.9 in adult, unspecified obesity type, unspecified whether serious comorbidity present       Plan:  Discussed use of Left knee corticosteroid injection; deferred due to most recent A1c of 8.3  Patient will reach out if he is able to decrease his A1c  Fitted for hinged knee brace today  10 minutes were spent sizing, fitting, and educating regarding durable medical equipment by Dr. Ace Mustafa and his assistant under his direction today.  CPT 18194.  Prescribed Naproxen 500mg, twice daily as needed; do not take ibuprofen or Aleve  Prior authorization placed for Left knee euflexxa  At least 10 minutes were spent developing, teaching, and performing a home exercise program.  A written summary was provided and all questions were answered. This service was performed by Dr. Ace Mustafa and his assistant under his direction. CPT 04829-IM  Although there is not a cure for arthritis, there are effective ways to improve symptoms.     Exercise & Activity:  I recommend low impact activities such as elliptical and bicycle   Walking is great for arthritis: https://www.[employer] Software.semanticlabs/3-reasons-walking-with-knee-arthritis/  If walking long distances, I recommend good quality well-cushioned shoes. Varsity sports can help you find the right ones: https://www.varsityrunning.com/  Aquatic and pool therapy is often helpful because it lessens the impact on the joint, strengthens the leg and thigh muscles, and helps to control swelling.   Knee motion is important to the health of  the knee.     Knee Braces:  A compression knee sleeve can help limit swelling and provide proprioceptive feedback.     Prescriptions & Medications:  I do recommend formal physical therapy or at minimum a home exercise program.   Over the counter analgesic (pain-relieving) medications can help. Examples are Tylenol, Ibuprofen, and Aleve. Check with your primary care physician to make sure you don't have contra-indications to taking those medicines.  Some over the counter supplement solutions such as glucosamine and chondroitin may help with symptoms, although the evidence is mixed.    Healthy Lifestyle:  Excess body weight can have a negative impact on joint health and on pain. I recommend healthy lifestyle choices including nutrition and exercise that help reach and maintain an ideal body weight. Tips for Exercise: https://www.TradeBlock/13-exercise-tips-for-a-healthier-you/  Some diets cause increased inflammation. I recommend a balanced wholesome diet including some foods such as olives that are shown to decrease inflammation. More diet information available here: https://www.JungleCents.PackLink/8-best-foods-for-knee-arthritis/      Follow-up: 4 week for visco or sooner if there are any problems between now and then.    Leave Review:   Google: Leave Google Review  Healthgrades: Leave Healthgrades Review    After Hours Number: [phone]                     STRENGTHENING EXERCISES                    If you have any difficulties reading this information, you may visit the online version using the following link: Knee Conditioning Program (https://orthoinfo.aaos.org/globalassets/pdfs/2017-rehab_knee.pdf)

## 2023-05-19 NOTE — PROGRESS NOTES
Patient ID: Brett Garcia  YOB: 1965  MRN: [id number]    Chief Complaint: Pain of the Left Knee    Referred By: Monica Andrade NP    History of Present Illness: Brett Garcia is a  57 y.o. male    with a chief complaint of Pain of the Left Knee    Patient reports that the onset of left knee pain was February 2023. He states that the pain is chronic in nature. His pain is intermittent aching, throbbing, and sometimes sharp. He reports use of iburprofen 800mg  every 5 hours daily that provides some relief. His pain is aggravated by prolonged walking, standing, and bending his knee and squatting/kneeling. He reports no history of injections or physical therapy.     HPI    Past Medical History:   Past Medical History:   Diagnosis Date    Diabetes mellitus with neuropathy 2009    BS 57 am 12/18/2015    Hyperlipidemia 2009    Hypertension associated with diabetes 4/21/2017     Past Surgical History:   Procedure Laterality Date    COLONOSCOPY N/A 11/1/2018    Procedure: COLONOSCOPY;  Surgeon: Kristina Wilson MD;  Location: [facility];  Service: Endoscopy;  Laterality: N/A;    COLONOSCOPY W/ POLYPECTOMY  11/01/2018    Polyp x1, repeat 5 years; Dr. Wilson     Family History   Problem Relation Age of Onset    Diabetes Father     Hypertension Father     Heart attack Father     Cataracts Father     Hypertension Mother     Hypertension Sister      Social History     Socioeconomic History    Marital status:     Number of children: 2   Occupational History    Occupation: Plant work      Employer: [employer]     Comment: [employer]   Tobacco Use    Smoking status: Never    Smokeless tobacco: Former   Substance and Sexual Activity    Alcohol use: Yes     Alcohol/week: 2.0 standard drinks     Types: 2 Cans of beer per week    Drug use: No    Sexual activity: Yes     Partners: Female     Medication List with Changes/Refills   New Medications    NAPROXEN (NAPROSYN) 500 MG TABLET     "Take 1 tablet (500 mg total) by mouth 2 (two) times daily.   Current Medications    AMITRIPTYLINE (ELAVIL) 25 MG TABLET    TAKE 2 TABLETS BY MOUTH EVERY EVENING    ASCORBIC ACID, VITAMIN C, (VITAMIN C) 1000 MG TABLET    Take by mouth once daily.    ASPIRIN (ECOTRIN) 81 MG EC TABLET    Take 81 mg by mouth once daily.    ATORVASTATIN (LIPITOR) 40 MG TABLET    Take 1 tablet (40 mg total) by mouth once daily.    BLOOD SUGAR DIAGNOSTIC (ONETOUCH ULTRA TEST) STRP    TEST BLOOD SUGAR THREE TIMES DAILY    DULAGLUTIDE (TRULICITY) 3 MG/0.5 ML PEN INJECTOR    Inject 3 mg into the skin every 7 days.    FLUTICASONE PROPIONATE (FLONASE) 50 MCG/ACTUATION NASAL SPRAY    2 sprays (100 mcg total) by Each Nostril route once daily. Seasonal allergic rhinitis [J30.2]    IBUPROFEN (ADVIL,MOTRIN) 800 MG TABLET    Take 1 tablet (800 mg total) by mouth every 6 (six) hours as needed for Pain.    INSULIN DEGLUDEC (TRESIBA FLEXTOUCH U-200) 200 UNIT/ML (3 ML) INSULIN PEN    Inject 50 Units into the skin every evening.    LANCETS 30 GAUGE MISC    1 lancet by Misc.(Non-Drug; Combo Route) route 3 (three) times daily.    LISINOPRIL (PRINIVIL,ZESTRIL) 5 MG TABLET    Take 1 tablet (5 mg total) by mouth once daily.    METFORMIN (GLUCOPHAGE) 1000 MG TABLET    Take 1 tablet (1,000 mg total) by mouth 2 (two) times daily with meals.    PEDI MULTIVIT NO.12 W-FLUORIDE (MULTIVITAMINS-FLUORIDE-FOLIC A ORAL)    Take by mouth.    PEN NEEDLE, DIABETIC (BD ULTRA-FINE MINI PEN NEEDLE) 31 GAUGE X 3/16" NDLE    Use once daily with insulin pen as directed    PSYLLIUM HUSK (METAMUCIL ORAL)    Take by mouth.    REPAGLINIDE (PRANDIN) 2 MG TABLET    TAKE 2 TABLETS BY MOUTH 3 (THREE) TIMES DAILY BEFORE MEALS.    VITAMIN D (VITAMIN D3) 1000 UNITS TAB    Take 1,000 Units by mouth once daily.    ZINC GLUCONATE 50 MG TABLET    Take 50 mg by mouth once daily.     Review of patient's allergies indicates:   Allergen Reactions    Hay fever and allergy relief      ROS    Physical " Exam:   Body mass index is 33.67 kg/m².  There were no vitals filed for this visit.   GENERAL: Well appearing, appropriate for stated age, no acute distress.  CARDIOVASCULAR: Pulses regular by peripheral palpation.  PULMONARY: Respirations are even and non-labored.  NEURO: Awake, alert, and oriented x 3.  PSYCH: Mood & affect are appropriate.  HEENT: Head is normocephalic and atraumatic.  Ortho/SPM Exam  Left Knee:    Inspection: moderate effusion    Palpation tenderness: Medial/lateral joint line, MFC    Range of motion: 10 deg extension - 115 deg flexion    Strength:  4+/5 Extension    4+/5 Flexion    4+/5 Hip Abduction    Valgus (-)  Varus (-)  Lachman (-)  Anterior Drawer (-)  Posterior Drawer (-)  Eusebio (-); pain in medial compartment  Intact EHL, FHL, gastrocsoleus, and tibialis anterior. Sensation intact to light touch in superficial peroneal, deep peroneal, tibial, sural, and saphenous nerve distributions. Foot warm and well perfused with capillary refill of less than 2 seconds and palpable pedal pulses.        Imaging:    X-Ray Knee 1 or 2 View Left  Narrative: EXAMINATION:  XR KNEE 1 OR 2 VIEW LEFT    CLINICAL HISTORY:  Pain in left knee    TECHNIQUE:  Standing bilateral AP knee radiographs    COMPARISON:  None available.    FINDINGS:  No acute displaced fracture, subluxation, or dislocation is identified. Mild left knee medial compartment joint space narrowing.  Minimal osteophytosis bilaterally.  Stable bone island distal left femur.  Impression: Left greater than right knee degenerative changes.    Electronically signed by: Sudhir Salinas  Date:    05/19/2023  Time:    09:57      Relevant imaging results reviewed and interpreted by me, discussed with the patient and / or family today.     Other Tests:         Patient Instructions   Assessment:  Brett Garcia is a  57 y.o. male    with a chief complaint of Pain of the Left Knee    Left chronic knee pain, effusion  Left knee primary  osteoarthritis  Diabetes, uncontrolled  8.3 A1c (5/3/23)    Encounter Diagnoses   Name Primary?    Knee effusion, left     Primary osteoarthritis of left knee Yes    Uncontrolled other specified diabetes mellitus with hyperglycemia     Class 1 obesity with body mass index (BMI) of 33.0 to 33.9 in adult, unspecified obesity type, unspecified whether serious comorbidity present       Plan:  Discussed use of Left knee corticosteroid injection; deferred due to most recent A1c of 8.3  Patient will reach out if he is able to decrease his A1c  Fitted for hinged knee brace today  10 minutes were spent sizing, fitting, and educating regarding durable medical equipment by Dr. Ace Mustafa and his assistant under his direction today.  CPT 46281.  Prescribed Naproxen 500mg, twice daily as needed; do not take ibuprofen or Aleve  Prior authorization placed for Left knee euflexxa  At least 10 minutes were spent developing, teaching, and performing a home exercise program.  A written summary was provided and all questions were answered. This service was performed by Dr. Ace Mustafa and his assistant under his direction. CPT 09549-EV  Although there is not a cure for arthritis, there are effective ways to improve symptoms.     Exercise & Activity:  I recommend low impact activities such as elliptical and bicycle   Walking is great for arthritis: https://www.Digitour Media.com/3-reasons-walking-with-knee-arthritis/  If walking long distances, I recommend good quality well-cushioned shoes. Varsity sports can help you find the right ones: https://www.Eiger BioPharmaceuticalstySmadexing.Amirite.com/  Aquatic and pool therapy is often helpful because it lessens the impact on the joint, strengthens the leg and thigh muscles, and helps to control swelling.   Knee motion is important to the health of the knee.     Knee Braces:  A compression knee sleeve can help limit swelling and provide proprioceptive feedback.     Prescriptions & Medications:  I do recommend  formal physical therapy or at minimum a home exercise program.   Over the counter analgesic (pain-relieving) medications can help. Examples are Tylenol, Ibuprofen, and Aleve. Check with your primary care physician to make sure you don't have contra-indications to taking those medicines.  Some over the counter supplement solutions such as glucosamine and chondroitin may help with symptoms, although the evidence is mixed.    Healthy Lifestyle:  Excess body weight can have a negative impact on joint health and on pain. I recommend healthy lifestyle choices including nutrition and exercise that help reach and maintain an ideal body weight. Tips for Exercise: https://www.Guard RFID Solutions/13-exercise-tips-for-a-healthier-you/  Some diets cause increased inflammation. I recommend a balanced wholesome diet including some foods such as olives that are shown to decrease inflammation. More diet information available here: https://www.Guard RFID Solutions/8-best-foods-for-knee-arthritis/      Follow-up: 4 week for visco or sooner if there are any problems between now and then.    Leave Review:   Google: Leave Google Review  Healthgrades: Leave Healthgrades Review    After Hours Number: [phone]                     STRENGTHENING EXERCISES                    If you have any difficulties reading this information, you may visit the online version using the following link: Knee Conditioning Program (https://orthoinfo.aaos.org/globalassets/pdfs/2017-rehab_knee.pdf)       Provider Note/Medical Decision Making:       I discussed worrisome and red flag signs and symptoms with the patient. The patient expressed understanding and agreed to alert me immediately or to go to the emergency room if they experience any of these.   Treatment plan was developed with input from the patient/family, and they expressed understanding and agreement with the plan. All questions were answered today.          Ace Mustafa MD  Orthopaedic  Surgery & Sports Medicine       Disclaimer: This note was prepared using a voice recognition system and is likely to have sound alike errors within the text.     I, Bita Hollis, acted as a scribe for Ace Mustafa MD for the duration of this office visit.

## 2023-05-25 ENCOUNTER — CLINICAL SUPPORT (OUTPATIENT)
Dept: REHABILITATION | Facility: HOSPITAL | Age: 58
End: 2023-05-25
Payer: COMMERCIAL

## 2023-05-25 DIAGNOSIS — M25.662 DECREASED RANGE OF MOTION (ROM) OF LEFT KNEE: Primary | ICD-10-CM

## 2023-05-25 DIAGNOSIS — M62.81 WEAKNESS OF TRUNK MUSCULATURE: ICD-10-CM

## 2023-05-25 PROCEDURE — 97112 NEUROMUSCULAR REEDUCATION: CPT | Mod: PN

## 2023-05-25 PROCEDURE — 97110 THERAPEUTIC EXERCISES: CPT | Mod: PN

## 2023-05-25 NOTE — PROGRESS NOTES
"[facility]   Physical Therapy Treatment Note      Name: Brett Garcia  Clinic Number: [id number]    Therapy Diagnosis:   Encounter Diagnoses   Name Primary?    Decreased range of motion (ROM) of left knee Yes    Weakness of trunk musculature      Physician: Monica Andrade,*    Visit Date: 5/25/2023       Physician Orders: PT Eval and Treat   Medical Diagnosis from Referral: Acute midline low back pain without sciatica [M54.50], Acute pain of left knee [M25.562]  Evaluation Date: 5/19/2023  Authorization Period Expiration: 5/11/2023  Plan of Care Expiration: 7/14/2023  Progress Note Due: 6/ 19/ 2023  Visit # / Visits authorized: 1/ 20  FOTO: 1/3     Precautions: Standard and Diabetes     PTA Visit #: 0/5     Time In: 5:15 pm   Time Out: 6:10 pm  Total Billable Time: 55 minutes (5 minutes 1:1 with trained technician)     Subjective     Pt reports: no changes in symptoms since IE.  He was not compliant with home exercise program.  Response to previous treatment: n/a today   Functional change: n/ a today     Pain: 3/10 L knee and low back     Objective      Objective Measures updated at progress report unless specified.     Treatment     Brett received the following treatments:      Brett received therapeutic exercises to develop strength, endurance, ROM, and flexibility for (25) minutes including:     LTR x 15 B   Hamstring stretch 3 x 20" B   Piriformis stretch 3 x 20" B  Hand Heel rocks x 10   DKTC x 15   Side stepping GTB // 3 laps     Brett received the following manual therapy techniques applied for (00) minutes, including:    Brett participated in neuromuscular re-education activities to improve: Balance, Coordination, Kinesthetic, Sense, Proprioception, and Posture for (30) minutes. The following activities were included:    Bridges 2 x 10   Reverse clams x 12 B   SLR abduction x 15 B 5" holds  Prone TKEs x 10 10" holds   Plank on Elbows 4 x 20"   SLS 3 x 30" B " "  Static stance on airex + eyes closed 3 x 30"   Leaning cat/ cow x 10     Brett participated in dynamic functional therapeutic activities to improve functional performance for 00  minutes, including:      Patient Education and Home Exercises       Education provided:   - pacing exercise  - importance of functional stability development     Written Home Exercises Provided: Patient instructed to cont prior HEP. Exercises were reviewed and Brett was able to demonstrate them prior to the end of the session.  Brett demonstrated good  understanding of the education provided. See EMR under Patient Instructions for exercises provided during therapy sessions    Assessment   Patient presents to first follow up today with continued lacking strength and stability in the core and lower extremity muscles. Treatment done today to address these limitations was tolerated well. No increases in pain were noted. Patient remains with tension build up in the B gluts and hip rotators, and lacking lumbar mobility. Will continue to address limitations by promoting strength, mobility and stability at the trunk, hips and knees.     Brett Is progressing well towards his goals.   Pt prognosis is Good.     Pt will continue to benefit from skilled outpatient physical therapy to address the deficits listed in the problem list box on initial evaluation, provide pt/family education and to maximize pt's level of independence in the home and community environment.     Pt's spiritual, cultural and educational needs considered and pt agreeable to plan of care and goals.     Anticipated barriers to physical therapy: none     Goals:    Short Term Goals:  4 weeks Progress   5/19/2023   Pain: Pt will demonstrate improved pain by reports of less than or equal to 6/10 worst pain on the verbal rating scale in order to progress toward maximal functional ability and improve QOL. PC   Function: Patient will demonstrate improved function as indicated by a " functional limitation score of less than or equal to 31 out of 100 on knee and lumbar FOTO. PC   HEP: Patient will demonstrate independence with HEP in order to progress toward functional independence. PC      Long Term Goals:  8 weeks Progress  5/19/2023   Pain: Pt will demonstrate improved pain by reports of less than or equal to 3/10 worst pain on the verbal rating scale in order to progress toward maximal functional ability and improve QOL.   PC   Function: Patient will demonstrate improved function as indicated by a functional limitation score of less than or equal to 23 out of 100 on knee and lumbar FOTO. PC   Mobility: Patient will improve AROM to stated goals, listed in objective measures above, in order to return to maximal functional potential and improve quality of life. PC   Strength: Patient will improve strength to stated goals, listed in objective measures above, in order to improve functional independence and quality of life. PC   Stair Climbing: Patient will demonstrate ability to climb up and down 20 steps with normalized mechanics and no complaints of pain in order to improve functional mobility at two-story home and at job.  PC   GOALS KEY:   PC= progressing/continue;   PM= partially met;             DC= discontinue    Plan     Plan of care Certification: 5/19/2023 to 7/14/2023.     Outpatient Physical Therapy 2 times weekly for 8 weeks to include the following interventions: Cervical/Lumbar Traction, Electrical Stimulation  , Gait Training, Manual Therapy, Moist Heat/ Ice, Neuromuscular Re-ed, Orthotic Management and Training, Patient Education, Self Care, Therapeutic Activities, Therapeutic Exercise, Ultrasound, and Dry Needling .        Kelsie Moser, PT

## 2023-05-26 ENCOUNTER — CLINICAL SUPPORT (OUTPATIENT)
Dept: REHABILITATION | Facility: HOSPITAL | Age: 58
End: 2023-05-26
Payer: COMMERCIAL

## 2023-05-26 ENCOUNTER — OFFICE VISIT (OUTPATIENT)
Dept: DIABETES | Facility: CLINIC | Age: 58
End: 2023-05-26
Payer: COMMERCIAL

## 2023-05-26 DIAGNOSIS — Z79.4 TYPE 2 DIABETES MELLITUS WITH DIABETIC NEUROPATHY, WITH LONG-TERM CURRENT USE OF INSULIN: Primary | ICD-10-CM

## 2023-05-26 DIAGNOSIS — E11.69 HYPERLIPIDEMIA ASSOCIATED WITH TYPE 2 DIABETES MELLITUS: ICD-10-CM

## 2023-05-26 DIAGNOSIS — E78.5 HYPERLIPIDEMIA ASSOCIATED WITH TYPE 2 DIABETES MELLITUS: ICD-10-CM

## 2023-05-26 DIAGNOSIS — E11.59 HYPERTENSION ASSOCIATED WITH DIABETES: ICD-10-CM

## 2023-05-26 DIAGNOSIS — M25.662 DECREASED RANGE OF MOTION (ROM) OF LEFT KNEE: Primary | ICD-10-CM

## 2023-05-26 DIAGNOSIS — M62.81 WEAKNESS OF TRUNK MUSCULATURE: ICD-10-CM

## 2023-05-26 DIAGNOSIS — I15.2 HYPERTENSION ASSOCIATED WITH DIABETES: ICD-10-CM

## 2023-05-26 DIAGNOSIS — E11.40 TYPE 2 DIABETES MELLITUS WITH DIABETIC NEUROPATHY, WITH LONG-TERM CURRENT USE OF INSULIN: Primary | ICD-10-CM

## 2023-05-26 PROCEDURE — 4010F ACE/ARB THERAPY RXD/TAKEN: CPT | Mod: CPTII,95,, | Performed by: NURSE PRACTITIONER

## 2023-05-26 PROCEDURE — 3052F HG A1C>EQUAL 8.0%<EQUAL 9.0%: CPT | Mod: CPTII,95,, | Performed by: NURSE PRACTITIONER

## 2023-05-26 PROCEDURE — 4010F PR ACE/ARB THEARPY RXD/TAKEN: ICD-10-PCS | Mod: CPTII,95,, | Performed by: NURSE PRACTITIONER

## 2023-05-26 PROCEDURE — 1159F PR MEDICATION LIST DOCUMENTED IN MEDICAL RECORD: ICD-10-PCS | Mod: CPTII,95,, | Performed by: NURSE PRACTITIONER

## 2023-05-26 PROCEDURE — 97110 THERAPEUTIC EXERCISES: CPT | Mod: PN,CQ

## 2023-05-26 PROCEDURE — 1160F RVW MEDS BY RX/DR IN RCRD: CPT | Mod: CPTII,95,, | Performed by: NURSE PRACTITIONER

## 2023-05-26 PROCEDURE — 99214 OFFICE O/P EST MOD 30 MIN: CPT | Mod: 95,,, | Performed by: NURSE PRACTITIONER

## 2023-05-26 PROCEDURE — 99214 PR OFFICE/OUTPT VISIT, EST, LEVL IV, 30-39 MIN: ICD-10-PCS | Mod: 95,,, | Performed by: NURSE PRACTITIONER

## 2023-05-26 PROCEDURE — 1160F PR REVIEW ALL MEDS BY PRESCRIBER/CLIN PHARMACIST DOCUMENTED: ICD-10-PCS | Mod: CPTII,95,, | Performed by: NURSE PRACTITIONER

## 2023-05-26 PROCEDURE — 3052F PR MOST RECENT HEMOGLOBIN A1C LEVEL 8.0 - < 9.0%: ICD-10-PCS | Mod: CPTII,95,, | Performed by: NURSE PRACTITIONER

## 2023-05-26 PROCEDURE — 97112 NEUROMUSCULAR REEDUCATION: CPT | Mod: PN,CQ

## 2023-05-26 PROCEDURE — 1159F MED LIST DOCD IN RCRD: CPT | Mod: CPTII,95,, | Performed by: NURSE PRACTITIONER

## 2023-05-26 RX ORDER — INSULIN DEGLUDEC 200 U/ML
45 INJECTION, SOLUTION SUBCUTANEOUS NIGHTLY
Qty: 3 PEN | Refills: 5
Start: 2023-05-26 | End: 2023-07-25

## 2023-05-26 RX ORDER — DULAGLUTIDE 4.5 MG/.5ML
4.5 INJECTION, SOLUTION SUBCUTANEOUS
Qty: 4 PEN | Refills: 11 | Status: SHIPPED | OUTPATIENT
Start: 2023-05-26 | End: 2023-09-25 | Stop reason: SDUPTHER

## 2023-05-26 NOTE — PROGRESS NOTES
"[facility]   Physical Therapy Treatment Note      Name: Brett Garcia  Clinic Number: [id number]    Therapy Diagnosis:   Encounter Diagnoses   Name Primary?    Decreased range of motion (ROM) of left knee Yes    Weakness of trunk musculature      Physician: Monica Andrade,*    Visit Date: 5/26/2023       Physician Orders: PT Eval and Treat   Medical Diagnosis from Referral: Acute midline low back pain without sciatica [M54.50], Acute pain of left knee [M25.562]  Evaluation Date: 5/19/2023  Authorization Period Expiration: 5/11/2023  Plan of Care Expiration: 7/14/2023  Progress Note Due: 6/ 19/ 2023  Visit # / Visits authorized: 2/ 20  FOTO: 1/3     Precautions: Standard and Diabetes     PTA Visit #: 1/5     Time In: 11:50 pm   Time Out: 12:40 pm  Total Billable Time: 50 minutes     Subjective     Pt reports: improvements  He was not compliant with home exercise program.  Response to previous treatment: n/a today   Functional change: n/ a today     Pain: 3/10 L knee and low back     Objective      Objective Measures updated at progress report unless specified.     Treatment     Brett received the following treatments:      Brett received therapeutic exercises to develop strength, endurance, ROM, and flexibility for (19) minutes including:     LTR x 15 B   Hamstring stretch 3 x 20" B   Piriformis stretch 3 x 20" B  Hand Heel rocks x 10   DKTC 2x10 focus on core contraction  Side stepping GTB // 3 laps   Seated Trunk Flexion stretch    Brett received the following manual therapy techniques applied for (00) minutes, including:    Brett participated in neuromuscular re-education activities to improve: Balance, Coordination, Kinesthetic, Sense, Proprioception, and Posture for (38) minutes. The following activities were included:    Modified Single Leg Bridges x15 each  Prone Donkey Kicks; 2 x8  Reverse clams x 12 B   SLR abduction x 15 B 5" holds  Prone TKEs x 10 10" holds " "  Plank on Elbows 4 x 20"   SLS 3 x 30" B   Static stance on airex + eyes closed 3 x 30"   Leaning cat/ cow x 10 cue for breathing  Seated on Swiss Ball combo LAQ; 1 x 10 each, 1x5  Seated on Swiss pelvic circles x15 cw and ccw  Thread the needle x10  Prone Press Ups x15  Tandem Stance eyes closed; 3 x 20" each  Tandem Gait forward and back 2 laps    Brett participated in dynamic functional therapeutic activities to improve functional performance for 00  minutes, including:      Patient Education and Home Exercises       Education provided:     Written Home Exercises Provided: Patient instructed to cont prior HEP. Exercises were reviewed and Brett was able to demonstrate them prior to the end of the session.  Brett demonstrated good  understanding of the education provided. See EMR under Patient Instructions for exercises provided during therapy sessions    Assessment     Continued work of previous treatment with progression as patient demonstrated tolerance to completing. Patient regularly reported feeling relief and no exacerbations of pain with prescribed exercises. Patient reported being able to feel when he is overdoing it and is able to make corrections necessary to minimize pain. Continued abnormal gait present with limited hip/pelvic disassociation from lower trunk, limited swaying/swing, as well as wide AR.    Brett Is progressing well towards his goals.   Pt prognosis is Good.     Pt will continue to benefit from skilled outpatient physical therapy to address the deficits listed in the problem list box on initial evaluation, provide pt/family education and to maximize pt's level of independence in the home and community environment.     Pt's spiritual, cultural and educational needs considered and pt agreeable to plan of care and goals.     Anticipated barriers to physical therapy: none     Goals:    Short Term Goals:  4 weeks Progress   5/19/2023   Pain: Pt will demonstrate improved pain by " reports of less than or equal to 6/10 worst pain on the verbal rating scale in order to progress toward maximal functional ability and improve QOL. PC   Function: Patient will demonstrate improved function as indicated by a functional limitation score of less than or equal to 31 out of 100 on knee and lumbar FOTO. PC   HEP: Patient will demonstrate independence with HEP in order to progress toward functional independence. PC      Long Term Goals:  8 weeks Progress  5/19/2023   Pain: Pt will demonstrate improved pain by reports of less than or equal to 3/10 worst pain on the verbal rating scale in order to progress toward maximal functional ability and improve QOL.   PC   Function: Patient will demonstrate improved function as indicated by a functional limitation score of less than or equal to 23 out of 100 on knee and lumbar FOTO. PC   Mobility: Patient will improve AROM to stated goals, listed in objective measures above, in order to return to maximal functional potential and improve quality of life. PC   Strength: Patient will improve strength to stated goals, listed in objective measures above, in order to improve functional independence and quality of life. PC   Stair Climbing: Patient will demonstrate ability to climb up and down 20 steps with normalized mechanics and no complaints of pain in order to improve functional mobility at two-story home and at job.  PC   GOALS KEY:   PC= progressing/continue;   PM= partially met;             DC= discontinue    Plan     Plan of care Certification: 5/19/2023 to 7/14/2023.     Outpatient Physical Therapy 2 times weekly for 8 weeks to include the following interventions: Cervical/Lumbar Traction, Electrical Stimulation  , Gait Training, Manual Therapy, Moist Heat/ Ice, Neuromuscular Re-ed, Orthotic Management and Training, Patient Education, Self Care, Therapeutic Activities, Therapeutic Exercise, Ultrasound, and Dry Needling .        Yahir Montoya, PTA

## 2023-05-26 NOTE — PATIENT INSTRUCTIONS
Medication Regimen:   Continue Metformin 1000 mg twice daily with meals  Continue Prandin 2 mg, 1 tablets three times daily before meals   Decrease Tresiba 45 units every morning  Increase Trulicity 4.5 mg weekly    Patient Instructions:  Carbohydrate Count: 30-45 grams/meal and 15 grams/snack with limit of 2 snacks per day.  Exercise: Goal is 150 minutes or more per week.  Bring meter and blood sugar log to each appointment.     Goals for Blood Sugar:  Fastin-130 mg/dl  2 hours after meal:  mg/dl  Before Bed: 100-150 mg/dl  If Blood sugar is below 70 mg/dl, DO NOT take diabetes medication. Eat first and then recheck blood sugar in 20 minutes.  Foods to eat if blood sugar is below 70 mg/dl  1/2 glass of orange juice   1/2 regular cola can   3-4 hard candies   3-4 small glucose tabs.   Foods to eat if blood sugar is below 50 mg/dl  1 glass of orange juice  1 regular cola can  8 hard candies  8 small glucose tabs.   If you have repeated eating/blood sugar recheck process 2 times and blood sugar still below 70 mg/dl, call 911.

## 2023-05-26 NOTE — PROGRESS NOTES
Established Patient - Virtual Telehealth Visit     The patient location is: Home ([place])  The chief complaint leading to consultation is: Diabetes Follow-up  Visit type: Virtual visit with synchronous audio and video via Epic [employer] isabela  Total time spent with patient: 16 minutes     Each patient to whom I provide medical services by telemedicine is:  (1) informed of the relationship between the physician and patient and the respective role of any other health care provider with respect to management of the patient; and (2) notified that they may decline to receive medical services by telemedicine and may withdraw from such care at any time. Patient verbally consented to receive this service via voice-only telephone call.    PCP: Herb Lord MD    Subjective:     Chief Complaint: Diabetes follow up.  Last visit with me: 3/31/2023    History of Present Illness:   57 y.o.  male for diabetes follow up.   Previously managed by JANE Ross  Last clinic visit 8/4/2021.  Type II diabetes since 2014 .  Comorbidities: HTN and HLD    Known diabetes complications:  DKA/HHS: no  Retinopathy: yes  Peripheral neuropathy: yes, numbness and tingling bilateral feet  Nephropathy: yes    Interval history:  Denies recent hospital admissions or ER visits.   Denies hypoglycemia.  Endorses diabetes related symptoms: Numbness and Tingling in both feet    Blood glucose monitoring: fingerstick: 1x/day   Per patient's recall, over the last 4 weeks   Fasting  this morning, average 110-120's    No BG log for review.    Activity Level: Walking 4x/day, 1400 steps/day  Meal Planning:3 meals/day;1 snacks/day.    Medication compliance all of the time, diet noncompliance some of the time.   Has attended diabetes education in the past.     Previous regimen:  Soliqua - on max dose    Past failed treatment: Jardiance - yeast infection, Ozempic - diarrhea    Current DM Medications:  Diabetes Medications               dulaglutide  (TRULICITY) 3 mg/0.5 mL pen injector Inject 3 mg into the skin every 7 days.    insulin degludec (TRESIBA FLEXTOUCH U-200) 200 unit/mL (3 mL) insulin pen Inject 50 Units into the skin every evening.    metFORMIN (GLUCOPHAGE) 1000 MG tablet Take 1 tablet (1,000 mg total) by mouth 2 (two) times daily with meals.    repaglinide (PRANDIN) 2 MG tablet TAKE 2 TABLETS BY MOUTH 3 (THREE) TIMES DAILY BEFORE MEALS.  Taking 1 tablet ac meals     Allergies  Review of patient's allergies indicates:   Allergen Reactions    Hay fever and allergy relief        Labs Reviewed:  His most recent A1C is:  Lab Results   Component Value Date    HGBA1C 8.3 (H) 05/05/2023    HGBA1C 9.4 (H) 01/19/2023    HGBA1C 10.2 (H) 12/16/2022       His most recent BMP is:  Lab Results   Component Value Date     09/23/2022    K 4.0 09/23/2022     09/23/2022    CO2 24 09/23/2022    BUN 17 09/23/2022    CREATININE 0.9 09/23/2022    CALCIUM 8.9 09/23/2022    ANIONGAP 10 09/23/2022    ESTGFRAFRICA >60.0 02/19/2022    EGFRNONAA >60.0 02/19/2022       Lab Results   Component Value Date    CPEPTIDE 2.4 04/21/2017     Lab Results   Component Value Date    GLUTAMICACID 0.00 04/21/2017       There is no height or weight on file to calculate BMI.    Wt Readings from Last 3 Encounters:   05/19/23 0949 103.4 kg (228 lb)   05/12/23 1026 103.6 kg (228 lb 6.3 oz)   04/14/23 1347 105.6 kg (232 lb 12.9 oz)        His blood sugar in clinic today is: Not assessed due to type of visit.    Diabetes Management Status  Statin: Taking  ACE/ARB: Taking    Screening or Prevention Patient's value Goal Complete/Controlled?   HgA1C Testing and Control   Lab Results   Component Value Date    HGBA1C 8.3 (H) 05/05/2023      Annually/Less than 8% No   Lipid profile : 09/23/2022 Annually Yes   LDL control Lab Results   Component Value Date    LDLCALC 53.2 (L) 09/23/2022    Annually/Less than 100 mg/dl  Yes   Nephropathy screening Lab Results   Component Value Date     MICALBCREAT 11.2 05/21/2022     Lab Results   Component Value Date    PROTEINUA Trace (A) 06/30/2016    Annually No   Blood pressure BP Readings from Last 1 Encounters:   05/12/23 122/82    Less than 140/90 Yes   Dilated retinal exam : 10/07/2022 Annually Yes    Foot exam   : 09/12/2022 Annually Yes     Social History     Socioeconomic History    Marital status:     Number of children: 2   Occupational History    Occupation: Plant work      Employer: [employer]     Comment: [employer]   Tobacco Use    Smoking status: Never    Smokeless tobacco: Former   Substance and Sexual Activity    Alcohol use: Yes     Alcohol/week: 2.0 standard drinks     Types: 2 Cans of beer per week    Drug use: No    Sexual activity: Yes     Partners: Female     Past Medical History:   Diagnosis Date    Diabetes mellitus with neuropathy 2009    BS 57 am 12/18/2015    Hyperlipidemia 2009    Hypertension associated with diabetes 4/21/2017     Review of Systems   Constitutional:  Negative for activity change, appetite change, fatigue and unexpected weight change.   HENT:  Negative for dental problem and trouble swallowing.    Eyes:  Negative for visual disturbance.   Respiratory:  Negative for chest tightness and shortness of breath.    Cardiovascular:  Negative for chest pain, palpitations and leg swelling.   Gastrointestinal:  Negative for abdominal pain, constipation, diarrhea, nausea and vomiting.   Endocrine: Negative for polydipsia, polyphagia and polyuria.   Genitourinary:  Negative for difficulty urinating, dysuria, frequency and urgency.   Musculoskeletal:  Negative for gait problem, myalgias and neck pain.   Integumentary:  Negative for rash and wound.   Allergic/Immunologic: Negative.    Neurological:  Positive for numbness (tingling BLE). Negative for dizziness, syncope, weakness and headaches.   Hematological: Negative.    Psychiatric/Behavioral:  Negative for confusion and sleep disturbance.    All other systems reviewed  and are negative.     Objective:      Physical Exam  Constitutional:       General: She is awake.      Appearance: Normal appearance. She is well-developed and well-groomed.   HENT:      Head: Normocephalic and atraumatic.   Eyes:      General: Lids are normal. Gaze aligned appropriately.   Pulmonary:      Effort: Pulmonary effort is normal. No respiratory distress.   Neurological:      Mental Status: She is alert and oriented to person, place, and time.   Psychiatric:         Attention and Perception: Attention normal.         Mood and Affect: Mood and affect normal.         Speech: Speech normal.         Behavior: Behavior normal.         Thought Content: Thought content normal.         Cognition and Memory: Cognition normal.         Judgment: Judgment normal.       Assessment / Plan:     Diagnoses and all orders for this visit:    Type 2 diabetes mellitus with diabetic neuropathy, with long-term current use of insulin  -     Hemoglobin A1C; Future  -     dulaglutide (TRULICITY) 4.5 mg/0.5 mL pen injector; Inject 4.5 mg into the skin every 7 days.  -     insulin degludec (TRESIBA FLEXTOUCH U-200) 200 unit/mL (3 mL) insulin pen; Inject 46 Units into the skin every evening.    Hyperlipidemia associated with type 2 diabetes mellitus    Hypertension associated with diabetes    Additional Plan Details:  - Condition: Uncontrolled, most recent A1C of 8.3% was completed 2 weeks ago. Improved from previous reading.  - Monitor blood glucose:  4x daily.Goals reviewed. Maintain BG log.   - Hypoglycemia protocol: Reviewed and risk discussed.  Notify if BG readings below 80. Protocol printout provided.   - Meal Planning: Limit carbohydrate intake 30-45 grams/meal, 3x daily and 15 grams/snack, limit 2/day.   - Activity level: Recommend at least 150 minutes of exercise in a week.  - BP and LDL: Recommend lifestyle modifications and follow up with PCP for management.   - Medication Regimen:     Continue Metformin 1000 mg twice  daily with meals  Continue Prandin 2 mg, 1 tablets three times daily before meals   Decrease Tresiba 45 units every morning  Increase Trulicity 4.5 mg weekly    - Medication consideration: Continue regimen. Titrate GLP/basal to goal BG.   - Health Maintenance standards addressed today:  A1c scheduled. [facility] Eye exam  [facility]  - Risks of uncontrolled DM explained. Importance of routine foot and eye exams reviewed. Scheduled as appropriate.  -The patient was explained the above plan and given opportunity to ask questions.  He understands, chooses and consents to this plan and accepts all the risks, which include but are not limited to the risks mentioned above.   - Labs ordered as above.  - CDE follow up: Scheduled  - Follow up: 8 weeks, virtual.         Charlotte T. Delacruz, FNP-C [facility]    A total of 18 minutes was spent in face to face time, of which over 50 % was spent in counseling patient on disease process, complications, treatment, and side effects of medications.

## 2023-05-30 ENCOUNTER — CLINICAL SUPPORT (OUTPATIENT)
Dept: REHABILITATION | Facility: HOSPITAL | Age: 58
End: 2023-05-30
Payer: COMMERCIAL

## 2023-05-30 DIAGNOSIS — M62.81 WEAKNESS OF TRUNK MUSCULATURE: ICD-10-CM

## 2023-05-30 DIAGNOSIS — M25.662 DECREASED RANGE OF MOTION (ROM) OF LEFT KNEE: Primary | ICD-10-CM

## 2023-05-30 PROCEDURE — 97140 MANUAL THERAPY 1/> REGIONS: CPT | Mod: PN

## 2023-05-30 PROCEDURE — 97112 NEUROMUSCULAR REEDUCATION: CPT | Mod: PN

## 2023-05-30 PROCEDURE — 97110 THERAPEUTIC EXERCISES: CPT | Mod: PN

## 2023-05-30 NOTE — PROGRESS NOTES
"[facility]   Physical Therapy Treatment Note      Name: Brett Garcia  Clinic Number: [id number]    Therapy Diagnosis:   Encounter Diagnoses   Name Primary?    Decreased range of motion (ROM) of left knee Yes    Weakness of trunk musculature      Physician: Monica Andrade,*    Visit Date: 5/30/2023       Physician Orders: PT Eval and Treat   Medical Diagnosis from Referral: Acute midline low back pain without sciatica [M54.50], Acute pain of left knee [M25.562]  Evaluation Date: 5/19/2023  Authorization Period Expiration: 5/11/2023  Plan of Care Expiration: 7/14/2023  Progress Note Due: 6/ 19/ 2023  Visit # / Visits authorized: 3/ 20  FOTO: 1/3     Precautions: Standard and Diabetes     PTA Visit #: 1/5     Time In: 5:15 pm   Time Out: 6:10 pm  Total Billable Time: 55 minutes     Subjective     Pt reports: he is feeling pretty good today, as he was off from work. Patient states distal hamstring has felt a little tight and back is somewhat aching, but nothing like he feels after work.     He was not compliant with home exercise program.  Response to previous treatment: soreness   Functional change: n/ a today     Pain: 3/10 L knee and low back     Objective      Objective Measures updated at progress report unless specified.     Treatment     Brett received the following treatments:      Brett received therapeutic exercises to develop strength, endurance, ROM, and flexibility for (9) minutes including:   Hamstring stretch 3 x 20" B   DKTC 2x10 focus on core contraction  Side stepping GTB // 3 laps     Brett received the following manual therapy techniques applied for (8) minutes, including:    STM and release techniques at L distal hamstring       Brett participated in neuromuscular re-education activities to improve: Balance, Coordination, Kinesthetic, Sense, Proprioception, and Posture for (38) minutes. The following activities were included:    Modified Single Leg " "Bridges x15 each  Prone Donkey Kicks; 2 x8  Reverse clams x 12 B   SLR abduction x 15 B 5" holds  Resisted standing TKEs GTB x 10 10" holds   Plank on Elbows 4 x 20"   SLS 3 x 30" B   Static stance on airex + eyes closed 3 x 30"   Leaning cat/ cow x 10 cue for breathing  Seated on Swiss Ball combo LAQ; 1 x 10 each  Seated on Swiss pelvic circles x15 cw and ccw  Thread the needle x10  Prone Press Ups x15  Tandem Stance eyes closed; 3 x 20" each  Tandem Gait forward and back 3 laps    Brett participated in dynamic functional therapeutic activities to improve functional performance for 00  minutes, including:      Patient Education and Home Exercises       Education provided:     Written Home Exercises Provided: Patient instructed to cont prior HEP. Exercises were reviewed and Brett was able to demonstrate them prior to the end of the session.  Brett demonstrated good  understanding of the education provided. See EMR under Patient Instructions for exercises provided during therapy sessions    Assessment   As patient presented with decreased pain today after having a day off, less focus and time was placed on stretching and more was placed on neuromuscular reeducation. However, patient did start to experience hamstring cramping and tension during session. This was reportedly relieved following manual therapy and stretching. Patient does well with carry over from prior sessions in terms of cues for proper muscle activation. Patient continues to be reminded about engaging core with proper breathing during exercise. Patient continues to be limited with pelvic control and mobility. Will continue to address limitations in session to come.     Brett Is progressing well towards his goals.   Pt prognosis is Good.     Pt will continue to benefit from skilled outpatient physical therapy to address the deficits listed in the problem list box on initial evaluation, provide pt/family education and to maximize pt's level of " independence in the home and community environment.     Pt's spiritual, cultural and educational needs considered and pt agreeable to plan of care and goals.     Anticipated barriers to physical therapy: none     Goals:    Short Term Goals:  4 weeks Progress   5/19/2023   Pain: Pt will demonstrate improved pain by reports of less than or equal to 6/10 worst pain on the verbal rating scale in order to progress toward maximal functional ability and improve QOL. PC   Function: Patient will demonstrate improved function as indicated by a functional limitation score of less than or equal to 31 out of 100 on knee and lumbar FOTO. PC   HEP: Patient will demonstrate independence with HEP in order to progress toward functional independence. PC      Long Term Goals:  8 weeks Progress  5/19/2023   Pain: Pt will demonstrate improved pain by reports of less than or equal to 3/10 worst pain on the verbal rating scale in order to progress toward maximal functional ability and improve QOL.   PC   Function: Patient will demonstrate improved function as indicated by a functional limitation score of less than or equal to 23 out of 100 on knee and lumbar FOTO. PC   Mobility: Patient will improve AROM to stated goals, listed in objective measures above, in order to return to maximal functional potential and improve quality of life. PC   Strength: Patient will improve strength to stated goals, listed in objective measures above, in order to improve functional independence and quality of life. PC   Stair Climbing: Patient will demonstrate ability to climb up and down 20 steps with normalized mechanics and no complaints of pain in order to improve functional mobility at two-story home and at job.  PC   GOALS KEY:   PC= progressing/continue;   PM= partially met;             DC= discontinue    Plan     Plan of care Certification: 5/19/2023 to 7/14/2023.     Outpatient Physical Therapy 2 times weekly for 8 weeks to include the following  interventions: Cervical/Lumbar Traction, Electrical Stimulation  , Gait Training, Manual Therapy, Moist Heat/ Ice, Neuromuscular Re-ed, Orthotic Management and Training, Patient Education, Self Care, Therapeutic Activities, Therapeutic Exercise, Ultrasound, and Dry Needling .        Kelsie Moser, PT

## 2023-06-02 ENCOUNTER — TELEPHONE (OUTPATIENT)
Dept: SPORTS MEDICINE | Facility: CLINIC | Age: 58
End: 2023-06-02
Payer: COMMERCIAL

## 2023-06-02 ENCOUNTER — CLINICAL SUPPORT (OUTPATIENT)
Dept: REHABILITATION | Facility: HOSPITAL | Age: 58
End: 2023-06-02
Payer: COMMERCIAL

## 2023-06-02 ENCOUNTER — PATIENT MESSAGE (OUTPATIENT)
Dept: SPORTS MEDICINE | Facility: CLINIC | Age: 58
End: 2023-06-02
Payer: COMMERCIAL

## 2023-06-02 DIAGNOSIS — M62.81 WEAKNESS OF TRUNK MUSCULATURE: ICD-10-CM

## 2023-06-02 DIAGNOSIS — M25.662 DECREASED RANGE OF MOTION (ROM) OF LEFT KNEE: Primary | ICD-10-CM

## 2023-06-02 PROCEDURE — 97110 THERAPEUTIC EXERCISES: CPT | Mod: PN

## 2023-06-02 PROCEDURE — 97112 NEUROMUSCULAR REEDUCATION: CPT | Mod: PN

## 2023-06-02 NOTE — PROGRESS NOTES
"[facility]   Physical Therapy Treatment Note      Name: Brett Garcia  Clinic Number: [id number]    Therapy Diagnosis:   Encounter Diagnoses   Name Primary?    Decreased range of motion (ROM) of left knee Yes    Weakness of trunk musculature      Physician: Monica Andrade,*    Visit Date: 6/2/2023       Physician Orders: PT Eval and Treat   Medical Diagnosis from Referral: Acute midline low back pain without sciatica [M54.50], Acute pain of left knee [M25.562]  Evaluation Date: 5/19/2023  Authorization Period Expiration: 5/11/2023  Plan of Care Expiration: 7/14/2023  Progress Note Due: 6/ 19/ 2023  Visit # / Visits authorized: 4/ 20  FOTO: 1/3     Precautions: Standard and Diabetes     PTA Visit #: 1/5     Time In: 3:00 pm   Time Out: 3:50 pm  Total Billable Time: 50 minutes (30 minutes 1:1 with trained technician)     Subjective     Pt reports: knee is hurting more so today, presenting. Patient states he thinks his medication is wearing off sooner.     He was not compliant with home exercise program.  Response to previous treatment: soreness   Functional change: n/ a today     Pain: 5/10 L knee and low back     Objective      Objective Measures updated at progress report unless specified.     Treatment     Brett received the following treatments:      Brett received therapeutic exercises to develop strength, endurance, ROM, and flexibility for (12) minutes including:   Hamstring stretch 3 x 20" B   DKTC 2x10 focus on core contraction  Side stepping GTB // 3 laps   Piriformis stretch 3 x 20" B      Brett received the following manual therapy techniques applied for (00) minutes, including:    STM and release techniques at L distal hamstring       Brett participated in neuromuscular re-education activities to improve: Balance, Coordination, Kinesthetic, Sense, Proprioception, and Posture for (38) minutes. The following activities were included:    Modified Single Leg " "Bridges x15 each  Prone Donkey Kicks; 2 x8  Reverse clams x 12 B   SLR abduction x 15 B 5" holds  Resisted standing TKEs GTB x 10 10" holds   Plank on Elbows 4 x 20"   Leaning cat/ cow x 10 cue for breathing  Seated on Swiss Ball combo LAQ; 1 x 10 each  Seated on Swiss pelvic circles x15 cw and ccw  Thread the needle x10  Prone Press Ups x15  Tandem Stance eyes closed; 3 x 20" each  Tandem Gait forward and back 3 laps  Bosu Squats in // x 10       Brett participated in dynamic functional therapeutic activities to improve functional performance for 00  minutes, including:      Patient Education and Home Exercises       Education provided:     Written Home Exercises Provided: Patient instructed to cont prior HEP. Exercises were reviewed and Brett was able to demonstrate them prior to the end of the session.  Brett demonstrated good  understanding of the education provided. See EMR under Patient Instructions for exercises provided during therapy sessions    Assessment   Patient presents with more knee pain than in past session today. Started out with stretches today with patient tolerating well. As exercises were continued, patient reports knee pain was relieved. Bosu squats were done as a progression at the end of session today with patient demonstrating the limitations of stability that remain apparent throughout lower extremities. Patient continues to be reminded about engaging core with proper breathing during exercise. Improved pelvic control and mobility was noted with SB work in mirror today for feedback. Will continue to address limitations in session to come.     Brett Is progressing well towards his goals.   Pt prognosis is Good.     Pt will continue to benefit from skilled outpatient physical therapy to address the deficits listed in the problem list box on initial evaluation, provide pt/family education and to maximize pt's level of independence in the home and community environment.     Pt's " spiritual, cultural and educational needs considered and pt agreeable to plan of care and goals.     Anticipated barriers to physical therapy: none     Goals:    Short Term Goals:  4 weeks Progress   5/19/2023   Pain: Pt will demonstrate improved pain by reports of less than or equal to 6/10 worst pain on the verbal rating scale in order to progress toward maximal functional ability and improve QOL. PC   Function: Patient will demonstrate improved function as indicated by a functional limitation score of less than or equal to 31 out of 100 on knee and lumbar FOTO. PC   HEP: Patient will demonstrate independence with HEP in order to progress toward functional independence. PC      Long Term Goals:  8 weeks Progress  5/19/2023   Pain: Pt will demonstrate improved pain by reports of less than or equal to 3/10 worst pain on the verbal rating scale in order to progress toward maximal functional ability and improve QOL.   PC   Function: Patient will demonstrate improved function as indicated by a functional limitation score of less than or equal to 23 out of 100 on knee and lumbar FOTO. PC   Mobility: Patient will improve AROM to stated goals, listed in objective measures above, in order to return to maximal functional potential and improve quality of life. PC   Strength: Patient will improve strength to stated goals, listed in objective measures above, in order to improve functional independence and quality of life. PC   Stair Climbing: Patient will demonstrate ability to climb up and down 20 steps with normalized mechanics and no complaints of pain in order to improve functional mobility at two-story home and at job.  PC   GOALS KEY:   PC= progressing/continue;   PM= partially met;             DC= discontinue    Plan     Plan of care Certification: 5/19/2023 to 7/14/2023.     Outpatient Physical Therapy 2 times weekly for 8 weeks to include the following interventions: Cervical/Lumbar Traction, Electrical Stimulation  ,  Gait Training, Manual Therapy, Moist Heat/ Ice, Neuromuscular Re-ed, Orthotic Management and Training, Patient Education, Self Care, Therapeutic Activities, Therapeutic Exercise, Ultrasound, and Dry Needling .        Kelsie Moser, PT

## 2023-06-02 NOTE — TELEPHONE ENCOUNTER
Rescheduled patient   ----- Message from Grace Cross sent at 6/2/2023  1:10 PM CDT -----  Contact: Brett  Patient is calling to speak with the nurse regarding rescheduling 06/26/2023 and 07/03/2023 appointment. Please give a call back at [phone] as requested.  Thanks  LR

## 2023-06-06 ENCOUNTER — CLINICAL SUPPORT (OUTPATIENT)
Dept: REHABILITATION | Facility: HOSPITAL | Age: 58
End: 2023-06-06
Payer: COMMERCIAL

## 2023-06-06 DIAGNOSIS — M62.81 WEAKNESS OF TRUNK MUSCULATURE: ICD-10-CM

## 2023-06-06 DIAGNOSIS — M25.662 DECREASED RANGE OF MOTION (ROM) OF LEFT KNEE: Primary | ICD-10-CM

## 2023-06-06 PROCEDURE — 97112 NEUROMUSCULAR REEDUCATION: CPT | Mod: PN,CQ

## 2023-06-06 PROCEDURE — 97110 THERAPEUTIC EXERCISES: CPT | Mod: PN,CQ

## 2023-06-06 NOTE — PROGRESS NOTES
"[facility]   Physical Therapy Treatment Note      Name: Brett Garcia  Clinic Number: [id number]    Therapy Diagnosis:   Encounter Diagnoses   Name Primary?    Decreased range of motion (ROM) of left knee Yes    Weakness of trunk musculature      Physician: Monica Andrade,*    Visit Date: 6/6/2023       Physician Orders: PT Eval and Treat   Medical Diagnosis from Referral: Acute midline low back pain without sciatica [M54.50], Acute pain of left knee [M25.562]  Evaluation Date: 5/19/2023  Authorization Period Expiration: 5/11/2023  Plan of Care Expiration: 7/14/2023  Progress Note Due: 6/ 19/ 2023  Visit # / Visits authorized: 5/ 20  FOTO: 1/3     Precautions: Standard and Diabetes     PTA Visit #: 1/5     Time In: 4:50 pm   Time Out: 5:51 pm  Total Billable Time: 61 minutes    Subjective     Pt reports: feeling a deep ache in the low back and left piriformis. Patient reports taking the Naproxen every 8 hours helps diminish the left knee/lower extremity pain.   He was not compliant with home exercise program.  Response to previous treatment: soreness   Functional change: n/ a today     Pain: 5/10 low back     Objective      Objective Measures updated at progress report unless specified.     Treatment     Brett received the following treatments:      Brett received therapeutic exercises to develop strength, endurance, ROM, and flexibility for (23) minutes including:     Shuttle Squats; 5B, 2 minutes  Shuttle Single Leg squats; 3B , 1 minute each  Hamstring stretch 3 x 20" B   DKTC 2x10 focus on core contraction  Side stepping GTB // 3 laps   Piriformis stretch 3 x 20" B  Standing Hip Cars; x15 each  Static Lunges; 2 x6 each with BUE A on right   Hammond Pose modified to tolerable leg position; 5-10" hold x3 each      Brett received the following manual therapy techniques applied for (00) minutes, including:    STM and release techniques at L distal hamstring       Brett " "participated in neuromuscular re-education activities to improve: Balance, Coordination, Kinesthetic, Sense, Proprioception, and Posture for (38) minutes. The following activities were included:    Modified Single Leg Bridges x15 each  Prone Donkey Kicks; 2 x8, each  SLR abduction 10 x10"  Reclined seated SLR with abducting and adducting over 2 dumbbells x10 each  Leaning cat/ cow x 10 cue for breathing  Seated on Swiss Ball combo LAQ; 1 x 10 each  Seated on Swiss pelvic circles x15 cw and ccw  Seated on swiss ball; holding swiss ball combo perturbations up/down and side to side 3 rounds  Standing pelvic circles x10 each  Thread the needle x10  Prone Press Ups x15  Tandem Stance eyes closed; 3 x 20" each  Tandem Gait forward and back 3 laps  Bosu Squats in // x 10    Prone Hip Internal Rotation/External Rotation x15 each    Brett participated in dynamic functional therapeutic activities to improve functional performance for 00  minutes, including:      Patient Education and Home Exercises       Education provided:     Written Home Exercises Provided: Patient instructed to cont prior HEP. Exercises were reviewed and Brett was able to demonstrate them prior to the end of the session.  Brett demonstrated good  understanding of the education provided. See EMR under Patient Instructions for exercises provided during therapy sessions    Assessment     Continued work in attempt to decrease subjective pain in back and hip as well as improve left hip and lower extremity strength/endurance. Patient continues to report a reoccurrence of Left Lower Extremity having more challenge with exercises compared to right. Continued workout fatigue reported post treatment with patient stating feeling improvements overall by the visit. Continued difficulty of pelvic and hip mobility as well as a lack disassociation from the back present. Continued abnormal gait mechanics with minimal trunk rotation and hip swing present.    Brett " Is progressing well towards his goals.   Pt prognosis is Good.     Pt will continue to benefit from skilled outpatient physical therapy to address the deficits listed in the problem list box on initial evaluation, provide pt/family education and to maximize pt's level of independence in the home and community environment.     Pt's spiritual, cultural and educational needs considered and pt agreeable to plan of care and goals.     Anticipated barriers to physical therapy: none     Goals:    Short Term Goals:  4 weeks Progress   5/19/2023   Pain: Pt will demonstrate improved pain by reports of less than or equal to 6/10 worst pain on the verbal rating scale in order to progress toward maximal functional ability and improve QOL. PC   Function: Patient will demonstrate improved function as indicated by a functional limitation score of less than or equal to 31 out of 100 on knee and lumbar FOTO. PC   HEP: Patient will demonstrate independence with HEP in order to progress toward functional independence. PC      Long Term Goals:  8 weeks Progress  5/19/2023   Pain: Pt will demonstrate improved pain by reports of less than or equal to 3/10 worst pain on the verbal rating scale in order to progress toward maximal functional ability and improve QOL.   PC   Function: Patient will demonstrate improved function as indicated by a functional limitation score of less than or equal to 23 out of 100 on knee and lumbar FOTO. PC   Mobility: Patient will improve AROM to stated goals, listed in objective measures above, in order to return to maximal functional potential and improve quality of life. PC   Strength: Patient will improve strength to stated goals, listed in objective measures above, in order to improve functional independence and quality of life. PC   Stair Climbing: Patient will demonstrate ability to climb up and down 20 steps with normalized mechanics and no complaints of pain in order to improve functional mobility at  two-story home and at job.  PC   GOALS KEY:   PC= progressing/continue;   PM= partially met;             DC= discontinue    Plan     Plan of care Certification: 5/19/2023 to 7/14/2023.     Outpatient Physical Therapy 2 times weekly for 8 weeks to include the following interventions: Cervical/Lumbar Traction, Electrical Stimulation  , Gait Training, Manual Therapy, Moist Heat/ Ice, Neuromuscular Re-ed, Orthotic Management and Training, Patient Education, Self Care, Therapeutic Activities, Therapeutic Exercise, Ultrasound, and Dry Needling .        Yahir Montoya, PTA

## 2023-06-07 ENCOUNTER — PATIENT MESSAGE (OUTPATIENT)
Dept: DIABETES | Facility: CLINIC | Age: 58
End: 2023-06-07
Payer: COMMERCIAL

## 2023-06-09 ENCOUNTER — CLINICAL SUPPORT (OUTPATIENT)
Dept: REHABILITATION | Facility: HOSPITAL | Age: 58
End: 2023-06-09
Payer: COMMERCIAL

## 2023-06-09 DIAGNOSIS — M25.662 DECREASED RANGE OF MOTION (ROM) OF LEFT KNEE: Primary | ICD-10-CM

## 2023-06-09 DIAGNOSIS — M62.81 WEAKNESS OF TRUNK MUSCULATURE: ICD-10-CM

## 2023-06-09 PROCEDURE — 97110 THERAPEUTIC EXERCISES: CPT | Mod: PN,CQ

## 2023-06-09 PROCEDURE — 97112 NEUROMUSCULAR REEDUCATION: CPT | Mod: PN,CQ

## 2023-06-09 PROCEDURE — 97530 THERAPEUTIC ACTIVITIES: CPT | Mod: PN,CQ

## 2023-06-09 NOTE — PROGRESS NOTES
"[facility]   Physical Therapy Treatment Note      Name: Brett Garcia  Clinic Number: [id number]    Therapy Diagnosis:   Encounter Diagnoses   Name Primary?    Decreased range of motion (ROM) of left knee Yes    Weakness of trunk musculature        Physician: Monica Andrade,*    Visit Date: 6/9/2023       Physician Orders: PT Eval and Treat   Medical Diagnosis from Referral: Acute midline low back pain without sciatica [M54.50], Acute pain of left knee [M25.562]  Evaluation Date: 5/19/2023  Authorization Period Expiration: 5/11/2023  Plan of Care Expiration: 7/14/2023  Progress Note Due: 6/ 19/ 2023  Visit # / Visits authorized: 6/ 20  FOTO: 1/3     Precautions: Standard and Diabetes     PTA Visit #: 1/5     Time In: 11:53 pm   Time Out: 12:48 pm  Total Billable Time: 53 minutes    Subjective     Pt reports: continuing to have left knee partially giving way during the day. Patient continues to report inability to walk with knees in terminal extension secondary to a deep ache pain in posterior knee.   He was compliant with home exercise program.  Response to previous treatment: soreness   Functional change: n/ a today     Pain: 5/10 low back     Objective      Objective Measures updated at progress report unless specified.     Treatment     Brett received the following treatments:      Brett received therapeutic exercises to develop strength, endurance, ROM, and flexibility for (18) minutes including:     Hamstring stretch 3 x 20" B   DKTC 2x10 focus on core contraction  Standing Hip Cars; x15 each  Static Lunges; 2 x6 each with BUE A on right   [place] Pose modified to tolerable leg position; 5-10" hold x3 each  Seated Trunk Flexion stretch    Brett received the following manual therapy techniques applied for (00) minutes, including:    STM and release techniques at L distal hamstring       Brett participated in neuromuscular re-education activities to improve: Balance, " "Coordination, Kinesthetic, Sense, Proprioception, and Posture for (23) minutes. The following activities were included:    Prone Donkey Kicks; 2 x8, each  SLR abduction 10 x10"  Reclined seated SLR with abducting and adducting over 2 dumbbells x10 each  Leaning cat/ cow x 10 cue for breathing  Standing pelvic circles x10 each  Prone Press Ups x15  Prone Hip Internal Rotation/External Rotation x15 each      Brett participated in dynamic functional therapeutic activities to improve functional performance for 12 minutes, including:    Johnnie Curls; 8# in each arm, x15  [place] Squats; with purple power band combo black power band at waist manually pulling posteriorly for hip hinge cues  Palloff Press Combo two side steps; cables 30# x8 each side  Core Stabilization against power band manually pulling in 4 directions; 2 laps 40' each    Patient Education and Home Exercises       Education provided:     Written Home Exercises Provided: Patient instructed to cont prior HEP. Exercises were reviewed and Brett was able to demonstrate them prior to the end of the session.  Brett demonstrated good  understanding of the education provided. See EMR under Patient Instructions for exercises provided during therapy sessions    Assessment     Continued progression of treatment to further improve hip/pelvic and lumbar mobility, core stabilization, and strength in lower half. Patient continues to demonstrate challenge with hip and pelvic mobility activities as well as Left Lower Extremity strengthening exercises. Patient reports no complaints in back with treatment today and improvements in the knee by the end of treatment.     Brett Is progressing well towards his goals.   Pt prognosis is Good.     Pt will continue to benefit from skilled outpatient physical therapy to address the deficits listed in the problem list box on initial evaluation, provide pt/family education and to maximize pt's level of independence in the home " and community environment.     Pt's spiritual, cultural and educational needs considered and pt agreeable to plan of care and goals.     Anticipated barriers to physical therapy: none     Goals:    Short Term Goals:  4 weeks Progress   5/19/2023   Pain: Pt will demonstrate improved pain by reports of less than or equal to 6/10 worst pain on the verbal rating scale in order to progress toward maximal functional ability and improve QOL. PC   Function: Patient will demonstrate improved function as indicated by a functional limitation score of less than or equal to 31 out of 100 on knee and lumbar FOTO. PC   HEP: Patient will demonstrate independence with HEP in order to progress toward functional independence. PC      Long Term Goals:  8 weeks Progress  5/19/2023   Pain: Pt will demonstrate improved pain by reports of less than or equal to 3/10 worst pain on the verbal rating scale in order to progress toward maximal functional ability and improve QOL.   PC   Function: Patient will demonstrate improved function as indicated by a functional limitation score of less than or equal to 23 out of 100 on knee and lumbar FOTO. PC   Mobility: Patient will improve AROM to stated goals, listed in objective measures above, in order to return to maximal functional potential and improve quality of life. PC   Strength: Patient will improve strength to stated goals, listed in objective measures above, in order to improve functional independence and quality of life. PC   Stair Climbing: Patient will demonstrate ability to climb up and down 20 steps with normalized mechanics and no complaints of pain in order to improve functional mobility at two-story home and at job.  PC   GOALS KEY:   PC= progressing/continue;   PM= partially met;             DC= discontinue    Plan     Plan of care Certification: 5/19/2023 to 7/14/2023.     Outpatient Physical Therapy 2 times weekly for 8 weeks to include the following interventions: Cervical/Lumbar  Traction, Electrical Stimulation  , Gait Training, Manual Therapy, Moist Heat/ Ice, Neuromuscular Re-ed, Orthotic Management and Training, Patient Education, Self Care, Therapeutic Activities, Therapeutic Exercise, Ultrasound, and Dry Needling .        Yahir Montoya, PTA

## 2023-06-13 ENCOUNTER — CLINICAL SUPPORT (OUTPATIENT)
Dept: REHABILITATION | Facility: HOSPITAL | Age: 58
End: 2023-06-13
Payer: COMMERCIAL

## 2023-06-13 DIAGNOSIS — M62.81 WEAKNESS OF TRUNK MUSCULATURE: ICD-10-CM

## 2023-06-13 DIAGNOSIS — M25.662 DECREASED RANGE OF MOTION (ROM) OF LEFT KNEE: Primary | ICD-10-CM

## 2023-06-13 PROCEDURE — 97530 THERAPEUTIC ACTIVITIES: CPT | Mod: PN,CQ

## 2023-06-13 PROCEDURE — 97110 THERAPEUTIC EXERCISES: CPT | Mod: PN,CQ

## 2023-06-13 PROCEDURE — 97112 NEUROMUSCULAR REEDUCATION: CPT | Mod: PN,CQ

## 2023-06-13 NOTE — PROGRESS NOTES
"[facility]   Physical Therapy Treatment Note      Name: Brett Garcia  Clinic Number: [id number]    Therapy Diagnosis:   Encounter Diagnoses   Name Primary?    Decreased range of motion (ROM) of left knee Yes    Weakness of trunk musculature        Physician: Monica Andrade,*    Visit Date: 6/13/2023       Physician Orders: PT Eval and Treat   Medical Diagnosis from Referral: Acute midline low back pain without sciatica [M54.50], Acute pain of left knee [M25.562]  Evaluation Date: 5/19/2023  Authorization Period Expiration: 5/11/2023  Plan of Care Expiration: 7/14/2023  Progress Note Due: 6/ 19/ 2023  Visit # / Visits authorized: 6/ 20  FOTO: 1/3     Precautions: Standard and Diabetes     PTA Visit #: 1/5     Time In: 5:31 pm   Time Out: 6:28 pm  Total Billable Time: 58 minutes    Subjective     Pt reports: feeling gradually improvements in left knee with exercises stating a decreased in reoccurrence of knee giving out during the work day. Patient reports continued deep ache present in posterior knee.   He was compliant with home exercise program.  Response to previous treatment: soreness   Functional change: n/ a today     Pain: 2-3/10 in left knee, 0-1/10 in low back      Objective      Objective Measures updated at progress report unless specified.     Treatment     Brett received the following treatments:      Brett received therapeutic exercises to develop strength, endurance, ROM, and flexibility for (23) minutes including:     Hamstring stretch combo hip stretch at stairs 5" x10 each B   DKTC combo bridge 2x10 focus on core contraction  Standing Hip Cars; x15 each  Static Lunges; 2 x6 each with BUE A on right   [place] Pose modified to tolerable leg position; 5-10" hold x3 each  Standing Iliotibial Band stretch with BUE A 5x10" each   Lower Trunk Rotation combo opposite upper trunk rotation    Brett received the following manual therapy techniques applied for (00) " minutes, including:    STM and release techniques at L distal hamstring       Brett participated in neuromuscular re-education activities to improve: Balance, Coordination, Kinesthetic, Sense, Proprioception, and Posture for (16) minutes. The following activities were included:    Bird Dogs; 2x 10  Sidelying hip ABC's x1 set each  Reclined seated SLR with abducting and adducting over 2 dumbbells x10 each  Leaning cat/ cow x 10 cue for breathing  Prone Press Ups x15        Brett participated in dynamic functional therapeutic activities to improve functional performance for 19 minutes, including:    Johnnie Curls; 8# in each arm, x15  Serbian Squats; with purple power band combo black power band at waist manually pulling posteriorly for hip hinge cues  Palloff Press Combo two side steps; cables 30# x8 each side  Core Stabilization against power band manually pulling in 4 directions; 2 laps 40' each  Karaoke stepping with trunk counter rotation; 5 laps   Simulated Golf swinging focusing rotation and control of motion; x15  Simulated Baseball swinging focusing rotation and control of motion x15    Patient Education and Home Exercises       Education provided:     Written Home Exercises Provided: Patient instructed to cont prior HEP. Exercises were reviewed and Brett was able to demonstrate them prior to the end of the session.  Brett demonstrated good  understanding of the education provided. See EMR under Patient Instructions for exercises provided during therapy sessions    Assessment     Continued lack of trunk and pelvic rotation present during gait. Included low loading rotational activities to treatment today in attempt to improve mobility as well as gradually attempting to decrease present stiffness in back and hips. Patient reported no exacerbations of pain in the left knee, groin, or back with prescribed activities. Patient continues to report feeling a good workout fatigue post treatment stating  feeling better overall compared to when he first came in.    Brett Is progressing well towards his goals.   Pt prognosis is Good.     Pt will continue to benefit from skilled outpatient physical therapy to address the deficits listed in the problem list box on initial evaluation, provide pt/family education and to maximize pt's level of independence in the home and community environment.     Pt's spiritual, cultural and educational needs considered and pt agreeable to plan of care and goals.     Anticipated barriers to physical therapy: none     Goals:    Short Term Goals:  4 weeks Progress   5/19/2023   Pain: Pt will demonstrate improved pain by reports of less than or equal to 6/10 worst pain on the verbal rating scale in order to progress toward maximal functional ability and improve QOL. PC   Function: Patient will demonstrate improved function as indicated by a functional limitation score of less than or equal to 31 out of 100 on knee and lumbar FOTO. PC   HEP: Patient will demonstrate independence with HEP in order to progress toward functional independence. PC      Long Term Goals:  8 weeks Progress  5/19/2023   Pain: Pt will demonstrate improved pain by reports of less than or equal to 3/10 worst pain on the verbal rating scale in order to progress toward maximal functional ability and improve QOL.   PC   Function: Patient will demonstrate improved function as indicated by a functional limitation score of less than or equal to 23 out of 100 on knee and lumbar FOTO. PC   Mobility: Patient will improve AROM to stated goals, listed in objective measures above, in order to return to maximal functional potential and improve quality of life. PC   Strength: Patient will improve strength to stated goals, listed in objective measures above, in order to improve functional independence and quality of life. PC   Stair Climbing: Patient will demonstrate ability to climb up and down 20 steps with normalized mechanics  and no complaints of pain in order to improve functional mobility at two-story home and at job.  PC   GOALS KEY:   PC= progressing/continue;   PM= partially met;             DC= discontinue    Plan     Plan of care Certification: 5/19/2023 to 7/14/2023.     Outpatient Physical Therapy 2 times weekly for 8 weeks to include the following interventions: Cervical/Lumbar Traction, Electrical Stimulation  , Gait Training, Manual Therapy, Moist Heat/ Ice, Neuromuscular Re-ed, Orthotic Management and Training, Patient Education, Self Care, Therapeutic Activities, Therapeutic Exercise, Ultrasound, and Dry Needling .        Yahir Montoya, PTA

## 2023-06-16 ENCOUNTER — CLINICAL SUPPORT (OUTPATIENT)
Dept: REHABILITATION | Facility: HOSPITAL | Age: 58
End: 2023-06-16
Payer: COMMERCIAL

## 2023-06-16 ENCOUNTER — OFFICE VISIT (OUTPATIENT)
Dept: SPORTS MEDICINE | Facility: CLINIC | Age: 58
End: 2023-06-16
Payer: COMMERCIAL

## 2023-06-16 VITALS — BODY MASS INDEX: 33.77 KG/M2 | HEIGHT: 69 IN | WEIGHT: 228 LBS

## 2023-06-16 DIAGNOSIS — M17.12 PRIMARY OSTEOARTHRITIS OF LEFT KNEE: Primary | ICD-10-CM

## 2023-06-16 DIAGNOSIS — M62.81 WEAKNESS OF TRUNK MUSCULATURE: ICD-10-CM

## 2023-06-16 DIAGNOSIS — M25.662 DECREASED RANGE OF MOTION (ROM) OF LEFT KNEE: Primary | ICD-10-CM

## 2023-06-16 PROCEDURE — 99999 PR PBB SHADOW E&M-EST. PATIENT-LVL III: ICD-10-PCS | Mod: PBBFAC,,, | Performed by: ORTHOPAEDIC SURGERY

## 2023-06-16 PROCEDURE — 97112 NEUROMUSCULAR REEDUCATION: CPT | Mod: PN,CQ

## 2023-06-16 PROCEDURE — 97530 THERAPEUTIC ACTIVITIES: CPT | Mod: PN,CQ

## 2023-06-16 PROCEDURE — 99499 NO LOS: ICD-10-PCS | Mod: S$GLB,,, | Performed by: ORTHOPAEDIC SURGERY

## 2023-06-16 PROCEDURE — 99999 PR PBB SHADOW E&M-EST. PATIENT-LVL III: CPT | Mod: PBBFAC,,, | Performed by: ORTHOPAEDIC SURGERY

## 2023-06-16 PROCEDURE — 20610 LARGE JOINT ASPIRATION/INJECTION: L KNEE: ICD-10-PCS | Mod: LT,S$GLB,, | Performed by: ORTHOPAEDIC SURGERY

## 2023-06-16 PROCEDURE — 20610 DRAIN/INJ JOINT/BURSA W/O US: CPT | Mod: LT,S$GLB,, | Performed by: ORTHOPAEDIC SURGERY

## 2023-06-16 PROCEDURE — 97110 THERAPEUTIC EXERCISES: CPT | Mod: PN,CQ

## 2023-06-16 PROCEDURE — 99499 UNLISTED E&M SERVICE: CPT | Mod: S$GLB,,, | Performed by: ORTHOPAEDIC SURGERY

## 2023-06-16 NOTE — PROCEDURES
Large Joint Aspiration/Injection: L knee    Date/Time: 6/16/2023 9:15 AM  Performed by: Ace Mustafa MD  Authorized by: Ace Mustafa MD     Consent Done?:  Yes (Verbal)  Indications:  Joint swelling and pain  Site marked: the procedure site was marked    Timeout: prior to procedure the correct patient, procedure, and site was verified    Prep: patient was prepped and draped in usual sterile fashion      Local anesthesia used?: Yes    Local anesthetic:  Topical anesthetic    Details:  Needle Size:  22 G  Ultrasonic Guidance for needle placement?: No    Approach:  Superior  Location:  Knee  Site:  L knee  Medications:  20 mg sodium hyaluronate (EUFLEXXA) 10 mg/mL(mw 2.4 -3.6 million)  Patient tolerance:  Patient tolerated the procedure well with no immediate complications

## 2023-06-16 NOTE — PROCEDURES
Large Joint Aspiration/Injection: L knee    Date/Time: 6/16/2023 9:15 AM  Performed by: Ace Mustafa MD  Authorized by: Ace Mustafa MD     Consent Done?:  Yes (Verbal)  Indications:  Joint swelling and pain  Site marked: the procedure site was marked    Timeout: prior to procedure the correct patient, procedure, and site was verified    Prep: patient was prepped and draped in usual sterile fashion      Local anesthesia used?: Yes    Local anesthetic:  Topical anesthetic    Details:  Needle Size:  22 G  Ultrasonic Guidance for needle placement?: No    Approach:  Superior  Location:  Knee  Site:  L knee  Medications:  20 mg sodium hyaluronate (EUFLEXXA) 10 mg/mL(mw 2.4 -3.6 million)  Patient tolerance:  Patient tolerated the procedure well with no immediate complications     Left knee Euflexxa 1/3

## 2023-06-16 NOTE — PROGRESS NOTES
"[facility]   Physical Therapy Treatment Note      Name: Brett Garcia  Clinic Number: [id number]    Therapy Diagnosis:   Encounter Diagnoses   Name Primary?    Decreased range of motion (ROM) of left knee Yes    Weakness of trunk musculature        Physician: Monica Andrade,*    Visit Date: 6/16/2023       Physician Orders: PT Eval and Treat   Medical Diagnosis from Referral: Acute midline low back pain without sciatica [M54.50], Acute pain of left knee [M25.562]  Evaluation Date: 5/19/2023  Authorization Period Expiration: 5/11/2023  Plan of Care Expiration: 7/14/2023  Progress Note Due: 6/ 19/ 2023  Visit # / Visits authorized: 6/ 20  FOTO: 1/3     Precautions: Standard and Diabetes     PTA Visit #: 1/5     Time In: 11:51 pm   Time Out: 12:48 pm  Total Billable Time: 57 minutes    Subjective     Pt reports: feeling gradually improvements in left knee with exercises stating a decreased in reoccurrence of knee giving out during the work day. Patient reports continued deep ache present in posterior knee.   He was compliant with home exercise program.  Response to previous treatment: soreness   Functional change: n/ a today     Pain: 2-3/10 in left knee, 0-1/10 in low back      Objective      Objective Measures updated at progress report unless specified.     Treatment     Brett received the following treatments:      Brett received therapeutic exercises to develop strength, endurance, ROM, and flexibility for (20) minutes including:     DKTC combo bridge 2x10 focus on core contraction  Standing Hip Cars; x15 each  [place] Pose modified to tolerable leg position; 5-10" hold x3 each  Standing Iliotibial Band stretch with BUE A 5x10" each   Downward dog 5 x10"  Downward dog into cobra 2x  Modified downward dog on knees to cobra x5      Brett received the following manual therapy techniques applied for (00) minutes, including:    STM and release techniques at L distal hamstring "       Brett participated in neuromuscular re-education activities to improve: Balance, Coordination, Kinesthetic, Sense, Proprioception, and Posture for (15) minutes. The following activities were included:    Bird Dogs; 2x 10 attempting to hold 2# med ball at low back  Sidelying hip ABC's x1 set each  Leaning cat/ cow x 10 cue for breathing  Prone Press Ups x20  BOSU Squats  Standing pelvic circles x10 each        Brtet participated in dynamic functional therapeutic activities to improve functional performance for 23 minutes, including:    Johnnie Curls; 10# in each arm, x15  Palloff Press Combo two side steps; cables 30# x8 each side  Core Stabilization against power band manually pulling in 4 directions; 2 laps full turf each  Karaoke stepping with trunk counter rotation; 3 laps full turf  Simulated Baseball swinging focusing rotation and control of motion x15  Half Kneel axe chops x15 each angled up and down    Patient Education and Home Exercises       Education provided:     Written Home Exercises Provided: Patient instructed to cont prior HEP. Exercises were reviewed and Brett was able to demonstrate them prior to the end of the session.  Brett demonstrated good  understanding of the education provided. See EMR under Patient Instructions for exercises provided during therapy sessions    Assessment     Continued progression of treatment in attempt to improve core strength, back and lower extremity mobility/flexibility, as well as functional endurance. No changes present at this time with gait mechanics and lack of natural sway. Patient reported no complaints of pain throughout treatment stating only a workout tiredness and soreness.    Brett Is progressing well towards his goals.   Pt prognosis is Good.     Pt will continue to benefit from skilled outpatient physical therapy to address the deficits listed in the problem list box on initial evaluation, provide pt/family education and to maximize pt's  level of independence in the home and community environment.     Pt's spiritual, cultural and educational needs considered and pt agreeable to plan of care and goals.     Anticipated barriers to physical therapy: none     Goals:    Short Term Goals:  4 weeks Progress   5/19/2023   Pain: Pt will demonstrate improved pain by reports of less than or equal to 6/10 worst pain on the verbal rating scale in order to progress toward maximal functional ability and improve QOL. PC   Function: Patient will demonstrate improved function as indicated by a functional limitation score of less than or equal to 31 out of 100 on knee and lumbar FOTO. PC   HEP: Patient will demonstrate independence with HEP in order to progress toward functional independence. PC      Long Term Goals:  8 weeks Progress  5/19/2023   Pain: Pt will demonstrate improved pain by reports of less than or equal to 3/10 worst pain on the verbal rating scale in order to progress toward maximal functional ability and improve QOL.   PC   Function: Patient will demonstrate improved function as indicated by a functional limitation score of less than or equal to 23 out of 100 on knee and lumbar FOTO. PC   Mobility: Patient will improve AROM to stated goals, listed in objective measures above, in order to return to maximal functional potential and improve quality of life. PC   Strength: Patient will improve strength to stated goals, listed in objective measures above, in order to improve functional independence and quality of life. PC   Stair Climbing: Patient will demonstrate ability to climb up and down 20 steps with normalized mechanics and no complaints of pain in order to improve functional mobility at two-story home and at job.  PC   GOALS KEY:   PC= progressing/continue;   PM= partially met;             DC= discontinue    Plan     Plan of care Certification: 5/19/2023 to 7/14/2023.     Outpatient Physical Therapy 2 times weekly for 8 weeks to include the  following interventions: Cervical/Lumbar Traction, Electrical Stimulation  , Gait Training, Manual Therapy, Moist Heat/ Ice, Neuromuscular Re-ed, Orthotic Management and Training, Patient Education, Self Care, Therapeutic Activities, Therapeutic Exercise, Ultrasound, and Dry Needling .        Yahir Montoya, PTA

## 2023-06-20 ENCOUNTER — CLINICAL SUPPORT (OUTPATIENT)
Dept: REHABILITATION | Facility: HOSPITAL | Age: 58
End: 2023-06-20
Payer: COMMERCIAL

## 2023-06-20 DIAGNOSIS — M62.81 WEAKNESS OF TRUNK MUSCULATURE: ICD-10-CM

## 2023-06-20 DIAGNOSIS — M25.662 DECREASED RANGE OF MOTION (ROM) OF LEFT KNEE: Primary | ICD-10-CM

## 2023-06-20 PROCEDURE — 97112 NEUROMUSCULAR REEDUCATION: CPT | Mod: PN

## 2023-06-20 PROCEDURE — 97530 THERAPEUTIC ACTIVITIES: CPT | Mod: PN

## 2023-06-20 PROCEDURE — 97110 THERAPEUTIC EXERCISES: CPT | Mod: PN

## 2023-06-20 NOTE — PROGRESS NOTES
"[facility]   Physical Therapy Treatment Note      Name: Brett Garcia  Clinic Number: [id number]    Therapy Diagnosis:   Encounter Diagnoses   Name Primary?    Decreased range of motion (ROM) of left knee Yes    Weakness of trunk musculature          Physician: Monica Andrade,*    Visit Date: 6/20/2023       Physician Orders: PT Eval and Treat   Medical Diagnosis from Referral: Acute midline low back pain without sciatica [M54.50], Acute pain of left knee [M25.562]  Evaluation Date: 5/19/2023  Authorization Period Expiration: 5/11/2023  Plan of Care Expiration: 7/14/2023  Progress Note Due: 6/ 19/ 2023  Visit # / Visits authorized: 9/ 20  FOTO: 2/3     Precautions: Standard and Diabetes     PTA Visit #: 1/5     Time In: 5:15 pm   Time Out: 6:15 pm  Total Billable Time: 60 minutes (1:1 20 minutes with trained technician)     Subjective     Pt reports: he has noticed great improvement at work. Patient states he know longer with going up and down steps until the end of the day. Pain is now only getting up to 4/10 at worst and is mainly at the end of a work day.     He was compliant with home exercise program.  Response to previous treatment: soreness   Functional change: less pain with work tasks    Pain: 2-3/10 in left knee, 0-1/10 in low back      Objective      Objective Measures updated at progress report unless specified.     Treatment     Brett received the following treatments:      Brett received therapeutic exercises to develop strength, endurance, ROM, and flexibility for (14) minutes including:     DKTC combo bridge 2x10 focus on core contraction  Standing Hip Cars; x15 each  Downward dog 5 x10"  Downward dog into cobra 2x      Brett received the following manual therapy techniques applied for (00) minutes, including:    Brett participated in neuromuscular re-education activities to improve: Balance, Coordination, Kinesthetic, Sense, Proprioception, and Posture " for (23) minutes. The following activities were included:    Bird Dogs; 2x 10 attempting to hold 2# med ball at low back  Sidelying hip ABC's x1 set each  Leaning cat/ cow x 10 cue for breathing  Prone Press Ups x20  BOSU Squats  Paloff press SL x 10 B   Seated on Swiss Ball combo LAQ; 1 x 10 each  Seated on Swiss pelvic circles x15 cw and ccw    Brett participated in dynamic functional therapeutic activities to improve functional performance for 23 minutes, including:    Johnnie Curls; 10# in each arm, x15  Step downs x 10 B   Sled push 25# 2 laps  Sled pull 25# 2 laps   Core Stabilization against power band manually pulling in 4 directions; 2 laps full turf each  Karaoke stepping with trunk counter rotation; 3 laps full turf    Patient Education and Home Exercises       Education provided:   - proper progression at home      Written Home Exercises Provided: Patient instructed to cont prior HEP. Exercises were reviewed and Brett was able to demonstrate them prior to the end of the session.  Brett demonstrated good  understanding of the education provided. See EMR under Patient Instructions for exercises provided during therapy sessions    Assessment   Patient does well with continued focus on functional strengthening and higher level stability training for patient job and community involvement. Patient reports pain is improving significantly session to session. Cues remain needed to help with technique and prevention of compensation patterns. Patient still guards with some lower extremity exercises to fear avoidance of L knee pain potential. Patient has improved stability with bosu squats today and will continue to benefit from work on this aspect of function.     Brett Is progressing well towards his goals.   Pt prognosis is Good.     Pt will continue to benefit from skilled outpatient physical therapy to address the deficits listed in the problem list box on initial evaluation, provide pt/family education  and to maximize pt's level of independence in the home and community environment.     Pt's spiritual, cultural and educational needs considered and pt agreeable to plan of care and goals.     Anticipated barriers to physical therapy: none     Goals:    Short Term Goals:  4 weeks Progress   6/20/2023   Pain: Pt will demonstrate improved pain by reports of less than or equal to 6/10 worst pain on the verbal rating scale in order to progress toward maximal functional ability and improve QOL. MET   Function: Patient will demonstrate improved function as indicated by a functional limitation score of less than or equal to 31 out of 100 on knee and lumbar FOTO. PC   HEP: Patient will demonstrate independence with HEP in order to progress toward functional independence. MET      Long Term Goals:  8 weeks Progress  5/20/2023   Pain: Pt will demonstrate improved pain by reports of less than or equal to 3/10 worst pain on the verbal rating scale in order to progress toward maximal functional ability and improve QOL.   PC   Function: Patient will demonstrate improved function as indicated by a functional limitation score of less than or equal to 23 out of 100 on knee and lumbar FOTO. PC   Mobility: Patient will improve AROM to stated goals, listed in objective measures above, in order to return to maximal functional potential and improve quality of life. PC   Strength: Patient will improve strength to stated goals, listed in objective measures above, in order to improve functional independence and quality of life. PC   Stair Climbing: Patient will demonstrate ability to climb up and down 20 steps with normalized mechanics and no complaints of pain in order to improve functional mobility at two-story home and at job.  PC   GOALS KEY:   PC= progressing/continue;   PM= partially met;             DC= discontinue    Plan     Plan of care Certification: 5/19/2023 to 7/14/2023.     Outpatient Physical Therapy 2 times weekly for 8  weeks to include the following interventions: Cervical/Lumbar Traction, Electrical Stimulation  , Gait Training, Manual Therapy, Moist Heat/ Ice, Neuromuscular Re-ed, Orthotic Management and Training, Patient Education, Self Care, Therapeutic Activities, Therapeutic Exercise, Ultrasound, and Dry Needling .        Kelsie Moser, PT

## 2023-06-30 ENCOUNTER — CLINICAL SUPPORT (OUTPATIENT)
Dept: REHABILITATION | Facility: HOSPITAL | Age: 58
End: 2023-06-30
Payer: COMMERCIAL

## 2023-06-30 DIAGNOSIS — M25.662 DECREASED RANGE OF MOTION (ROM) OF LEFT KNEE: Primary | ICD-10-CM

## 2023-06-30 DIAGNOSIS — M62.81 WEAKNESS OF TRUNK MUSCULATURE: ICD-10-CM

## 2023-06-30 PROCEDURE — 97112 NEUROMUSCULAR REEDUCATION: CPT | Mod: PN

## 2023-06-30 PROCEDURE — 97110 THERAPEUTIC EXERCISES: CPT | Mod: PN

## 2023-06-30 PROCEDURE — 97530 THERAPEUTIC ACTIVITIES: CPT | Mod: PN

## 2023-06-30 PROCEDURE — 97140 MANUAL THERAPY 1/> REGIONS: CPT | Mod: PN

## 2023-06-30 NOTE — PROGRESS NOTES
"[facility]   Physical Therapy Treatment Note      Name: Brett Garcai  Clinic Number: [id number]    Therapy Diagnosis:   Encounter Diagnoses   Name Primary?    Decreased range of motion (ROM) of left knee Yes    Weakness of trunk musculature          Physician: Monica Andrade,*    Visit Date: 6/30/2023       Physician Orders: PT Eval and Treat   Medical Diagnosis from Referral: Acute midline low back pain without sciatica [M54.50], Acute pain of left knee [M25.562]  Evaluation Date: 5/19/2023  Authorization Period Expiration: 5/11/2023  Plan of Care Expiration: 7/14/2023  Progress Note Due: 6/ 19/ 2023  Visit # / Visits authorized: 10/ 20  FOTO: 2/3     Precautions: Standard and Diabetes     PTA Visit #: 1/5     Time In: 11:15 pm   Time Out: 12:15 pm  Total Billable Time: 60 minutes     Subjective     Pt reports: he was on vacation and did lots of driving. Pain in the low back tolerated this well, but knee is still somewhat painful upon tactile pressure and hips do tighten up in the mornings and end of the day. Pain has not reached over a 3/10 in the past week.     He was compliant with home exercise program.  Response to previous treatment: soreness   Functional change: no pain with prolonged posture for driving     Pain: 2/10 in left knee, 0-1/10 in low back      Objective      Objective Measures updated at progress report unless specified.     Treatment     Brett received the following treatments:      Brett received therapeutic exercises to develop strength, endurance, ROM, and flexibility for (10) minutes including:     DKTC combo bridge 2x10 focus on core contraction  Standing Hip Cars; x15 each  Downward dog 5 x10"    Brett received the following manual therapy techniques applied for (8) minutes, including:    STM to L hamstring and piriformis prior to and following FDN    Brett participated in neuromuscular re-education activities to improve: Balance, " Coordination, Kinesthetic, Sense, Proprioception, and Posture for (26) minutes. The following activities were included:    Bird Dogs; x 20 B  Sidelying hip ABC's x1 set each  Leaning cat/ cow x 10 cue for breathing  Prone Press Ups x20  BOSU Squats  Paloff press SL x 10 B     Brett participated in dynamic functional therapeutic activities to improve functional performance for 16 minutes, including:    Step downs x 10 B   Sled push 25# 2 laps  Sled pull 25# 2 laps       Palpation Assessment to determine the necessity for Functional Dry Needling  L hamstring and piriformis.   Patient provided written and verbal consent to Functional Dry Needling.   Written Handout on response to FDN provided: Yes    Brett demonstrated good  understanding of the education provided.   Patient demonstrated appropriate response to Functional Dry Needling.      After exs., pt rec'd functional dry needling with E-stim today as follows: FDN to : L hamstring using two 75 mm fusiform needles and to L piriformis using two 100 mm fusiform needles.  After placement of needles, E-stim was applied with intensity ranged b/w 3-4 mA during course of treatment between two points in each muscle.      Patient Education and Home Exercises       Education provided:   - proper progression at home      Written Home Exercises Provided: Patient instructed to cont prior HEP. Exercises were reviewed and Brett was able to demonstrate them prior to the end of the session.  Brett demonstrated good  understanding of the education provided. See EMR under Patient Instructions for exercises provided during therapy sessions    Assessment   Patient does well with full treatment session today following break without therapy due to vacation. Patient core stability is notably improving with less shaking and need for cues for posture maintenance during planks and bosu squats. Dry needling treatment added in today as patient continues to complain of L sided hamstring  and piriformis tension build up. Patient tolerates treatment well without c/o of discomfort or adverse reaction.     Brett Is progressing well towards his goals.   Pt prognosis is Good.     Pt will continue to benefit from skilled outpatient physical therapy to address the deficits listed in the problem list box on initial evaluation, provide pt/family education and to maximize pt's level of independence in the home and community environment.     Pt's spiritual, cultural and educational needs considered and pt agreeable to plan of care and goals.     Anticipated barriers to physical therapy: none     Goals:    Short Term Goals:  4 weeks Progress   6/20/2023   Pain: Pt will demonstrate improved pain by reports of less than or equal to 6/10 worst pain on the verbal rating scale in order to progress toward maximal functional ability and improve QOL. MET   Function: Patient will demonstrate improved function as indicated by a functional limitation score of less than or equal to 31 out of 100 on knee and lumbar FOTO. PC   HEP: Patient will demonstrate independence with HEP in order to progress toward functional independence. MET      Long Term Goals:  8 weeks Progress  6/20/2023   Pain: Pt will demonstrate improved pain by reports of less than or equal to 3/10 worst pain on the verbal rating scale in order to progress toward maximal functional ability and improve QOL.   PC   Function: Patient will demonstrate improved function as indicated by a functional limitation score of less than or equal to 23 out of 100 on knee and lumbar FOTO. PC   Mobility: Patient will improve AROM to stated goals, listed in objective measures above, in order to return to maximal functional potential and improve quality of life. PC   Strength: Patient will improve strength to stated goals, listed in objective measures above, in order to improve functional independence and quality of life. PC   Stair Climbing: Patient will demonstrate ability  to climb up and down 20 steps with normalized mechanics and no complaints of pain in order to improve functional mobility at two-story home and at job.  PC   GOALS KEY:   PC= progressing/continue;   PM= partially met;             DC= discontinue    Plan     Plan of care Certification: 5/19/2023 to 7/14/2023.     Outpatient Physical Therapy 2 times weekly for 8 weeks to include the following interventions: Cervical/Lumbar Traction, Electrical Stimulation  , Gait Training, Manual Therapy, Moist Heat/ Ice, Neuromuscular Re-ed, Orthotic Management and Training, Patient Education, Self Care, Therapeutic Activities, Therapeutic Exercise, Ultrasound, and Dry Needling .        Kelsie Moser, PT

## 2023-07-03 ENCOUNTER — OFFICE VISIT (OUTPATIENT)
Dept: SPORTS MEDICINE | Facility: CLINIC | Age: 58
End: 2023-07-03
Payer: COMMERCIAL

## 2023-07-03 DIAGNOSIS — M17.12 PRIMARY OSTEOARTHRITIS OF LEFT KNEE: Primary | ICD-10-CM

## 2023-07-03 PROCEDURE — 99499 NO LOS: ICD-10-PCS | Mod: S$GLB,,, | Performed by: ORTHOPAEDIC SURGERY

## 2023-07-03 PROCEDURE — 99499 UNLISTED E&M SERVICE: CPT | Mod: S$GLB,,, | Performed by: ORTHOPAEDIC SURGERY

## 2023-07-03 PROCEDURE — 20610 DRAIN/INJ JOINT/BURSA W/O US: CPT | Mod: LT,S$GLB,, | Performed by: ORTHOPAEDIC SURGERY

## 2023-07-03 PROCEDURE — 99999 PR PBB SHADOW E&M-EST. PATIENT-LVL III: CPT | Mod: PBBFAC,,, | Performed by: ORTHOPAEDIC SURGERY

## 2023-07-03 PROCEDURE — 20610 LARGE JOINT ASPIRATION/INJECTION: L KNEE: ICD-10-PCS | Mod: LT,S$GLB,, | Performed by: ORTHOPAEDIC SURGERY

## 2023-07-03 PROCEDURE — 99999 PR PBB SHADOW E&M-EST. PATIENT-LVL III: ICD-10-PCS | Mod: PBBFAC,,, | Performed by: ORTHOPAEDIC SURGERY

## 2023-07-03 NOTE — PROCEDURES
Large Joint Aspiration/Injection: L knee    Date/Time: 7/3/2023 9:15 AM  Performed by: Ace Mustafa MD  Authorized by: Ace Mustafa MD     Consent Done?:  Yes (Verbal)  Indications:  Joint swelling and pain  Site marked: the procedure site was marked    Timeout: prior to procedure the correct patient, procedure, and site was verified    Prep: patient was prepped and draped in usual sterile fashion      Local anesthesia used?: Yes    Local anesthetic:  Topical anesthetic    Details:  Needle Size:  22 G  Ultrasonic Guidance for needle placement?: No    Approach:  Superior  Location:  Knee  Site:  L knee  Medications:  20 mg sodium hyaluronate (EUFLEXXA) 10 mg/mL(mw 2.4 -3.6 million)  Patient tolerance:  Patient tolerated the procedure well with no immediate complications     Left knee Euflexxa 2/3

## 2023-07-13 ENCOUNTER — CLINICAL SUPPORT (OUTPATIENT)
Dept: REHABILITATION | Facility: HOSPITAL | Age: 58
End: 2023-07-13
Payer: COMMERCIAL

## 2023-07-13 DIAGNOSIS — M62.81 WEAKNESS OF TRUNK MUSCULATURE: ICD-10-CM

## 2023-07-13 DIAGNOSIS — M25.662 DECREASED RANGE OF MOTION (ROM) OF LEFT KNEE: Primary | ICD-10-CM

## 2023-07-13 PROCEDURE — 97530 THERAPEUTIC ACTIVITIES: CPT | Mod: PN

## 2023-07-13 PROCEDURE — 97140 MANUAL THERAPY 1/> REGIONS: CPT | Mod: PN

## 2023-07-13 PROCEDURE — 97110 THERAPEUTIC EXERCISES: CPT | Mod: PN

## 2023-07-13 PROCEDURE — 97112 NEUROMUSCULAR REEDUCATION: CPT | Mod: PN

## 2023-07-13 NOTE — PROGRESS NOTES
[facility]   Physical Therapy Treatment Note      Name: Brett Garcia  Clinic Number: [id number]    Therapy Diagnosis:   Encounter Diagnoses   Name Primary?    Decreased range of motion (ROM) of left knee Yes    Weakness of trunk musculature          Physician: Monica Andrade,*    Visit Date: 7/13/2023       Physician Orders: PT Eval and Treat   Medical Diagnosis from Referral: Acute midline low back pain without sciatica [M54.50], Acute pain of left knee [M25.562]  Evaluation Date: 5/19/2023  Authorization Period Expiration: 5/11/2023  Plan of Care Expiration: 7/14/2023  Visit # / Visits authorized: 11/ 20  FOTO: 2/3     Precautions: Standard and Diabetes     PTA Visit #: 1/5     Time In: 4:55 pm   Time Out: 6:00 pm  Total Billable Time: 65 minutes (35 minutes 1:1 with trained technician)     Subjective     Pt reports: his knee and low back have continued to improve though he feels more weak this week after missing therapy last week, His hamstring continues to tighten up, but needling helped significantly for a few days after last session .   He was compliant with home exercise program.  Response to previous treatment: soreness, improved after needling   Functional change: no pain with prolonged posture for driving     Pain: 2/10 in left knee, 1/10 in low back      Objective      Objective Measures updated at progress report unless specified.     Treatment     Brett received the following treatments:      Brett received therapeutic exercises to develop strength, endurance, ROM, and flexibility for (10) minutes including:     DKTC combo bridge 2x10 focus on core contraction  Standing Hip Cars; x15 each  Hamstring stretch    Brett received the following manual therapy techniques applied for (8) minutes, including:    STM to L hamstring and piriformis prior to and following FDN    Brett participated in neuromuscular re-education activities to improve: Balance, Coordination,  Kinesthetic, Sense, Proprioception, and Posture for (35) minutes. The following activities were included:    Bird Dogs; x 20 B  Sidelying hip ABC's x1 set each  Leaning cat/ cow x 10 cue for breathing  Prone Press Ups x20  BOSU Squats  Paloff press SL x 10 B     Brett participated in dynamic functional therapeutic activities to improve functional performance for 12 minutes, including:    Step downs x 10 B   Sled push 25# 2 laps  Sled pull 25# 2 laps     Palpation Assessment to determine the necessity for Functional Dry Needling  L hamstring and piriformis.   Patient provided written and verbal consent to Functional Dry Needling.   Written Handout on response to FDN provided: Yes    Brett demonstrated good  understanding of the education provided.   Patient demonstrated appropriate response to Functional Dry Needling.      After exs., pt rec'd functional dry needling with E-stim today as follows: FDN to : L hamstring using two 75 mm fusiform needles and to L piriformis using two 100 mm fusiform needles.  After placement of needles, E-stim was applied with intensity ranged b/w 3-4 mA during course of treatment between two points in each muscle.      Patient Education and Home Exercises       Education provided:   - proper progression at home      Written Home Exercises Provided: Patient instructed to cont prior HEP. Exercises were reviewed and Brett was able to demonstrate them prior to the end of the session.  Brett demonstrated good  understanding of the education provided. See EMR under Patient Instructions for exercises provided during therapy sessions    Assessment   Patient does well with treatment today though limitations remain noted with exercise performance in lower extremity/ trunk stability and strength. Patient also have incident of hamstring cramping during mat exercise today showing hyperirritability that still exists in that region.  Dry needling continued per patient request for tension that  remains. Patient reports a good feeling of fatigue following treatment.     Brett Is progressing well towards his goals.   Pt prognosis is Good.     Pt will continue to benefit from skilled outpatient physical therapy to address the deficits listed in the problem list box on initial evaluation, provide pt/family education and to maximize pt's level of independence in the home and community environment.     Pt's spiritual, cultural and educational needs considered and pt agreeable to plan of care and goals.     Anticipated barriers to physical therapy: none     Goals:    Short Term Goals:  4 weeks Progress   6/20/2023   Pain: Pt will demonstrate improved pain by reports of less than or equal to 6/10 worst pain on the verbal rating scale in order to progress toward maximal functional ability and improve QOL. MET   Function: Patient will demonstrate improved function as indicated by a functional limitation score of less than or equal to 31 out of 100 on knee and lumbar FOTO. PC   HEP: Patient will demonstrate independence with HEP in order to progress toward functional independence. MET      Long Term Goals:  8 weeks Progress  6/20/2023   Pain: Pt will demonstrate improved pain by reports of less than or equal to 3/10 worst pain on the verbal rating scale in order to progress toward maximal functional ability and improve QOL.   PC   Function: Patient will demonstrate improved function as indicated by a functional limitation score of less than or equal to 23 out of 100 on knee and lumbar FOTO. PC   Mobility: Patient will improve AROM to stated goals, listed in objective measures above, in order to return to maximal functional potential and improve quality of life. PC   Strength: Patient will improve strength to stated goals, listed in objective measures above, in order to improve functional independence and quality of life. PC   Stair Climbing: Patient will demonstrate ability to climb up and down 20 steps with  normalized mechanics and no complaints of pain in order to improve functional mobility at two-story home and at job.  PC   GOALS KEY:   PC= progressing/continue;   PM= partially met;             DC= discontinue    Plan     Plan of care Certification: 5/19/2023 to 7/14/2023.     Outpatient Physical Therapy 2 times weekly for 8 weeks to include the following interventions: Cervical/Lumbar Traction, Electrical Stimulation  , Gait Training, Manual Therapy, Moist Heat/ Ice, Neuromuscular Re-ed, Orthotic Management and Training, Patient Education, Self Care, Therapeutic Activities, Therapeutic Exercise, Ultrasound, and Dry Needling .        Kelsie Moser, PT

## 2023-07-18 ENCOUNTER — CLINICAL SUPPORT (OUTPATIENT)
Dept: REHABILITATION | Facility: HOSPITAL | Age: 58
End: 2023-07-18
Payer: COMMERCIAL

## 2023-07-18 DIAGNOSIS — M62.81 WEAKNESS OF TRUNK MUSCULATURE: ICD-10-CM

## 2023-07-18 DIAGNOSIS — M25.662 DECREASED RANGE OF MOTION (ROM) OF LEFT KNEE: Primary | ICD-10-CM

## 2023-07-18 PROCEDURE — 97110 THERAPEUTIC EXERCISES: CPT | Mod: PN

## 2023-07-18 PROCEDURE — 97140 MANUAL THERAPY 1/> REGIONS: CPT | Mod: PN

## 2023-07-18 PROCEDURE — 97530 THERAPEUTIC ACTIVITIES: CPT | Mod: PN

## 2023-07-18 PROCEDURE — 97112 NEUROMUSCULAR REEDUCATION: CPT | Mod: PN

## 2023-07-19 NOTE — PLAN OF CARE
Outpatient Therapy Updated Plan of Care     Visit Date: 7/18/2023  Name: Brett Garcia  Clinic Number: [id number]    Therapy Diagnosis:   Encounter Diagnoses   Name Primary?    Decreased range of motion (ROM) of left knee Yes    Weakness of trunk musculature      Physician: Monica Andrade,*       Physician Orders: PT Eval and Treat   Medical Diagnosis from Referral: Acute midline low back pain without sciatica [M54.50], Acute pain of left knee [M25.562]  Evaluation Date: 5/19/2023  Authorization Period Expiration: 5/11/2023  Plan of Care Expiration: 7/14/2023  Visit # / Visits authorized: 12/ 20  FOTO: 2/3     Precautions: Standard and Diabetes      PTA Visit #: 1/5      Time In: 5:05 pm   Time Out: 6:15 pm  Total Billable Time: 70 minutes      Subjective      Pt reports: he has been feeling much better with just moving around at work. Patient states he is currently having 2/10 pain at worst. Patient states is mainly in the hamstring region when he starts to move after sitting for a while. Stair climbing has been much better and back/hip pain has as been very minimal.     He was compliant with home exercise program.  Response to previous treatment: soreness, improved after needling   Functional change: no pain with stair climbing at work      Pain: 0/10 today      Objective       RANGE OF MOTION:     Lumbar Right  (spine) Left    Pain/Dysfunction with Movement Status 7/18/2023 Goal   Lumbar Flexion  75% --- Pulling felt in hamstrings with lacking lumbar curve reversal  100% improved core reversal  100% with improved lumbar mobility vs hip   Lumbar Extension  50% --- No pain  75% 100%   Lumbar Rotation 50% 50% Tightness  75% B 100%      Knee AROM/PROM Right Left Pain/Dysfunction with Movement Status 7/17/2023 Goal   Knee Extension (0) 0 Lacking 10 degrees  Pulling at back of knee  FULL  Lacking < or = to 5 degrees       STRENGTH:     L/E MMT Right Left Pain/Dysfunction with Movement Status   7/18/2023  Goal    Hip Flexion  5/5 4+/5 --- 5/5 B  5/5 L    Hip Extension  4+/5 4/5 --- 4+/5 B  5/5 B    Hip Abduction  5/5 4/5 --- 4+/5 L 5/5 R 5/5 B   Knee Extension 5/5 4/5 Slight knee pain  4+/5 L, 5/5 R 5/5 B   Knee Flexion 5/5 4-/5 Slight pain in posterior knee  4/5 L 5/5 R 5/5 B      Movement Analysis Observations noted 7/18/2023   Plank on elbows Patient was able to hold plank on elbows for 45 seconds with moderate shaking starting at 15 seconds. Patient denies increase in low back pain.  Patient one minute hold with shaking at 38 seconds.       Limitation/Restriction for FOTO Survey     Therapist reviewed FOTO scores for Brett Garcia on 5/19/2023.   FOTO documents entered into [employer] - see Media section.     Lumbar Limitation Score: 37%  Knee Limitation Score: 42%    7/19/2023 Update  Lumbar Limitation Score: 21%  Knee Limitation Score 35%         Treatment      Brett received the following treatments:        Brett received therapeutic exercises to develop strength, endurance, ROM, and flexibility for (10) minutes including:      Standing Hip Cars; x15 each  Hamstring stretch     Brett received the following manual therapy techniques applied for (10) minutes, including:     STM to L hamstring prior to and following FDN     Brett participated in neuromuscular re-education activities to improve: Balance, Coordination, Kinesthetic, Sense, Proprioception, and Posture for (25) minutes. The following activities were included:     Bird Dogs; x 20 B  Sidelying hip ABC's x1 set each  Prone Press Ups x20  BOSU Squats  Full plank 1 minute   Paloff press SL x 10 B      Brett participated in dynamic functional therapeutic activities to improve functional performance for 25 minutes, including:     Step downs x 10 B   Sled push 25# 2 laps  Sled pull 25# 2 laps   Deadlifts 65#   Squats 65#  Stair climbing   Lifting techniques discussion for function at work and home     Palpation Assessment to determine the necessity for  Functional Dry Needling  L hamstring.   Patient provided written and verbal consent to Functional Dry Needling.   Written Handout on response to FDN provided: Yes    Brett demonstrated good  understanding of the education provided.   Patient demonstrated appropriate response to Functional Dry Needling.      After exs., pt rec'd functional dry needling with E-stim today as follows: FDN to : L lateral and medial hamstring using four 75 mm fusiform needles at points of tenderness. After placement of needles, E-stim was applied with intensity ranged b/w 3-4 mA during course of treatment between two points in each muscle.       Patient Education and Home Exercises        Education provided:   - proper progression at home  - importance of HEP compliance   - how to translate therapy to home environment      Written Home Exercises Provided: yes. Exercises were reviewed and Brett was able to demonstrate them prior to the end of the session.     Assessment   Brett is progressing well with physical therapy, with minimal complaints of pain or discomfort and significant improvements noted in lower extremity strength, range of motion, function, endurance and core stability since initial evaluation; please see exam for details. Brett continues to have difficulty with prolonged posture tolerance and higher level function at knee joint, due to limitations that remain in lower extremity strength and stability.  FOTO scores noted above indicate the patients perceived functional levels are improving since prior assessment. The above impairments and functional deficits will continue to be addressed over the course of this plan of care. Brett was educated on continued progression of PT, expectations for the duration of care, updates on goals set at initial evaluation, and home exercise program.  Brett will continue to benefit from physical therapy in order to further address goals, maximize function and quality of life.      Goals:    Short Term Goals:  4 weeks Progress   7/19/2023   Pain: Pt will demonstrate improved pain by reports of less than or equal to 6/10 worst pain on the verbal rating scale in order to progress toward maximal functional ability and improve QOL. MET   Function: Patient will demonstrate improved function as indicated by a functional limitation score of less than or equal to 31 out of 100 on knee and lumbar FOTO. PM   HEP: Patient will demonstrate independence with HEP in order to progress toward functional independence. MET      Long Term Goals:  8 weeks Progress  7/19/2023   Pain: Pt will demonstrate improved pain by reports of less than or equal to 3/10 worst pain on the verbal rating scale in order to progress toward maximal functional ability and improve QOL.   MET   Function: Patient will demonstrate improved function as indicated by a functional limitation score of less than or equal to 23 out of 100 on knee and lumbar FOTO. PM   Mobility: Patient will improve AROM to stated goals, listed in objective measures above, in order to return to maximal functional potential and improve quality of life. PM   Strength: Patient will improve strength to stated goals, listed in objective measures above, in order to improve functional independence and quality of life. PM   Stair Climbing: Patient will demonstrate ability to climb up and down 20 steps with normalized mechanics and no complaints of pain in order to improve functional mobility at two-story home and at job.  MET   GOALS KEY:   PC= progressing/continue;   PM= partially met;             DC= discontinue    Long Term Goal Status:   continue per initial plan of care.  Reasons for Recertification of Therapy:   Pt will continue to benefit from skilled outpatient physical therapy to address the deficits listed in the problem list box on initial evaluation, provide pt/family education and to maximize pt's level of independence in the home and community  environment.     Plan     Update Certification Period: 7/18/2023 to 8/8/2023  Recommended Treatment Plan: 1 times per week for 3 weeks: Aquatic Therapy, Cervical/Lumbar Traction, Electrical Stimulation with or without FDN, Gait Training, Manual Therapy, Moist Heat/ Ice, Neuromuscular Re-ed, Patient Education, Self Care, Therapeutic Activities, Therapeutic Exercise, and Functional Dry Needling    Kelsie Moser, PT, DPT      I CERTIFY THE NEED FOR THESE SERVICES FURNISHED UNDER THIS PLAN OF TREATMENT AND WHILE UNDER MY CARE    Physician's comments:        Physician's Signature: ___________________________________________________

## 2023-07-20 ENCOUNTER — TELEPHONE (OUTPATIENT)
Dept: PAIN MEDICINE | Facility: CLINIC | Age: 58
End: 2023-07-20
Payer: COMMERCIAL

## 2023-07-20 NOTE — TELEPHONE ENCOUNTER
----- Message from Rupali Leija sent at 7/20/2023 12:07 PM CDT -----  Contact: Brett West is calling to speak to the nurse regarding a message he received for a earlier appointment scheduled for tomorrow, he would like to accept it. Please give him a call at [phone]    Thanks  LJ

## 2023-07-21 ENCOUNTER — PROCEDURE VISIT (OUTPATIENT)
Dept: SPORTS MEDICINE | Facility: CLINIC | Age: 58
End: 2023-07-21
Payer: COMMERCIAL

## 2023-07-21 DIAGNOSIS — M17.12 PRIMARY OSTEOARTHRITIS OF LEFT KNEE: Primary | ICD-10-CM

## 2023-07-21 PROCEDURE — 20610 DRAIN/INJ JOINT/BURSA W/O US: CPT | Mod: LT,S$GLB,, | Performed by: PHYSICIAN ASSISTANT

## 2023-07-21 PROCEDURE — 20610 LARGE JOINT ASPIRATION/INJECTION: L KNEE: ICD-10-PCS | Mod: LT,S$GLB,, | Performed by: PHYSICIAN ASSISTANT

## 2023-07-21 PROCEDURE — 99499 NO LOS: ICD-10-PCS | Mod: S$GLB,,, | Performed by: PHYSICIAN ASSISTANT

## 2023-07-21 PROCEDURE — 99499 UNLISTED E&M SERVICE: CPT | Mod: S$GLB,,, | Performed by: PHYSICIAN ASSISTANT

## 2023-07-21 NOTE — PROCEDURES
Large Joint Aspiration/Injection: L knee    Date/Time: 7/21/2023 9:30 AM  Performed by: Tanya Sam PA-C  Authorized by: Tanya Sam PA-C     Consent Done?:  Yes (Verbal)  Indications:  Joint swelling and pain  Site marked: the procedure site was marked    Timeout: prior to procedure the correct patient, procedure, and site was verified    Prep: patient was prepped and draped in usual sterile fashion      Local anesthesia used?: Yes    Local anesthetic:  Topical anesthetic    Details:  Needle Size:  22 G  Ultrasonic Guidance for needle placement?: No    Approach:  Superior  Location:  Knee  Site:  L knee  Medications:  20 mg sodium hyaluronate (EUFLEXXA) 10 mg/mL(mw 2.4 -3.6 million)  Patient tolerance:  Patient tolerated the procedure well with no immediate complications     3/3

## 2023-07-25 DIAGNOSIS — E11.40 TYPE 2 DIABETES MELLITUS WITH DIABETIC NEUROPATHY, WITH LONG-TERM CURRENT USE OF INSULIN: ICD-10-CM

## 2023-07-25 DIAGNOSIS — Z79.4 TYPE 2 DIABETES MELLITUS WITH DIABETIC NEUROPATHY, WITH LONG-TERM CURRENT USE OF INSULIN: ICD-10-CM

## 2023-07-25 RX ORDER — INSULIN DEGLUDEC 200 U/ML
45 INJECTION, SOLUTION SUBCUTANEOUS NIGHTLY
Qty: 3 PEN | Refills: 5 | Status: SHIPPED | OUTPATIENT
Start: 2023-07-25 | End: 2023-09-28

## 2023-07-26 DIAGNOSIS — E11.9 TYPE 2 DIABETES MELLITUS WITHOUT COMPLICATION: ICD-10-CM

## 2023-07-27 ENCOUNTER — CLINICAL SUPPORT (OUTPATIENT)
Dept: REHABILITATION | Facility: HOSPITAL | Age: 58
End: 2023-07-27
Payer: COMMERCIAL

## 2023-07-27 DIAGNOSIS — M25.662 DECREASED RANGE OF MOTION (ROM) OF LEFT KNEE: Primary | ICD-10-CM

## 2023-07-27 DIAGNOSIS — M62.81 WEAKNESS OF TRUNK MUSCULATURE: ICD-10-CM

## 2023-07-27 DIAGNOSIS — M25.462 KNEE EFFUSION, LEFT: ICD-10-CM

## 2023-07-27 DIAGNOSIS — M17.12 PRIMARY OSTEOARTHRITIS OF LEFT KNEE: ICD-10-CM

## 2023-07-27 PROCEDURE — 97530 THERAPEUTIC ACTIVITIES: CPT | Mod: PN

## 2023-07-27 PROCEDURE — 97112 NEUROMUSCULAR REEDUCATION: CPT | Mod: PN

## 2023-07-27 PROCEDURE — 97110 THERAPEUTIC EXERCISES: CPT | Mod: PN

## 2023-07-27 PROCEDURE — 97140 MANUAL THERAPY 1/> REGIONS: CPT | Mod: PN

## 2023-07-27 NOTE — PROGRESS NOTES
[facility]   Physical Therapy Treatment Note      Name: Brett Garcia  Clinic Number: [id number]    Therapy Diagnosis:   Encounter Diagnoses   Name Primary?    Decreased range of motion (ROM) of left knee Yes    Weakness of trunk musculature      Physician: Monica Andrade,*    Visit Date: 7/27/2023    Physician Orders: PT Eval and Treat   Medical Diagnosis from Referral: Acute midline low back pain without sciatica [M54.50], Acute pain of left knee [M25.562]  Evaluation Date: 5/19/2023  Authorization Period Expiration: 5/11/2023  Plan of Care Expiration: 8/8/2023  Visit # / Visits authorized: 12/ 20  FOTO: 2/3     Precautions: Standard and Diabetes     PTA Visit #: 0/5     Time In: 5:10 pm   Time Out: 6:16 pm  Total Billable Time: 66 minutes ( 55 minutes 1:1 with trained technician)     Subjective     Pt reports: back is better for the most part, patient remains with some discomfort and tension behind his knee .  He was compliant with home exercise program.  Response to previous treatment: some soreness   Functional change: better gait and stair tolerance     Pain: 1/10  Location: distal hamstring      Objective      Objective Measures updated at progress report unless specified.     Treatment     Brett received the following treatments:    Brett received therapeutic exercises to develop strength, endurance, ROM, and flexibility for (10) minutes including:      Hamstring contract-relax   Hamstring stretch  Prone Hamstring curls BTB (eccentric focus)  2 x 10      Brett received the following manual therapy techniques applied for (10) minutes, including:     STM to L hamstring prior to and following FDN     Brett participated in neuromuscular re-education activities to improve: Balance, Coordination, Kinesthetic, Sense, Proprioception, and Posture for (23) minutes. The following activities were included:     Sidelying hip ABC's x1 set each  BOSU Squats  Full plank 1 minute  "  Paloff press SL 30#  x 10 B   Wall sits 3 x 30"   TRX SLS 2 x 10 B      Brett participated in dynamic functional therapeutic activities to improve functional performance for 23 minutes, including:     Step downs x 10 B   Sled push 25# 2 laps  Sled pull 25# 2 laps   Deadlifts 65# 2 x 6   Squats 65# 2 x 6   Lifting techniques discussion for function at work and home     Palpation Assessment to determine the necessity for Functional Dry Needling  L hamstring.   Patient provided written and verbal consent to Functional Dry Needling.   Written Handout on response to FDN provided: Yes    Brett demonstrated good  understanding of the education provided.   Patient demonstrated appropriate response to Functional Dry Needling.      After exs., pt rec'd functional dry needling with E-stim today as follows: FDN to : L lateral distal hamstring using four 60 mm fusiform needles at points of tenderness. After placement of needles, E-stim was applied with intensity ranged b/w 3-4 mA during course of treatment between two points in each muscle.      Patient Education and Home Exercises       Education provided:   - justification and explanation of treatment   - HEP modifications     Written Home Exercises Provided: Patient instructed to cont prior HEP. Exercises were reviewed and Brett was able to demonstrate them prior to the end of the session.  Brett demonstrated good  understanding of the education provided. See EMR under Patient Instructions for exercises provided during therapy sessions    Assessment   Patient does well with treatment modified to focus more so on strengthening  and mobility at distal hamstring and overall stability. Patient notes difficulty with added challenge to the quad and overall lower extremity remains. Medial tension in muscle has improved, but distal lateral tension remains. Patient instructed to make sure knee is more involved with stretching to help with distal tension.     Brett Is " progressing well towards his goals.   Pt prognosis is Excellent.     Pt will continue to benefit from skilled outpatient physical therapy to address the deficits listed in the problem list box on initial evaluation, provide pt/family education and to maximize pt's level of independence in the home and community environment.     Pt's spiritual, cultural and educational needs considered and pt agreeable to plan of care and goals.     Anticipated barriers to physical therapy: none    Goals:   Short Term Goals:  4 weeks Progress   7/19/2023   Pain: Pt will demonstrate improved pain by reports of less than or equal to 6/10 worst pain on the verbal rating scale in order to progress toward maximal functional ability and improve QOL. MET   Function: Patient will demonstrate improved function as indicated by a functional limitation score of less than or equal to 31 out of 100 on knee and lumbar FOTO. PM   HEP: Patient will demonstrate independence with HEP in order to progress toward functional independence. MET      Long Term Goals:  8 weeks Progress  7/19/2023   Pain: Pt will demonstrate improved pain by reports of less than or equal to 3/10 worst pain on the verbal rating scale in order to progress toward maximal functional ability and improve QOL.   MET   Function: Patient will demonstrate improved function as indicated by a functional limitation score of less than or equal to 23 out of 100 on knee and lumbar FOTO. PM   Mobility: Patient will improve AROM to stated goals, listed in objective measures above, in order to return to maximal functional potential and improve quality of life. PM   Strength: Patient will improve strength to stated goals, listed in objective measures above, in order to improve functional independence and quality of life. PM   Stair Climbing: Patient will demonstrate ability to climb up and down 20 steps with normalized mechanics and no complaints of pain in order to improve functional mobility at  two-story home and at job.  MET   GOALS KEY:   PC= progressing/continue;   PM= partially met;             DC= discontinue  Plan     Update Certification Period: 7/18/2023 to 8/8/2023  Recommended Treatment Plan: 1 times per week for 3 weeks: Aquatic Therapy, Cervical/Lumbar Traction, Electrical Stimulation with or without FDN, Gait Training, Manual Therapy, Moist Heat/ Ice, Neuromuscular Re-ed, Patient Education, Self Care, Therapeutic Activities, Therapeutic Exercise, and Functional Dry Needling    Kelsie Moser, PT

## 2023-07-28 ENCOUNTER — OFFICE VISIT (OUTPATIENT)
Dept: DIABETES | Facility: CLINIC | Age: 58
End: 2023-07-28
Payer: COMMERCIAL

## 2023-07-28 DIAGNOSIS — E11.40 TYPE 2 DIABETES MELLITUS WITH DIABETIC NEUROPATHY, WITH LONG-TERM CURRENT USE OF INSULIN: Primary | ICD-10-CM

## 2023-07-28 DIAGNOSIS — E78.5 HYPERLIPIDEMIA ASSOCIATED WITH TYPE 2 DIABETES MELLITUS: ICD-10-CM

## 2023-07-28 DIAGNOSIS — E11.69 HYPERLIPIDEMIA ASSOCIATED WITH TYPE 2 DIABETES MELLITUS: ICD-10-CM

## 2023-07-28 DIAGNOSIS — Z79.4 TYPE 2 DIABETES MELLITUS WITH DIABETIC NEUROPATHY, WITH LONG-TERM CURRENT USE OF INSULIN: Primary | ICD-10-CM

## 2023-07-28 DIAGNOSIS — E11.59 HYPERTENSION ASSOCIATED WITH DIABETES: ICD-10-CM

## 2023-07-28 DIAGNOSIS — I15.2 HYPERTENSION ASSOCIATED WITH DIABETES: ICD-10-CM

## 2023-07-28 PROCEDURE — 1159F MED LIST DOCD IN RCRD: CPT | Mod: CPTII,95,, | Performed by: NURSE PRACTITIONER

## 2023-07-28 PROCEDURE — 3052F PR MOST RECENT HEMOGLOBIN A1C LEVEL 8.0 - < 9.0%: ICD-10-PCS | Mod: CPTII,95,, | Performed by: NURSE PRACTITIONER

## 2023-07-28 PROCEDURE — 99214 PR OFFICE/OUTPT VISIT, EST, LEVL IV, 30-39 MIN: ICD-10-PCS | Mod: 95,,, | Performed by: NURSE PRACTITIONER

## 2023-07-28 PROCEDURE — 3052F HG A1C>EQUAL 8.0%<EQUAL 9.0%: CPT | Mod: CPTII,95,, | Performed by: NURSE PRACTITIONER

## 2023-07-28 PROCEDURE — 99214 OFFICE O/P EST MOD 30 MIN: CPT | Mod: 95,,, | Performed by: NURSE PRACTITIONER

## 2023-07-28 PROCEDURE — 1160F RVW MEDS BY RX/DR IN RCRD: CPT | Mod: CPTII,95,, | Performed by: NURSE PRACTITIONER

## 2023-07-28 PROCEDURE — 1159F PR MEDICATION LIST DOCUMENTED IN MEDICAL RECORD: ICD-10-PCS | Mod: CPTII,95,, | Performed by: NURSE PRACTITIONER

## 2023-07-28 PROCEDURE — 1160F PR REVIEW ALL MEDS BY PRESCRIBER/CLIN PHARMACIST DOCUMENTED: ICD-10-PCS | Mod: CPTII,95,, | Performed by: NURSE PRACTITIONER

## 2023-07-28 PROCEDURE — 4010F PR ACE/ARB THEARPY RXD/TAKEN: ICD-10-PCS | Mod: CPTII,95,, | Performed by: NURSE PRACTITIONER

## 2023-07-28 PROCEDURE — 4010F ACE/ARB THERAPY RXD/TAKEN: CPT | Mod: CPTII,95,, | Performed by: NURSE PRACTITIONER

## 2023-07-28 RX ORDER — PEN NEEDLE, DIABETIC 30 GX3/16"
1 NEEDLE, DISPOSABLE MISCELLANEOUS DAILY
Qty: 100 EACH | Refills: 4 | Status: SHIPPED | OUTPATIENT
Start: 2023-07-28

## 2023-07-28 RX ORDER — LANCETS
1 EACH MISCELLANEOUS 3 TIMES DAILY
Qty: 100 EACH | Refills: 11 | Status: SHIPPED | OUTPATIENT
Start: 2023-07-28 | End: 2023-09-25 | Stop reason: SDUPTHER

## 2023-07-28 RX ORDER — NAPROXEN 500 MG/1
TABLET ORAL
Qty: 60 TABLET | Refills: 2 | Status: SHIPPED | OUTPATIENT
Start: 2023-07-28 | End: 2023-08-04

## 2023-07-28 RX ORDER — INSULIN PUMP SYRINGE, 3 ML
EACH MISCELLANEOUS
Qty: 1 EACH | Refills: 0 | Status: SHIPPED | OUTPATIENT
Start: 2023-07-28 | End: 2023-11-16 | Stop reason: SDUPTHER

## 2023-07-28 NOTE — PATIENT INSTRUCTIONS
Medication Regimen:   Continue Metformin 1000 mg twice daily with meals  Continue Prandin 2 mg, 1 tablets three times daily before meals   Continue Tresiba 45 units every morning  Continue Trulicity 4.5 mg weekly    Patient Instructions:  Carbohydrate Count: 30-45 grams/meal and 15 grams/snack with limit of 2 snacks per day.  Exercise: Goal is 150 minutes or more per week.  Bring meter and blood sugar log to each appointment.     Goals for Blood Sugar:  Fastin-130 mg/dl  2 hours after meal:  mg/dl  Before Bed: 100-150 mg/dl  If Blood sugar is below 70 mg/dl, DO NOT take diabetes medication. Eat first and then recheck blood sugar in 20 minutes.  Foods to eat if blood sugar is below 70 mg/dl  1/2 glass of orange juice   1/2 regular cola can   3-4 hard candies   3-4 small glucose tabs.   Foods to eat if blood sugar is below 50 mg/dl  1 glass of orange juice  1 regular cola can  8 hard candies  8 small glucose tabs.   If you have repeated eating/blood sugar recheck process 2 times and blood sugar still below 70 mg/dl, call 911.

## 2023-07-28 NOTE — PROGRESS NOTES
Established Patient - Virtual Telehealth Visit     The patient location is: Home ([place])  The chief complaint leading to consultation is: Diabetes Follow-up  Visit type: Virtual visit with synchronous audio and video via [employer] is[facility]a  Total time spent with patient: 15 minutes     Each patient to whom I provide medical services by telemedicine is:  (1) informed of the relationship between the physician and patient and the respective role of any other health care provider with respect to management of the patient; and (2) notified that they may decline to receive medical services by telemedicine and may withdraw from such care at any time. Patient verbally consented to receive this service via voice-only telephone call.    PCP: Herb Lord MD    Subjective:     Chief Complaint: Diabetes follow up.  Last visit with me: 5/26/23    History of Present Illness:   57 y.o.  male for diabetes follow up.   Previously managed by JANE Ross  Last clinic visit 8/4/2021.  Type II diabetes since 2014 .  Comorbidities: HTN and HLD    Known diabetes complications:  DKA/HHS: no  Retinopathy: yes  Peripheral neuropathy: yes, numbness and tingling bilateral feet  Nephropathy: yes    Interval history:  At last visit, Trulicity dose increased.  Tolerating well.    Denies recent hospital admissions or ER visits.   Denies hypoglycemia.  Endorses diabetes related symptoms: Numbness and Tingling in both feet    Blood glucose monitoring: fingerstick: 1x/day   Per patient's recall, over the last 4 weeks   Fasting BG  average 110- 130's    No BG log for review.    Activity Level: Walking 4x/day, 1400 steps/day  Meal Planning:3 meals/day;1 snacks/day.    Medication compliance all of the time, diet noncompliance some of the time.   Has attended diabetes education in the past.     Previous regimen:  Soliqua - on max dose    Past failed treatment: Jardiance - yeast infection, Ozempic - diarrhea    Current DM  Medications:  Diabetes Medications               dulaglutide (TRULICITY) 4.5 mg/0.5 mL pen injector Inject 4.5 mg into the skin every 7 days.    insulin degludec (TRESIBA FLEXTOUCH U-200) 200 unit/mL (3 mL) insulin pen Inject 46 Units into the skin every evening.  Self-adjusting, 46 units weekdays/60 units weekends    metFORMIN (GLUCOPHAGE) 1000 MG tablet Take 1 tablet (1,000 mg total) by mouth 2 (two) times daily with meals.    repaglinide (PRANDIN) 2 MG tablet TAKE 2 TABLETS BY MOUTH 3 (THREE) TIMES DAILY BEFORE MEALS.     Allergies  Review of patient's allergies indicates:   Allergen Reactions    Hay fever and allergy relief        Labs Reviewed:  His most recent A1C is:  Lab Results   Component Value Date    HGBA1C 8.3 (H) 05/05/2023    HGBA1C 9.4 (H) 01/19/2023    HGBA1C 10.2 (H) 12/16/2022       His most recent BMP is:  Lab Results   Component Value Date     09/23/2022    K 4.0 09/23/2022     09/23/2022    CO2 24 09/23/2022    BUN 17 09/23/2022    CREATININE 0.9 09/23/2022    CALCIUM 8.9 09/23/2022    ANIONGAP 10 09/23/2022    ESTGFRAFRICA >60.0 02/19/2022    EGFRNONAA >60.0 02/19/2022       Lab Results   Component Value Date    CPEPTIDE 2.4 04/21/2017     Lab Results   Component Value Date    GLUTAMICACID 0.00 04/21/2017       There is no height or weight on file to calculate BMI.    Wt Readings from Last 3 Encounters:   06/16/23 0911 103.4 kg (228 lb)   05/19/23 0949 103.4 kg (228 lb)   05/12/23 1026 103.6 kg (228 lb 6.3 oz)        His blood sugar in clinic today is: Not assessed due to type of visit.    Diabetes Management Status  Statin: Taking  ACE/ARB: Taking    Screening or Prevention Patient's value Goal Complete/Controlled?   HgA1C Testing and Control   Lab Results   Component Value Date    HGBA1C 8.3 (H) 05/05/2023      Annually/Less than 8% No   Lipid profile : 09/23/2022 Annually Yes   LDL control Lab Results   Component Value Date    LDLCALC 53.2 (L) 09/23/2022    Annually/Less than  100 mg/dl  Yes   Nephropathy screening Lab Results   Component Value Date    MICALBCREAT 11.2 05/21/2022     Lab Results   Component Value Date    PROTEINUA Trace (A) 06/30/2016    Annually No   Blood pressure BP Readings from Last 1 Encounters:   05/12/23 122/82    Less than 140/90 Yes   Dilated retinal exam : 10/07/2022 Annually Yes    Foot exam   : 09/12/2022 Annually Yes     Social History     Socioeconomic History    Marital status:     Number of children: 2   Occupational History    Occupation: Plant work      Employer: [employer]     Comment: [employer]   Tobacco Use    Smoking status: Never    Smokeless tobacco: Former   Substance and Sexual Activity    Alcohol use: Yes     Alcohol/week: 2.0 standard drinks     Types: 2 Cans of beer per week    Drug use: No    Sexual activity: Yes     Partners: Female     Past Medical History:   Diagnosis Date    Diabetes mellitus with neuropathy 2009    BS 57 am 12/18/2015    Hyperlipidemia 2009    Hypertension associated with diabetes 4/21/2017     Review of Systems   Constitutional:  Negative for activity change, appetite change, fatigue and unexpected weight change.   HENT:  Negative for dental problem and trouble swallowing.    Eyes:  Negative for visual disturbance.   Respiratory:  Negative for chest tightness and shortness of breath.    Cardiovascular:  Negative for chest pain, palpitations and leg swelling.   Gastrointestinal:  Negative for abdominal pain, constipation, diarrhea, nausea and vomiting.   Endocrine: Negative for polydipsia, polyphagia and polyuria.   Genitourinary:  Negative for difficulty urinating, dysuria, frequency and urgency.   Musculoskeletal:  Negative for gait problem, myalgias and neck pain.   Integumentary:  Negative for rash and wound.   Allergic/Immunologic: Negative.    Neurological:  Positive for numbness (tingling BLE). Negative for dizziness, syncope, weakness and headaches.   Hematological: Negative.    Psychiatric/Behavioral:   "Negative for confusion and sleep disturbance.    All other systems reviewed and are negative.     Objective:      Physical Exam  Constitutional:       General: She is awake.      Appearance: Normal appearance. She is well-developed and well-groomed.   HENT:      Head: Normocephalic and atraumatic.   Eyes:      General: Lids are normal. Gaze aligned appropriately.   Pulmonary:      Effort: Pulmonary effort is normal. No respiratory distress.   Neurological:      Mental Status: She is alert and oriented to person, place, and time.   Psychiatric:         Attention and Perception: Attention normal.         Mood and Affect: Mood and affect normal.         Speech: Speech normal.         Behavior: Behavior normal.         Thought Content: Thought content normal.         Cognition and Memory: Cognition normal.         Judgment: Judgment normal.       Assessment / Plan:     Diagnoses and all orders for this visit:    Type 2 diabetes mellitus with diabetic neuropathy, with long-term current use of insulin  -     lancets Misc; 1 each by Misc.(Non-Drug; Combo Route) route 3 (three) times daily.  -     blood-glucose meter kit; Use as instructed  -     blood sugar diagnostic Strp; 1 each by Misc.(Non-Drug; Combo Route) route 3 (three) times daily.  -     pen needle, diabetic 32 gauge x 5/32" Ndle; 1 each by Misc.(Non-Drug; Combo Route) route once daily.    Hyperlipidemia associated with type 2 diabetes mellitus    Hypertension associated with diabetes    Additional Plan Details:  - Condition: Uncontrolled, most recent A1C of 8.3% was completed 2 months ago. Improved from previous reading.  - Monitor blood glucose:  4x daily.Goals reviewed. Maintain BG log.   - Hypoglycemia protocol: Reviewed and risk discussed.  Notify if BG readings below 80. Protocol printout provided.   - Meal Planning: Limit carbohydrate intake 30-45 grams/meal, 3x daily and 15 grams/snack, limit 2/day.   - Activity level: Recommend at least 150 minutes of " exercise in a week.  - BP and LDL: Recommend lifestyle modifications and follow up with PCP for management.   - Medication Regimen:     Continue Metformin 1000 mg twice daily with meals  Continue Prandin 2 mg, 1 tablets three times daily before meals   Continue Tresiba 45 units every morning  Continue Trulicity 4.5 mg weekly    - Medication consideration: Continue regimen. Titrate GLP/basal to goal BG.   - Health Maintenance standards addressed today:  A1c scheduled. [facility] exam  [facility]  - Risks of uncontrolled DM explained. Importance of routine foot and eye exams reviewed. Scheduled as appropriate.  -The patient was explained the above plan and given opportunity to ask questions.  He understands, chooses and consents to this plan and accepts all the risks, which include but are not limited to the risks mentioned above.   - Labs ordered as above.  - CDE follow up: Scheduled  - Follow up: 2 months in clinic.         Charlotte T. Delacruz, FNP-C [facility]    A total of 18 minutes was spent in face to face time, of which over 50 % was spent in counseling patient on disease process, complications, treatment, and side effects of medications.

## 2023-08-03 ENCOUNTER — CLINICAL SUPPORT (OUTPATIENT)
Dept: REHABILITATION | Facility: HOSPITAL | Age: 58
End: 2023-08-03
Payer: COMMERCIAL

## 2023-08-03 DIAGNOSIS — M62.81 WEAKNESS OF TRUNK MUSCULATURE: ICD-10-CM

## 2023-08-03 DIAGNOSIS — M25.662 DECREASED RANGE OF MOTION (ROM) OF LEFT KNEE: Primary | ICD-10-CM

## 2023-08-03 PROCEDURE — 97140 MANUAL THERAPY 1/> REGIONS: CPT | Mod: PN

## 2023-08-03 PROCEDURE — 97112 NEUROMUSCULAR REEDUCATION: CPT | Mod: PN

## 2023-08-03 PROCEDURE — 97530 THERAPEUTIC ACTIVITIES: CPT | Mod: PN

## 2023-08-03 NOTE — PROGRESS NOTES
[facility]   Physical Therapy Treatment Note      Name: Brett Garcia  Clinic Number: [id number]    Therapy Diagnosis:   Encounter Diagnoses   Name Primary?    Decreased range of motion (ROM) of left knee Yes    Weakness of trunk musculature      Physician: Monica Andrade,*    Visit Date: 8/3/2023    Physician Orders: PT Eval and Treat   Medical Diagnosis from Referral: Acute midline low back pain without sciatica [M54.50], Acute pain of left knee [M25.562]  Evaluation Date: 5/19/2023  Authorization Period Expiration: 5/11/2023  Plan of Care Expiration: 8/8/2023  Visit # / Visits authorized: 12/ 20  FOTO: 2/3     Precautions: Standard and Diabetes     PTA Visit #: 0/5     Time In: 5:30 pm   Time Out: 6:30 pm  Total Billable Time: 60 minutes ( 45  minutes 1:1 with trained technician)     Subjective     Pt reports: hamstring pain has been better, but patient remains with some anterior knee pain after he does a lot of walking and stair climbing at work. This pain is very minimal.   He was compliant with home exercise program.  Response to previous treatment: some soreness   Functional change: better gait and stair tolerance     Pain: 1/10  Location: anterior medial knee       Objective      Objective Measures updated at progress report unless specified.     Treatment     Brett received the following treatments:    Brett received therapeutic exercises to develop strength, endurance, ROM, and flexibility for (6) minutes including:      Hamstring stretch  Prone Hamstring curls BTB (eccentric focus)  2 x 10      Brett received the following manual therapy techniques applied for (8) minutes, including:     STM and palpation to L knee musculature prior to and following FDN     Brett participated in neuromuscular re-education activities to improve: Balance, Coordination, Kinesthetic, Sense, Proprioception, and Posture for (23) minutes. The following activities were included:    "  Sidelying hip ABC's x2 set each  BOSU Squats  Paloff press SL 30#  x 10 B   Wall sits 3 x 30"   TRX SLS 2 x 10 B      Brett participated in dynamic functional therapeutic activities to improve functional performance for 23 minutes, including:     Step downs x 10 B   Sled push 25# 2 laps  Sled pull 25# 2 laps   Deadlifts 65# 2 x 6   Squats 65# 2 x 6      Palpation Assessment to determine the necessity for Functional Dry Needling  L hamstring.   Patient provided written and verbal consent to Functional Dry Needling.   Written Handout on response to FDN provided: Yes    Brett demonstrated good  understanding of the education provided.   Patient demonstrated appropriate response to Functional Dry Needling.      After exs., pt rec'd functional dry needling with E-stim today as follows: FDN to : L knee at 9 points surrounding joint utilizing standardized knee OA dry needling protocol. After placement of needles, E-stim was applied with intensity ranged b/w 3-4 mA during course of treatment between points in each region for 8 minutes.       Patient Education and Home Exercises       Education provided:   - justification and explanation of treatment   - HEP modifications     Written Home Exercises Provided: Patient instructed to cont prior HEP. Exercises were reviewed and Brett was able to demonstrate them prior to the end of the session.  Brett demonstrated good  understanding of the education provided. See EMR under Patient Instructions for exercises provided during therapy sessions    Assessment   Patient does well with more knee stabilizing focus of treatment today. Patient has improved performance of TRX squats today with less shaking and no complaints of discomfort. Some discomfort is noted going into wall squats, but not during. Needling was done with OA protocol use today to help with discomfort felt. Patient continues to be educated on balance of heavy work activity with good stability exercises at home " to prevent strain on joints. Plan for D/C next session.     Brett Is progressing well towards his goals.   Pt prognosis is Excellent.     Pt will continue to benefit from skilled outpatient physical therapy to address the deficits listed in the problem list box on initial evaluation, provide pt/family education and to maximize pt's level of independence in the home and community environment.     Pt's spiritual, cultural and educational needs considered and pt agreeable to plan of care and goals.     Anticipated barriers to physical therapy: none    Goals:   Short Term Goals:  4 weeks Progress   7/19/2023   Pain: Pt will demonstrate improved pain by reports of less than or equal to 6/10 worst pain on the verbal rating scale in order to progress toward maximal functional ability and improve QOL. MET   Function: Patient will demonstrate improved function as indicated by a functional limitation score of less than or equal to 31 out of 100 on knee and lumbar FOTO. PM   HEP: Patient will demonstrate independence with HEP in order to progress toward functional independence. MET      Long Term Goals:  8 weeks Progress  7/19/2023   Pain: Pt will demonstrate improved pain by reports of less than or equal to 3/10 worst pain on the verbal rating scale in order to progress toward maximal functional ability and improve QOL.   MET   Function: Patient will demonstrate improved function as indicated by a functional limitation score of less than or equal to 23 out of 100 on knee and lumbar FOTO. PM   Mobility: Patient will improve AROM to stated goals, listed in objective measures above, in order to return to maximal functional potential and improve quality of life. PM   Strength: Patient will improve strength to stated goals, listed in objective measures above, in order to improve functional independence and quality of life. PM   Stair Climbing: Patient will demonstrate ability to climb up and down 20 steps with normalized  mechanics and no complaints of pain in order to improve functional mobility at two-story home and at job.  MET   GOALS KEY:   PC= progressing/continue;   PM= partially met;             DC= discontinue  Plan     Update Certification Period: 7/18/2023 to 8/8/2023  Recommended Treatment Plan: 1 times per week for 3 weeks: Aquatic Therapy, Cervical/Lumbar Traction, Electrical Stimulation with or without FDN, Gait Training, Manual Therapy, Moist Heat/ Ice, Neuromuscular Re-ed, Patient Education, Self Care, Therapeutic Activities, Therapeutic Exercise, and Functional Dry Needling    Kelsie Moser, PT

## 2023-08-04 ENCOUNTER — OFFICE VISIT (OUTPATIENT)
Dept: PAIN MEDICINE | Facility: CLINIC | Age: 58
End: 2023-08-04
Payer: COMMERCIAL

## 2023-08-04 ENCOUNTER — OFFICE VISIT (OUTPATIENT)
Dept: INTERNAL MEDICINE | Facility: CLINIC | Age: 58
End: 2023-08-04
Payer: COMMERCIAL

## 2023-08-04 VITALS
RESPIRATION RATE: 20 BRPM | TEMPERATURE: 99 F | HEIGHT: 69 IN | DIASTOLIC BLOOD PRESSURE: 82 MMHG | HEART RATE: 94 BPM | WEIGHT: 230.19 LBS | OXYGEN SATURATION: 97 % | BODY MASS INDEX: 34.09 KG/M2 | SYSTOLIC BLOOD PRESSURE: 130 MMHG

## 2023-08-04 VITALS
WEIGHT: 228.75 LBS | HEIGHT: 69 IN | HEART RATE: 91 BPM | SYSTOLIC BLOOD PRESSURE: 150 MMHG | DIASTOLIC BLOOD PRESSURE: 86 MMHG | BODY MASS INDEX: 33.88 KG/M2

## 2023-08-04 DIAGNOSIS — E11.42 DIABETIC POLYNEUROPATHY ASSOCIATED WITH TYPE 2 DIABETES MELLITUS: ICD-10-CM

## 2023-08-04 DIAGNOSIS — M43.06 PARS DEFECT OF LUMBAR SPINE: ICD-10-CM

## 2023-08-04 DIAGNOSIS — E78.5 HYPERLIPIDEMIA, UNSPECIFIED HYPERLIPIDEMIA TYPE: Primary | ICD-10-CM

## 2023-08-04 DIAGNOSIS — Z79.4 TYPE 2 DIABETES MELLITUS WITH DIABETIC NEUROPATHY, WITH LONG-TERM CURRENT USE OF INSULIN: ICD-10-CM

## 2023-08-04 DIAGNOSIS — M54.16 LUMBAR RADICULOPATHY: Primary | ICD-10-CM

## 2023-08-04 DIAGNOSIS — M54.50 ACUTE MIDLINE LOW BACK PAIN WITHOUT SCIATICA: ICD-10-CM

## 2023-08-04 DIAGNOSIS — M54.9 DORSALGIA, UNSPECIFIED: ICD-10-CM

## 2023-08-04 DIAGNOSIS — M51.36 DDD (DEGENERATIVE DISC DISEASE), LUMBAR: ICD-10-CM

## 2023-08-04 DIAGNOSIS — E11.59 HYPERTENSION ASSOCIATED WITH DIABETES: ICD-10-CM

## 2023-08-04 DIAGNOSIS — E11.40 TYPE 2 DIABETES MELLITUS WITH DIABETIC NEUROPATHY, WITH LONG-TERM CURRENT USE OF INSULIN: ICD-10-CM

## 2023-08-04 DIAGNOSIS — M47.816 LUMBAR SPONDYLOSIS: ICD-10-CM

## 2023-08-04 DIAGNOSIS — M21.41 FLAT FEET, BILATERAL: ICD-10-CM

## 2023-08-04 DIAGNOSIS — M17.11 LOCALIZED OSTEOARTHRITIS OF RIGHT KNEE: ICD-10-CM

## 2023-08-04 DIAGNOSIS — I15.2 HYPERTENSION ASSOCIATED WITH DIABETES: ICD-10-CM

## 2023-08-04 DIAGNOSIS — M43.16 SPONDYLOLISTHESIS OF LUMBAR REGION: ICD-10-CM

## 2023-08-04 DIAGNOSIS — M21.42 FLAT FEET, BILATERAL: ICD-10-CM

## 2023-08-04 PROCEDURE — 3008F BODY MASS INDEX DOCD: CPT | Mod: CPTII,S$GLB,, | Performed by: PHYSICIAN ASSISTANT

## 2023-08-04 PROCEDURE — 3052F HG A1C>EQUAL 8.0%<EQUAL 9.0%: CPT | Mod: CPTII,S$GLB,, | Performed by: ANESTHESIOLOGY

## 2023-08-04 PROCEDURE — 3079F DIAST BP 80-89 MM HG: CPT | Mod: CPTII,S$GLB,, | Performed by: ANESTHESIOLOGY

## 2023-08-04 PROCEDURE — 1159F MED LIST DOCD IN RCRD: CPT | Mod: CPTII,S$GLB,, | Performed by: ANESTHESIOLOGY

## 2023-08-04 PROCEDURE — 4010F ACE/ARB THERAPY RXD/TAKEN: CPT | Mod: CPTII,S$GLB,, | Performed by: ANESTHESIOLOGY

## 2023-08-04 PROCEDURE — 3079F PR MOST RECENT DIASTOLIC BLOOD PRESSURE 80-89 MM HG: ICD-10-PCS | Mod: CPTII,S$GLB,, | Performed by: PHYSICIAN ASSISTANT

## 2023-08-04 PROCEDURE — 3052F PR MOST RECENT HEMOGLOBIN A1C LEVEL 8.0 - < 9.0%: ICD-10-PCS | Mod: CPTII,S$GLB,, | Performed by: PHYSICIAN ASSISTANT

## 2023-08-04 PROCEDURE — 1160F PR REVIEW ALL MEDS BY PRESCRIBER/CLIN PHARMACIST DOCUMENTED: ICD-10-PCS | Mod: CPTII,S$GLB,, | Performed by: PHYSICIAN ASSISTANT

## 2023-08-04 PROCEDURE — 99214 OFFICE O/P EST MOD 30 MIN: CPT | Mod: S$GLB,,, | Performed by: PHYSICIAN ASSISTANT

## 2023-08-04 PROCEDURE — 3079F PR MOST RECENT DIASTOLIC BLOOD PRESSURE 80-89 MM HG: ICD-10-PCS | Mod: CPTII,S$GLB,, | Performed by: ANESTHESIOLOGY

## 2023-08-04 PROCEDURE — 1159F MED LIST DOCD IN RCRD: CPT | Mod: CPTII,S$GLB,, | Performed by: PHYSICIAN ASSISTANT

## 2023-08-04 PROCEDURE — 4010F PR ACE/ARB THEARPY RXD/TAKEN: ICD-10-PCS | Mod: CPTII,S$GLB,, | Performed by: ANESTHESIOLOGY

## 2023-08-04 PROCEDURE — 3008F PR BODY MASS INDEX (BMI) DOCUMENTED: ICD-10-PCS | Mod: CPTII,S$GLB,, | Performed by: PHYSICIAN ASSISTANT

## 2023-08-04 PROCEDURE — 99204 OFFICE O/P NEW MOD 45 MIN: CPT | Mod: S$GLB,,, | Performed by: ANESTHESIOLOGY

## 2023-08-04 PROCEDURE — 1160F RVW MEDS BY RX/DR IN RCRD: CPT | Mod: CPTII,S$GLB,, | Performed by: PHYSICIAN ASSISTANT

## 2023-08-04 PROCEDURE — 3077F SYST BP >= 140 MM HG: CPT | Mod: CPTII,S$GLB,, | Performed by: ANESTHESIOLOGY

## 2023-08-04 PROCEDURE — 99214 PR OFFICE/OUTPT VISIT, EST, LEVL IV, 30-39 MIN: ICD-10-PCS | Mod: S$GLB,,, | Performed by: PHYSICIAN ASSISTANT

## 2023-08-04 PROCEDURE — 99204 PR OFFICE/OUTPT VISIT, NEW, LEVL IV, 45-59 MIN: ICD-10-PCS | Mod: S$GLB,,, | Performed by: ANESTHESIOLOGY

## 2023-08-04 PROCEDURE — 99999 PR PBB SHADOW E&M-EST. PATIENT-LVL IV: ICD-10-PCS | Mod: PBBFAC,,, | Performed by: ANESTHESIOLOGY

## 2023-08-04 PROCEDURE — 3075F PR MOST RECENT SYSTOLIC BLOOD PRESS GE 130-139MM HG: ICD-10-PCS | Mod: CPTII,S$GLB,, | Performed by: PHYSICIAN ASSISTANT

## 2023-08-04 PROCEDURE — 3008F PR BODY MASS INDEX (BMI) DOCUMENTED: ICD-10-PCS | Mod: CPTII,S$GLB,, | Performed by: ANESTHESIOLOGY

## 2023-08-04 PROCEDURE — 3052F PR MOST RECENT HEMOGLOBIN A1C LEVEL 8.0 - < 9.0%: ICD-10-PCS | Mod: CPTII,S$GLB,, | Performed by: ANESTHESIOLOGY

## 2023-08-04 PROCEDURE — 4010F ACE/ARB THERAPY RXD/TAKEN: CPT | Mod: CPTII,S$GLB,, | Performed by: PHYSICIAN ASSISTANT

## 2023-08-04 PROCEDURE — 3075F SYST BP GE 130 - 139MM HG: CPT | Mod: CPTII,S$GLB,, | Performed by: PHYSICIAN ASSISTANT

## 2023-08-04 PROCEDURE — 99999 PR PBB SHADOW E&M-EST. PATIENT-LVL V: ICD-10-PCS | Mod: PBBFAC,,, | Performed by: PHYSICIAN ASSISTANT

## 2023-08-04 PROCEDURE — 3077F PR MOST RECENT SYSTOLIC BLOOD PRESSURE >= 140 MM HG: ICD-10-PCS | Mod: CPTII,S$GLB,, | Performed by: ANESTHESIOLOGY

## 2023-08-04 PROCEDURE — 4010F PR ACE/ARB THEARPY RXD/TAKEN: ICD-10-PCS | Mod: CPTII,S$GLB,, | Performed by: PHYSICIAN ASSISTANT

## 2023-08-04 PROCEDURE — 1159F PR MEDICATION LIST DOCUMENTED IN MEDICAL RECORD: ICD-10-PCS | Mod: CPTII,S$GLB,, | Performed by: PHYSICIAN ASSISTANT

## 2023-08-04 PROCEDURE — 3052F HG A1C>EQUAL 8.0%<EQUAL 9.0%: CPT | Mod: CPTII,S$GLB,, | Performed by: PHYSICIAN ASSISTANT

## 2023-08-04 PROCEDURE — 99999 PR PBB SHADOW E&M-EST. PATIENT-LVL IV: CPT | Mod: PBBFAC,,, | Performed by: ANESTHESIOLOGY

## 2023-08-04 PROCEDURE — 99999 PR PBB SHADOW E&M-EST. PATIENT-LVL V: CPT | Mod: PBBFAC,,, | Performed by: PHYSICIAN ASSISTANT

## 2023-08-04 PROCEDURE — 3008F BODY MASS INDEX DOCD: CPT | Mod: CPTII,S$GLB,, | Performed by: ANESTHESIOLOGY

## 2023-08-04 PROCEDURE — 1159F PR MEDICATION LIST DOCUMENTED IN MEDICAL RECORD: ICD-10-PCS | Mod: CPTII,S$GLB,, | Performed by: ANESTHESIOLOGY

## 2023-08-04 PROCEDURE — 3079F DIAST BP 80-89 MM HG: CPT | Mod: CPTII,S$GLB,, | Performed by: PHYSICIAN ASSISTANT

## 2023-08-04 RX ORDER — PREGABALIN 50 MG/1
50 CAPSULE ORAL NIGHTLY
Qty: 30 CAPSULE | Refills: 1 | Status: SHIPPED | OUTPATIENT
Start: 2023-08-04 | End: 2023-10-08

## 2023-08-04 NOTE — PROGRESS NOTES
New Patient Interventional Pain Note (Initial Visit)    Referring Physician: Monica Andrade,*    PCP: Herb Lord MD    Chief Complaint:     Chief Complaint   Patient presents with    Low-back Pain    Knee Pain     Left side        SUBJECTIVE:    Brett Garcia is a 57 y.o. male who presents to the clinic for the evaluation of lower back pain.   Patient reports 4 month history of lower back pain.  He denies any inciting traumas to his low back pain.  He denies any previous surgery in his lumbar spine.  Patient reports that pain initially started as a stabbing aching pain over his left foot that rated up to his left knee and localized over his left knee.  Patient reports that this is progressively surgery radiated up to his left lower back and left buttocks as well.  Pain is worse with flexion and prolonged standing, better with heat and rest.  Pain is currently rated 2/10, but can increase to a 7/10 with exacerbating activity. Denies any fevers, chills, changes in gait, saddle anesthesia, or bowel and bladder incontinence  Of note, patient has recently seen viscosupplementation injection for left knee with moderate relief.  Patient also reports history of numbness in his bilateral feet secondary to diabetic neuropathy.      Non-Pharmacologic Treatments:  Physical Therapy/Home Exercise: yes  Ice/Heat:yes  TENS: no  Acupuncture: no  Massage: no  Chiropractic: yes        Previous Pain Medications:  NSAIDs, Tylenol, muscle relaxers, neuropathic     report:  Reviewed and consistent with medication use as prescribed.    Pain Procedures:   None    Pain Disability Index Review:     Last 3 PDI Scores 8/4/2023   Pain Disability Index (PDI) 7       Imaging:   Results for orders placed during the hospital encounter of 05/12/23    X-Ray Lumbar Spine 5 View    Narrative  EXAMINATION:  XR LUMBAR SPINE COMPLETE 5 VIEW    CLINICAL HISTORY:  Low back pain, unspecified    TECHNIQUE:  AP, lateral, spot and bilateral  oblique views of the lumbar spine were performed.    COMPARISON:  None    FINDINGS:  Vertebral body heights maintained.  L5 pars defects with grade 2 anterolisthesis.  Severe degenerative disc height loss with vacuum phenomenon at L5-S1.  Lower lumbar spine facet arthropathy.  Soft tissues unremarkable.    Impression  L5 pars defect with grade 2 anterolisthesis      Electronically signed by: Thang Garcia MD  Date:    05/12/2023  Time:    11:58      Past Medical History:   Diagnosis Date    Diabetes mellitus with neuropathy 2009    BS 57 am 12/18/2015    Hyperlipidemia 2009    Hypertension associated with diabetes 4/21/2017     Past Surgical History:   Procedure Laterality Date    COLONOSCOPY N/A 11/1/2018    Procedure: COLONOSCOPY;  Surgeon: Kristina Wilson MD;  Location: [facility];  Service: Endoscopy;  Laterality: N/A;    COLONOSCOPY W/ POLYPECTOMY  11/01/2018    Polyp x1, repeat 5 years; Dr. Wilson     Social History     Socioeconomic History    Marital status:     Number of children: 2   Occupational History    Occupation: Plant work      Employer: [employer]     Comment: [employer]   Tobacco Use    Smoking status: Never    Smokeless tobacco: Former   Substance and Sexual Activity    Alcohol use: Yes     Alcohol/week: 2.0 standard drinks of alcohol     Types: 2 Cans of beer per week    Drug use: No    Sexual activity: Yes     Partners: Female     Family History   Problem Relation Age of Onset    Diabetes Father     Hypertension Father     Heart attack Father     Cataracts Father     Hypertension Mother     Hypertension Sister        Review of patient's allergies indicates:   Allergen Reactions    Hay fever and allergy relief        Current Outpatient Medications   Medication Sig    amitriptyline (ELAVIL) 25 MG tablet TAKE 2 TABLETS BY MOUTH EVERY EVENING    ascorbic acid, vitamin C, (VITAMIN C) 1000 MG tablet Take by mouth once daily.    aspirin (ECOTRIN) 81 MG EC tablet Take 81 mg by mouth once  "daily.    atorvastatin (LIPITOR) 40 MG tablet Take 1 tablet (40 mg total) by mouth once daily.    blood sugar diagnostic Strp 1 each by Misc.(Non-Drug; Combo Route) route 3 (three) times daily.    blood-glucose meter kit Use as instructed    dulaglutide (TRULICITY) 4.5 mg/0.5 mL pen injector Inject 4.5 mg into the skin every 7 days.    fluticasone propionate (FLONASE) 50 mcg/actuation nasal spray 2 sprays (100 mcg total) by Each Nostril route once daily. Seasonal allergic rhinitis [J30.2]    insulin degludec (TRESIBA FLEXTOUCH U-200) 200 unit/mL (3 mL) insulin pen Inject 46 Units into the skin every evening.    lancets Misc 1 each by Misc.(Non-Drug; Combo Route) route 3 (three) times daily.    lisinopriL (PRINIVIL,ZESTRIL) 5 MG tablet Take 1 tablet (5 mg total) by mouth once daily.    metFORMIN (GLUCOPHAGE) 1000 MG tablet Take 1 tablet (1,000 mg total) by mouth 2 (two) times daily with meals.    naproxen (NAPROSYN) 500 MG tablet TAKE 1 TABLET BY MOUTH TWICE A DAY (Patient not taking: Reported on 8/4/2023)    pedi multivit no.12 w-fluoride (MULTIVITAMINS-FLUORIDE-FOLIC A ORAL) Take by mouth.    pen needle, diabetic 32 gauge x 5/32" Ndle 1 each by Misc.(Non-Drug; Combo Route) route once daily.    psyllium husk (METAMUCIL ORAL) Take by mouth.    repaglinide (PRANDIN) 2 MG tablet TAKE 2 TABLETS BY MOUTH 3 (THREE) TIMES DAILY BEFORE MEALS.    vitamin D (VITAMIN D3) 1000 units Tab Take 1,000 Units by mouth once daily.    zinc gluconate 50 mg tablet Take 50 mg by mouth once daily.    pregabalin (LYRICA) 50 MG capsule Take 1 capsule (50 mg total) by mouth every evening.     No current facility-administered medications for this visit.         ROS  Review of Systems   Constitutional:  Negative for activity change, appetite change and fever.   HENT:  Negative for facial swelling, rhinorrhea and sore throat.    Eyes:  Negative for pain and redness.   Respiratory:  Negative for cough, chest tightness, shortness of breath, " "wheezing and stridor.    Cardiovascular:  Positive for leg swelling. Negative for chest pain and palpitations.   Gastrointestinal:  Negative for abdominal pain, blood in stool, constipation, diarrhea, nausea and vomiting.   Endocrine: Negative for polydipsia, polyphagia and polyuria.   Genitourinary:  Negative for dysuria, hematuria and urgency.   Musculoskeletal:  Positive for arthralgias, back pain, gait problem, joint swelling and myalgias. Negative for neck pain and neck stiffness.   Skin:  Negative for rash.   Allergic/Immunologic: Negative for food allergies.   Neurological:  Positive for weakness and numbness. Negative for dizziness, tremors, seizures, syncope, facial asymmetry, speech difficulty, light-headedness and headaches.   Psychiatric/Behavioral:  Negative for agitation, hallucinations, self-injury and suicidal ideas. The patient is not nervous/anxious and is not hyperactive.             OBJECTIVE:  BP (!) 150/86 (BP Location: Left arm, Patient Position: Sitting)   Pulse 91   Ht 5' 9" (1.753 m)   Wt 103.8 kg (228 lb 11.6 oz)   BMI 33.78 kg/m²         Physical Exam  Constitutional:       General: He is not in acute distress.     Appearance: Normal appearance. He is not ill-appearing.   HENT:      Head: Normocephalic and atraumatic.      Nose: No congestion or rhinorrhea.      Mouth/Throat:      Mouth: Mucous membranes are moist.   Eyes:      Extraocular Movements: Extraocular movements intact.      Pupils: Pupils are equal, round, and reactive to light.   Cardiovascular:      Pulses: Normal pulses.   Pulmonary:      Effort: Pulmonary effort is normal.   Abdominal:      General: Abdomen is flat.      Palpations: Abdomen is soft.   Skin:     General: Skin is warm and dry.      Capillary Refill: Capillary refill takes less than 2 seconds.   Neurological:      General: No focal deficit present.      Mental Status: He is alert and oriented to person, place, and time.      Sensory: Sensory deficit " present.      Motor: Weakness present. No abnormal muscle tone.      Gait: Gait abnormal.      Deep Tendon Reflexes:      Reflex Scores:       Patellar reflexes are 2+ on the right side and 2+ on the left side.       Achilles reflexes are 2+ on the right side and 2+ on the left side.     Comments: Decreased light touch sensation over bilateral plantar aspect of the feet   4/5 strength in left dorsiflexion   Psychiatric:         Mood and Affect: Mood normal.         Behavior: Behavior normal.         Thought Content: Thought content normal.           Musculoskeletal:  Lumbar Exam  Incision: no  Pain with Flexion: yes, flexion greater than extension  Pain with Extension: yes  ROM:  Decreased  Paraspinous TTP:  Left-greater-than-right  Facet TTP:  L5-S1  Facet Loading:  Positive on left  SLR:  Positive on the left at 80°  SIJ TTP:  Positive on left  ELANA:  Positive on left      LABS:  Lab Results   Component Value Date    WBC 5.35 07/21/2021    HGB 13.7 (L) 07/21/2021    HCT 40.4 07/21/2021    MCV 93 07/21/2021     07/21/2021       CMP  Sodium   Date Value Ref Range Status   09/23/2022 139 136 - 145 mmol/L Final     Potassium   Date Value Ref Range Status   09/23/2022 4.0 3.5 - 5.1 mmol/L Final     Chloride   Date Value Ref Range Status   09/23/2022 105 95 - 110 mmol/L Final     CO2   Date Value Ref Range Status   09/23/2022 24 23 - 29 mmol/L Final     Glucose   Date Value Ref Range Status   09/23/2022 242 (H) 70 - 110 mg/dL Final     BUN   Date Value Ref Range Status   09/23/2022 17 6 - 20 mg/dL Final     Creatinine   Date Value Ref Range Status   09/23/2022 0.9 0.5 - 1.4 mg/dL Final     Calcium   Date Value Ref Range Status   09/23/2022 8.9 8.7 - 10.5 mg/dL Final     Total Protein   Date Value Ref Range Status   12/16/2022 7.0 6.0 - 8.4 g/dL Final     Albumin   Date Value Ref Range Status   12/16/2022 3.8 3.5 - 5.2 g/dL Final     Total Bilirubin   Date Value Ref Range Status   12/16/2022 0.5 0.1 - 1.0 mg/dL  Final     Comment:     For infants and newborns, interpretation of results should be based  on gestational age, weight and in agreement with clinical  observations.    Premature Infant recommended reference ranges:  Up to 24 hours.............<8.0 mg/dL  Up to 48 hours............<12.0 mg/dL  3-5 days..................<15.0 mg/dL  6-29 days.................<15.0 mg/dL       Alkaline Phosphatase   Date Value Ref Range Status   12/16/2022 74 55 - 135 U/L Final     AST   Date Value Ref Range Status   12/16/2022 63 (H) 10 - 40 U/L Final     ALT   Date Value Ref Range Status   12/16/2022 68 (H) 10 - 44 U/L Final     Anion Gap   Date Value Ref Range Status   09/23/2022 10 8 - 16 mmol/L Final     eGFR if    Date Value Ref Range Status   02/19/2022 >60.0 >60 mL/min/1.73 m^2 Final     eGFR if non    Date Value Ref Range Status   02/19/2022 >60.0 >60 mL/min/1.73 m^2 Final     Comment:     Calculation used to obtain the estimated glomerular filtration  rate (eGFR) is the CKD-EPI equation.          Lab Results   Component Value Date    HGBA1C 8.3 (H) 05/05/2023             ASSESSMENT:       57 y.o. year old male with lower back pain, consistent with     1. Lumbar radiculopathy  MRI Lumbar Spine Without Contrast    pregabalin (LYRICA) 50 MG capsule      2. Acute midline low back pain without sciatica  Ambulatory referral/consult to [facility]    pregabalin (LYRICA) 50 MG capsule      3. Lumbar spondylosis  pregabalin (LYRICA) 50 MG capsule      4. DDD (degenerative disc disease), lumbar  pregabalin (LYRICA) 50 MG capsule      5. Spondylolisthesis of lumbar region  pregabalin (LYRICA) 50 MG capsule    Back/Cervical Brace For Home Use      6. Pars defect of lumbar spine  pregabalin (LYRICA) 50 MG capsule      7. Dorsalgia, unspecified  pregabalin (LYRICA) 50 MG capsule      8. Diabetic polyneuropathy associated with type 2 diabetes mellitus        9. BMI 33.0-33.9,adult          Lumbar  radiculopathy  -     MRI Lumbar Spine Without Contrast; Future; Expected date: 08/04/2023  -     pregabalin (LYRICA) 50 MG capsule; Take 1 capsule (50 mg total) by mouth every evening.  Dispense: 30 capsule; Refill: 1    Acute midline low back pain without sciatica  -     Ambulatory referral/consult to [facility]  -     pregabalin (LYRICA) 50 MG capsule; Take 1 capsule (50 mg total) by mouth every evening.  Dispense: 30 capsule; Refill: 1    Lumbar spondylosis  -     pregabalin (LYRICA) 50 MG capsule; Take 1 capsule (50 mg total) by mouth every evening.  Dispense: 30 capsule; Refill: 1    DDD (degenerative disc disease), lumbar  -     pregabalin (LYRICA) 50 MG capsule; Take 1 capsule (50 mg total) by mouth every evening.  Dispense: 30 capsule; Refill: 1    Spondylolisthesis of lumbar region  -     pregabalin (LYRICA) 50 MG capsule; Take 1 capsule (50 mg total) by mouth every evening.  Dispense: 30 capsule; Refill: 1  -     Back/Cervical Brace For Home Use    Pars defect of lumbar spine  -     pregabalin (LYRICA) 50 MG capsule; Take 1 capsule (50 mg total) by mouth every evening.  Dispense: 30 capsule; Refill: 1    Dorsalgia, unspecified  -     pregabalin (LYRICA) 50 MG capsule; Take 1 capsule (50 mg total) by mouth every evening.  Dispense: 30 capsule; Refill: 1    Diabetic polyneuropathy associated with type 2 diabetes mellitus    BMI 33.0-33.9,adult             PLAN:   - Interventions:   None at this time.  We will obtain MRI of lumbar spine to further evaluate any possible lumbar radicular component to left lower extremity pain.  - Anticoagulation use:   Yes aspirin    - Medications:   Start Lyrica 50 mg at night   Continue Elavil 25 mg at night   Continue Voltaren 50mg PO BID PRN    - Therapy:   Patient has completed 8 weeks of formal physical therapy in the last 3 months with moderate relief.  Continue home exercises.  We will have patient fitted for LSO brace for grade 2 spondylolisthesis noted on  x-ray    - Imaging/Diagnostic:   X-rays of lumbar spine reviewed and findings discussed with patient.  Significant for grade 2 anterolisthesis of L5 upon S1 were noted L5 pars defect   We will obtain MRI lumbar spine without contrast to further evaluate lower back pain with left lumbar radiculopathy were noted grade 2 anterolisthesis of L5 upon S1 on x-ray has continued despite over 8 weeks conservative therapy    - Consults:   Continue follow-up with orthopedics for left knee pain    - Counseled patient regarding the importance of activity modification    - Patient Questions: Answered all of the patient's questions regarding diagnosis, therapy, and treatment     This condition does not require this patient to take time off of work, and the primary goal of our Pain Management services is to improve the patient's functional capacity.     - Follow up visit: return to clinic after MRI        The above plan and management options were discussed at length with patient. Patient is in agreement with the above and verbalized understanding.    I discussed the goals of interventional chronic pain management with the patient on today's visit.  I explained the utility of injections for diagnostic and therapeutic purposes.  We discussed a multimodal approach to pain including treating the patient's given worst pain at any given time.  We will use a systematic approach to addressing pain.  We will also adopt a multimodal approach that includes injections, adjuvant medications, physical therapy, at times psychiatry.  There may be a limited role for opioid use intermittently in the treatment of pain, more particularly for acute pain although no one approach can be used as a sole treatment modality.    I emphasized the importance of regular exercise, core strengthening and stretching, diet and weight loss as a cornerstone of long-term pain management.      Wilfred Serrano MD  Interventional Pain Management  [facility]  [place]    Disclaimer:  This note was prepared using voice recognition system and is likely to have sound alike errors that may have been overlooked even after proof reading.  Please call me with any questions

## 2023-08-04 NOTE — PROGRESS NOTES
"Subjective:      Patient ID: Brett Garcia is a 57 y.o. male.    Chief Complaint: Hyperlipidemia, Diabetes, and Hypertension    Patient is new to me, being seen today for 6mth f/u.     HTN- lisinopril 5mg  HLD- on statin therapy   DM- A1c 8.3%, trulicity 4.5mg, tresiba 50 units, metformin 1000mg bid, repaglinide 2mg   Blood sugar at home 130s AM    Due for labs     PT recommended foot insole, he is unsure what kind, reports he has flat feet     Last visit Dec 2022 with PCP.      Review of Systems   Constitutional:  Negative for chills, diaphoresis and fever.   HENT:  Negative for congestion, rhinorrhea and sore throat.    Respiratory:  Negative for cough, shortness of breath and wheezing.    Cardiovascular:  Positive for leg swelling (L leg, relates to L knee pain).   Gastrointestinal:  Negative for abdominal pain, constipation, diarrhea, nausea and vomiting.   Skin:  Negative for rash.   Neurological:  Negative for dizziness, light-headedness and headaches.       Objective:   /82   Pulse 94   Temp 99 °F (37.2 °C) (Tympanic)   Resp 20   Ht 5' 9" (1.753 m)   Wt 104.4 kg (230 lb 2.6 oz)   SpO2 97%   BMI 33.99 kg/m²   Physical Exam  Constitutional:       General: He is not in acute distress.     Appearance: Normal appearance. He is well-developed. He is not ill-appearing.   HENT:      Head: Normocephalic and atraumatic.   Cardiovascular:      Rate and Rhythm: Normal rate and regular rhythm.      Heart sounds: Normal heart sounds. No murmur heard.  Pulmonary:      Effort: Pulmonary effort is normal. No respiratory distress.      Breath sounds: Normal breath sounds. No decreased breath sounds.   Musculoskeletal:      Right lower leg: No swelling. No edema.      Left lower leg: Swelling (mild) present. No edema.   Skin:     General: Skin is warm and dry.      Findings: No rash.   Psychiatric:         Speech: Speech normal.         Behavior: Behavior normal.         Thought Content: Thought content normal. "       Assessment:      1. Hyperlipidemia, unspecified hyperlipidemia type    2. Hypertension associated with diabetes    3. Type 2 diabetes mellitus with diabetic neuropathy, with long-term current use of insulin    4. Flat feet, bilateral       Plan:   Hyperlipidemia, unspecified hyperlipidemia type  -     Lipid Panel; Future; Expected date: 08/04/2023    Hypertension associated with diabetes  -     CBC Auto Differential; Future; Expected date: 08/04/2023  -     Comprehensive Metabolic Panel; Future; Expected date: 08/04/2023    Type 2 diabetes mellitus with diabetic neuropathy, with long-term current use of insulin  -     Hemoglobin A1C; Future; Expected date: 08/04/2023  -     Microalbumin/Creatinine Ratio, Urine; Future; Expected date: 08/04/2023    Flat feet, bilateral  -     Ambulatory referral/consult to Podiatry; Future; Expected date: 08/11/2023      Fasting labs, additional recs pending results     Keep f/u with Diabetes in Sept 3mth f/u PCP

## 2023-08-10 ENCOUNTER — CLINICAL SUPPORT (OUTPATIENT)
Dept: REHABILITATION | Facility: HOSPITAL | Age: 58
End: 2023-08-10
Payer: COMMERCIAL

## 2023-08-10 DIAGNOSIS — M62.81 WEAKNESS OF TRUNK MUSCULATURE: ICD-10-CM

## 2023-08-10 DIAGNOSIS — M25.662 DECREASED RANGE OF MOTION (ROM) OF LEFT KNEE: Primary | ICD-10-CM

## 2023-08-10 PROCEDURE — 97112 NEUROMUSCULAR REEDUCATION: CPT | Mod: PN

## 2023-08-10 PROCEDURE — 97110 THERAPEUTIC EXERCISES: CPT | Mod: PN

## 2023-08-10 PROCEDURE — 97530 THERAPEUTIC ACTIVITIES: CPT | Mod: PN

## 2023-08-10 NOTE — PROGRESS NOTES
[facility]  Physical Therapy Discharge Note    Name: Brett Garcia  Clinic Number: [id number]    Therapy Diagnosis:   Encounter Diagnoses   Name Primary?    Decreased range of motion (ROM) of left knee Yes    Weakness of trunk musculature      Physician: Monica Andrade,*    Physician Orders: PT Eval and Treat   Medical Diagnosis from Referral: Acute midline low back pain without sciatica [M54.50], Acute pain of left knee [M25.562]  Evaluation Date: 5/19/2023  Authorization Period Expiration: 5/11/2023  Plan of Care Expiration: 8/8/2023  Visit # / Visits authorized: 15/ 20  FOTO: 3/3     Precautions: Standard and Diabetes      PTA Visit #: 0/5      Time In: 5:30 pm   Time Out: 6:20 pm  Total Billable Time: 50 minutes ( 20 minutes 1:1 with trained technician)      Subjective      Pt reports: Patient reports he has had about a 2/10 pain at worst at this point in both the knee and back. These are both mostly at the end of the day after working his very physically demanding joint. Patient no longer feels that he is limited with being able to perform demands of job or home activity due to this minor residual pain. Patient feels confident in ability to perform home exercise and keep up with gains at home.      Pain Today: 0/10  Location: anterior medial knee        Objective       Objective Measures updated at progress report unless specified.       RANGE OF MOTION:     Lumbar Right  (spine) Left    Pain/Dysfunction with Movement Status 8/10/2023 Goal   Lumbar Flexion  75% --- Pulling felt in hamstrings with lacking lumbar curve reversal  100% improved core reversal  100% with improved lumbar mobility vs hip   Lumbar Extension  50% --- No pain  100% 100%   Lumbar Rotation 50% 50% Tightness  100% B 100%      Knee AROM/PROM Right Left Pain/Dysfunction with Movement Status 8/10/2023 Goal   Knee Extension (0) 0 Lacking 10 degrees  Pulling at back of knee  FULL  Lacking < or = to 5 degrees   "     STRENGTH:     L/E MMT Right Left Pain/Dysfunction with Movement Status   8/10/2023  Goal   Hip Flexion  5/5 4+/5 --- 5/5 B  5/5 L    Hip Extension  4+/5 4/5 --- 5/5 B  5/5 B    Hip Abduction  5/5 4/5 --- 5/5 B 5/5 B   Knee Extension 5/5 4/5 Slight knee pain  5/5 B  5/5 B   Knee Flexion 5/5 4-/5 Slight pain in posterior knee  5/5 B  5/5 B      Movement Analysis Observations noted 8/11/2023   Plank on elbows Patient was able to hold plank on elbows for 45 seconds with moderate shaking starting at 15 seconds. Patient denies increase in low back pain.  Patient completed 1 minute plank hold without shaking and good form maintenance.       Treatment      Brett received the following treatments:     Brett received therapeutic exercises to develop strength, endurance, ROM, and flexibility for (10) minutes including:      Hamstring stretch  Piriformis stretch   Prone Hamstring curls BTB (eccentric focus)  2 x 10      Brett participated in neuromuscular re-education activities to improve: Balance, Coordination, Kinesthetic, Sense, Proprioception, and Posture for (15) minutes. The following activities were included:     Sidelying hip ABC's x2 set each  Wall sits 3 x 30"   TRX SLS 2 x 10 B      Brett participated in dynamic functional therapeutic activities to improve functional performance for 25 minutes, including:     Step downs x 10 B   Sled push 25# 2 laps  Sled pull 25# 2 laps   HEP education at home and how to modify and progress safely at home      Patient Education and Home Exercises        Education provided:   - justification and explanation of treatment   - HEP modifications      Written Home Exercises Provided: yes. Exercises were reviewed and Brett was able to demonstrate them prior to the end of the session.  Brett demonstrated good  understanding of the education provided. See EMR under Patient Instructions for exercises provided during therapy sessions  ASSESSMENT    Brett Garcia is a 57 " y.o. male who has completed 16 sessions of formal outpatient physical therapy for treatment of acute midline low back pain without sciatica and acute pain of left knee. Patient has since shown significant improvement in lower extremity strength, range of motion, core stability, and endurance allowing for better function with stair climbing, prolonged ambulation, lifting and bending.  FOTO scores assessed today show Brett Garcia's overall functional status has improved with physical therapy treatment. Any residual limitations will continue to be addressed with home exercise program. Patient demonstrates independence with home program prior to discharge for safe transition to independent phase of care. Patient educated to follow up with MD is symptoms return or worsen.      Discharge reason: Patient has met all of his goals    Discharge FOTO Score:     Limitation/Restriction for FOTO Survey     Therapist reviewed FOTO scores for Brett Garcia on 8/11/2023.   FOTO documents entered into [employer] - see Media section.     Lumbar Limitation Score: 37% (score 55)  Knee Limitation Score: 42% (score 58)     8/11/2023 Update  Lumbar Score: 94  Knee Score 81      Goals:  Short Term Goals:  4 weeks Progress   8/11/2023   Pain: Pt will demonstrate improved pain by reports of less than or equal to 6/10 worst pain on the verbal rating scale in order to progress toward maximal functional ability and improve QOL. MET   Function: Patient will demonstrate improved function as indicated by a functional limitation score of less than or equal to 31 out of 100 on knee and lumbar FOTO. MET   HEP: Patient will demonstrate independence with HEP in order to progress toward functional independence. MET      Long Term Goals:  8 weeks Progress  8/11/2023   Pain: Pt will demonstrate improved pain by reports of less than or equal to 3/10 worst pain on the verbal rating scale in order to progress toward maximal functional ability and improve  QOL.   MET   Function: Patient will demonstrate improved function as indicated by a functional limitation score of less than or equal to 23 out of 100 on knee and lumbar FOTO. MET   Mobility: Patient will improve AROM to stated goals, listed in objective measures above, in order to return to maximal functional potential and improve quality of life. MET   Strength: Patient will improve strength to stated goals, listed in objective measures above, in order to improve functional independence and quality of life. MET   Stair Climbing: Patient will demonstrate ability to climb up and down 20 steps with normalized mechanics and no complaints of pain in order to improve functional mobility at two-story home and at job.  MET   GOALS KEY:   PC= progressing/continue;   PM= partially met;             DC= discontinue    PLAN   This patient is discharged from Physical Therapy      Kelsie Moser PT

## 2023-08-11 ENCOUNTER — HOSPITAL ENCOUNTER (OUTPATIENT)
Dept: RADIOLOGY | Facility: HOSPITAL | Age: 58
Discharge: HOME | End: 2023-08-11
Attending: ANESTHESIOLOGY
Payer: COMMERCIAL

## 2023-08-11 DIAGNOSIS — M54.16 LUMBAR RADICULOPATHY: ICD-10-CM

## 2023-08-11 PROCEDURE — 72148 MRI LUMBAR SPINE W/O DYE: CPT | Mod: TC

## 2023-08-11 PROCEDURE — 72148 MRI LUMBAR SPINE W/O DYE: CPT | Mod: 26,,, | Performed by: RADIOLOGY

## 2023-08-11 PROCEDURE — 72148 MRI LUMBAR SPINE WITHOUT CONTRAST: ICD-10-PCS | Mod: 26,,, | Performed by: RADIOLOGY

## 2023-08-11 NOTE — PLAN OF CARE
[facility]  Physical Therapy Discharge Note    Name: Brett Garcia  Clinic Number: [id number]    Therapy Diagnosis:   Encounter Diagnoses   Name Primary?    Decreased range of motion (ROM) of left knee Yes    Weakness of trunk musculature      Physician: Monica Andrade,*    Physician Orders: PT Eval and Treat   Medical Diagnosis from Referral: Acute midline low back pain without sciatica [M54.50], Acute pain of left knee [M25.562]  Evaluation Date: 5/19/2023  Authorization Period Expiration: 5/11/2023  Plan of Care Expiration: 8/8/2023  Visit # / Visits authorized: 15/ 20  FOTO: 3/3     Precautions: Standard and Diabetes      PTA Visit #: 0/5      Time In: 5:30 pm   Time Out: 6:20 pm  Total Billable Time: 50 minutes ( 20 minutes 1:1 with trained technician)      Subjective      Pt reports: Patient reports he has had about a 2/10 pain at worst at this point in both the knee and back. These are both mostly at the end of the day after working his very physically demanding joint. Patient no longer feels that he is limited with being able to perform demands of job or home activity due to this minor residual pain. Patient feels confident in ability to perform home exercise and keep up with gains at home.      Pain Today: 0/10  Location: anterior medial knee        Objective       Objective Measures updated at progress report unless specified.       RANGE OF MOTION:     Lumbar Right  (spine) Left    Pain/Dysfunction with Movement Status 8/10/2023 Goal   Lumbar Flexion  75% --- Pulling felt in hamstrings with lacking lumbar curve reversal  100% improved core reversal  100% with improved lumbar mobility vs hip   Lumbar Extension  50% --- No pain  100% 100%   Lumbar Rotation 50% 50% Tightness  100% B 100%      Knee AROM/PROM Right Left Pain/Dysfunction with Movement Status 8/10/2023 Goal   Knee Extension (0) 0 Lacking 10 degrees  Pulling at back of knee  FULL  Lacking < or = to 5 degrees   "     STRENGTH:     L/E MMT Right Left Pain/Dysfunction with Movement Status   8/10/2023  Goal   Hip Flexion  5/5 4+/5 --- 5/5 B  5/5 L    Hip Extension  4+/5 4/5 --- 5/5 B  5/5 B    Hip Abduction  5/5 4/5 --- 5/5 B 5/5 B   Knee Extension 5/5 4/5 Slight knee pain  5/5 B  5/5 B   Knee Flexion 5/5 4-/5 Slight pain in posterior knee  5/5 B  5/5 B      Movement Analysis Observations noted 8/11/2023   Plank on elbows Patient was able to hold plank on elbows for 45 seconds with moderate shaking starting at 15 seconds. Patient denies increase in low back pain.  Patient completed 1 minute plank hold without shaking and good form maintenance.       Treatment      Brett received the following treatments:     Brett received therapeutic exercises to develop strength, endurance, ROM, and flexibility for (10) minutes including:      Hamstring stretch  Piriformis stretch   Prone Hamstring curls BTB (eccentric focus)  2 x 10      Brett participated in neuromuscular re-education activities to improve: Balance, Coordination, Kinesthetic, Sense, Proprioception, and Posture for (15) minutes. The following activities were included:     Sidelying hip ABC's x2 set each  Wall sits 3 x 30"   TRX SLS 2 x 10 B      Brett participated in dynamic functional therapeutic activities to improve functional performance for 25 minutes, including:     Step downs x 10 B   Sled push 25# 2 laps  Sled pull 25# 2 laps   HEP education at home and how to modify and progress safely at home      Patient Education and Home Exercises        Education provided:   - justification and explanation of treatment   - HEP modifications      Written Home Exercises Provided: yes. Exercises were reviewed and Brett was able to demonstrate them prior to the end of the session.  Brett demonstrated good  understanding of the education provided. See EMR under Patient Instructions for exercises provided during therapy sessions  ASSESSMENT    Brett Garcia is a 57 " y.o. male who has completed 16 sessions of formal outpatient physical therapy for treatment of acute midline low back pain without sciatica and acute pain of left knee. Patient has since shown significant improvement in lower extremity strength, range of motion, core stability, and endurance allowing for better function with stair climbing, prolonged ambulation, lifting and bending.  FOTO scores assessed today show Brett Garcia's overall functional status has improved with physical therapy treatment. Any residual limitations will continue to be addressed with home exercise program. Patient demonstrates independence with home program prior to discharge for safe transition to independent phase of care. Patient educated to follow up with MD is symptoms return or worsen.      Discharge reason: Patient has met all of his goals    Discharge FOTO Score:     Limitation/Restriction for FOTO Survey     Therapist reviewed FOTO scores for Brett Garcia on 8/11/2023.   FOTO documents entered into [employer] - see Media section.     Lumbar Limitation Score: 37% (score 55)  Knee Limitation Score: 42% (score 58)     8/11/2023 Update  Lumbar Score: 94  Knee Score 81      Goals:  Short Term Goals:  4 weeks Progress   8/11/2023   Pain: Pt will demonstrate improved pain by reports of less than or equal to 6/10 worst pain on the verbal rating scale in order to progress toward maximal functional ability and improve QOL. MET   Function: Patient will demonstrate improved function as indicated by a functional limitation score of less than or equal to 31 out of 100 on knee and lumbar FOTO. MET   HEP: Patient will demonstrate independence with HEP in order to progress toward functional independence. MET      Long Term Goals:  8 weeks Progress  8/11/2023   Pain: Pt will demonstrate improved pain by reports of less than or equal to 3/10 worst pain on the verbal rating scale in order to progress toward maximal functional ability and improve  QOL.   MET   Function: Patient will demonstrate improved function as indicated by a functional limitation score of less than or equal to 23 out of 100 on knee and lumbar FOTO. MET   Mobility: Patient will improve AROM to stated goals, listed in objective measures above, in order to return to maximal functional potential and improve quality of life. MET   Strength: Patient will improve strength to stated goals, listed in objective measures above, in order to improve functional independence and quality of life. MET   Stair Climbing: Patient will demonstrate ability to climb up and down 20 steps with normalized mechanics and no complaints of pain in order to improve functional mobility at two-story home and at job.  MET   GOALS KEY:   PC= progressing/continue;   PM= partially met;             DC= discontinue    PLAN   This patient is discharged from Physical Therapy      Kelsie Moser PT

## 2023-08-15 ENCOUNTER — TELEPHONE (OUTPATIENT)
Dept: PAIN MEDICINE | Facility: CLINIC | Age: 58
End: 2023-08-15
Payer: COMMERCIAL

## 2023-08-21 ENCOUNTER — PATIENT OUTREACH (OUTPATIENT)
Dept: ADMINISTRATIVE | Facility: HOSPITAL | Age: 58
End: 2023-08-21
Payer: COMMERCIAL

## 2023-08-21 DIAGNOSIS — J30.2 SEASONAL ALLERGIC RHINITIS: ICD-10-CM

## 2023-08-21 DIAGNOSIS — G47.00 INSOMNIA, UNSPECIFIED TYPE: ICD-10-CM

## 2023-08-21 NOTE — TELEPHONE ENCOUNTER
----- Message from Rukhsana Browning sent at 8/21/2023 11:34 AM CDT -----  Contact: Tulio  Type:  RX Refill Request    Who Called: Tulio  Refill or New Rx:refill  RX Name and Strength:Repaglinide 2mg  How is the patient currently taking it? (ex. 1XDay):  Is this a 30 day or 90 day RX:  Preferred Pharmacy with phone number:  [facility]/pharmacy #[id number] [place], [place] - [address]  [address] [address]  Phone: [phone] Fax: [phone]  Local or Mail Order:local  Ordering Provider:Pop  Would the patient rather a call back or a response via [facility]? call  Best Call Back Number:[phone]  Additional Information: pt is out    Who Called: Tulio  Refill or New Rx:refill  RX Name and Strength:Amitriptyline 25mg  How is the patient currently taking it? (ex. 1XDay):2  Is this a 30 day or 90 day RX:180  Preferred Pharmacy with phone number:  [facility]/pharmacy #[id number] [facility], [place] - [address]  [address] [address]  Phone: [phone] Fax: [phone]  Local or Mail Order:local  Ordering Provider:Pop  Would the patient rather a call back or a response via [facility]? call  Best Call Back Number:[phone]  Additional Information: pt is out    Who Called: Tulio  Refill or New Rx:refill  RX Name and Strength:Flonase  How is the patient currently taking it? (ex. 1XDay):  Is this a 30 day or 90 day RX:  Preferred Pharmacy with phone number:  [facility]/pharmacy #[id number] [facility], [place]  [address]  [address] [address]  Phone: [phone] Fax: [phone]  Local or Mail Order:local  Ordering Provider:Pop  Would the patient rather a call back or a response via [facility]? call  Best Call Back Number:[phone]  Additional Information: pt is out

## 2023-08-21 NOTE — TELEPHONE ENCOUNTER
Care Due:                  Date            Visit Type   Department     Provider  --------------------------------------------------------------------------------                                EP -                              PRIMARY      [facility] INTERNAL  Last Visit: 12-      CARE ([facility])   OH Lord                              EP -                              PRIMARY      [facility] INTERNAL  Next Visit: 12-      CARE ([facility])   OH Lord                                                            Last  Test          Frequency    Reason                     Performed    Due Date  --------------------------------------------------------------------------------    CMP.........  12 months..  lisinopriL, metFORMIN....  09- 09-    HBA1C.......  6 months...  metFORMIN, repaglinide...  05- 11-    Lipid Panel.  12 months..  atorvastatin.............  09- 09-    [facility] Embedded Care Due Messages. Reference number: [id number].   8/21/2023 4:51:26 PM CDT

## 2023-08-21 NOTE — TELEPHONE ENCOUNTER
----- Message from Avis Browning sent at 8/21/2023  3:59 PM CDT -----  Contact: Brett Jackson is calling in regards to getting refills on fluticasone propionate (FLONASE) 50 mcg/actuation nasal spray, amitriptyline (ELAVIL) 25 MG tablet and repaglinide (PRANDIN) 2 MG tablet.please call back at [phone]        [facility]/pharmacy #[id number] - [place] - [address] AT [facility]  [address] [facility] [address]  Phone: [phone] Fax: [phone]  Hours: Not open 24 hours    Thanks  LEI

## 2023-08-22 RX ORDER — FLUTICASONE PROPIONATE 50 MCG
2 SPRAY, SUSPENSION (ML) NASAL DAILY
Qty: 48 G | Refills: 1 | Status: SHIPPED | OUTPATIENT
Start: 2023-08-22 | End: 2024-04-22 | Stop reason: SDUPTHER

## 2023-08-22 RX ORDER — REPAGLINIDE 2 MG/1
TABLET ORAL
Qty: 540 TABLET | Refills: 0 | Status: SHIPPED | OUTPATIENT
Start: 2023-08-22 | End: 2023-11-16 | Stop reason: SDUPTHER

## 2023-08-22 RX ORDER — AMITRIPTYLINE HYDROCHLORIDE 25 MG/1
50 TABLET, FILM COATED ORAL NIGHTLY
Qty: 180 TABLET | Refills: 1 | Status: SHIPPED | OUTPATIENT
Start: 2023-08-22 | End: 2024-02-27 | Stop reason: SDUPTHER

## 2023-08-22 NOTE — TELEPHONE ENCOUNTER
Refill Routing Note   Medication(s) are not appropriate for processing by [facility] for the following reason(s):      Medication outside of protocol    [facility] action(s):  Route  Approve Care Due:  None identified     Medication Therapy Plan: FOVS; FLOS      Appointments  past 12m or future 3m with PCP    Date Provider   Last Visit   12/16/2022 Herb Lord MD   Next Visit   12/8/2023 Herb Lord MD   ED visits in past 90 days: 0        Note composed:10:07 AM 08/22/2023

## 2023-08-22 NOTE — TELEPHONE ENCOUNTER
I spoke to the patient and let him know that I see in his chart that the medications were sent to our centralized staff pool and Dr. Lord. We are just waiting for them to be signed and sent in to [facility]. He verbalized understanding.

## 2023-08-22 NOTE — TELEPHONE ENCOUNTER
----- Message from Caprice Grider sent at 8/22/2023  9:33 AM CDT -----  Contact: [phone]  Pt states the pharmacy is telling him they have been trying for 10 days to get his medications filled and he has called multiple times since yesterday but no one is calling him back. Pt is out of these three medications and he states something needs to be done about this now. Please call pt back  re this matter. Thanks      repaglinide (PRANDIN) 2 MG tablet  amitriptyline (ELAVIL) 25 MG tablet  fluticasone propionate (FLONASE) 50 mcg/actuation nasal spray      [facility]/pharmacy #[id number] - [place], [place] - [address] [facility]  [address]  [place] [address]  Phone: [phone] Fax: [phone]

## 2023-08-25 ENCOUNTER — LAB VISIT (OUTPATIENT)
Dept: LAB | Facility: HOSPITAL | Age: 58
End: 2023-08-25
Attending: PHYSICIAN ASSISTANT
Payer: COMMERCIAL

## 2023-08-25 ENCOUNTER — OFFICE VISIT (OUTPATIENT)
Dept: PODIATRY | Facility: CLINIC | Age: 58
End: 2023-08-25
Payer: COMMERCIAL

## 2023-08-25 VITALS — WEIGHT: 230 LBS | HEIGHT: 69 IN | BODY MASS INDEX: 34.07 KG/M2

## 2023-08-25 DIAGNOSIS — M21.42 FLAT FEET, BILATERAL: ICD-10-CM

## 2023-08-25 DIAGNOSIS — S96.912S REPETITIVE STRAIN INJURY OF LEFT FOOT, SEQUELA: ICD-10-CM

## 2023-08-25 DIAGNOSIS — Z79.4 TYPE 2 DIABETES MELLITUS WITH DIABETIC NEUROPATHY, WITH LONG-TERM CURRENT USE OF INSULIN: ICD-10-CM

## 2023-08-25 DIAGNOSIS — I15.2 HYPERTENSION ASSOCIATED WITH DIABETES: ICD-10-CM

## 2023-08-25 DIAGNOSIS — M21.41 FLAT FEET, BILATERAL: ICD-10-CM

## 2023-08-25 DIAGNOSIS — E78.5 HYPERLIPIDEMIA, UNSPECIFIED HYPERLIPIDEMIA TYPE: ICD-10-CM

## 2023-08-25 DIAGNOSIS — S96.911S OVERUSE SYNDROME OF FOOT, RIGHT, SEQUELA: ICD-10-CM
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
## 2023-08-25 DIAGNOSIS — E11.40 TYPE 2 DIABETES MELLITUS WITH DIABETIC NEUROPATHY, WITH LONG-TERM CURRENT USE OF INSULIN: ICD-10-CM

## 2023-08-25 DIAGNOSIS — E11.42 DIABETIC POLYNEUROPATHY ASSOCIATED WITH TYPE 2 DIABETES MELLITUS: ICD-10-CM
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
## 2023-08-25 DIAGNOSIS — E11.9 COMPREHENSIVE DIABETIC FOOT EXAMINATION, TYPE 2 DM, ENCOUNTER FOR: Primary | ICD-10-CM

## 2023-08-25 DIAGNOSIS — E11.59 HYPERTENSION ASSOCIATED WITH DIABETES: ICD-10-CM

## 2023-08-25 LAB
ALBUMIN SERPL BCP-MCNC: 3.7 G/DL (ref 3.5–5.2)
ALP SERPL-CCNC: 72 U/L (ref 55–135)
ALT SERPL W/O P-5'-P-CCNC: 59 U/L (ref 10–44)
ANION GAP SERPL CALC-SCNC: 11 MMOL/L (ref 8–16)
AST SERPL-CCNC: 56 U/L (ref 10–40)
BASOPHILS # BLD AUTO: 0.03 K/UL (ref 0–0.2)
BASOPHILS NFR BLD: 0.5 % (ref 0–1.9)
BILIRUB SERPL-MCNC: 0.5 MG/DL (ref 0.1–1)
BUN SERPL-MCNC: 17 MG/DL (ref 6–20)
CALCIUM SERPL-MCNC: 9.1 MG/DL (ref 8.7–10.5)
CHLORIDE SERPL-SCNC: 104 MMOL/L (ref 95–110)
CHOLEST SERPL-MCNC: 98 MG/DL (ref 120–199)
CHOLEST/HDLC SERPL: 2.6 {RATIO} (ref 2–5)
CO2 SERPL-SCNC: 25 MMOL/L (ref 23–29)
CREAT SERPL-MCNC: 0.7 MG/DL (ref 0.5–1.4)
DIFFERENTIAL METHOD: ABNORMAL
EOSINOPHIL # BLD AUTO: 0.4 K/UL (ref 0–0.5)
EOSINOPHIL NFR BLD: 5.4 % (ref 0–8)
ERYTHROCYTE [DISTWIDTH] IN BLOOD BY AUTOMATED COUNT: 14 % (ref 11.5–14.5)
EST. GFR  (NO RACE VARIABLE): >60 ML/MIN/1.73 M^2
ESTIMATED AVG GLUCOSE: 189 MG/DL (ref 68–131)
GLUCOSE SERPL-MCNC: 104 MG/DL (ref 70–110)
HBA1C MFR BLD: 8.2 % (ref 4–5.6)
HCT VFR BLD AUTO: 40.7 % (ref 40–54)
HDLC SERPL-MCNC: 38 MG/DL (ref 40–75)
HDLC SERPL: 38.8 % (ref 20–50)
HGB BLD-MCNC: 13.5 G/DL (ref 14–18)
IMM GRANULOCYTES # BLD AUTO: 0.01 K/UL (ref 0–0.04)
IMM GRANULOCYTES NFR BLD AUTO: 0.2 % (ref 0–0.5)
LDLC SERPL CALC-MCNC: 39.2 MG/DL (ref 63–159)
LYMPHOCYTES # BLD AUTO: 1.9 K/UL (ref 1–4.8)
LYMPHOCYTES NFR BLD: 28.5 % (ref 18–48)
MCH RBC QN AUTO: 30.9 PG (ref 27–31)
MCHC RBC AUTO-ENTMCNC: 33.2 G/DL (ref 32–36)
MCV RBC AUTO: 93 FL (ref 82–98)
MONOCYTES # BLD AUTO: 0.5 K/UL (ref 0.3–1)
MONOCYTES NFR BLD: 7.7 % (ref 4–15)
NEUTROPHILS # BLD AUTO: 3.8 K/UL (ref 1.8–7.7)
NEUTROPHILS NFR BLD: 57.7 % (ref 38–73)
NONHDLC SERPL-MCNC: 60 MG/DL
NRBC BLD-RTO: 0 /100 WBC
PLATELET # BLD AUTO: 168 K/UL (ref 150–450)
PMV BLD AUTO: 11.7 FL (ref 9.2–12.9)
POTASSIUM SERPL-SCNC: 4 MMOL/L (ref 3.5–5.1)
PROT SERPL-MCNC: 6.9 G/DL (ref 6–8.4)
RBC # BLD AUTO: 4.37 M/UL (ref 4.6–6.2)
SODIUM SERPL-SCNC: 140 MMOL/L (ref 136–145)
TRIGL SERPL-MCNC: 104 MG/DL (ref 30–150)
WBC # BLD AUTO: 6.63 K/UL (ref 3.9–12.7)

## 2023-08-25 PROCEDURE — 3052F PR MOST RECENT HEMOGLOBIN A1C LEVEL 8.0 - < 9.0%: ICD-10-PCS | Mod: CPTII,S$GLB,, | Performed by: PODIATRIST

## 2023-08-25 PROCEDURE — 1159F PR MEDICATION LIST DOCUMENTED IN MEDICAL RECORD: ICD-10-PCS | Mod: CPTII,S$GLB,, | Performed by: PODIATRIST

## 2023-08-25 PROCEDURE — 85025 COMPLETE CBC W/AUTO DIFF WBC: CPT | Performed by: PHYSICIAN ASSISTANT

## 2023-08-25 PROCEDURE — 3008F BODY MASS INDEX DOCD: CPT | Mod: CPTII,S$GLB,, | Performed by: PODIATRIST

## 2023-08-25 PROCEDURE — 1160F PR REVIEW ALL MEDS BY PRESCRIBER/CLIN PHARMACIST DOCUMENTED: ICD-10-PCS | Mod: CPTII,S$GLB,, | Performed by: PODIATRIST

## 2023-08-25 PROCEDURE — 36415 COLL VENOUS BLD VENIPUNCTURE: CPT | Performed by: PHYSICIAN ASSISTANT

## 2023-08-25 PROCEDURE — 83036 HEMOGLOBIN GLYCOSYLATED A1C: CPT | Performed by: PHYSICIAN ASSISTANT

## 2023-08-25 PROCEDURE — 80053 COMPREHEN METABOLIC PANEL: CPT | Performed by: PHYSICIAN ASSISTANT

## 2023-08-25 PROCEDURE — 4010F PR ACE/ARB THEARPY RXD/TAKEN: ICD-10-PCS | Mod: CPTII,S$GLB,, | Performed by: PODIATRIST

## 2023-08-25 PROCEDURE — 99214 PR OFFICE/OUTPT VISIT, EST, LEVL IV, 30-39 MIN: ICD-10-PCS | Mod: S$GLB,,, | Performed by: PODIATRIST

## 2023-08-25 PROCEDURE — 80061 LIPID PANEL: CPT | Performed by: PHYSICIAN ASSISTANT

## 2023-08-25 PROCEDURE — 1159F MED LIST DOCD IN RCRD: CPT | Mod: CPTII,S$GLB,, | Performed by: PODIATRIST

## 2023-08-25 PROCEDURE — 3008F PR BODY MASS INDEX (BMI) DOCUMENTED: ICD-10-PCS | Mod: CPTII,S$GLB,, | Performed by: PODIATRIST

## 2023-08-25 PROCEDURE — 3052F HG A1C>EQUAL 8.0%<EQUAL 9.0%: CPT | Mod: CPTII,S$GLB,, | Performed by: PODIATRIST

## 2023-08-25 PROCEDURE — 99214 OFFICE O/P EST MOD 30 MIN: CPT | Mod: S$GLB,,, | Performed by: PODIATRIST

## 2023-08-25 PROCEDURE — 1160F RVW MEDS BY RX/DR IN RCRD: CPT | Mod: CPTII,S$GLB,, | Performed by: PODIATRIST

## 2023-08-25 PROCEDURE — 99999 PR PBB SHADOW E&M-EST. PATIENT-LVL IV: CPT | Mod: PBBFAC,,, | Performed by: PODIATRIST

## 2023-08-25 PROCEDURE — 99999 PR PBB SHADOW E&M-EST. PATIENT-LVL IV: ICD-10-PCS | Mod: PBBFAC,,, | Performed by: PODIATRIST

## 2023-08-25 PROCEDURE — 4010F ACE/ARB THERAPY RXD/TAKEN: CPT | Mod: CPTII,S$GLB,, | Performed by: PODIATRIST

## 2023-08-25 NOTE — PROGRESS NOTES
Subjective:       Patient ID: Brett Garcia is a 57 y.o. male.    Chief Complaint: Foot Problem (C/o flat feet, pt was last seen on 8/4/23 by PCP Tessy Alicea PA-C, diabetic pt wearing sneakers )      HPI: Brett Garcia presents to the office today, under referral by their Primary Care Provider, Herb Lord MD, for his annual diabetic foot assessment and risk evalution Patient is a DMII. Patient states neuropathy. This patient last saw his/her primary care provider on 8/4/23. Does follow with Pain Management due to lumbar spine pathology.  Request Rx for shoe arch supports.     Hemoglobin A1C   Date Value Ref Range Status   05/05/2023 8.3 (H) 4.0 - 5.6 % Final     Comment:     ADA Screening Guidelines:  5.7-6.4%  Consistent with prediabetes  >or=6.5%  Consistent with diabetes    High levels of fetal hemoglobin interfere with the HbA1C  assay. Heterozygous hemoglobin variants (HbS, HgC, etc)do  not significantly interfere with this assay.   However, presence of multiple variants may affect accuracy.     01/19/2023 9.4 (H) 4.0 - 5.6 % Final     Comment:     ADA Screening Guidelines:  5.7-6.4%  Consistent with prediabetes  >or=6.5%  Consistent with diabetes    High levels of fetal hemoglobin interfere with the HbA1C  assay. Heterozygous hemoglobin variants (HbS, HgC, etc)do  not significantly interfere with this assay.   However, presence of multiple variants may affect accuracy.     12/16/2022 10.2 (H) 4.0 - 5.6 % Final     Comment:     ADA Screening Guidelines:  5.7-6.4%  Consistent with prediabetes  >or=6.5%  Consistent with diabetes    High levels of fetal hemoglobin interfere with the HbA1C  assay. Heterozygous hemoglobin variants (HbS, HgC, etc)do  not significantly interfere with this assay.   However, presence of multiple variants may affect accuracy.     .    Review of patient's allergies indicates:   Allergen Reactions    Hay fever and allergy relief        Past Medical History:  "  Diagnosis Date    Diabetes mellitus with neuropathy 2009    BS 57 am 12/18/2015    Hyperlipidemia 2009    Hypertension associated with diabetes 4/21/2017       Family History   Problem Relation Age of Onset    Diabetes Father     Hypertension Father     Heart attack Father     Cataracts Father     Hypertension Mother     Hypertension Sister        Social History     Socioeconomic History    Marital status:     Number of children: 2   Occupational History    Occupation: Plant work      Employer: [employer]     Comment: [employer]   Tobacco Use    Smoking status: Never    Smokeless tobacco: Former   Substance and Sexual Activity    Alcohol use: Yes     Alcohol/week: 2.0 standard drinks of alcohol     Types: 2 Cans of beer per week    Drug use: No    Sexual activity: Yes     Partners: Female       Past Surgical History:   Procedure Laterality Date    COLONOSCOPY N/A 11/1/2018    Procedure: COLONOSCOPY;  Surgeon: Kristina Wilson MD;  Location: [facility];  Service: Endoscopy;  Laterality: N/A;    COLONOSCOPY W/ POLYPECTOMY  11/01/2018    Polyp x1, repeat 5 years; Dr. Wilsno       Review of Systems   Constitutional:  Negative for chills, fatigue and fever.   HENT:  Negative for hearing loss.    Eyes:  Negative for photophobia and visual disturbance.   Respiratory:  Negative for cough, chest tightness, shortness of breath and wheezing.    Cardiovascular:  Negative for chest pain and palpitations.   Gastrointestinal:  Negative for constipation, diarrhea, nausea and vomiting.   Endocrine: Negative for cold intolerance and heat intolerance.   Genitourinary:  Negative for flank pain.   Musculoskeletal:  Positive for gait problem. Negative for neck pain and neck stiffness.   Neurological:  Negative for light-headedness and headaches.   Psychiatric/Behavioral:  Negative for sleep disturbance.          Objective:   Ht 5' 9" (1.753 m)   Wt 104.3 kg (230 lb)   BMI 33.97 kg/m²     Physical Exam  LOWER EXTREMITY PHYSICAL " EXAMINATION    ORTHOPEDIC: Rectus to slightly pes planus type is noted. Equinus contracture is noted. MMT is 5/5.    VASCULAR: Capillary refill time is less than 3s. Edema is trace. The left dorsalis pedis pulse is 2/4 and the right dorsalis pedis pulse is 2/4. The left posterior tibial pulse is 2/4 and the right posterior tibial pulse is 2/4. Varicosities are absent. Spider veins and telangiectasias are noted. Hair growth is present. Skin appearance is WNL.     NEUROLOGY: Proprioception is intact. Sensation to light touch is intact. Vibratory sensation is decreased to the left and right lower extremity. Examination with 5.07 [place] monofilament reveals that protective sensation is not intact to the left and right plantar surfaces of the foot and digits, as the patient has decreased sensation/detection at greater than 4 distinct points of contact.     DERMATOLOGY: Skin is supple, moist, dry, and intact.     Assessment:     1. Comprehensive diabetic foot examination, type 2 DM, encounter for    2. Diabetic polyneuropathy associated with type 2 diabetes mellitus    3. Flat feet, bilateral    4. Repetitive strain injury of left foot, sequela    5. Overuse syndrome of foot, right, sequela        Plan:     Comprehensive diabetic foot examination, type 2 DM, encounter for  Diabetic polyneuropathy associated with type 2 diabetes mellitus  I counseled the patient on his/her Diabetic Mellitus regarding today's clinical examination and objection findings. We did also discuss recent medication changes, pertinent labs and imaging evaluations and other medical consultation notes and progress notes. Greater than 50% of this visit was spent on counseling and coordination of care. Greater than 20 minutes of this appt. was spent on education about the diabetic foot, in relation to PVD and/or neuropathy, and the prevention of limb loss.     Shoe gear is inspected and wear and proper fit/type. Patient is reminded of the  importance of good nutrition and blood sugar control to help prevent podiatric complications of diabetes. Patient instructed on proper foot hygeine. We discussed wearing proper shoe gear, daily foot inspections, never walking without protective shoe gear, never putting sharp instruments to feet.  Patient  will continue to monitor the areas daily, inspect feet, wear protective shoe gear when ambulatory, moisturizer to maintain skin integrity.     Patient's DMI/DMII is managed by Primary Care Provider and/or Endocrinology Advanced Practice Provider. As per the most recent glycohemoglobin level, this patient is not at goal.    Flat feet, bilateral  Repetitive strain injury of left foot, sequela  Overuse syndrome of foot, right, sequela  Thorough discussion is had with the patient today, concerning the diagnosis, its etiology, and the treatment algorithm at present.     Did discuss proper and supportive shoe gear in detail and at length with the patient.  These are shoes with firm and robust arch support; medial counter.  Shoes which only bend at the metatarsophalangeal joint and which are rigid in the midfoot and hindfoot. Patient urged to purchase running type or cross training type shoes gear which are designed for pronation control.     Prescription is written for Orthotist evaluation at [facility], [address] [place] [id number], for lower extremity orthotic(s) management and/or shoe gear management.         Protective Sensation (w/ 10 gram monofilament):  Right: Decreased  Left: Decreased    Visual Inspection:  Normal -  Bilateral    Pedal Pulses:   Right: Present  Left: Present    Posterior Tibialis Pulses:   Right:Present  Left: Present              Future Appointments   Date Time Provider Department Center   9/8/2023 10:15 AM Wilfred Serrano MD [facility]   9/28/2023  4:30 PM [facility]   12/1/2023  8:00 AM Tanya Sam PA-C  [facility]   12/8/2023  2:00 PM [facility]

## 2023-09-08 LAB
LEFT EYE DM RETINOPATHY: NEGATIVE
RIGHT EYE DM RETINOPATHY: NEGATIVE

## 2023-09-25 ENCOUNTER — TELEPHONE (OUTPATIENT)
Dept: PAIN MEDICINE | Facility: CLINIC | Age: 58
End: 2023-09-25
Payer: COMMERCIAL

## 2023-09-25 DIAGNOSIS — Z79.4 TYPE 2 DIABETES MELLITUS WITH DIABETIC NEUROPATHY, WITH LONG-TERM CURRENT USE OF INSULIN: ICD-10-CM

## 2023-09-25 DIAGNOSIS — E11.40 TYPE 2 DIABETES MELLITUS WITH DIABETIC NEUROPATHY, WITH LONG-TERM CURRENT USE OF INSULIN: ICD-10-CM

## 2023-09-25 NOTE — TELEPHONE ENCOUNTER
Returned call to patient, he missed the virtual  appt with provider and wanted to reschedule for MRI results.  Appt scheduled for 11/16/23 and patient asked to be placed on the waiting list.  Patient agreed with date and time.

## 2023-09-25 NOTE — TELEPHONE ENCOUNTER
----- Message from Maria Elena Acharya sent at 9/25/2023  1:52 PM CDT -----  Contact: Brett Crooks is calling to speak with the nurse needing an appt scheduled to discuss results from MRI. Please give patient a call at [phone].

## 2023-09-26 RX ORDER — DULAGLUTIDE 4.5 MG/.5ML
4.5 INJECTION, SOLUTION SUBCUTANEOUS
Qty: 4 PEN | Refills: 5 | Status: SHIPPED | OUTPATIENT
Start: 2023-09-26 | End: 2023-12-14

## 2023-09-26 RX ORDER — LANCETS
1 EACH MISCELLANEOUS 3 TIMES DAILY
Qty: 100 EACH | Refills: 11 | Status: SHIPPED | OUTPATIENT
Start: 2023-09-26

## 2023-09-28 ENCOUNTER — OFFICE VISIT (OUTPATIENT)
Dept: DIABETES | Facility: CLINIC | Age: 58
End: 2023-09-28
Payer: COMMERCIAL

## 2023-09-28 VITALS
WEIGHT: 230.63 LBS | SYSTOLIC BLOOD PRESSURE: 140 MMHG | HEART RATE: 89 BPM | HEIGHT: 69 IN | BODY MASS INDEX: 34.16 KG/M2 | DIASTOLIC BLOOD PRESSURE: 79 MMHG

## 2023-09-28 DIAGNOSIS — E78.5 HYPERLIPIDEMIA ASSOCIATED WITH TYPE 2 DIABETES MELLITUS: ICD-10-CM

## 2023-09-28 DIAGNOSIS — Z79.4 TYPE 2 DIABETES MELLITUS WITH DIABETIC NEUROPATHY, WITH LONG-TERM CURRENT USE OF INSULIN: Primary | ICD-10-CM

## 2023-09-28 DIAGNOSIS — E11.69 HYPERLIPIDEMIA ASSOCIATED WITH TYPE 2 DIABETES MELLITUS: ICD-10-CM

## 2023-09-28 DIAGNOSIS — E11.40 TYPE 2 DIABETES MELLITUS WITH DIABETIC NEUROPATHY, WITH LONG-TERM CURRENT USE OF INSULIN: Primary | ICD-10-CM

## 2023-09-28 DIAGNOSIS — I15.2 HYPERTENSION ASSOCIATED WITH DIABETES: ICD-10-CM

## 2023-09-28 DIAGNOSIS — E11.59 HYPERTENSION ASSOCIATED WITH DIABETES: ICD-10-CM

## 2023-09-28 PROCEDURE — 99999 PR PBB SHADOW E&M-EST. PATIENT-LVL V: CPT | Mod: PBBFAC,,, | Performed by: NURSE PRACTITIONER

## 2023-09-28 PROCEDURE — 1160F RVW MEDS BY RX/DR IN RCRD: CPT | Mod: CPTII,S$GLB,, | Performed by: NURSE PRACTITIONER

## 2023-09-28 PROCEDURE — 3008F PR BODY MASS INDEX (BMI) DOCUMENTED: ICD-10-PCS | Mod: CPTII,S$GLB,, | Performed by: NURSE PRACTITIONER

## 2023-09-28 PROCEDURE — 3061F NEG MICROALBUMINURIA REV: CPT | Mod: CPTII,S$GLB,, | Performed by: NURSE PRACTITIONER

## 2023-09-28 PROCEDURE — 1159F PR MEDICATION LIST DOCUMENTED IN MEDICAL RECORD: ICD-10-PCS | Mod: CPTII,S$GLB,, | Performed by: NURSE PRACTITIONER

## 2023-09-28 PROCEDURE — 3077F PR MOST RECENT SYSTOLIC BLOOD PRESSURE >= 140 MM HG: ICD-10-PCS | Mod: CPTII,S$GLB,, | Performed by: NURSE PRACTITIONER

## 2023-09-28 PROCEDURE — 3061F PR NEG MICROALBUMINURIA RESULT DOCUMENTED/REVIEW: ICD-10-PCS | Mod: CPTII,S$GLB,, | Performed by: NURSE PRACTITIONER

## 2023-09-28 PROCEDURE — 1160F PR REVIEW ALL MEDS BY PRESCRIBER/CLIN PHARMACIST DOCUMENTED: ICD-10-PCS | Mod: CPTII,S$GLB,, | Performed by: NURSE PRACTITIONER

## 2023-09-28 PROCEDURE — 3052F PR MOST RECENT HEMOGLOBIN A1C LEVEL 8.0 - < 9.0%: ICD-10-PCS | Mod: CPTII,S$GLB,, | Performed by: NURSE PRACTITIONER

## 2023-09-28 PROCEDURE — 1159F MED LIST DOCD IN RCRD: CPT | Mod: CPTII,S$GLB,, | Performed by: NURSE PRACTITIONER

## 2023-09-28 PROCEDURE — 99214 PR OFFICE/OUTPT VISIT, EST, LEVL IV, 30-39 MIN: ICD-10-PCS | Mod: S$GLB,,, | Performed by: NURSE PRACTITIONER

## 2023-09-28 PROCEDURE — 4010F PR ACE/ARB THEARPY RXD/TAKEN: ICD-10-PCS | Mod: CPTII,S$GLB,, | Performed by: NURSE PRACTITIONER

## 2023-09-28 PROCEDURE — 3077F SYST BP >= 140 MM HG: CPT | Mod: CPTII,S$GLB,, | Performed by: NURSE PRACTITIONER

## 2023-09-28 PROCEDURE — 99999 PR PBB SHADOW E&M-EST. PATIENT-LVL V: ICD-10-PCS | Mod: PBBFAC,,, | Performed by: NURSE PRACTITIONER

## 2023-09-28 PROCEDURE — 3066F PR DOCUMENTATION OF TREATMENT FOR NEPHROPATHY: ICD-10-PCS | Mod: CPTII,S$GLB,, | Performed by: NURSE PRACTITIONER

## 2023-09-28 PROCEDURE — 3052F HG A1C>EQUAL 8.0%<EQUAL 9.0%: CPT | Mod: CPTII,S$GLB,, | Performed by: NURSE PRACTITIONER

## 2023-09-28 PROCEDURE — 99214 OFFICE O/P EST MOD 30 MIN: CPT | Mod: S$GLB,,, | Performed by: NURSE PRACTITIONER

## 2023-09-28 PROCEDURE — 3066F NEPHROPATHY DOC TX: CPT | Mod: CPTII,S$GLB,, | Performed by: NURSE PRACTITIONER

## 2023-09-28 PROCEDURE — 3078F PR MOST RECENT DIASTOLIC BLOOD PRESSURE < 80 MM HG: ICD-10-PCS | Mod: CPTII,S$GLB,, | Performed by: NURSE PRACTITIONER

## 2023-09-28 PROCEDURE — 3078F DIAST BP <80 MM HG: CPT | Mod: CPTII,S$GLB,, | Performed by: NURSE PRACTITIONER

## 2023-09-28 PROCEDURE — 4010F ACE/ARB THERAPY RXD/TAKEN: CPT | Mod: CPTII,S$GLB,, | Performed by: NURSE PRACTITIONER

## 2023-09-28 PROCEDURE — 3008F BODY MASS INDEX DOCD: CPT | Mod: CPTII,S$GLB,, | Performed by: NURSE PRACTITIONER

## 2023-09-28 RX ORDER — INSULIN DEGLUDEC 200 U/ML
50 INJECTION, SOLUTION SUBCUTANEOUS NIGHTLY
Qty: 3 PEN | Refills: 5
Start: 2023-09-28 | End: 2023-12-22

## 2023-09-28 NOTE — PATIENT INSTRUCTIONS
Medication Regimen:   Continue Metformin 1000 mg twice daily with meals  Continue Prandin 2 mg,  two  tablets three times daily before meals   Continue Tresiba 50 units every morning  Continue Trulicity 4.5 mg weekly    Patient Instructions:  Carbohydrate Count: 30-45 grams/meal and 15 grams/snack with limit of 2 snacks per day.  Exercise: Goal is 150 minutes or more per week.  Bring meter and blood sugar log to each appointment.     Goals for Blood Sugar:  Fastin-130 mg/dl  2 hours after meal:  mg/dl  Before Bed: 100-150 mg/dl  If Blood sugar is below 70 mg/dl, DO NOT take diabetes medication. Eat first and then recheck blood sugar in 20 minutes.  Foods to eat if blood sugar is below 70 mg/dl  1/2 glass of orange juice   1/2 regular cola can   3-4 hard candies   3-4 small glucose tabs.   Foods to eat if blood sugar is below 50 mg/dl  1 glass of orange juice  1 regular cola can  8 hard candies  8 small glucose tabs.   If you have repeated eating/blood sugar recheck process 2 times and blood sugar still below 70 mg/dl, call 911.

## 2023-09-28 NOTE — PROGRESS NOTES
PCP: Herb Lord MD    Subjective:     Chief Complaint: Diabetes follow up.  Last visit (virtual) with me: 7/28/23    History of Present Illness:   57 y.o.  male for diabetes follow up.   Previously managed by JANE Ross  Last clinic visit 8/4/2021.  Type II diabetes since 2014.  Comorbidities: HTN and HLD    Known diabetes complications:  DKA/HHS: no  Retinopathy: yes  Peripheral neuropathy: yes, numbness and tingling bilateral feet  Nephropathy: yes    Interval history:  Since last visit, admits to have improved on diet.  Consistent on Tresiba dose during weekends.    Denies recent hospital admissions or ER visits.   Denies hypoglycemia.  Endorses diabetes related symptoms: Numbness and Tingling in both feet    Blood glucose monitoring: fingerstick: 1x/day   Per patient's recall, over the last 4 weeks   Fasting BG  average 120's   AC dinner 140's    No BG log for review.    Activity Level: Walking 4x/day, 1400 steps/day  Meal Planning:3 meals/day;1 snacks/day.    Medication compliance all of the time, diet noncompliance some of the time.   Has attended diabetes education in the past.     Previous regimen:  Soliqua - on max dose    Past failed treatment: Jardiance - yeast infection, Ozempic - diarrhea    Current DM Medications:  Diabetes Medications               dulaglutide (TRULICITY) 4.5 mg/0.5 mL pen injector Inject 4.5 mg into the skin every 7 days.    insulin degludec (TRESIBA FLEXTOUCH U-200) 200 unit/mL (3 mL) insulin pen Inject 46 Units into the skin every evening.  Taking 50 units    metFORMIN (GLUCOPHAGE) 1000 MG tablet Take 1 tablet (1,000 mg total) by mouth 2 (two) times daily with meals.    repaglinide (PRANDIN) 2 MG tablet TAKE 2 TABLETS BY MOUTH 3 (THREE) TIMES DAILY BEFORE MEALS.       Allergies  Review of patient's allergies indicates:   Allergen Reactions    Hay fever and allergy relief        Labs Reviewed:  His most recent A1C is:  Lab Results   Component Value Date     HGBA1C 8.2 (H) 08/25/2023    HGBA1C 8.3 (H) 05/05/2023    HGBA1C 9.4 (H) 01/19/2023       His most recent BMP is:  Lab Results   Component Value Date     08/25/2023    K 4.0 08/25/2023     08/25/2023    CO2 25 08/25/2023    BUN 17 08/25/2023    CREATININE 0.7 08/25/2023    CALCIUM 9.1 08/25/2023    ANIONGAP 11 08/25/2023    ESTGFRAFRICA >60.0 02/19/2022    EGFRNONAA >60.0 02/19/2022       Lab Results   Component Value Date    CPEPTIDE 2.4 04/21/2017     Lab Results   Component Value Date    GLUTAMICACID 0.00 04/21/2017       Body mass index is 34.05 kg/m².    Weight lost 5 lbs. since last visit.  Wt Readings from Last 3 Encounters:   09/28/23 1630 104.6 kg (230 lb 9.6 oz)   08/25/23 0830 104.3 kg (230 lb)   08/04/23 1533 104.4 kg (230 lb 2.6 oz)        His blood sugar in clinic today is: 185    Diabetes Management Status  Statin: Taking  ACE/ARB: Taking    Screening or Prevention Patient's value Goal Complete/Controlled?   HgA1C Testing and Control   Lab Results   Component Value Date    HGBA1C 8.2 (H) 08/25/2023      Annually/Less than 8% No   Lipid profile : 08/25/2023 Annually Yes   LDL control Lab Results   Component Value Date    LDLCALC 39.2 (L) 08/25/2023    Annually/Less than 100 mg/dl  Yes   Nephropathy screening Lab Results   Component Value Date    MICALBCREAT 9.0 08/25/2023     Lab Results   Component Value Date    PROTEINUA Trace (A) 06/30/2016    Annually Yes   Blood pressure BP Readings from Last 1 Encounters:   09/28/23 (!) 140/79    Less than 140/90 Yes   Dilated retinal exam : 10/07/2022 Annually Yes    Foot exam   : 08/25/2023 Annually Yes     Social History     Socioeconomic History    Marital status:     Number of children: 2   Occupational History    Occupation: Plant work      Employer: [employer]     Comment: [employer]   Tobacco Use    Smoking status: Never    Smokeless tobacco: Former   Substance and Sexual Activity    Alcohol use: Yes     Alcohol/week: 2.0 standard  drinks of alcohol     Types: 2 Cans of beer per week    Drug use: No    Sexual activity: Yes     Partners: Female     Past Medical History:   Diagnosis Date    Diabetes mellitus with neuropathy 2009    BS 57 am 12/18/2015    Hyperlipidemia 2009    Hypertension associated with diabetes 4/21/2017     Review of Systems   Constitutional:  Negative for activity change, appetite change, fatigue and unexpected weight change.   HENT:  Negative for dental problem and trouble swallowing.    Eyes:  Negative for visual disturbance.   Respiratory:  Negative for chest tightness and shortness of breath.    Cardiovascular:  Negative for chest pain, palpitations and leg swelling.   Gastrointestinal:  Negative for abdominal pain, constipation, diarrhea, nausea and vomiting.   Endocrine: Negative for polydipsia, polyphagia and polyuria.   Genitourinary:  Negative for difficulty urinating, dysuria, frequency and urgency.   Musculoskeletal:  Negative for gait problem, myalgias and neck pain.   Integumentary:  Negative for rash and wound.   Allergic/Immunologic: Negative.    Neurological:  Positive for numbness (tingling BLE). Negative for dizziness, syncope, weakness and headaches.   Hematological: Negative.    Psychiatric/Behavioral:  Negative for confusion and sleep disturbance.    All other systems reviewed and are negative.     Objective:      Physical Exam  Vitals reviewed.   Constitutional:       Appearance: Normal appearance.   HENT:      Head: Normocephalic and atraumatic.   Eyes:      General: Vision grossly intact.      Extraocular Movements: Extraocular movements intact.      Pupils: Pupils are equal, round, and reactive to light.   Neck:      Thyroid: No thyroid mass or thyroid tenderness.   Cardiovascular:      Rate and Rhythm: Normal rate and regular rhythm.      Pulses: Normal pulses.           Radial pulses are 2+ on the right side and 2+ on the left side.        Dorsalis pedis pulses are 2+ on the right side and 2+ on  the left side.      Heart sounds: Normal heart sounds.   Pulmonary:      Effort: Pulmonary effort is normal.      Breath sounds: Normal breath sounds.   Abdominal:      General: Bowel sounds are normal.      Palpations: Abdomen is soft.   Musculoskeletal:         General: Normal range of motion.      Cervical back: Normal range of motion and neck supple.      Right lower leg: No edema.      Left lower leg: No edema.      Right foot: No deformity or bunion.      Left foot: No deformity or bunion.   Feet:      Right foot:      Protective Sensation: 6 sites tested.  6 sites sensed.      Skin integrity: Dry skin present. No ulcer, skin breakdown or callus.      Toenail Condition: Right toenails are normal.      Left foot:      Protective Sensation: 6 sites tested.  6 sites sensed.      Skin integrity: Dry skin present. No ulcer, skin breakdown or callus.      Toenail Condition: Left toenails are normal.      Comments: Pinprick exam completed with 10-g monofilament. Vibration sensation intact.  Lymphadenopathy:      Cervical: No cervical adenopathy.   Skin:     General: Skin is warm and dry.      Findings: No rash or wound.      Comments: Negative for acanthosis nigricans. Well-healed SQ injection sites.   Neurological:      Mental Status: He is alert and oriented to person, place, and time.      Coordination: Coordination is intact.      Gait: Gait is intact.   Psychiatric:         Attention and Perception: Attention normal.         Mood and Affect: Mood normal.         Speech: Speech normal.         Behavior: Behavior normal. Behavior is cooperative.         Thought Content: Thought content normal.         Cognition and Memory: Cognition normal.     Assessment / Plan:     Diagnoses and all orders for this visit:    Type 2 diabetes mellitus with diabetic neuropathy, with long-term current use of insulin  -     POCT Glucose, Hand-Held Device  -     Hemoglobin A1C; Future  -     insulin degludec (TRESIBA FLEXTOUCH U-200)  200 unit/mL (3 mL) insulin pen; Inject 50 Units into the skin every evening.    Hyperlipidemia associated with type 2 diabetes mellitus    Hypertension associated with diabetes    Additional Plan Details:  - Condition: Uncontrolled, most recent A1C of 8.2% was completed 1 month ago. Slightly improved from previous reading.  - Monitor blood glucose:  4x daily.Goals reviewed. Maintain BG log.   - Hypoglycemia protocol: Reviewed and risk discussed.  Notify if BG readings below 80. Protocol printout provided.   - Meal Planning: Limit carbohydrate intake 30-45 grams/meal, 3x daily and 15 grams/snack, limit 2/day.   - Activity level: Recommend at least 150 minutes of exercise in a week.  - BP and LDL: Recommend lifestyle modifications and follow up with PCP for management.   - Medication Regimen:     Continue Metformin 1000 mg twice daily with meals  Continue Prandin 2 mg,  two  tablets three times daily before meals   Continue Tresiba 50 units every morning  Continue Trulicity 4.5 mg weekly    - Medication consideration: Continue regimen. If next A1c continues to be above goal, discussed switching GLP to Mounjaro.  - Health Maintenance standards addressed today:  A1c scheduled. Eye exam UTD with Dr. Lindo,  9/8/23.  - Risks of uncontrolled DM explained. Importance of routine foot and eye exams reviewed. Scheduled as appropriate.  -The patient was explained the above plan and given opportunity to ask questions.  He understands, chooses and consents to this plan and accepts all the risks, which include but are not limited to the risks mentioned above.   - Labs ordered as above.  - CDE follow up: Scheduled  - Follow up: 2 months in clinic.         Ginger Franz, FNP-C [facility]    A total of 30 minutes was spent in face to face time, of which over 50 % was spent in counseling patient on disease process, complications, treatment, and side effects of  medications.

## 2023-10-04 ENCOUNTER — PATIENT MESSAGE (OUTPATIENT)
Dept: INTERNAL MEDICINE | Facility: CLINIC | Age: 58
End: 2023-10-04
Payer: COMMERCIAL

## 2023-10-08 DIAGNOSIS — M54.9 DORSALGIA, UNSPECIFIED: ICD-10-CM

## 2023-10-08 DIAGNOSIS — M43.06 PARS DEFECT OF LUMBAR SPINE: ICD-10-CM

## 2023-10-08 DIAGNOSIS — M54.50 ACUTE MIDLINE LOW BACK PAIN WITHOUT SCIATICA: ICD-10-CM

## 2023-10-08 DIAGNOSIS — M47.816 LUMBAR SPONDYLOSIS: ICD-10-CM

## 2023-10-08 DIAGNOSIS — M51.36 DDD (DEGENERATIVE DISC DISEASE), LUMBAR: ICD-10-CM

## 2023-10-08 DIAGNOSIS — M54.16 LUMBAR RADICULOPATHY: ICD-10-CM

## 2023-10-08 DIAGNOSIS — M43.16 SPONDYLOLISTHESIS OF LUMBAR REGION: ICD-10-CM

## 2023-10-08 RX ORDER — PREGABALIN 50 MG/1
50 CAPSULE ORAL NIGHTLY
Qty: 30 CAPSULE | Refills: 1 | Status: SHIPPED | OUTPATIENT
Start: 2023-10-08 | End: 2023-10-18 | Stop reason: SDUPTHER

## 2023-10-09 ENCOUNTER — TELEPHONE (OUTPATIENT)
Dept: INTERNAL MEDICINE | Facility: CLINIC | Age: 58
End: 2023-10-09
Payer: COMMERCIAL

## 2023-10-09 NOTE — TELEPHONE ENCOUNTER
----- Message from Hamilton Arreola sent at 10/9/2023  1:19 PM CDT -----  Contact: Brett West would like a call back at [phone], in regards to the appointment he has scheduled for 12/8/23, patient received a notification that it needs to be rescheduled and the next available is on 2/6/24 and he would like something sooner if possible.  Thanks   Am

## 2023-10-18 ENCOUNTER — OFFICE VISIT (OUTPATIENT)
Dept: PAIN MEDICINE | Facility: CLINIC | Age: 58
End: 2023-10-18
Payer: COMMERCIAL

## 2023-10-18 DIAGNOSIS — M51.36 DDD (DEGENERATIVE DISC DISEASE), LUMBAR: ICD-10-CM

## 2023-10-18 DIAGNOSIS — M54.9 DORSALGIA, UNSPECIFIED: ICD-10-CM

## 2023-10-18 DIAGNOSIS — M54.16 LUMBAR RADICULOPATHY: ICD-10-CM

## 2023-10-18 DIAGNOSIS — M47.816 LUMBAR SPONDYLOSIS: ICD-10-CM

## 2023-10-18 DIAGNOSIS — M54.50 ACUTE MIDLINE LOW BACK PAIN WITHOUT SCIATICA: ICD-10-CM

## 2023-10-18 DIAGNOSIS — M43.06 PARS DEFECT OF LUMBAR SPINE: ICD-10-CM

## 2023-10-18 DIAGNOSIS — M43.16 SPONDYLOLISTHESIS OF LUMBAR REGION: ICD-10-CM

## 2023-10-18 PROCEDURE — 4010F ACE/ARB THERAPY RXD/TAKEN: CPT | Mod: CPTII,95,, | Performed by: ANESTHESIOLOGY

## 2023-10-18 PROCEDURE — 99213 OFFICE O/P EST LOW 20 MIN: CPT | Mod: 95,,, | Performed by: ANESTHESIOLOGY

## 2023-10-18 PROCEDURE — 3061F PR NEG MICROALBUMINURIA RESULT DOCUMENTED/REVIEW: ICD-10-PCS | Mod: CPTII,95,, | Performed by: ANESTHESIOLOGY

## 2023-10-18 PROCEDURE — 3052F HG A1C>EQUAL 8.0%<EQUAL 9.0%: CPT | Mod: CPTII,95,, | Performed by: ANESTHESIOLOGY

## 2023-10-18 PROCEDURE — 3061F NEG MICROALBUMINURIA REV: CPT | Mod: CPTII,95,, | Performed by: ANESTHESIOLOGY

## 2023-10-18 PROCEDURE — 3066F PR DOCUMENTATION OF TREATMENT FOR NEPHROPATHY: ICD-10-PCS | Mod: CPTII,95,, | Performed by: ANESTHESIOLOGY

## 2023-10-18 PROCEDURE — 4010F PR ACE/ARB THEARPY RXD/TAKEN: ICD-10-PCS | Mod: CPTII,95,, | Performed by: ANESTHESIOLOGY

## 2023-10-18 PROCEDURE — 3066F NEPHROPATHY DOC TX: CPT | Mod: CPTII,95,, | Performed by: ANESTHESIOLOGY

## 2023-10-18 PROCEDURE — 99213 PR OFFICE/OUTPT VISIT, EST, LEVL III, 20-29 MIN: ICD-10-PCS | Mod: 95,,, | Performed by: ANESTHESIOLOGY

## 2023-10-18 PROCEDURE — 3052F PR MOST RECENT HEMOGLOBIN A1C LEVEL 8.0 - < 9.0%: ICD-10-PCS | Mod: CPTII,95,, | Performed by: ANESTHESIOLOGY

## 2023-10-18 RX ORDER — PREGABALIN 50 MG/1
50 CAPSULE ORAL NIGHTLY
Qty: 30 CAPSULE | Refills: 1 | Status: SHIPPED | OUTPATIENT
Start: 2023-10-18 | End: 2023-12-19 | Stop reason: SDUPTHER

## 2023-10-18 NOTE — PROGRESS NOTES
Interventional Pain Progress Note     The patient location is: home  The chief complaint leading to consultation is: chronic pain      Visit type: audiovisual     Face to Face time with patient: 10-15 minutes  20 minutes of total time spent on the encounter, which includes face to face time and non-face to face time preparing to see the patient (eg, review of tests), Obtaining and/or reviewing separately obtained history, Documenting clinical information in the electronic or other health record, Independently interpreting results (not separately reported) and communicating results to the patient/family/caregiver, or Care coordination (not separately reported).      Each patient to whom he or she provides medical services by telemedicine is:  (1) informed of the relationship between the physician and patient and the respective role of any other health care provider with respect to management of the patient; and (2) notified that he or she may decline to receive medical services by telemedicine and may withdraw from such care at any time.      Referring Physician: No ref. provider found    PCP: Herb Lord MD    Chief Complaint:  Lower back pain       SUBJECTIVE:    Interval History (10/18/2023):  Patient returns to clinic to review MRI.  Since last being seen, patient reports that lower back pain has improved.  Patient reports that pain is typically mild with rest, however when he begins to be active at work pain increases.  Lower back pain described as a throbbing aching pain in the band across the lower back, left-greater-than-right.  Patient reports in his pain will radiate down his left lower extremity on the posterior aspect to his mid calf in a burning pain.  Patient denies any significant radiation down his right lower extremity.  Pain is worse with prolonged standing and walking, better with heat and anti-inflammatories.  Pain is currently rated a 4/10, but can increase to a 7/10 with exacerbating  activities. Denies any fevers, chills, changes in gait, saddle anesthesia, or bowel and bladder incontinence        Initial HPI:     Brett Garcia is a 57 y.o. male who presents to the clinic for the evaluation of lower back pain.   Patient reports 4 month history of lower back pain.  He denies any inciting traumas to his low back pain.  He denies any previous surgery in his lumbar spine.  Patient reports that pain initially started as a stabbing aching pain over his left foot that rated up to his left knee and localized over his left knee.  Patient reports that this is progressively surgery radiated up to his left lower back and left buttocks as well.  Pain is worse with flexion and prolonged standing, better with heat and rest.  Pain is currently rated 2/10, but can increase to a 7/10 with exacerbating activity. Denies any fevers, chills, changes in gait, saddle anesthesia, or bowel and bladder incontinence  Of note, patient has recently seen viscosupplementation injection for left knee with moderate relief.  Patient also reports history of numbness in his bilateral feet secondary to diabetic neuropathy.      Non-Pharmacologic Treatments:  Physical Therapy/Home Exercise: yes  Ice/Heat:yes  TENS: no  Acupuncture: no  Massage: no  Chiropractic: yes        Previous Pain Medications:  NSAIDs, Tylenol, muscle relaxers, neuropathic     report:  Reviewed and consistent with medication use as prescribed.    Pain Procedures:   None    Pain Disability Index Review:         8/4/2023    11:40 AM   Last 3 PDI Scores   Pain Disability Index (PDI) 7       Imaging:   Results for orders placed during the hospital encounter of 08/11/23    MRI Lumbar Spine Without Contrast    Narrative  EXAMINATION:  MRI LUMBAR SPINE WITHOUT CONTRAST    CLINICAL HISTORY:  Low back pain, symptoms persist with > 6wks conservative treatment; Radiculopathy, lumbar region    TECHNIQUE:  Multiplanar, multisequence MR images were acquired from the  thoracolumbar junction to the sacrum without contrast.    COMPARISON:  Lumbar radiograph 05/12/2023    FINDINGS:  Alignment: Grade 2 L5-S1 anterolisthesis.    Vertebrae: Lumbar vertebral body heights are maintained.  Prominent L5-S1 endplate degenerative changes with only minor endplate edema.  No evidence of an acute fracture.  Marrow signal is otherwise within normal limits.    Discs: L5-S1 significant disc height loss and desiccation.  Remaining disc heights are maintained with multilevel disc desiccation.    Cord: Within normal limits.  Conus terminates at L1-L2.    Degenerative findings:    T12-L1: Tiny central disc extrusion.  No significant spinal canal stenosis or neural foraminal stenosis.    L1-L2: No significant posterior disc bulge.  Mild right-sided facet arthropathy.  No significant spinal canal stenosis or neural foraminal stenosis.    L2-L3: No significant disc bulge.  Mild facet arthropathy.  No significant spinal canal stenosis or neural foraminal stenosis.    L3-L4: Minimal broad-based posterior disc bulge and mild facet arthropathy.  Mild bilateral neural foraminal stenosis.  No significant spinal canal stenosis.    L4-L5: Mild broad-based disc bulge and bilateral facet arthropathy.  Minor narrowing of the lateral recesses without significant spinal canal stenosis.  Moderate bilateral neural foraminal stenosis.    L5-S1: Grade 2 anterolisthesis with roughly 8 mm of posterior disc space uncovering.  Mild broad-based posterior disc bulge is slightly asymmetric to the right with narrowing of the right lateral recess.  Bilateral facet arthropathy contributes to severe bilateral neural foraminal stenosis.  No significant spinal canal stenosis.    Paraspinal muscles & soft tissues: Within normal limits.    Impression  1. L5-S1 grade 2 anterolisthesis contributes to severe bilateral neural foraminal stenosis and minor narrowing of the right lateral recess.  No significant lumbar spinal canal  stenosis.  2. Moderate bilateral L4-L5 neural foraminal stenosis.      Electronically signed by: Wyatt Erickson  Date:    08/11/2023  Time:    09:41            Results for orders placed during the hospital encounter of 05/12/23    X-Ray Lumbar Spine 5 View    Narrative  EXAMINATION:  XR LUMBAR SPINE COMPLETE 5 VIEW    CLINICAL HISTORY:  Low back pain, unspecified    TECHNIQUE:  AP, lateral, spot and bilateral oblique views of the lumbar spine were performed.    COMPARISON:  None    FINDINGS:  Vertebral body heights maintained.  L5 pars defects with grade 2 anterolisthesis.  Severe degenerative disc height loss with vacuum phenomenon at L5-S1.  Lower lumbar spine facet arthropathy.  Soft tissues unremarkable.    Impression  L5 pars defect with grade 2 anterolisthesis      Electronically signed by: Thang Garcia MD  Date:    05/12/2023  Time:    11:58      Past Medical History:   Diagnosis Date    Diabetes mellitus with neuropathy 2009    BS 57 am 12/18/2015    Hyperlipidemia 2009    Hypertension associated with diabetes 4/21/2017     Past Surgical History:   Procedure Laterality Date    COLONOSCOPY N/A 11/1/2018    Procedure: COLONOSCOPY;  Surgeon: Kristina Wilson MD;  Location: [facility];  Service: Endoscopy;  Laterality: N/A;    COLONOSCOPY W/ POLYPECTOMY  11/01/2018    Polyp x1, repeat 5 years; Dr. Wilson     Social History     Socioeconomic History    Marital status:     Number of children: 2   Occupational History    Occupation: Plant work      Employer: [employer]     Comment: [employer]   Tobacco Use    Smoking status: Never    Smokeless tobacco: Former   Substance and Sexual Activity    Alcohol use: Yes     Alcohol/week: 2.0 standard drinks of alcohol     Types: 2 Cans of beer per week    Drug use: No    Sexual activity: Yes     Partners: Female     Family History   Problem Relation Age of Onset    Diabetes Father     Hypertension Father     Heart attack Father     Cataracts Father      "Hypertension Mother     Hypertension Sister        Review of patient's allergies indicates:   Allergen Reactions    Hay fever and allergy relief        Current Outpatient Medications   Medication Sig    amitriptyline (ELAVIL) 25 MG tablet Take 2 tablets (50 mg total) by mouth every evening.    ascorbic acid, vitamin C, (VITAMIN C) 1000 MG tablet Take by mouth once daily.    aspirin (ECOTRIN) 81 MG EC tablet Take 81 mg by mouth once daily.    atorvastatin (LIPITOR) 40 MG tablet Take 1 tablet (40 mg total) by mouth once daily.    blood sugar diagnostic Strp 1 each by Misc.(Non-Drug; Combo Route) route 3 (three) times daily.    blood-glucose meter kit Use as instructed    dulaglutide (TRULICITY) 4.5 mg/0.5 mL pen injector Inject 4.5 mg into the skin every 7 days.    fluticasone propionate (FLONASE) 50 mcg/actuation nasal spray 2 sprays (100 mcg total) by Each Nostril route once daily. Seasonal allergic rhinitis [J30.2]    insulin degludec (TRESIBA FLEXTOUCH U-200) 200 unit/mL (3 mL) insulin pen Inject 50 Units into the skin every evening.    lancets Misc 1 each by Misc.(Non-Drug; Combo Route) route 3 (three) times daily.    lisinopriL (PRINIVIL,ZESTRIL) 5 MG tablet Take 1 tablet (5 mg total) by mouth once daily.    metFORMIN (GLUCOPHAGE) 1000 MG tablet Take 1 tablet (1,000 mg total) by mouth 2 (two) times daily with meals.    pedi multivit no.12 w-fluoride (MULTIVITAMINS-FLUORIDE-FOLIC A ORAL) Take by mouth.    pen needle, diabetic 32 gauge x 5/32" Ndle 1 each by Misc.(Non-Drug; Combo Route) route once daily.    pregabalin (LYRICA) 50 MG capsule Take 1 capsule (50 mg total) by mouth every evening.    psyllium husk (METAMUCIL ORAL) Take by mouth.    repaglinide (PRANDIN) 2 MG tablet TAKE 2 TABLETS BY MOUTH 3 (THREE) TIMES DAILY BEFORE MEALS.    vitamin D (VITAMIN D3) 1000 units Tab Take 1,000 Units by mouth once daily.    zinc gluconate 50 mg tablet Take 50 mg by mouth once daily.     No current facility-administered " medications for this visit.         ROS  Review of Systems   Constitutional:  Negative for activity change, appetite change and fever.   Respiratory:  Negative for cough, chest tightness, shortness of breath, wheezing and stridor.    Cardiovascular:  Positive for leg swelling. Negative for chest pain and palpitations.   Gastrointestinal:  Negative for abdominal pain, blood in stool, constipation, diarrhea, nausea and vomiting.   Endocrine: Negative for polydipsia, polyphagia and polyuria.   Genitourinary:  Negative for dysuria, hematuria and urgency.   Musculoskeletal:  Positive for arthralgias, back pain, gait problem, joint swelling and myalgias. Negative for neck pain and neck stiffness.   Skin:  Negative for rash.   Allergic/Immunologic: Negative for food allergies.   Neurological:  Positive for weakness and numbness. Negative for dizziness, tremors, seizures, syncope, facial asymmetry, speech difficulty, light-headedness and headaches.   Psychiatric/Behavioral:  Negative for agitation, hallucinations, self-injury and suicidal ideas. The patient is not nervous/anxious and is not hyperactive.             OBJECTIVE:  There were no vitals taken for this visit.        Physical Exam  Constitutional:       General: He is not in acute distress.     Appearance: Normal appearance. He is not ill-appearing.   HENT:      Head: Normocephalic and atraumatic.      Nose: No congestion or rhinorrhea.      Mouth/Throat:      Mouth: Mucous membranes are moist.   Eyes:      Extraocular Movements: Extraocular movements intact.      Pupils: Pupils are equal, round, and reactive to light.   Cardiovascular:      Pulses: Normal pulses.   Pulmonary:      Effort: Pulmonary effort is normal.   Abdominal:      General: Abdomen is flat.      Palpations: Abdomen is soft.   Skin:     General: Skin is warm and dry.      Capillary Refill: Capillary refill takes less than 2 seconds.   Neurological:      General: No focal deficit present.       Mental Status: He is alert and oriented to person, place, and time.      Sensory: Sensory deficit present.      Motor: Weakness present. No abnormal muscle tone.      Gait: Gait abnormal.      Deep Tendon Reflexes:      Reflex Scores:       Patellar reflexes are 2+ on the right side and 2+ on the left side.       Achilles reflexes are 2+ on the right side and 2+ on the left side.     Comments: Decreased light touch sensation over bilateral plantar aspect of the feet   4/5 strength in left dorsiflexion   Psychiatric:         Mood and Affect: Mood normal.         Behavior: Behavior normal.         Thought Content: Thought content normal.           Musculoskeletal:  Lumbar Exam  Incision: no  Pain with Flexion: yes, flexion greater than extension  Pain with Extension: yes  ROM:  Decreased  Paraspinous TTP:  Left-greater-than-right  Facet TTP:  L5-S1  Facet Loading:  Positive on left  SLR:  Positive on the left at 80°  SIJ TTP:  Positive on left  ELANA:  Positive on left      LABS:  Lab Results   Component Value Date    WBC 6.63 08/25/2023    HGB 13.5 (L) 08/25/2023    HCT 40.7 08/25/2023    MCV 93 08/25/2023     08/25/2023       CMP  Sodium   Date Value Ref Range Status   08/25/2023 140 136 - 145 mmol/L Final     Potassium   Date Value Ref Range Status   08/25/2023 4.0 3.5 - 5.1 mmol/L Final     Chloride   Date Value Ref Range Status   08/25/2023 104 95 - 110 mmol/L Final     CO2   Date Value Ref Range Status   08/25/2023 25 23 - 29 mmol/L Final     Glucose   Date Value Ref Range Status   08/25/2023 104 70 - 110 mg/dL Final     BUN   Date Value Ref Range Status   08/25/2023 17 6 - 20 mg/dL Final     Creatinine   Date Value Ref Range Status   08/25/2023 0.7 0.5 - 1.4 mg/dL Final     Calcium   Date Value Ref Range Status   08/25/2023 9.1 8.7 - 10.5 mg/dL Final     Total Protein   Date Value Ref Range Status   08/25/2023 6.9 6.0 - 8.4 g/dL Final     Albumin   Date Value Ref Range Status   08/25/2023 3.7 3.5 - 5.2  g/dL Final     Total Bilirubin   Date Value Ref Range Status   08/25/2023 0.5 0.1 - 1.0 mg/dL Final     Comment:     For infants and newborns, interpretation of results should be based  on gestational age, weight and in agreement with clinical  observations.    Premature Infant recommended reference ranges:  Up to 24 hours.............<8.0 mg/dL  Up to 48 hours............<12.0 mg/dL  3-5 days..................<15.0 mg/dL  6-29 days.................<15.0 mg/dL       Alkaline Phosphatase   Date Value Ref Range Status   08/25/2023 72 55 - 135 U/L Final     AST   Date Value Ref Range Status   08/25/2023 56 (H) 10 - 40 U/L Final     ALT   Date Value Ref Range Status   08/25/2023 59 (H) 10 - 44 U/L Final     Anion Gap   Date Value Ref Range Status   08/25/2023 11 8 - 16 mmol/L Final     eGFR if    Date Value Ref Range Status   02/19/2022 >60.0 >60 mL/min/1.73 m^2 Final     eGFR if non    Date Value Ref Range Status   02/19/2022 >60.0 >60 mL/min/1.73 m^2 Final     Comment:     Calculation used to obtain the estimated glomerular filtration  rate (eGFR) is the CKD-EPI equation.          Lab Results   Component Value Date    HGBA1C 8.2 (H) 08/25/2023             ASSESSMENT:       57 y.o. year old male with lower back pain, consistent with     1. Acute midline low back pain without sciatica        2. Lumbar radiculopathy  pregabalin (LYRICA) 50 MG capsule    Ambulatory referral/consult to Physical/Occupational Therapy    IR Epidural Transfor 1st Vert Lumbar Glen    Case Request-RAD/Other Procedure Area: Bilateral L5/S1 TF BRE      3. Lumbar spondylosis  pregabalin (LYRICA) 50 MG capsule      4. DDD (degenerative disc disease), lumbar  pregabalin (LYRICA) 50 MG capsule      5. Spondylolisthesis of lumbar region  pregabalin (LYRICA) 50 MG capsule      6. Pars defect of lumbar spine  pregabalin (LYRICA) 50 MG capsule      7. Dorsalgia, unspecified          Acute midline low back pain without  sciatica    Lumbar radiculopathy  -     pregabalin (LYRICA) 50 MG capsule; Take 1 capsule (50 mg total) by mouth every evening.  Dispense: 30 capsule; Refill: 1  -     Ambulatory referral/consult to Physical/Occupational Therapy; Future; Expected date: 10/25/2023  -     IR Epidural Transfor 1st Vert Lumbar [place]; Future; Expected date: 10/18/2023  -     Case Request-RAD/Other Procedure Area: Bilateral L5/S1 TF BRE    Lumbar spondylosis  -     pregabalin (LYRICA) 50 MG capsule; Take 1 capsule (50 mg total) by mouth every evening.  Dispense: 30 capsule; Refill: 1    DDD (degenerative disc disease), lumbar  -     pregabalin (LYRICA) 50 MG capsule; Take 1 capsule (50 mg total) by mouth every evening.  Dispense: 30 capsule; Refill: 1    Spondylolisthesis of lumbar region  -     pregabalin (LYRICA) 50 MG capsule; Take 1 capsule (50 mg total) by mouth every evening.  Dispense: 30 capsule; Refill: 1    Pars defect of lumbar spine  -     pregabalin (LYRICA) 50 MG capsule; Take 1 capsule (50 mg total) by mouth every evening.  Dispense: 30 capsule; Refill: 1    Dorsalgia, unspecified             PLAN:   - Interventions:    Schedule patient for bilateral L5-S1 transforaminal epidural steroid injection for lumbar radiculopathy.  - Anticoagulation use:   Yes aspirin    - Medications:   Refill Lyrica 50 mg at night   Continue Elavil 25 mg at night   Continue Voltaren 50mg PO BID PRN    - Therapy:   Refer to formal physical therapy for a 2nd round of formal physical therapy.  Continue wearing LSO brace for spondylolisthesis    - Imaging/Diagnostic:   MRI lumbar spine reviewed and findings discussed with patient.  Significant for grade 2 spondylolisthesis measuring 8 mm.  There is noted facet arthropathy at L4-5 and L5-S1. Moderate foraminal stenosis at L4-5 and severe foraminal stenosis at L5-S1.   X-rays of lumbar spine reviewed.  Significant for grade 2 anterolisthesis of L5 upon S1 were noted L5 pars defect       - Consults:    Continue follow-up with orthopedics for left knee pain    - Counseled patient regarding the importance of activity modification    - Patient Questions: Answered all of the patient's questions regarding diagnosis, therapy, and treatment     This condition does not require this patient to take time off of work, and the primary goal of our Pain Management services is to improve the patient's functional capacity.     - Follow up visit: return to clinic 4 weeks after procedure        The above plan and management options were discussed at length with patient. Patient is in agreement with the above and verbalized understanding.    I discussed the goals of interventional chronic pain management with the patient on today's visit.  I explained the utility of injections for diagnostic and therapeutic purposes.  We discussed a multimodal approach to pain including treating the patient's given worst pain at any given time.  We will use a systematic approach to addressing pain.  We will also adopt a multimodal approach that includes injections, adjuvant medications, physical therapy, at times psychiatry.  There may be a limited role for opioid use intermittently in the treatment of pain, more particularly for acute pain although no one approach can be used as a sole treatment modality.    I emphasized the importance of regular exercise, core strengthening and stretching, diet and weight loss as a cornerstone of long-term pain management.      Wilfred Serrano MD  Interventional Pain Management  [facility]    Disclaimer:  This note was prepared using voice recognition system and is likely to have sound alike errors that may have been overlooked even after proof reading.  Please call me with any questions

## 2023-10-19 ENCOUNTER — PATIENT MESSAGE (OUTPATIENT)
Dept: PAIN MEDICINE | Facility: CLINIC | Age: 58
End: 2023-10-19
Payer: COMMERCIAL

## 2023-10-19 ENCOUNTER — TELEPHONE (OUTPATIENT)
Dept: PAIN MEDICINE | Facility: CLINIC | Age: 58
End: 2023-10-19
Payer: COMMERCIAL

## 2023-10-19 NOTE — TELEPHONE ENCOUNTER
----- Message from Wilfred Serrano MD sent at 10/18/2023  2:16 PM CDT -----  Pain Provider: Maggie  Patient Name: Brett Garcia  MRN: [id number]  Case: LUMBAR TFESI  Level: L5/S1  Laterality: bilateral      Patient can follow up 4 weeks after procedure

## 2023-10-19 NOTE — TELEPHONE ENCOUNTER
Called to scheduled pt procedure with , pt scheduled for November 2. Procedure Instructions sent to pt MyChart.  .Neena Osorio CCM

## 2023-10-25 ENCOUNTER — TELEPHONE (OUTPATIENT)
Dept: DIABETES | Facility: CLINIC | Age: 58
End: 2023-10-25
Payer: COMMERCIAL

## 2023-10-25 NOTE — TELEPHONE ENCOUNTER
Called patient to assist with rescheduling an appointment due to the provider being out Left a message

## 2023-10-30 ENCOUNTER — CLINICAL SUPPORT (OUTPATIENT)
Dept: REHABILITATION | Facility: HOSPITAL | Age: 58
End: 2023-10-30
Attending: ANESTHESIOLOGY
Payer: COMMERCIAL

## 2023-10-30 DIAGNOSIS — M79.605 LEFT LEG PAIN: ICD-10-CM

## 2023-10-30 DIAGNOSIS — M54.50 LUMBAR PAIN: ICD-10-CM

## 2023-10-30 DIAGNOSIS — M54.16 LUMBAR RADICULOPATHY: ICD-10-CM

## 2023-10-30 DIAGNOSIS — R26.9 ABNORMAL GAIT: ICD-10-CM

## 2023-10-30 PROCEDURE — 97110 THERAPEUTIC EXERCISES: CPT | Mod: PN

## 2023-10-30 PROCEDURE — 97161 PT EVAL LOW COMPLEX 20 MIN: CPT | Mod: PN

## 2023-10-31 ENCOUNTER — PATIENT MESSAGE (OUTPATIENT)
Dept: PAIN MEDICINE | Facility: HOSPITAL | Age: 58
End: 2023-10-31
Payer: COMMERCIAL

## 2023-10-31 PROBLEM — M54.50 LUMBAR PAIN: Status: ACTIVE | Noted: 2023-10-31

## 2023-10-31 PROBLEM — R26.9 ABNORMAL GAIT: Status: ACTIVE | Noted: 2023-10-31

## 2023-10-31 PROBLEM — M79.605 LEFT LEG PAIN: Status: ACTIVE | Noted: 2023-10-31

## 2023-10-31 NOTE — PLAN OF CARE
[facility]   Physical Therapy Initial Evaluation     Date: 10/30/2023   Name: Brett Garcia  Clinic Number: [id number]    Therapy Diagnosis:   Encounter Diagnoses   Name Primary?    Lumbar radiculopathy     Lumbar pain     Left leg pain     Abnormal gait      Physician: Wilfred Serrano MD    Physician Orders: PT Eval and Treat   Medical Diagnosis from Referral: M54.16 (ICD-10-CM) - Lumbar radiculopathy   Evaluation Date: 10/30/2023  Authorization Period Expiration: 10/17/2024  Plan of Care Expiration: 12/29/2023  Progress Note Due: 11/30/2023  Visit # / Visits authorized: 1/ 12 +eval   FOTO: 1/3 (last performed 10/30/2023)    Precautions: Standard hairline fracture to L5    Time In: 5:45  Time Out: 6:30  Total Appointment Time (timed & untimed codes): 45 minutes      SUBJECTIVE     Date of onset: February 2023    History of current condition - Brett reports: pain to left lower back and gastroc with intermittent pain to anterior knee since February. Denies severe low back pain or left lower extremity prior to February 2023. Unsure of KELSEA. Greatest pain with prolong walking sitting. History of hairline fracture to L5.    Falls: none     Imaging: [] Xray [x] MRI [] CT: Performed on: 8/04/2023    Impression:  1. L5-S1 grade 2 anterolisthesis contributes to severe bilateral neural foraminal stenosis and minor narrowing of the right lateral recess.  No significant lumbar spinal canal stenosis.  2. Moderate bilateral L4-L5 neural foraminal stenosis.    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   Prior Level of Function: independent with increase pain   Current Level of Function: independent with increase pain     Pain:  Current 4/10, worst 5/10  Location: left PSIS  Description: Aching and Deep  Aggravating Factors: Night Time  Easing Factors: dry needling     Patients goals:  decrease pain      Medical History:   Past Medical History:   Diagnosis Date    Diabetes mellitus with neuropathy 2009    BS 57 am 12/18/2015    Hyperlipidemia 2009    Hypertension associated with diabetes 4/21/2017       Surgical History:   Brett Garcia  has a past surgical history that includes Colonoscopy w/ polypectomy (11/01/2018) and Colonoscopy (N/A, 11/1/2018).    Medications:   Brett has a current medication list which includes the following prescription(s): amitriptyline, ascorbic acid (vitamin c), aspirin, atorvastatin, blood sugar diagnostic, blood-glucose meter, trulicity, fluticasone propionate, insulin degludec, lancets, lisinopril, metformin, pedi multivit no.12 w-fluoride, pen needle, diabetic, pregabalin, psyllium husk, repaglinide, vitamin d, and zinc gluconate.    Allergies:   Review of patient's allergies indicates:   Allergen Reactions    Hay fever and allergy relief         OBJECTIVE     OBSERVATION: swelling to left knee, pitting edema to left leg     POSTURE: rounded shoulder     GAIT: antalgic gait pattern during left stance phase, decreased murali, decreased step length    SFMA:  Top Tiers Right  (Spine) Left Notes    Multi-segmental   Flexion DP knee  Bellmore's sign   Multi-segmental Extension DP 30% limited     Multi-segmental Rotation  DN 30% limited DP 30% limited    Single Leg Stance  (10 sec) FN FN    FN = Functional Normal   FP = Functional Painful  DN = Dysfunctional Normal   DP = Dysfuncional Painful     STRENGTH:   Lower Extremity  Strength RIGHT   LEFT   Hip Flexion  []1  []2  []3  [x]4  []5      [x]+ []- []1  []2  []3  [x]4  []5      [x]+ []-   Knee Flexion []1  []2  []3  [x]4  []5      [x]+ []- []1  []2  []3  [x]4*  []5      [x]+ []-   Knee Extension []1  []2  []3  [x]4  []5      [x]+ []- []1  []2  []3  [x]4  []5      [x]+ []-   Ankle Dorsiflexion []1  []2  []3  [x]4  []5      [x]+ []- []1  []2  []3  [x]4  []5      [x]+ []-     RANGE OF MOTION: (*) pain and/or  dysfunction  Hip AROM/PROM Right Left Notes   Hip Flexion (120º) 105 90    Hip Extension (30º) NT NT    Hip Abduction (45º) NT NT    Hip Internal Rotation  (45º) 10 5    Hip ER (45º) 30 30        SPECIAL TESTS:   Straight leg raise test = (R) mid tibia (improve AROM with core assist); (L) mid tibia (tight hamstring)   Ely's test = (R) 27 cm (heel to gluteal fold); (L) 35 cm     SENSATION:  [x] Intact to Light Touch     [] Impaired:    PALPATION: tenderness to palpation to left PSIS region     JOINT MOBILITY: none performed    Intake Outcome Measure for FOTO lumbar Survey    Therapist reviewed FOTO scores for Brett Garcia on 10/30/2023.   FOTO documents entered into [employer] - see Media section.    Intake Score: 52%       TREATMENT     Total Treatment time (time-based codes) separate from Evaluation: 10 minutes      Brett received the treatments listed below:      manual therapy techniques: were applied to - for (-) minutes , including:     therapeutic exercises to develop strength, endurance, ROM, and flexibility for (10) minutesincluding:   Supine hamstring str    Sidelying clams   Posterior pelvic tilt    neuromuscular re-education activities to improve: Balance, Coordination, Proprioception, Posture, and stability for (-) minutes . The following activities were included:    therapeutic activities to improve functional performance for (-) minutes including:    gait training to improve functional mobility and safety for (-) minutes, including:    PATIENT EDUCATION AND HOME EXERCISES     Education provided:   - HEP provided   - PT role in POC   - recommended compression stocking to decrease swelling to left lower extremity.     Written Home Exercises Provided: yes. Exercises were reviewed and Brett was able to demonstrate them prior to the end of the session.  Brett demonstrated good  understanding of the education provided. See EMR under Patient Instructions for exercises provided during therapy  sessions.    ASSESSMENT     Brett is a 57 y.o. male referred to outpatient Physical Therapy with a medical diagnosis of Lumbar radiculopathy. Pt presents with impairments in the following categories: IMPAIRMENTS: ROM, strength, joint mobility, muscle length, posture, gait mechanics, core strength and stability, and functional movement patterns.     Patient prognosis is Good.   Patient will benefit from skilled outpatient Physical Therapy to address the deficits stated above and in the chart below, provide patient /family education, and to maximize patientt's level of independence.     Plan of care discussed with patient: Yes  Patient's spiritual, cultural and educational needs considered and patient is agreeable to the plan of care and goals as stated below:     Anticipated Barriers for therapy: co-morbidities, sedentary lifestyle, and chronicity of condition    Medical Necessity is demonstrated by the following  History  Co-morbidities and personal factors that may impact the plan of care [x] LOW: no personal factors / co-morbidities  [] MODERATE: 1-2 personal factors / co-morbidities  [] HIGH: 3+ personal factors / co-morbidities    Moderate / High Support Documentation:   Past Medical History:   Diagnosis Date    Diabetes mellitus with neuropathy 2009    BS 57 am 12/18/2015    Hyperlipidemia 2009    Hypertension associated with diabetes 4/21/2017        Examination  Body Structures and Functions, activity limitations and participation restrictions that may impact the plan of care [x] LOW: addressing 1-2 elements  [] MODERATE: 3+ elements  [] HIGH: 4+ elements (please support below)    Moderate / High Support Documentation: na     Clinical Presentation [x] LOW: stable  [] MODERATE: Evolving  [] HIGH: Unstable     Decision Making/ Complexity Score: low       Goals:   Short Term Goals:  4 weeks  HEP: Patient will demonstrate 50% independence with HEP   Pain: Pt will demonstrate improved pain less than or equal to  5/10 at worse  Function: Patient will increase functional score to equal or greater than to 55 out of 100 on FOTO.  Mobility: Patient will improve left hamstring length  Strength: Patient will improve impaired strength to greater or equal to 4/5.     Long Term Goals:  8 weeks  HEP: Pt will be independent with advanced HEP.  Pain: Pt will demonstrate improved pain less than or equal to 1/10 at worse in order to progress toward maximal functional ability and improve QOL.  Function: Patient will  increase functional score to equal or greater than to 58 out of 100 on FOTO to improve functional independence and quality of life.   Mobility: Patient will demonstrate no pain with functional trunk movement to return to prior level of function.   Strength: Patient will improve impaired strength to greater or equal to 4+/5 in order to perform work duties without limiation  Gait: Patient will demonstrate normalized gait mechanics with minimal compensation in order to return to PLOF.    PLAN   Plan of care Certification: 10/30/2023 to 12/29/2023.    Outpatient Physical Therapy 2 times weekly for 8 weeks to include the following interventions: Cervical/Lumbar Traction, Electrical Stimulation , Manual Therapy, Moist Heat/ Ice, Neuromuscular Re-ed, Patient Education, Self Care, Therapeutic Activities, Therapeutic Exercise, and FDN.     Omayra Holliday, PT      I CERTIFY THE NEED FOR THESE SERVICES FURNISHED UNDER THIS PLAN OF TREATMENT AND WHILE UNDER MY CARE   Physician's comments:     Physician's Signature: ___________________________________________________

## 2023-10-31 NOTE — PRE-PROCEDURE INSTRUCTIONS
Spoke with patient regarding procedure scheduled on 11.2     Arrival time 1215     Has patient been sick with fever or on antibiotics within the last 7 days? No     Does the patient have any open wounds, sores or rashes? No     Does the patient have any recent fractures? no     Has patient received a vaccination within the last 7 days? No     Received the COVID vaccination? yes     Has the patient stopped all medications as directed? hold dm meds am of procedure     Does patient have a pacemaker, defibrillator, or implantable stimulator? No     Does the patient have a ride to and from procedure and someone reliable to remain with patient?       Is the patient diabetic? yes     Does the patient have sleep apnea? Or use O2 at home? No and no     Is the patient receiving sedation? yes     Is the patient instructed to remain NPO beginning at midnight the night before their procedure? yes     Procedure location confirmed with patient? Yes     Covid- Denies signs/symptoms. Instructed to notify PAT/MD if any changes.

## 2023-11-01 RX ORDER — ONDANSETRON 2 MG/ML
4 INJECTION INTRAMUSCULAR; INTRAVENOUS ONCE AS NEEDED
Status: CANCELLED | OUTPATIENT
Start: 2023-11-01 | End: 2035-03-30

## 2023-11-02 ENCOUNTER — HOSPITAL ENCOUNTER (OUTPATIENT)
Facility: HOSPITAL | Age: 58
Discharge: HOME | End: 2023-11-02
Attending: ANESTHESIOLOGY | Admitting: ANESTHESIOLOGY
Payer: COMMERCIAL

## 2023-11-02 VITALS
WEIGHT: 227.06 LBS | HEIGHT: 69 IN | BODY MASS INDEX: 33.63 KG/M2 | SYSTOLIC BLOOD PRESSURE: 132 MMHG | DIASTOLIC BLOOD PRESSURE: 72 MMHG | OXYGEN SATURATION: 97 % | HEART RATE: 90 BPM | RESPIRATION RATE: 18 BRPM | TEMPERATURE: 97 F

## 2023-11-02 DIAGNOSIS — M54.16 LUMBAR RADICULOPATHY: Primary | ICD-10-CM

## 2023-11-02 PROCEDURE — 64483 NJX AA&/STRD TFRM EPI L/S 1: CPT | Mod: 50,,, | Performed by: ANESTHESIOLOGY

## 2023-11-02 PROCEDURE — 25500020 PHARM REV CODE 255: Performed by: ANESTHESIOLOGY

## 2023-11-02 PROCEDURE — 64483 NJX AA&/STRD TFRM EPI L/S 1: CPT | Mod: 50 | Performed by: ANESTHESIOLOGY

## 2023-11-02 PROCEDURE — 25000003 PHARM REV CODE 250: Performed by: ANESTHESIOLOGY

## 2023-11-02 PROCEDURE — 63600175 PHARM REV CODE 636 W HCPCS: Performed by: ANESTHESIOLOGY

## 2023-11-02 PROCEDURE — 64483 PR EPIDURAL INJ, ANES/STEROID, TRANSFORAMINAL, LUMB/SACR, SNGL LEVL: ICD-10-PCS | Mod: 50,,, | Performed by: ANESTHESIOLOGY

## 2023-11-02 RX ORDER — LIDOCAINE HYDROCHLORIDE 10 MG/ML
INJECTION, SOLUTION EPIDURAL; INFILTRATION; INTRACAUDAL; PERINEURAL
Status: DISCONTINUED | OUTPATIENT
Start: 2023-11-02 | End: 2023-11-02 | Stop reason: HOSPADM

## 2023-11-02 RX ORDER — FENTANYL CITRATE 50 UG/ML
INJECTION, SOLUTION INTRAMUSCULAR; INTRAVENOUS
Status: DISCONTINUED | OUTPATIENT
Start: 2023-11-02 | End: 2023-11-02 | Stop reason: HOSPADM

## 2023-11-02 RX ORDER — MIDAZOLAM HYDROCHLORIDE 1 MG/ML
INJECTION, SOLUTION INTRAMUSCULAR; INTRAVENOUS
Status: DISCONTINUED | OUTPATIENT
Start: 2023-11-02 | End: 2023-11-02 | Stop reason: HOSPADM

## 2023-11-02 RX ORDER — BETAMETHASONE SODIUM PHOSPHATE AND BETAMETHASONE ACETATE 3; 3 MG/ML; MG/ML
INJECTION, SUSPENSION INTRA-ARTICULAR; INTRALESIONAL; INTRAMUSCULAR; SOFT TISSUE
Status: DISCONTINUED | OUTPATIENT
Start: 2023-11-02 | End: 2023-11-02 | Stop reason: HOSPADM

## 2023-11-02 NOTE — DISCHARGE SUMMARY
Discharge Note  Short Stay      SUMMARY     Admit Date: 11/2/2023    Attending Physician: Wilfred Serrano MD        Discharge Physician: Wilfred Serrano MD        Discharge Date: 11/2/2023 2:02 PM    Procedure(s) (LRB):  Bilateral L5/S1 TF BRE (Bilateral)    Final Diagnosis: Lumbar radiculopathy [M54.16]    Disposition: Home or self care    Patient Instructions:   Current Discharge Medication List        CONTINUE these medications which have NOT CHANGED    Details   amitriptyline (ELAVIL) 25 MG tablet Take 2 tablets (50 mg total) by mouth every evening.  Qty: 180 tablet, Refills: 1    Associated Diagnoses: Insomnia, unspecified type      aspirin (ECOTRIN) 81 MG EC tablet Take 81 mg by mouth once daily.      atorvastatin (LIPITOR) 40 MG tablet Take 1 tablet (40 mg total) by mouth once daily.  Qty: 90 tablet, Refills: 2    Associated Diagnoses: Mixed hyperlipidemia      blood sugar diagnostic Strp 1 each by Misc.(Non-Drug; Combo Route) route 3 (three) times daily.  Qty: 100 strip, Refills: 11    Comments: Dispense preferred on insurance  Associated Diagnoses: Type 2 diabetes mellitus with diabetic neuropathy, with long-term current use of insulin      dulaglutide (TRULICITY) 4.5 mg/0.5 mL pen injector Inject 4.5 mg into the skin every 7 days.  Qty: 4 pen , Refills: 5    Associated Diagnoses: Type 2 diabetes mellitus with diabetic neuropathy, with long-term current use of insulin      lisinopriL (PRINIVIL,ZESTRIL) 5 MG tablet Take 1 tablet (5 mg total) by mouth once daily.  Qty: 90 tablet, Refills: 1    Comments: .  Associated Diagnoses: Hypertension associated with diabetes      metFORMIN (GLUCOPHAGE) 1000 MG tablet Take 1 tablet (1,000 mg total) by mouth 2 (two) times daily with meals.  Qty: 180 tablet, Refills: 3    Comments: Please inactivate all prior scripts with same name and strength including any scripts on hold.  Associated Diagnoses: Type 2 diabetes mellitus with diabetic neuropathy, with long-term  "current use of insulin      pregabalin (LYRICA) 50 MG capsule Take 1 capsule (50 mg total) by mouth every evening.  Qty: 30 capsule, Refills: 1    Comments: Not to exceed 4 additional fills before 01/31/2024  Associated Diagnoses: Lumbar radiculopathy; Lumbar spondylosis; DDD (degenerative disc disease), lumbar; Spondylolisthesis of lumbar region; Pars defect of lumbar spine      repaglinide (PRANDIN) 2 MG tablet TAKE 2 TABLETS BY MOUTH 3 (THREE) TIMES DAILY BEFORE MEALS.  Qty: 540 tablet, Refills: 0      ascorbic acid, vitamin C, (VITAMIN C) 1000 MG tablet Take by mouth once daily.      blood-glucose meter kit Use as instructed  Qty: 1 each, Refills: 0    Comments: Dispense preferred on insurance  Associated Diagnoses: Type 2 diabetes mellitus with diabetic neuropathy, with long-term current use of insulin      fluticasone propionate (FLONASE) 50 mcg/actuation nasal spray 2 sprays (100 mcg total) by Each Nostril route once daily. Seasonal allergic rhinitis [J30.2]  Qty: 48 g, Refills: 1    Associated Diagnoses: Seasonal allergic rhinitis      insulin degludec (TRESIBA FLEXTOUCH U-200) 200 unit/mL (3 mL) insulin pen Inject 50 Units into the skin every evening.  Qty: 3 pen , Refills: 5    Comments: May titrate up to a max dose of 60 units/day.  Associated Diagnoses: Type 2 diabetes mellitus with diabetic neuropathy, with long-term current use of insulin      lancets Misc 1 each by Misc.(Non-Drug; Combo Route) route 3 (three) times daily.  Qty: 100 each, Refills: 11    Comments: Dispense preferred on insurance  Associated Diagnoses: Type 2 diabetes mellitus with diabetic neuropathy, with long-term current use of insulin      pedi multivit no.12 w-fluoride (MULTIVITAMINS-FLUORIDE-FOLIC A ORAL) Take by mouth.      pen needle, diabetic 32 gauge x 5/32" Ndle 1 each by Misc.(Non-Drug; Combo Route) route once daily.  Qty: 100 each, Refills: 4    Associated Diagnoses: Type 2 diabetes mellitus with diabetic neuropathy, with " long-term current use of insulin      psyllium husk (METAMUCIL ORAL) Take by mouth.      vitamin D (VITAMIN D3) 1000 units Tab Take 1,000 Units by mouth once daily.      zinc gluconate 50 mg tablet Take 50 mg by mouth once daily.                 Discharge Diagnosis: Lumbar radiculopathy [M54.16]  Condition on Discharge: Stable with no complications to procedure   Diet on Discharge: Same as before.  Activity: as per instruction sheet.  Discharge to: Home with a responsible adult.  Follow up: 2-4 weeks       Please call the office at [phone] if you experience any weakness or loss of sensation, fever > 101.5, pain uncontrolled with oral medications, persistent nausea/vomiting/or diarrhea, redness or drainage from the incisions, or any other worrisome concerns. If physician on call was not reached or could not communicate with our office for any reason please go to the nearest emergency department

## 2023-11-02 NOTE — H&P
HPI  Patient presenting for Procedure(s) (LRB):  Bilateral L5/S1 TF BRE (Bilateral)     Patient on Anti-coagulation Yes, ASA, can continue    No health changes since previous encounter    Past Medical History:   Diagnosis Date    Diabetes mellitus with neuropathy 2009    BS 57 am 12/18/2015    Hyperlipidemia 2009    Hypertension associated with diabetes 4/21/2017     Past Surgical History:   Procedure Laterality Date    COLONOSCOPY N/A 11/1/2018    Procedure: COLONOSCOPY;  Surgeon: Kristina Wilson MD;  Location: [facility];  Service: Endoscopy;  Laterality: N/A;    COLONOSCOPY W/ POLYPECTOMY  11/01/2018    Polyp x1, repeat 5 years; Dr. Wilson     Review of patient's allergies indicates:   Allergen Reactions    Hay fever and allergy relief         No current facility-administered medications on file prior to encounter.     Current Outpatient Medications on File Prior to Encounter   Medication Sig Dispense Refill    amitriptyline (ELAVIL) 25 MG tablet Take 2 tablets (50 mg total) by mouth every evening. 180 tablet 1    aspirin (ECOTRIN) 81 MG EC tablet Take 81 mg by mouth once daily.      atorvastatin (LIPITOR) 40 MG tablet Take 1 tablet (40 mg total) by mouth once daily. 90 tablet 2    blood sugar diagnostic Strp 1 each by Misc.(Non-Drug; Combo Route) route 3 (three) times daily. 100 strip 11    dulaglutide (TRULICITY) 4.5 mg/0.5 mL pen injector Inject 4.5 mg into the skin every 7 days. 4 pen 5    lisinopriL (PRINIVIL,ZESTRIL) 5 MG tablet Take 1 tablet (5 mg total) by mouth once daily. 90 tablet 1    metFORMIN (GLUCOPHAGE) 1000 MG tablet Take 1 tablet (1,000 mg total) by mouth 2 (two) times daily with meals. 180 tablet 3    pregabalin (LYRICA) 50 MG capsule Take 1 capsule (50 mg total) by mouth every evening. 30 capsule 1    repaglinide (PRANDIN) 2 MG tablet TAKE 2 TABLETS BY MOUTH 3 (THREE) TIMES DAILY BEFORE MEALS. 540 tablet 0    ascorbic acid, vitamin C, (VITAMIN C) 1000 MG tablet Take by mouth once daily.       "blood-glucose meter kit Use as instructed 1 each 0    fluticasone propionate (FLONASE) 50 mcg/actuation nasal spray 2 sprays (100 mcg total) by Each Nostril route once daily. Seasonal allergic rhinitis [J30.2] 48 g 1    insulin degludec (TRESIBA FLEXTOUCH U-200) 200 unit/mL (3 mL) insulin pen Inject 50 Units into the skin every evening. 3 pen 5    lancets Misc 1 each by Misc.(Non-Drug; Combo Route) route 3 (three) times daily. 100 each 11    pedi multivit no.12 w-fluoride (MULTIVITAMINS-FLUORIDE-FOLIC A ORAL) Take by mouth.      pen needle, diabetic 32 gauge x 5/32" Ndle 1 each by Misc.(Non-Drug; Combo Route) route once daily. 100 each 4    psyllium husk (METAMUCIL ORAL) Take by mouth.      vitamin D (VITAMIN D3) 1000 units Tab Take 1,000 Units by mouth once daily.      zinc gluconate 50 mg tablet Take 50 mg by mouth once daily.          PMHx, PSHx, Allergies, Medications reviewed in epic    ROS negative except pain complaints in HPI    OBJECTIVE:    BP (!) 154/73 (BP Location: Right arm, Patient Position: Sitting)   Pulse 90   Temp 97.2 °F (36.2 °C) (Temporal)   Resp 18   Ht 5' 9" (1.753 m)   Wt 103 kg (227 lb 1.2 oz)   SpO2 98%   BMI 33.53 kg/m²     PHYSICAL EXAMINATION:    GENERAL: Well appearing, in no acute distress, alert and oriented x3.  PSYCH:  Mood and affect appropriate.  SKIN: Skin color, texture, turgor normal, no rashes or lesions which will impact the procedure.  CV: RRR with palpation of the radial artery.  PULM: No evidence of respiratory difficulty, symmetric chest rise. Clear to auscultation.  NEURO: Cranial nerves grossly intact.    Plan:    Proceed with procedure as planned Procedure(s) (LRB):  Bilateral L5/S1 TF BRE (Bilateral)    Wilfred Serrano MD  11/02/2023            "

## 2023-11-02 NOTE — OP NOTE
Transforaminal Lumbar Epidural Steroid Injection    INFORMED CONSENT: The procedure, risks, benefits and options were discussed with patient. There are no contraindications to the procedure. The patient expressed understanding and agreed to proceed. The personnel performing the procedure was discussed.    Date of procedure 11/02/2023    Time-out taken to identify patient and procedure side prior to starting the procedure.                     PROCEDURE:    1)  Bilateral  L5/S1 TRANSFORAMINAL EPIDURAL STEROID INJECTION      Pre Procedure diagnosis:    Lumbar radiculopathy [M54.16]  1. Lumbar radiculopathy        Post-Procedure diagnosis:   same    Surgeon: Wilfred Serrano MD    Assistants: None    Medication: 2ml Betamethasone PF 6mg/ml and 2ml Lidocaine PF 1%    Local: 3ml Lidocaine PF 1%    Sedation: Conscious sedation provided by M.D    SEDATION MEDICATIONS: local/IV sedation: Versed 2 mg and fentanyl 75 mcg IV.  Conscious sedation ordered by MD.  Patient reevaluated and sedation administered by MD and monitored by RN.  Total sedation time was less than 10 min.        Complications: None    Specimens: None        TECHNIQUE: The patient was brought to the procedure room. IV access was obtained prior to the procedure. The patient was positioned prone on the fluoroscopy table. Continuous hemodynamic monitoring was initiated including blood pressure, EKG, and pulse oximetry. . The skin was prepped with chlorhexidine and draped in a sterile fashion.   Local Xylocaine was injected by raising a wheel and going down to the periosteum using a 27-gauge hypodermic needle.      The bilateral L5/S1 transforaminal spaces were identified with fluoroscopy in the  AP, oblique, and lateral views.  A 22 gauge spinal quinke needle was then advanced into the area of the trans foraminal spaces at the above levels with confirmation of proper needle position using AP, oblique, and lateral fluoroscopic views. Once the needle tip was in  the area of the transforaminal space, and there was no blood, CSF or paraesthesias,  2 mL of Omnipaque 300mg/ml was injected at each level for a total of 4 mL.  Fluoroscopic imaging in the AP and lateral views revealed a clear outline of the spinal nerve with proximal spread of agent through the neural foramen into the epidural space. A total combination of 1 mL of Lidocaine PF 1% and 1ml of Betamethasone PF 6mg/ml was injected into each level for a total of 4mL of injected medications with displacement of the contrast dye confirming that the medication went into the area of the transforaminal spaces at each level. A sterile dressing was applied. Patient tolerated procedure well.        The patient was monitored for approximately 30 minutes after the procedure.  Patient was given post procedure and discharge instructions to follow at home.  The patient was discharged in a stable condition

## 2023-11-02 NOTE — DISCHARGE INSTRUCTIONS

## 2023-11-03 LAB — POCT GLUCOSE: 165 MG/DL (ref 70–110)

## 2023-11-10 ENCOUNTER — CLINICAL SUPPORT (OUTPATIENT)
Dept: REHABILITATION | Facility: HOSPITAL | Age: 58
End: 2023-11-10
Payer: COMMERCIAL

## 2023-11-10 DIAGNOSIS — M54.50 LUMBAR PAIN: Primary | ICD-10-CM

## 2023-11-10 DIAGNOSIS — R26.9 ABNORMAL GAIT: ICD-10-CM

## 2023-11-10 DIAGNOSIS — M79.605 LEFT LEG PAIN: ICD-10-CM

## 2023-11-10 PROCEDURE — 97140 MANUAL THERAPY 1/> REGIONS: CPT | Mod: PN

## 2023-11-10 PROCEDURE — 97110 THERAPEUTIC EXERCISES: CPT | Mod: PN

## 2023-11-10 PROCEDURE — 97112 NEUROMUSCULAR REEDUCATION: CPT | Mod: PN

## 2023-11-10 NOTE — PROGRESS NOTES
[facility]   Physical Therapy Treatment Note      Name: Brett Garcia  Clinic Number: [id number]    Therapy Diagnosis:   Encounter Diagnoses   Name Primary?    Lumbar pain Yes    Left leg pain     Abnormal gait      Physician: Wilfred Serrano MD    Visit Date: 11/10/2023       Physician Orders: PT Eval and Treat   Medical Diagnosis from Referral: M54.16 (ICD-10-CM) - Lumbar radiculopathy   Evaluation Date: 10/30/2023  Authorization Period Expiration: 10/17/2024  Plan of Care Expiration: 12/29/2023  Progress Note Due: 11/30/2023  Visit # / Visits authorized: 1/ 12 +eval   FOTO: 1/3 (last performed 10/30/2023)     Precautions: Standard hairline fracture to L5       PTA Visit #: 1/5     Time In: 8:20 am   Time Out: 9:15 am  Total Billable Time: 55 minutes (5 minutes 1:1 with trained technician)     Subjective     Pt reports: pain has been minimal in the back and knee, thought the back is more functionally limiting than the knee lately with long work days. Patient reports he did buy compression socks per recommendation at evaluation. Patient states they have helped a lot.   He was not compliant with home exercise program.  Response to previous treatment: n/a today  Functional change: n/a today     Pain: 1/10  Location: L low back     Objective      Objective Measures updated at progress report unless specified.     Treatment     Brett received the following treatments:      Brett received therapeutic exercises to develop strength, endurance, ROM, and flexibility for (15) minutes including:     Upright bike 6 minutes   DKTC with SB x 20   LTR with SB x 20     Brett received the following manual therapy techniques applied for (15) minutes, including:    Passive L hip stretching   STM to lumbar paraspinals and gluts prior to and following FDN     Brett participated in neuromuscular re-education activities to improve: Balance, Coordination, Kinesthetic Sense, Proprioception, and  "Posture for (25) minutes. The following activities were included:    PPT 3 x 10 5" holds   Bridges x 10  Clamshells 3 x 10 5" holds   Cat/cow x 10   Quadruped alternating hip extension x 10 B     Brett participated in dynamic functional therapeutic activities to improve functional performance for 00  minutes, including:    Palpation Assessment to determine the necessity for Functional Dry Needling.   Patient provided written and verbal consent to Functional Dry Needling   Written Handout on response to FDN provided: Yes    Brett demonstrated good  understanding of the education provided.     Patient demonstrated appropriate response to Functional Dry Needling.   After exs., pt rec'd functional dry needling with E-stim today as follows: FDN to B lumbar paraspinals at L5-S1 levels using 60 mm fusiform needles and to L glut medius using 75 mm fusiform needles.  After placement of needles, E-stim was applied with intensity ranged b/w 3-4 mA during course of treatment.      Patient Education and Home Exercises       Education provided:   - explanation and justification of treatments performed  - importance of compliance with HEP     Written Home Exercises Provided: Patient instructed to cont prior HEP. Exercises were reviewed and Brett was able to demonstrate them prior to the end of the session.  Brett demonstrated good  understanding of the education provided. See EMR under Patient Instructions for exercises provided during therapy sessions    Assessment   Patient presents reporting non compliance with HEP at first follow up session today. Therefore, education was given on its importance along with HEP exercise review. Patient tolerates well with minimal to moderate cueing needed with core control activities. Patient displays continued lacking mobility and flexibility at trunk and lower extremity muscular with noted tension. Manual therapies done to address this were tolerated well with patient reporting " feeling improved following.     Brett Is progressing well towards his goals.   Pt prognosis is Good.     Pt will continue to benefit from skilled outpatient physical therapy to address the deficits listed in the problem list box on initial evaluation, provide pt/family education and to maximize pt's level of independence in the home and community environment.     Pt's spiritual, cultural and educational needs considered and pt agreeable to plan of care and goals.     Anticipated barriers to physical therapy: co-morbidities, sedentary lifestyle, and chronicity of condition    Goals:   Short Term Goals:  4 weeks  HEP: Patient will demonstrate 50% independence with HEP   Pain: Pt will demonstrate improved pain less than or equal to 5/10 at worse  Function: Patient will increase functional score to equal or greater than to 55 out of 100 on FOTO.  Mobility: Patient will improve left hamstring length  Strength: Patient will improve impaired strength to greater or equal to 4/5.      Long Term Goals:  8 weeks  HEP: Pt will be independent with advanced HEP.  Pain: Pt will demonstrate improved pain less than or equal to 1/10 at worse in order to progress toward maximal functional ability and improve QOL.  Function: Patient will  increase functional score to equal or greater than to 58 out of 100 on FOTO to improve functional independence and quality of life.   Mobility: Patient will demonstrate no pain with functional trunk movement to return to prior level of function.   Strength: Patient will improve impaired strength to greater or equal to 4+/5 in order to perform work duties without limiation  Gait: Patient will demonstrate normalized gait mechanics with minimal compensation in order to return to PLOF.    Plan     Plan of care Certification: 10/30/2023 to 12/29/2023.     Outpatient Physical Therapy 2 times weekly for 8 weeks to include the following interventions: Cervical/Lumbar Traction, Electrical Stimulation ,  Manual Therapy, Moist Heat/ Ice, Neuromuscular Re-ed, Patient Education, Self Care, Therapeutic Activities, Therapeutic Exercise, and FDN.        Kelsie Moser, PT

## 2023-11-14 ENCOUNTER — CLINICAL SUPPORT (OUTPATIENT)
Dept: REHABILITATION | Facility: HOSPITAL | Age: 58
End: 2023-11-14
Payer: COMMERCIAL

## 2023-11-14 DIAGNOSIS — M54.50 LUMBAR PAIN: Primary | ICD-10-CM

## 2023-11-14 DIAGNOSIS — M79.605 LEFT LEG PAIN: ICD-10-CM

## 2023-11-14 DIAGNOSIS — R26.9 ABNORMAL GAIT: ICD-10-CM

## 2023-11-14 PROCEDURE — 97112 NEUROMUSCULAR REEDUCATION: CPT | Mod: PN

## 2023-11-14 PROCEDURE — 97110 THERAPEUTIC EXERCISES: CPT | Mod: PN

## 2023-11-14 NOTE — PROGRESS NOTES
"[facility]   Physical Therapy Treatment Note      Name: Brett Garcia  Clinic Number: [id number]    Therapy Diagnosis:   Encounter Diagnoses   Name Primary?    Lumbar pain Yes    Left leg pain     Abnormal gait      Physician: Wilfred Serrano MD    Visit Date: 11/14/2023       Physician Orders: PT Eval and Treat   Medical Diagnosis from Referral: M54.16 (ICD-10-CM) - Lumbar radiculopathy   Evaluation Date: 10/30/2023  Authorization Period Expiration: 10/17/2024  Plan of Care Expiration: 12/29/2023  Progress Note Due: 11/30/2023  Visit # / Visits authorized: 3/ 12 +eval   FOTO: 1/3 (last performed 10/30/2023)     Precautions: Standard hairline fracture to L5     PTA Visit #: -/5     Time In: 4:00 pm   Time Out: 4:57 am  Total Billable Time: 57 minutes     Subjective     Pt reports: soreness to deep core and pelvic region following last session.   He was not compliant with home exercise program.  Response to previous treatment: n/a today  Functional change: n/a today     Pain: 1/10  Location: L low back     Objective      Objective Measures updated at progress report unless specified.     Treatment     Brett received the following treatments:      Brett received therapeutic exercises to develop strength, endurance, ROM, and flexibility for (15) minutes including:   Upright bike 6 minutes   DKTC with SB x 20   LTR with SB x 20     Brett received the following manual therapy techniques applied for (-) minutes, including:  Passive L hip stretching   STM to lumbar paraspinals and gluts prior to and following FDN     Brett participated in neuromuscular re-education activities to improve: Balance, Coordination, Kinesthetic Sense, Proprioception, and Posture for (42) minutes. The following activities were included:  PPT 3 x 10 5" holds   Bridges with PPT  3 x 10  Clamshells YTB 3 x 10 5" holds   Cat/cow x 10   Quadruped alternating hip extension 2 x 10 B   Quad unilateral multifidus " activation (with blue oval disc under contralateral knee) 2 x 10 B  Seated on SB D2 YTB 3 x 10 B   Hooklying ball toss with yellow SB 2x10     Brett participated in dynamic functional therapeutic activities to improve functional performance for 00  minutes, including:  Palpation Assessment to determine the necessity for Functional Dry Needling.   Patient provided written and verbal consent to Functional Dry Needling   Written Handout on response to FDN provided: Yes    Brett demonstrated good  understanding of the education provided.     Patient demonstrated appropriate response to Functional Dry Needling.   After exs., pt rec'd functional dry needling with E-stim today as follows: FDN to B lumbar paraspinals at L5-S1 levels using 60 mm fusiform needles and to L glut medius using 75 mm fusiform needles.  After placement of needles, E-stim was applied with intensity ranged b/w 3-4 mA during course of treatment.      Patient Education and Home Exercises       Education provided:   - explanation and justification of treatments performed  - importance of compliance with HEP     Written Home Exercises Provided: Patient instructed to cont prior HEP. Exercises were reviewed and Brett was able to demonstrate them prior to the end of the session.  Brett demonstrated good  understanding of the education provided. See EMR under Patient Instructions for exercises provided during therapy sessions    Assessment   Pt improving with pelvic motor control. Pt with good tolerance to progression of exercises. Incorporated core stability exercises with moderate fatigue at end of session. Continue to progress as tolerated.     Brett Is progressing well towards his goals.   Pt prognosis is Good.     Pt will continue to benefit from skilled outpatient physical therapy to address the deficits listed in the problem list box on initial evaluation, provide pt/family education and to maximize pt's level of independence in the home and  community environment.     Pt's spiritual, cultural and educational needs considered and pt agreeable to plan of care and goals.     Anticipated barriers to physical therapy: co-morbidities, sedentary lifestyle, and chronicity of condition    Goals:   Short Term Goals:  4 weeks  HEP: Patient will demonstrate 50% independence with HEP   Pain: Pt will demonstrate improved pain less than or equal to 5/10 at worse  Function: Patient will increase functional score to equal or greater than to 55 out of 100 on FOTO.  Mobility: Patient will improve left hamstring length  Strength: Patient will improve impaired strength to greater or equal to 4/5.      Long Term Goals:  8 weeks  HEP: Pt will be independent with advanced HEP.  Pain: Pt will demonstrate improved pain less than or equal to 1/10 at worse in order to progress toward maximal functional ability and improve QOL.  Function: Patient will  increase functional score to equal or greater than to 58 out of 100 on FOTO to improve functional independence and quality of life.   Mobility: Patient will demonstrate no pain with functional trunk movement to return to prior level of function.   Strength: Patient will improve impaired strength to greater or equal to 4+/5 in order to perform work duties without limiation  Gait: Patient will demonstrate normalized gait mechanics with minimal compensation in order to return to PLOF.    Plan     Plan of care Certification: 10/30/2023 to 12/29/2023.     Outpatient Physical Therapy 2 times weekly for 8 weeks to include the following interventions: Cervical/Lumbar Traction, Electrical Stimulation , Manual Therapy, Moist Heat/ Ice, Neuromuscular Re-ed, Patient Education, Self Care, Therapeutic Activities, Therapeutic Exercise, and FDN.        Omayra Holliday, PT

## 2023-11-16 DIAGNOSIS — E11.59 HYPERTENSION ASSOCIATED WITH DIABETES: ICD-10-CM

## 2023-11-16 DIAGNOSIS — E11.40 TYPE 2 DIABETES MELLITUS WITH DIABETIC NEUROPATHY, WITH LONG-TERM CURRENT USE OF INSULIN: ICD-10-CM

## 2023-11-16 DIAGNOSIS — I15.2 HYPERTENSION ASSOCIATED WITH DIABETES: ICD-10-CM

## 2023-11-16 DIAGNOSIS — Z79.4 TYPE 2 DIABETES MELLITUS WITH DIABETIC NEUROPATHY, WITH LONG-TERM CURRENT USE OF INSULIN: ICD-10-CM

## 2023-11-17 ENCOUNTER — CLINICAL SUPPORT (OUTPATIENT)
Dept: REHABILITATION | Facility: HOSPITAL | Age: 58
End: 2023-11-17
Payer: COMMERCIAL

## 2023-11-17 ENCOUNTER — TELEPHONE (OUTPATIENT)
Dept: INTERNAL MEDICINE | Facility: CLINIC | Age: 58
End: 2023-11-17
Payer: COMMERCIAL

## 2023-11-17 DIAGNOSIS — M79.605 LEFT LEG PAIN: ICD-10-CM

## 2023-11-17 DIAGNOSIS — Z12.11 COLON CANCER SCREENING: Primary | ICD-10-CM

## 2023-11-17 DIAGNOSIS — K63.5 POLYP OF COLON, UNSPECIFIED PART OF COLON, UNSPECIFIED TYPE: ICD-10-CM

## 2023-11-17 DIAGNOSIS — M54.50 LUMBAR PAIN: Primary | ICD-10-CM

## 2023-11-17 DIAGNOSIS — R26.9 ABNORMAL GAIT: ICD-10-CM

## 2023-11-17 PROCEDURE — 97112 NEUROMUSCULAR REEDUCATION: CPT | Mod: PN

## 2023-11-17 PROCEDURE — 97110 THERAPEUTIC EXERCISES: CPT | Mod: PN

## 2023-11-17 RX ORDER — INSULIN PUMP SYRINGE, 3 ML
EACH MISCELLANEOUS
Qty: 1 EACH | Refills: 0 | Status: SHIPPED | OUTPATIENT
Start: 2023-11-17

## 2023-11-17 RX ORDER — LISINOPRIL 5 MG/1
5 TABLET ORAL DAILY
Qty: 90 TABLET | Refills: 0 | Status: SHIPPED | OUTPATIENT
Start: 2023-11-17 | End: 2024-02-26 | Stop reason: SDUPTHER

## 2023-11-17 RX ORDER — REPAGLINIDE 2 MG/1
TABLET ORAL
Qty: 540 TABLET | Refills: 0 | Status: SHIPPED | OUTPATIENT
Start: 2023-11-17 | End: 2024-02-29 | Stop reason: SDUPTHER

## 2023-11-17 NOTE — PROGRESS NOTES
"[facility]   Physical Therapy Treatment Note      Name: Brett Garcia  Clinic Number: [id number]    Therapy Diagnosis:   Encounter Diagnoses   Name Primary?    Lumbar pain Yes    Left leg pain     Abnormal gait      Physician: Wilfred Serrano MD    Visit Date: 11/17/2023       Physician Orders: PT Eval and Treat   Medical Diagnosis from Referral: M54.16 (ICD-10-CM) - Lumbar radiculopathy   Evaluation Date: 10/30/2023  Authorization Period Expiration: 10/17/2024  Plan of Care Expiration: 12/29/2023  Progress Note Due: 11/30/2023  Visit # / Visits authorized: 3/ 12 +eval   FOTO: 1/3 (last performed 10/30/2023)     Precautions: Standard hairline fracture to L5     PTA Visit #: -/5     Time In: 8:15 am   Time Out: 9:00 am  Total Billable Time: 45 minutes (10 minutes 1:1 with trained technician)     Subjective     Pt reports: left posterior knee tension in hamstrings is noted today, but back is doing okay   He was not compliant with home exercise program.  Response to previous treatment: n/a today  Functional change: n/a today     Pain: 1/10  Location: L low back     Objective      Objective Measures updated at progress report unless specified.     Treatment     Brett received the following treatments:      Brett received therapeutic exercises to develop strength, endurance, ROM, and flexibility for (15) minutes including:     DKTC with SB x 20   LTR with SB x 20    L Hamstring stretch 4 x 20"     Brett received the following manual therapy techniques applied for (-) minutes, including:      Brett participated in neuromuscular re-education activities to improve: Balance, Coordination, Kinesthetic Sense, Proprioception, and Posture for (30) minutes. The following activities were included:    Bridges with hamstring bias 3 x 10  Clamshells YTB 3 x 10 5" holds   Cat/cow x 10   Quadruped alternating hip extension 2 x 10 B   Seated on SB D2 YTB 3 x 10 B   Nordic hamstring curls 2 x 6 " "      Brett participated in dynamic functional therapeutic activities to improve functional performance for 00  minutes, including:       Patient Education and Home Exercises       Education provided:   - explanation and justification of treatments performed  - importance of compliance with HEP     Written Home Exercises Provided: Patient instructed to cont prior HEP. Exercises were reviewed and Brett was able to demonstrate them prior to the end of the session.  Brett demonstrated good  understanding of the education provided. See EMR under Patient Instructions for exercises provided during therapy sessions    Assessment    Pt with good tolerance to progression of exercises with more focus on hamstrings today as they continue to be intermittently irritated with increased tension development. Patient does well with notes weakness and instability in trunk and lower extremities, but patient leaves session reporting feeling "really good" without any hamstring tension. Continue to progress as tolerated.     Brett Is progressing well towards his goals.   Pt prognosis is Good.     Pt will continue to benefit from skilled outpatient physical therapy to address the deficits listed in the problem list box on initial evaluation, provide pt/family education and to maximize pt's level of independence in the home and community environment.     Pt's spiritual, cultural and educational needs considered and pt agreeable to plan of care and goals.     Anticipated barriers to physical therapy: co-morbidities, sedentary lifestyle, and chronicity of condition    Goals:   Short Term Goals:  4 weeks  HEP: Patient will demonstrate 50% independence with HEP   Pain: Pt will demonstrate improved pain less than or equal to 5/10 at worse  Function: Patient will increase functional score to equal or greater than to 55 out of 100 on FOTO.  Mobility: Patient will improve left hamstring length  Strength: Patient will improve impaired " strength to greater or equal to 4/5.      Long Term Goals:  8 weeks  HEP: Pt will be independent with advanced HEP.  Pain: Pt will demonstrate improved pain less than or equal to 1/10 at worse in order to progress toward maximal functional ability and improve QOL.  Function: Patient will  increase functional score to equal or greater than to 58 out of 100 on FOTO to improve functional independence and quality of life.   Mobility: Patient will demonstrate no pain with functional trunk movement to return to prior level of function.   Strength: Patient will improve impaired strength to greater or equal to 4+/5 in order to perform work duties without limiation  Gait: Patient will demonstrate normalized gait mechanics with minimal compensation in order to return to PLOF.    Plan     Plan of care Certification: 10/30/2023 to 12/29/2023.     Outpatient Physical Therapy 2 times weekly for 8 weeks to include the following interventions: Cervical/Lumbar Traction, Electrical Stimulation , Manual Therapy, Moist Heat/ Ice, Neuromuscular Re-ed, Patient Education, Self Care, Therapeutic Activities, Therapeutic Exercise, and FDN.        Kelsie Moser, PT

## 2023-11-17 NOTE — TELEPHONE ENCOUNTER
----- Message from Rukhsana Browning sent at 11/17/2023 12:16 PM CST -----  Contact: Brett West is needing a call back in regards to having some colonoscopy orders put in. Please give him a call back at [phone]

## 2023-11-20 ENCOUNTER — HOSPITAL ENCOUNTER (OUTPATIENT)
Dept: PREADMISSION TESTING | Facility: HOSPITAL | Age: 58
Discharge: HOME | End: 2023-11-20
Attending: INTERNAL MEDICINE
Payer: COMMERCIAL

## 2023-11-20 DIAGNOSIS — K63.5 POLYP OF COLON, UNSPECIFIED PART OF COLON, UNSPECIFIED TYPE: ICD-10-CM

## 2023-11-20 DIAGNOSIS — Z12.11 COLON CANCER SCREENING: ICD-10-CM

## 2023-11-20 RX ORDER — SODIUM, POTASSIUM,MAG SULFATES 17.5-3.13G
1 SOLUTION, RECONSTITUTED, ORAL ORAL DAILY
Qty: 1 KIT | Refills: 0 | Status: SHIPPED | OUTPATIENT
Start: 2023-11-20 | End: 2023-11-22

## 2023-11-21 ENCOUNTER — CLINICAL SUPPORT (OUTPATIENT)
Dept: REHABILITATION | Facility: HOSPITAL | Age: 58
End: 2023-11-21
Payer: COMMERCIAL

## 2023-11-21 DIAGNOSIS — M54.50 LUMBAR PAIN: Primary | ICD-10-CM

## 2023-11-21 DIAGNOSIS — M79.605 LEFT LEG PAIN: ICD-10-CM

## 2023-11-21 DIAGNOSIS — R26.9 ABNORMAL GAIT: ICD-10-CM

## 2023-11-21 PROCEDURE — 97140 MANUAL THERAPY 1/> REGIONS: CPT | Mod: PN

## 2023-11-21 PROCEDURE — 97110 THERAPEUTIC EXERCISES: CPT | Mod: PN

## 2023-11-21 PROCEDURE — 97112 NEUROMUSCULAR REEDUCATION: CPT | Mod: PN

## 2023-11-21 NOTE — PROGRESS NOTES
"[facility]   Physical Therapy Treatment Note      Name: Brett Garcia  Clinic Number: [id number]    Therapy Diagnosis:   Encounter Diagnoses   Name Primary?    Lumbar pain Yes    Left leg pain     Abnormal gait      Physician: Wilfred Serrano MD    Visit Date: 11/21/2023       Physician Orders: PT Eval and Treat   Medical Diagnosis from Referral: M54.16 (ICD-10-CM) - Lumbar radiculopathy   Evaluation Date: 10/30/2023  Authorization Period Expiration: 10/17/2024  Plan of Care Expiration: 12/29/2023  Progress Note Due: 11/30/2023  Visit # / Visits authorized: 5/ 12 +eval   FOTO: 1/3 (last performed 10/30/2023)     Precautions: Standard hairline fracture to L5     PTA Visit #: -/5     Time In: 4:00 pm   Time Out: 5:25 am  Total Billable Time: 85 minutes (70 minutes with PT tech)    Subjective     Pt reports: cramping to left hamstring last night. Reports residual left hamstring pain today.   He was not compliant with home exercise program.  Response to previous treatment: n/a today  Functional change: n/a today     Pain: 1/10  Location: L low back     Objective      Objective Measures updated at progress report unless specified.     Treatment     Brett received the following treatments:      Brett received therapeutic exercises to develop strength, endurance, ROM, and flexibility for (15) minutes including:   Upright bike 6 minutes   DKTC with SB x 20   LTR with SB x 20   L hamstring str 3x30"     Brett received the following manual therapy techniques applied for (25) minutes, including:  L hamstring stretch   Functional Dry Needling     FDN applied to the left hamstring. Dry needling was performed to decrease inflammation, increase circulation, decrease pain and restore homeostasis.      50 mm needles with 0.25 gauge were used. Electrical stimulation was applied to the needles for 10 mintues    Patient gave written consent to undergo dry needling. Written consent can be found in " "the media section in pts chart. All needles were removed and changes in signs and symptoms were assessed. No adverse reactions noted at the conclusion of the treatment.       Brett participated in neuromuscular re-education activities to improve: Balance, Coordination, Kinesthetic Sense, Proprioception, and Posture for (45) minutes. The following activities were included:  PPT 3 x 10 5" holds   Bridges with PPT  3 x 10  Clamshells YTB 3 x 10 5" holds   Cat/cow x 10   Quadruped alternating hip extension 2 x 10 B   Quad unilateral multifidus activation (with blue oval disc under contralateral knee) 2 x 10 B  Seated on SB D2 YTB 3 x 10 B   Hooklying ball toss with yellow SB 2x10     Brett participated in dynamic functional therapeutic activities to improve functional performance for 00  minutes, including:       Patient Education and Home Exercises       Education provided:   - explanation and justification of treatments performed  - importance of compliance with HEP     Written Home Exercises Provided: Patient instructed to cont prior HEP. Exercises were reviewed and Brett was able to demonstrate them prior to the end of the session.  Brett demonstrated good  understanding of the education provided. See EMR under Patient Instructions for exercises provided during therapy sessions    Assessment   Pt with good tolerance to progression of exercises. Continue to note poor core stability and spine motor control. Pt with increased tension to left hamstring; performed dry needling with e-stim to address deficit and pain level. Pt with good relief following dry needling. No increase of pain at the end of today's session. Continue to progress as tolerated.     Brett Is progressing well towards his goals.   Pt prognosis is Good.     Pt will continue to benefit from skilled outpatient physical therapy to address the deficits listed in the problem list box on initial evaluation, provide pt/family education and to " maximize pt's level of independence in the home and community environment.     Pt's spiritual, cultural and educational needs considered and pt agreeable to plan of care and goals.     Anticipated barriers to physical therapy: co-morbidities, sedentary lifestyle, and chronicity of condition    Goals:   Short Term Goals:  4 weeks  HEP: Patient will demonstrate 50% independence with HEP   Pain: Pt will demonstrate improved pain less than or equal to 5/10 at worse  Function: Patient will increase functional score to equal or greater than to 55 out of 100 on FOTO.  Mobility: Patient will improve left hamstring length  Strength: Patient will improve impaired strength to greater or equal to 4/5.      Long Term Goals:  8 weeks  HEP: Pt will be independent with advanced HEP.  Pain: Pt will demonstrate improved pain less than or equal to 1/10 at worse in order to progress toward maximal functional ability and improve QOL.  Function: Patient will  increase functional score to equal or greater than to 58 out of 100 on FOTO to improve functional independence and quality of life.   Mobility: Patient will demonstrate no pain with functional trunk movement to return to prior level of function.   Strength: Patient will improve impaired strength to greater or equal to 4+/5 in order to perform work duties without limiation  Gait: Patient will demonstrate normalized gait mechanics with minimal compensation in order to return to PLOF.    Plan     Plan of care Certification: 10/30/2023 to 12/29/2023.     Outpatient Physical Therapy 2 times weekly for 8 weeks to include the following interventions: Cervical/Lumbar Traction, Electrical Stimulation , Manual Therapy, Moist Heat/ Ice, Neuromuscular Re-ed, Patient Education, Self Care, Therapeutic Activities, Therapeutic Exercise, and FDN.        Omayra Holliday, PT

## 2023-11-27 ENCOUNTER — PATIENT OUTREACH (OUTPATIENT)
Dept: ADMINISTRATIVE | Facility: HOSPITAL | Age: 58
End: 2023-11-27
Payer: COMMERCIAL

## 2023-11-28 ENCOUNTER — CLINICAL SUPPORT (OUTPATIENT)
Dept: REHABILITATION | Facility: HOSPITAL | Age: 58
End: 2023-11-28
Payer: COMMERCIAL

## 2023-11-28 DIAGNOSIS — M79.605 LEFT LEG PAIN: ICD-10-CM

## 2023-11-28 DIAGNOSIS — M54.50 LUMBAR PAIN: Primary | ICD-10-CM

## 2023-11-28 DIAGNOSIS — R26.9 ABNORMAL GAIT: ICD-10-CM

## 2023-11-28 PROCEDURE — 97112 NEUROMUSCULAR REEDUCATION: CPT | Mod: PN

## 2023-11-28 PROCEDURE — 97140 MANUAL THERAPY 1/> REGIONS: CPT | Mod: PN

## 2023-11-28 PROCEDURE — 97110 THERAPEUTIC EXERCISES: CPT | Mod: PN

## 2023-11-28 NOTE — PROGRESS NOTES
"[facility]   Physical Therapy Treatment Note      Name: Brett Garcia  Clinic Number: [id number]    Therapy Diagnosis:   Encounter Diagnoses   Name Primary?    Lumbar pain Yes    Left leg pain     Abnormal gait      Physician: Wilfred Serrano MD    Visit Date: 11/28/2023       Physician Orders: PT Eval and Treat   Medical Diagnosis from Referral: M54.16 (ICD-10-CM) - Lumbar radiculopathy   Evaluation Date: 10/30/2023  Authorization Period Expiration: 10/17/2024  Plan of Care Expiration: 12/29/2023  Progress Note Due: 11/30/2023  Visit # / Visits authorized: 5/ 12 +eval   FOTO: 1/3 (last performed 10/30/2023)     Precautions: Standard hairline fracture to L5     PTA Visit #: -/5     Time In: 4:45 pm   Time Out: 5:40 pm  Total Billable Time: 50 minutes (35 minutes with PT tech)    Subjective     Pt reports: pain in the low back today after working bent forward for hours straight today.  He was not compliant with home exercise program.  Response to previous treatment: n/a today  Functional change: n/a today     Pain: 3/10  Location: L low back     Objective      Objective Measures updated at progress report unless specified.     Treatment     Brett received the following treatments:      Brett received therapeutic exercises to develop strength, endurance, ROM, and flexibility for (10) minutes including:   DKTC with SB x 20   LTR with SB x 20   L hamstring str 3x30"     Brett received the following manual therapy techniques applied for (10) minutes, including:    STM to L hamstring and lumbar paraspinals prior to and following FDN     Functional Dry Needling     FDN applied to the left hamstring and B lumbar paraspinals. Dry needling was performed to decrease inflammation, increase circulation, decrease pain and restore homeostasis.      50 mm needles with 0.25 gauge were used. Electrical stimulation was applied to the needles for 10 mintues    Patient gave written consent to " "undergo dry needling. Written consent can be found in the media section in pts chart. All needles were removed and changes in signs and symptoms were assessed. No adverse reactions noted at the conclusion of the treatment.       Brett participated in neuromuscular re-education activities to improve: Balance, Coordination, Kinesthetic Sense, Proprioception, and Posture for (30) minutes. The following activities were included:  PPT 3 x 10 5" holds   Bridges with PPT  3 x 10  Clamshells YTB 3 x 10 5" holds   Cat/cow x 10   Quadruped Bird Dog 2 x 10 B     Brett participated in dynamic functional therapeutic activities to improve functional performance for 00  minutes, including:       Patient Education and Home Exercises       Education provided:   - explanation and justification of treatments performed  - importance of compliance with HEP     Written Home Exercises Provided: Patient instructed to cont prior HEP. Exercises were reviewed and Brett was able to demonstrate them prior to the end of the session.  Brett demonstrated good  understanding of the education provided. See EMR under Patient Instructions for exercises provided during therapy sessions    Assessment   Pt with good tolerance with slight progression of exercise, as lumbar patient presents with observable stiffness in trunk presenting to session today. Continue to note poor core stability and spine motor control. Pt with increased tension to left hamstring and lumbar parapsinals; performed dry needling with e-stim to address deficit and pain level. Pt with good relief following dry needling. No increase of pain at the end of today's session. Continue to progress as tolerated.     Brett Is progressing well towards his goals.   Pt prognosis is Good.     Pt will continue to benefit from skilled outpatient physical therapy to address the deficits listed in the problem list box on initial evaluation, provide pt/family education and to maximize pt's " level of independence in the home and community environment.     Pt's spiritual, cultural and educational needs considered and pt agreeable to plan of care and goals.     Anticipated barriers to physical therapy: co-morbidities, sedentary lifestyle, and chronicity of condition    Goals:   Short Term Goals:  4 weeks  HEP: Patient will demonstrate 50% independence with HEP   Pain: Pt will demonstrate improved pain less than or equal to 5/10 at worse  Function: Patient will increase functional score to equal or greater than to 55 out of 100 on FOTO.  Mobility: Patient will improve left hamstring length  Strength: Patient will improve impaired strength to greater or equal to 4/5.      Long Term Goals:  8 weeks  HEP: Pt will be independent with advanced HEP.  Pain: Pt will demonstrate improved pain less than or equal to 1/10 at worse in order to progress toward maximal functional ability and improve QOL.  Function: Patient will  increase functional score to equal or greater than to 58 out of 100 on FOTO to improve functional independence and quality of life.   Mobility: Patient will demonstrate no pain with functional trunk movement to return to prior level of function.   Strength: Patient will improve impaired strength to greater or equal to 4+/5 in order to perform work duties without limiation  Gait: Patient will demonstrate normalized gait mechanics with minimal compensation in order to return to PLOF.    Plan     Plan of care Certification: 10/30/2023 to 12/29/2023.     Outpatient Physical Therapy 2 times weekly for 8 weeks to include the following interventions: Cervical/Lumbar Traction, Electrical Stimulation , Manual Therapy, Moist Heat/ Ice, Neuromuscular Re-ed, Patient Education, Self Care, Therapeutic Activities, Therapeutic Exercise, and FDN.        Kelsie Moser, PT

## 2023-12-01 ENCOUNTER — ANESTHESIA EVENT (OUTPATIENT)
Dept: ENDOSCOPY | Facility: HOSPITAL | Age: 58
End: 2023-12-01
Payer: COMMERCIAL

## 2023-12-01 ENCOUNTER — ANESTHESIA (OUTPATIENT)
Dept: ENDOSCOPY | Facility: HOSPITAL | Age: 58
End: 2023-12-01
Payer: COMMERCIAL

## 2023-12-01 ENCOUNTER — HOSPITAL ENCOUNTER (OUTPATIENT)
Facility: HOSPITAL | Age: 58
Discharge: HOME | End: 2023-12-01
Attending: INTERNAL MEDICINE | Admitting: INTERNAL MEDICINE
Payer: COMMERCIAL

## 2023-12-01 DIAGNOSIS — Z12.11 COLON CANCER SCREENING: Primary | ICD-10-CM

## 2023-12-01 LAB — POCT GLUCOSE: 96 MG/DL (ref 70–110)

## 2023-12-01 PROCEDURE — 37000009 HC ANESTHESIA EA ADD 15 MINS: Performed by: INTERNAL MEDICINE

## 2023-12-01 PROCEDURE — 45385 COLONOSCOPY W/LESION REMOVAL: CPT | Mod: 33,,, | Performed by: INTERNAL MEDICINE

## 2023-12-01 PROCEDURE — 25000003 PHARM REV CODE 250: Performed by: INTERNAL MEDICINE

## 2023-12-01 PROCEDURE — 27201089 HC SNARE, DISP (ANY): Performed by: INTERNAL MEDICINE

## 2023-12-01 PROCEDURE — 88305 TISSUE EXAM BY PATHOLOGIST: CPT | Mod: 26,,, | Performed by: PATHOLOGY

## 2023-12-01 PROCEDURE — 63600175 PHARM REV CODE 636 W HCPCS: Performed by: NURSE ANESTHETIST, CERTIFIED REGISTERED

## 2023-12-01 PROCEDURE — 25000003 PHARM REV CODE 250: Performed by: NURSE ANESTHETIST, CERTIFIED REGISTERED

## 2023-12-01 PROCEDURE — 88305 TISSUE EXAM BY PATHOLOGIST: ICD-10-PCS | Mod: 26,,, | Performed by: PATHOLOGY

## 2023-12-01 PROCEDURE — 45385 PR COLONOSCOPY,REMV LESN,SNARE: ICD-10-PCS | Mod: 33,,, | Performed by: INTERNAL MEDICINE

## 2023-12-01 PROCEDURE — 88305 TISSUE EXAM BY PATHOLOGIST: CPT | Performed by: PATHOLOGY

## 2023-12-01 PROCEDURE — 45385 COLONOSCOPY W/LESION REMOVAL: CPT | Mod: PT | Performed by: INTERNAL MEDICINE

## 2023-12-01 PROCEDURE — 37000008 HC ANESTHESIA 1ST 15 MINUTES: Performed by: INTERNAL MEDICINE

## 2023-12-01 RX ORDER — SODIUM CHLORIDE, SODIUM LACTATE, POTASSIUM CHLORIDE, CALCIUM CHLORIDE 600; 310; 30; 20 MG/100ML; MG/100ML; MG/100ML; MG/100ML
INJECTION, SOLUTION INTRAVENOUS CONTINUOUS
Status: DISCONTINUED | OUTPATIENT
Start: 2023-12-01 | End: 2023-12-01 | Stop reason: HOSPADM

## 2023-12-01 RX ORDER — PROPOFOL 10 MG/ML
VIAL (ML) INTRAVENOUS
Status: DISCONTINUED | OUTPATIENT
Start: 2023-12-01 | End: 2023-12-01

## 2023-12-01 RX ORDER — DEXTROMETHORPHAN/PSEUDOEPHED 2.5-7.5/.8
DROPS ORAL
Status: DISCONTINUED | OUTPATIENT
Start: 2023-12-01 | End: 2023-12-01 | Stop reason: HOSPADM

## 2023-12-01 RX ORDER — LIDOCAINE HYDROCHLORIDE 10 MG/ML
INJECTION, SOLUTION EPIDURAL; INFILTRATION; INTRACAUDAL; PERINEURAL
Status: DISCONTINUED | OUTPATIENT
Start: 2023-12-01 | End: 2023-12-01

## 2023-12-01 RX ADMIN — SODIUM CHLORIDE, SODIUM LACTATE, POTASSIUM CHLORIDE, AND CALCIUM CHLORIDE: .6; .31; .03; .02 INJECTION, SOLUTION INTRAVENOUS at 12:12

## 2023-12-01 RX ADMIN — PROPOFOL 30 MG: 10 INJECTION, EMULSION INTRAVENOUS at 12:12

## 2023-12-01 RX ADMIN — PROPOFOL 90 MG: 10 INJECTION, EMULSION INTRAVENOUS at 12:12

## 2023-12-01 RX ADMIN — PROPOFOL 30 MG: 10 INJECTION, EMULSION INTRAVENOUS at 01:12

## 2023-12-01 RX ADMIN — LIDOCAINE HYDROCHLORIDE 50 MG: 10 SOLUTION INTRAVENOUS at 12:12

## 2023-12-01 NOTE — ANESTHESIA PREPROCEDURE EVALUATION
12/01/2023  Brett Garcia is a 57 y.o., male.      Pre-op Assessment    I have reviewed the Patient Summary Reports.     I have reviewed the Nursing Notes. I have reviewed the NPO Status.   I have reviewed the Medications.     Review of Systems  Anesthesia Hx:  No problems with previous Anesthesia             Denies Family Hx of Anesthesia complications.    Denies Personal Hx of Anesthesia complications.                    Social:  Non-Smoker       Hematology/Oncology:  Hematology Normal   Oncology Normal                                   EENT/Dental:  EENT/Dental Normal           Cardiovascular:     Hypertension                                        Pulmonary:  Pulmonary Normal                       Renal/:  Renal/ Normal                 Hepatic/GI:  Hepatic/GI Normal                 Musculoskeletal:  Musculoskeletal Normal                Neurological:    Neuromuscular Disease,                                   Endocrine:  Diabetes, type 2         Obesity / BMI > 30  Dermatological:  Skin Normal    Psych:  Psychiatric History                Physical Exam  General: Cooperative, Alert, Oriented and Well nourished    Airway:  Mallampati: II   Mouth Opening: Normal  TM Distance: Normal  Tongue: Normal  Neck ROM: Normal ROM    Dental:  Intact    Chest/Lungs:  Clear to auscultation, Normal Respiratory Rate    Heart:  Rate: Normal  Rhythm: Regular Rhythm    Anesthesia Plan  Type of Anesthesia, risks & benefits discussed:    Anesthesia Type: MAC  Intra-op Monitoring Plan: Standard ASA Monitors  Induction:  IV  Informed Consent: Informed consent signed with the Patient and all parties understand the risks and agree with anesthesia plan.  All questions answered.   ASA Score: 2  Day of Surgery Review of History & Physical: H&P Update referred to the surgeon/provider.I have interviewed and examined the patient.  I have reviewed the patient's H&P dated: There are no significant changes.     Ready For Surgery From Anesthesia Perspective.   .

## 2023-12-01 NOTE — PROVATION PATIENT INSTRUCTIONS
Discharge Summary/Instructions after an Endoscopic Procedure  Patient Name: Brett Garcia  Patient MRN: [id number]  Patient YOB: 1965 Friday, December 1, 2023 Kristina Wilson MD  Dear patient,  As a result of recent federal legislation (The Federal Cures Act), you may   receive lab or pathology results from your procedure in your [facility]   account before your physician is able to contact you. Your physician or   their representative will relay the results to you with their   recommendations at their soonest availability.  Thank you,  RESTRICTIONS:  During your procedure today, you received medications for sedation.  These   medications may affect your judgment, balance and coordination.  Therefore,   for 24 hours, you have the following restrictions:   - DO NOT drive a car, operate machinery, make legal/financial decisions,   sign important papers or drink alcohol.    ACTIVITY:  Today: no heavy lifting, straining or running due to procedural   sedation/anesthesia.  The following day: return to full activity including work.  DIET:  Eat and drink normally unless instructed otherwise.     TREATMENT FOR COMMON SIDE EFFECTS:  - Mild abdominal pain, nausea, belching, bloating or excessive gas:  rest,   eat lightly and use a heating pad.  - Sore Throat: treat with throat lozenges and/or gargle with warm salt   water.  - Because air was used during the procedure, expelling large amounts of air   from your rectum or belching is normal.  - If a bowel prep was taken, you may not have a bowel movement for 1-3 days.    This is normal.  SYMPTOMS TO WATCH FOR AND REPORT TO YOUR PHYSICIAN:  1. Abdominal pain or bloating, other than gas cramps.  2. Chest pain.  3. Back pain.  4. Signs of infection such as: chills or fever occurring within 24 hours   after the procedure.  5. Rectal bleeding, which would show as bright red, maroon, or black stools.   (A tablespoon of blood from the rectum is not serious, especially  if   hemorrhoids are present.)  6. Vomiting.  7. Weakness or dizziness.  GO DIRECTLY TO THE NEAREST EMERGENCY ROOM IF YOU HAVE ANY OF THE FOLLOWING:      Difficulty breathing              Chills and/or fever over 101 F   Persistent vomiting and/or vomiting blood   Severe abdominal pain   Severe chest pain   Black, tarry stools   Bleeding- more than one tablespoon   Any other symptom or condition that you feel may need urgent attention  Your doctor recommends these additional instructions:  If any biopsies were taken, your doctors clinic will contact you in 1 to 2   weeks with any results.  - Patient has a contact number available for emergencies.  The signs and   symptoms of potential delayed complications were discussed with the   patient.  Return to normal activities tomorrow.  Written discharge   instructions were provided to the patient.   - Discharge patient to home (via wheelchair).   - Resume previous diet today.   - Continue present medications.   - No aspirin, ibuprofen, naproxen, or other non-steroidal anti-inflammatory   drugs for 7 days after polyp removal.   - Await pathology results.   - Repeat colonoscopy in 5 years for surveillance.  For questions, problems or results please call your physician Kristina Wilson MD at Work:  [phone]  If you have any questions about the above instructions, call the GI   department at [phone] or call the endoscopy unit at [phone]   from 7am until 3 pm.  [facility], EMERGENCY ROOM PHONE NUMBER:   [phone]  IF A COMPLICATION OR EMERGENCY SITUATION ARISES AND YOU ARE UNABLE TO REACH   YOUR PHYSICIAN - GO DIRECTLY TO THE EMERGENCY ROOM.  I have read or have had read to me these discharge instructions for my   procedure and have received a written copy.  I understand these   instructions and will follow-up with my physician if I have any questions.     __________________________________        _____________________________________  Nurse Signature                                          Patient/Designated   Responsible Party Signature  MD Kristina Arias MD  12/1/2023 1:17:26 PM  This report has been verified and signed electronically.  Dear patient,  As a result of recent federal legislation (The Federal Cures Act), you may   receive lab or pathology results from your procedure in your [facility]   account before your physician is able to contact you. Your physician or   their representative will relay the results to you with their   recommendations at their soonest availability.  Thank you,  PROVATION

## 2023-12-01 NOTE — H&P
PRE PROCEDURE H&P    Patient Name: Brett Garcia  MRN: [id number]  : 1965  Date of Procedure:  2023  Referring Physician: Kristina Wilson MD  Primary Physician: Herb Lord MD  Procedure Physician: Kristina Wilson MD       Planned Procedure: Colonoscopy  Diagnosis: previous adenomatous polyp  Chief Complaint: Same as above    HPI: Patient is an 57 y.o. male is here for the above.         Past Medical History:   Past Medical History:   Diagnosis Date    Diabetes mellitus with neuropathy     BS 57 am 2015    Hyperlipidemia     Hypertension associated with diabetes 2017        Past Surgical History:  Past Surgical History:   Procedure Laterality Date    COLONOSCOPY N/A 2018    Procedure: COLONOSCOPY;  Surgeon: Kristina Wilson MD;  Location: [facility];  Service: Endoscopy;  Laterality: N/A;    COLONOSCOPY W/ POLYPECTOMY  2018    Polyp x1, repeat 5 years; Dr. Wilson    SELECTIVE INJECTION OF ANESTHETIC AGENT AROUND LUMBAR SPINAL NERVE ROOT BY TRANSFORAMINAL APPROACH Bilateral 2023    Procedure: Bilateral L5/S1 TF BRE;  Surgeon: Wilfred Serrano MD;  Location: [facility];  Service: Pain Management;  Laterality: Bilateral;        Home Medications:  Prior to Admission medications    Medication Sig Start Date End Date Taking? Authorizing Provider   amitriptyline (ELAVIL) 25 MG tablet Take 2 tablets (50 mg total) by mouth every evening. 23  Yes Herb Lord MD   ascorbic acid, vitamin C, (VITAMIN C) 1000 MG tablet Take by mouth once daily.   Yes Provider, Historical   atorvastatin (LIPITOR) 40 MG tablet Take 1 tablet (40 mg total) by mouth once daily. 23  Yes Herb Lord MD   insulin degludec (TRESIBA FLEXTOUCH U-200) 200 unit/mL (3 mL) insulin pen Inject 50 Units into the skin every evening. 23  Yes Ginger Franz NP   lisinopriL (PRINIVIL,ZESTRIL) 5 MG tablet Take 1 tablet (5 mg total) by mouth once daily. 23  Yes  "Mirna Lyons NP   metFORMIN (GLUCOPHAGE) 1000 MG tablet Take 1 tablet (1,000 mg total) by mouth 2 (two) times daily with meals. 5/16/23  Yes Herb Lord MD   pregabalin (LYRICA) 50 MG capsule Take 1 capsule (50 mg total) by mouth every evening. 10/18/23  Yes Wilfred Serrano MD   repaglinide (PRANDIN) 2 MG tablet TAKE 2 TABLETS BY MOUTH 3 (THREE) TIMES DAILY BEFORE MEALS. 11/17/23  Yes Mirna Lyons NP   vitamin D (VITAMIN D3) 1000 units Tab Take 1,000 Units by mouth once daily.   Yes Provider, Historical   zinc gluconate 50 mg tablet Take 50 mg by mouth once daily.   Yes Provider, Historical   aspirin (ECOTRIN) 81 MG EC tablet Take 81 mg by mouth once daily.    Provider, Historical   blood sugar diagnostic Strp 1 each by Misc.(Non-Drug; Combo Route) route 3 (three) times daily. 7/28/23   Ginger Franz NP   blood-glucose meter kit Use as instructed 11/17/23   Ginger Franz NP   dulaglutide (TRULICITY) 4.5 mg/0.5 mL pen injector Inject 4.5 mg into the skin every 7 days. 9/26/23   Ginger Franz NP   fluticasone propionate (FLONASE) 50 mcg/actuation nasal spray 2 sprays (100 mcg total) by Each Nostril route once daily. Seasonal allergic rhinitis [J30.2] 8/22/23   Herb Lord MD   lancets Misc 1 each by Misc.(Non-Drug; Combo Route) route 3 (three) times daily. 9/26/23   Ginger Franz NP   pedi multivit no.12 w-fluoride (MULTIVITAMINS-FLUORIDE-FOLIC A ORAL) Take by mouth.    Provider, Historical   pen needle, diabetic 32 gauge x 5/32" Ndle 1 each by Misc.(Non-Drug; Combo Route) route once daily. 7/28/23   Ginger Franz NP   psyllium husk (METAMUCIL ORAL) Take by mouth.    Provider, Historical        Allergies:  Review of patient's allergies indicates:   Allergen Reactions    Hay fever and allergy relief         Social History:   Social History     Socioeconomic History    Marital status:     Number of children: 2   Occupational History    " "Occupation: Plant work      Employer: [employer]     Comment: [employer]   Tobacco Use    Smoking status: Never    Smokeless tobacco: Former   Substance and Sexual Activity    Alcohol use: Not Currently     Alcohol/week: 2.0 standard drinks of alcohol     Types: 2 Shots of liquor per week    Drug use: No    Sexual activity: Yes     Partners: Female       Family History:  Family History   Problem Relation Age of Onset    Diabetes Father     Hypertension Father     Heart attack Father     Cataracts Father     Hypertension Mother     Hypertension Sister        ROS: No acute cardiac events, no acute respiratory complaints.     Physical Exam (all patients):    /75 (BP Location: Left arm, Patient Position: Lying)   Pulse 86   Temp 97.9 °F (36.6 °C) (Temporal)   Resp 18   Ht 5' 9" (1.753 m)   Wt 98.4 kg (217 lb)   SpO2 98%   BMI 32.05 kg/m²   Lungs: Clear to auscultation bilaterally, respirations unlabored  Heart: Regular rate and rhythm, S1 and S2 normal, no obvious murmurs  Abdomen:         Soft, non-tender, bowel sounds normal, no masses, no organomegaly    Lab Results   Component Value Date    WBC 6.63 08/25/2023    MCV 93 08/25/2023    RDW 14.0 08/25/2023     08/25/2023     08/25/2023    HGBA1C 8.2 (H) 08/25/2023    BUN 17 08/25/2023     08/25/2023    K 4.0 08/25/2023     08/25/2023        SEDATION PLAN: per anesthesia      History reviewed, vital signs satisfactory, cardiopulmonary status satisfactory, sedation options, risks and plans have been discussed with the patient  All their questions were answered and the patient agrees to the sedation procedures as planned and the patient is deemed an appropriate candidate for the sedation as planned.    Procedure explained to patient, informed consent obtained and placed in chart.    Kristina Wilson  12/1/2023  11:57 AM   "

## 2023-12-01 NOTE — PLAN OF CARE
Dr Wilson at bedside to update patient and family on results and recs. Reviewed post sedation precautions and post procedure care.

## 2023-12-01 NOTE — ANESTHESIA POSTPROCEDURE EVALUATION
Anesthesia Post Evaluation    Patient: Brett Garcia    Procedure(s) Performed: Procedure(s) (LRB):  COLONOSCOPY (N/A)    Final Anesthesia Type: MAC      Patient location during evaluation: PACU  Patient participation: Yes- Able to Participate  Level of consciousness: awake and alert  Post-procedure vital signs: reviewed and stable  Pain management: adequate  Airway patency: patent    PONV status at discharge: No PONV  Anesthetic complications: no      Cardiovascular status: blood pressure returned to baseline  Respiratory status: unassisted and room air  Hydration status: euvolemic  Follow-up not needed.              Vitals Value Taken Time   /60 12/01/23 1318   Temp 37 °C (98.6 °F) 12/01/23 1318   Pulse 78 12/01/23 1318   Resp 18 12/01/23 1318   SpO2 98 % 12/01/23 1318         No case tracking events are documented in the log.      Pain/Rj Score: Rj Score: 9 (12/1/2023  1:18 PM)

## 2023-12-01 NOTE — TRANSFER OF CARE
"Anesthesia Transfer of Care Note    Patient: Brett Garcia    Procedure(s) Performed: Procedure(s) (LRB):  COLONOSCOPY (N/A)    Patient location: PACU    Anesthesia Type: MAC    Transport from OR: Transported from OR on room air with adequate spontaneous ventilation    Post pain: adequate analgesia    Post assessment: no apparent anesthetic complications    Post vital signs: stable    Level of consciousness: responds to stimulation    Nausea/Vomiting: no nausea/vomiting    Complications: none    Transfer of care protocol was followed      Last vitals: Visit Vitals  /75 (BP Location: Left arm, Patient Position: Lying)   Pulse 86   Temp 36.6 °C (97.9 °F) (Temporal)   Resp 18   Ht 5' 9" (1.753 m)   Wt 98.4 kg (217 lb)   SpO2 98%   BMI 32.05 kg/m²     "

## 2023-12-04 ENCOUNTER — PATIENT OUTREACH (OUTPATIENT)
Dept: ADMINISTRATIVE | Facility: HOSPITAL | Age: 58
End: 2023-12-04
Payer: COMMERCIAL

## 2023-12-04 VITALS
RESPIRATION RATE: 20 BRPM | DIASTOLIC BLOOD PRESSURE: 60 MMHG | SYSTOLIC BLOOD PRESSURE: 95 MMHG | TEMPERATURE: 98 F | BODY MASS INDEX: 32.14 KG/M2 | OXYGEN SATURATION: 97 % | WEIGHT: 217 LBS | HEIGHT: 69 IN | HEART RATE: 80 BPM

## 2023-12-04 NOTE — PROGRESS NOTES
Working Diabetic Lab Report.    Pt has lab appt scheduled, 12/08/23.  All needed labs scheduled.

## 2023-12-05 ENCOUNTER — CLINICAL SUPPORT (OUTPATIENT)
Dept: REHABILITATION | Facility: HOSPITAL | Age: 58
End: 2023-12-05
Payer: COMMERCIAL

## 2023-12-05 DIAGNOSIS — M79.605 LEFT LEG PAIN: ICD-10-CM

## 2023-12-05 DIAGNOSIS — R26.9 ABNORMAL GAIT: ICD-10-CM

## 2023-12-05 DIAGNOSIS — M54.50 LUMBAR PAIN: Primary | ICD-10-CM

## 2023-12-05 PROCEDURE — 97112 NEUROMUSCULAR REEDUCATION: CPT | Mod: PN

## 2023-12-05 PROCEDURE — 97110 THERAPEUTIC EXERCISES: CPT | Mod: PN

## 2023-12-05 NOTE — PROGRESS NOTES
"[facility]   Physical Therapy Treatment Note      Name: Brett Garcia  Clinic Number: [id number]    Therapy Diagnosis:   No diagnosis found.    Physician: Wilfred Serrano MD    Visit Date: 12/5/2023       Physician Orders: PT Eval and Treat   Medical Diagnosis from Referral: M54.16 (ICD-10-CM) - Lumbar radiculopathy   Evaluation Date: 10/30/2023  Authorization Period Expiration: 10/17/2024  Plan of Care Expiration: 12/29/2023  Progress Note Due: 11/30/2023 ***  Visit # / Visits authorized: 5/ 12 +eval   FOTO: 1/3 (last performed 10/30/2023) ***     Precautions: Standard hairline fracture to L5     PTA Visit #: -/5     Time In: 4:45 pm   Time Out: 5:40 pm  Total Billable Time: 50 minutes (35 minutes with PT tech)    Subjective     Pt reports: pain in the low back today after working bent forward for hours straight today.  He was not compliant with home exercise program.  Response to previous treatment: n/a today  Functional change: n/a today     Pain: 3/10  Location: L low back     Objective      Objective Measures updated at progress report unless specified.     Treatment     Brett received the following treatments:      Brett received therapeutic exercises to develop strength, endurance, ROM, and flexibility for (10) minutes including:   DKTC with SB x 20   LTR with SB x 20   L hamstring str 3x30"     Brett received the following manual therapy techniques applied for (10) minutes, including:    STM to L hamstring and lumbar paraspinals prior to and following FDN     Functional Dry Needling     FDN applied to the left hamstring and B lumbar paraspinals. Dry needling was performed to decrease inflammation, increase circulation, decrease pain and restore homeostasis.      50 mm needles with 0.25 gauge were used. Electrical stimulation was applied to the needles for 10 mintues    Patient gave written consent to undergo dry needling. Written consent can be found in the media section " "in pts chart. All needles were removed and changes in signs and symptoms were assessed. No adverse reactions noted at the conclusion of the treatment.       Brett participated in neuromuscular re-education activities to improve: Balance, Coordination, Kinesthetic Sense, Proprioception, and Posture for (30) minutes. The following activities were included:  PPT 3 x 10 5" holds   Bridges with PPT  3 x 10  Clamshells YTB 3 x 10 5" holds   Cat/cow x 10   Quadruped Bird Dog 2 x 10 B     Brett participated in dynamic functional therapeutic activities to improve functional performance for 00  minutes, including:       Patient Education and Home Exercises       Education provided:   - explanation and justification of treatments performed  - importance of compliance with HEP     Written Home Exercises Provided: Patient instructed to cont prior HEP. Exercises were reviewed and Brett was able to demonstrate them prior to the end of the session.  Brett demonstrated good  understanding of the education provided. See EMR under Patient Instructions for exercises provided during therapy sessions    Assessment   Pt with good tolerance with slight progression of exercise, as lumbar patient presents with observable stiffness in trunk presenting to session today. Continue to note poor core stability and spine motor control. Pt with increased tension to left hamstring and lumbar parapsinals; performed dry needling with e-stim to address deficit and pain level. Pt with good relief following dry needling. No increase of pain at the end of today's session. Continue to progress as tolerated.     Brett Is progressing well towards his goals.   Pt prognosis is Good.     Pt will continue to benefit from skilled outpatient physical therapy to address the deficits listed in the problem list box on initial evaluation, provide pt/family education and to maximize pt's level of independence in the home and community environment.     Pt's " spiritual, cultural and educational needs considered and pt agreeable to plan of care and goals.     Anticipated barriers to physical therapy: co-morbidities, sedentary lifestyle, and chronicity of condition    Goals:   Short Term Goals:  4 weeks  HEP: Patient will demonstrate 50% independence with HEP   Pain: Pt will demonstrate improved pain less than or equal to 5/10 at worse  Function: Patient will increase functional score to equal or greater than to 55 out of 100 on FOTO.  Mobility: Patient will improve left hamstring length  Strength: Patient will improve impaired strength to greater or equal to 4/5.      Long Term Goals:  8 weeks  HEP: Pt will be independent with advanced HEP.  Pain: Pt will demonstrate improved pain less than or equal to 1/10 at worse in order to progress toward maximal functional ability and improve QOL.  Function: Patient will  increase functional score to equal or greater than to 58 out of 100 on FOTO to improve functional independence and quality of life.   Mobility: Patient will demonstrate no pain with functional trunk movement to return to prior level of function.   Strength: Patient will improve impaired strength to greater or equal to 4+/5 in order to perform work duties without limiation  Gait: Patient will demonstrate normalized gait mechanics with minimal compensation in order to return to PLOF.    Plan     Plan of care Certification: 10/30/2023 to 12/29/2023.     Outpatient Physical Therapy 2 times weekly for 8 weeks to include the following interventions: Cervical/Lumbar Traction, Electrical Stimulation , Manual Therapy, Moist Heat/ Ice, Neuromuscular Re-ed, Patient Education, Self Care, Therapeutic Activities, Therapeutic Exercise, and FDN.        Omayra Holliday, PT

## 2023-12-06 LAB
FINAL PATHOLOGIC DIAGNOSIS: NORMAL
GROSS: NORMAL
Lab: NORMAL
MICROSCOPIC EXAM: NORMAL

## 2023-12-07 NOTE — PROGRESS NOTES
"[facility]   Physical Therapy Treatment Note      Name: Brett Garcia  Clinic Number: [id number]    Therapy Diagnosis:   Encounter Diagnoses   Name Primary?    Lumbar pain Yes    Left leg pain     Abnormal gait      Physician: Wilfred Serrano MD    Visit Date: 12/5/2023       Physician Orders: PT Eval and Treat   Medical Diagnosis from Referral: M54.16 (ICD-10-CM) - Lumbar radiculopathy   Evaluation Date: 10/30/2023  Authorization Period Expiration: 10/17/2024  Plan of Care Expiration: 12/29/2023  Visit # / Visits authorized: 7/ 12 +eval   FOTO: 1/3 (FOTO [place])     Precautions: Standard hairline fracture to L5     PTA Visit #: -/5     Time In: 4:45 pm   Time Out: 5:40 pm  Total Billable Time: 55 minutes    Subjective     Pt reports: no real pain in either affected region today, though back did hurt over the weekend.   He was not compliant with home exercise program.  Response to previous treatment: soreness   Functional change: n/a today     Pain: 0/10  Location: L low back     Objective      Objective Measures updated at progress report unless specified.     Treatment     Brett received the following treatments:      Brett received therapeutic exercises to develop strength, endurance, ROM, and flexibility for (15) minutes including:     Upright bike 6 minutes   DKTC with SB x 20   LTR with SB x 20   L hamstring str 3x30"     Brett received the following manual therapy techniques applied for (00) minutes, including:      Brett participated in neuromuscular re-education activities to improve: Balance, Coordination, Kinesthetic Sense, Proprioception, and Posture for (40) minutes. The following activities were included:  PPT 3 x 10 5" holds   Bridges with PPT  3 x 10  Clamshells RTB 3 x 10 5" holds   Cat/cow x 10   Quadruped Bird Dog 2 x 10 B   Chops at pulleys 20#  2 x 10 B   Bosu Squats 2 x 10       Brett participated in dynamic functional therapeutic activities to improve " functional performance for 00  minutes, including:       Patient Education and Home Exercises       Education provided:   - explanation and justification of treatments performed  - importance of compliance with HEP     Written Home Exercises Provided: Patient instructed to cont prior HEP. Exercises were reviewed and Brett was able to demonstrate them prior to the end of the session.  Brett demonstrated good  understanding of the education provided. See EMR under Patient Instructions for exercises provided during therapy sessions    Assessment   Pt with good tolerance with progression of exercise, as symptoms are low presenting to session today. Continue to note poor core stability and spine motor control though improvements are being noted in stability on bosu today. No increase of pain at the end of today's session. Continue to progress as tolerated.     Brett Is progressing well towards his goals.   Pt prognosis is Good.     Pt will continue to benefit from skilled outpatient physical therapy to address the deficits listed in the problem list box on initial evaluation, provide pt/family education and to maximize pt's level of independence in the home and community environment.     Pt's spiritual, cultural and educational needs considered and pt agreeable to plan of care and goals.     Anticipated barriers to physical therapy: co-morbidities, sedentary lifestyle, and chronicity of condition    Goals:   Short Term Goals:  4 weeks  HEP: Patient will demonstrate 50% independence with HEP   Pain: Pt will demonstrate improved pain less than or equal to 5/10 at worse  Function: Patient will increase functional score to equal or greater than to 55 out of 100 on FOTO.  Mobility: Patient will improve left hamstring length  Strength: Patient will improve impaired strength to greater or equal to 4/5.      Long Term Goals:  8 weeks  HEP: Pt will be independent with advanced HEP.  Pain: Pt will demonstrate improved pain  less than or equal to 1/10 at worse in order to progress toward maximal functional ability and improve QOL.  Function: Patient will  increase functional score to equal or greater than to 58 out of 100 on FOTO to improve functional independence and quality of life.   Mobility: Patient will demonstrate no pain with functional trunk movement to return to prior level of function.   Strength: Patient will improve impaired strength to greater or equal to 4+/5 in order to perform work duties without limiation  Gait: Patient will demonstrate normalized gait mechanics with minimal compensation in order to return to PLOF.    Plan     Plan of care Certification: 10/30/2023 to 12/29/2023.     Outpatient Physical Therapy 2 times weekly for 8 weeks to include the following interventions: Cervical/Lumbar Traction, Electrical Stimulation , Manual Therapy, Moist Heat/ Ice, Neuromuscular Re-ed, Patient Education, Self Care, Therapeutic Activities, Therapeutic Exercise, and FDN.        Kelsie Moser, PT

## 2023-12-08 ENCOUNTER — LAB VISIT (OUTPATIENT)
Dept: LAB | Facility: HOSPITAL | Age: 58
End: 2023-12-08
Attending: NURSE PRACTITIONER
Payer: COMMERCIAL

## 2023-12-08 ENCOUNTER — CLINICAL SUPPORT (OUTPATIENT)
Dept: REHABILITATION | Facility: HOSPITAL | Age: 58
End: 2023-12-08
Payer: COMMERCIAL

## 2023-12-08 DIAGNOSIS — E11.40 TYPE 2 DIABETES MELLITUS WITH DIABETIC NEUROPATHY, WITH LONG-TERM CURRENT USE OF INSULIN: ICD-10-CM

## 2023-12-08 DIAGNOSIS — M79.605 LEFT LEG PAIN: ICD-10-CM

## 2023-12-08 DIAGNOSIS — R26.9 ABNORMAL GAIT: ICD-10-CM

## 2023-12-08 DIAGNOSIS — Z79.4 TYPE 2 DIABETES MELLITUS WITH DIABETIC NEUROPATHY, WITH LONG-TERM CURRENT USE OF INSULIN: ICD-10-CM

## 2023-12-08 DIAGNOSIS — M54.50 LUMBAR PAIN: Primary | ICD-10-CM

## 2023-12-08 LAB
ESTIMATED AVG GLUCOSE: 200 MG/DL (ref 68–131)
HBA1C MFR BLD: 8.6 % (ref 4–5.6)

## 2023-12-08 PROCEDURE — 97110 THERAPEUTIC EXERCISES: CPT | Mod: PN

## 2023-12-08 PROCEDURE — 83036 HEMOGLOBIN GLYCOSYLATED A1C: CPT | Performed by: NURSE PRACTITIONER

## 2023-12-08 PROCEDURE — 97112 NEUROMUSCULAR REEDUCATION: CPT | Mod: PN

## 2023-12-08 PROCEDURE — 36415 COLL VENOUS BLD VENIPUNCTURE: CPT | Performed by: NURSE PRACTITIONER

## 2023-12-08 NOTE — PROGRESS NOTES
"[facility]   Physical Therapy Treatment Note      Name: Brett Garcia  Clinic Number: [id number]    Therapy Diagnosis:   Encounter Diagnoses   Name Primary?    Lumbar pain Yes    Left leg pain     Abnormal gait        Physician: Wilfred Serrano MD    Visit Date: 12/8/2023       Physician Orders: PT Eval and Treat   Medical Diagnosis from Referral: M54.16 (ICD-10-CM) - Lumbar radiculopathy   Evaluation Date: 10/30/2023  Authorization Period Expiration: 10/17/2024  Plan of Care Expiration: 12/29/2023  Visit # / Visits authorized: 8/ 12 +eval   FOTO: 2/3      Precautions: Standard hairline fracture to L5     PTA Visit #: -/5     Time In: 7:30 pm   Time Out: 8:30 pm  Total Billable Time: 60 minutes (15 minutes 1:1 with trained technician)     Subjective     Pt reports: no real pain in either affected region today. Pain may be not apparent because of medicine take before treatment. No reports of pain since previous treat.  He was partially compliant with home exercise program.  Response to previous treatment: minimal soreness   Functional change: n/a today     Pain: 0/10  Location: L low back     Objective      Objective Measures updated at progress report unless specified.     FOTO Lumbar Survey    Therapist reviewed FOTO scores for Brett Garcia on 12/8/2023.   FOTO documents entered into [employer] - see Media section.    Score: 62        Treatment     Brett received the following treatments:      Brett received therapeutic exercises to develop strength, endurance, ROM, and flexibility for (18) minutes including:     Upright bike 6 minutes   DKTC with SB x 20   LTR with SB x 20   L hamstring str 3x30"     Brett received the following manual therapy techniques applied for (00) minutes, including:      Brett participated in neuromuscular re-education activities to improve: Balance, Coordination, Kinesthetic Sense, Proprioception, and Posture for (42) minutes. The following activities " "were included:  PPT 3 x 10 5" holds   Bridges with PPT  3 x 10  Clamshells RTB 3 x 10 5" holds   Cat/cow x 10   Quadruped Bird Dog 2 x 10 B   Chops at pulleys 20#  2 x 10 B      Scissor kicks 2 x 1'  6 inch leg holds 2 x 1'  Sit to stands with RTB hip resistance 20# KB       Brett participated in dynamic functional therapeutic activities to improve functional performance for 00  minutes, including:       Patient Education and Home Exercises       Education provided:   - explanation and justification of treatments performed  - importance of compliance with HEP     Written Home Exercises Provided: Patient instructed to cont prior HEP. Exercises were reviewed and Brett was able to demonstrate them prior to the end of the session.  Brett demonstrated good  understanding of the education provided. See EMR under Patient Instructions for exercises provided during therapy sessions    Assessment   Pt with good tolerance with progression of exercise, as symptoms are low presenting to session today. Patient shown increase tolerance in core stability and strengthening exercises. Less verbal cueing needed for core stabilization exercises. No increase of pain at the end of today's session. Continue to progress as tolerated.     Brett Is progressing well towards his goals.   Pt prognosis is Good.     Pt will continue to benefit from skilled outpatient physical therapy to address the deficits listed in the problem list box on initial evaluation, provide pt/family education and to maximize pt's level of independence in the home and community environment.     Pt's spiritual, cultural and educational needs considered and pt agreeable to plan of care and goals.     Anticipated barriers to physical therapy: co-morbidities, sedentary lifestyle, and chronicity of condition    Goals:   Short Term Goals:  4 weeks  HEP: Patient will demonstrate 50% independence with HEP   Pain: Pt will demonstrate improved pain less than or equal to " 5/10 at worse  Function: Patient will increase functional score to equal or greater than to 55 out of 100 on FOTO. MET 12/8/2023  Mobility: Patient will improve left hamstring length  Strength: Patient will improve impaired strength to greater or equal to 4/5.      Long Term Goals:  8 weeks  HEP: Pt will be independent with advanced HEP.  Pain: Pt will demonstrate improved pain less than or equal to 1/10 at worse in order to progress toward maximal functional ability and improve QOL.  Function: Patient will  increase functional score to equal or greater than to 58 out of 100 on FOTO to improve functional independence and quality of life.   Mobility: Patient will demonstrate no pain with functional trunk movement to return to prior level of function.   Strength: Patient will improve impaired strength to greater or equal to 4+/5 in order to perform work duties without limiation  Gait: Patient will demonstrate normalized gait mechanics with minimal compensation in order to return to PLOF.    Plan     Plan of care Certification: 10/30/2023 to 12/29/2023.     Outpatient Physical Therapy 2 times weekly for 8 weeks to include the following interventions: Cervical/Lumbar Traction, Electrical Stimulation , Manual Therapy, Moist Heat/ Ice, Neuromuscular Re-ed, Patient Education, Self Care, Therapeutic Activities, Therapeutic Exercise, and FDN.        Kelsie Moser, PT

## 2023-12-12 ENCOUNTER — PATIENT MESSAGE (OUTPATIENT)
Dept: GASTROENTEROLOGY | Facility: CLINIC | Age: 58
End: 2023-12-12
Payer: COMMERCIAL

## 2023-12-12 ENCOUNTER — CLINICAL SUPPORT (OUTPATIENT)
Dept: REHABILITATION | Facility: HOSPITAL | Age: 58
End: 2023-12-12
Payer: COMMERCIAL

## 2023-12-12 DIAGNOSIS — R26.9 ABNORMAL GAIT: ICD-10-CM

## 2023-12-12 DIAGNOSIS — M54.50 LUMBAR PAIN: Primary | ICD-10-CM

## 2023-12-12 DIAGNOSIS — M79.605 LEFT LEG PAIN: ICD-10-CM

## 2023-12-12 PROCEDURE — 97110 THERAPEUTIC EXERCISES: CPT | Mod: PN

## 2023-12-12 PROCEDURE — 97112 NEUROMUSCULAR REEDUCATION: CPT | Mod: PN

## 2023-12-12 NOTE — PROGRESS NOTES
"[facility]   Physical Therapy Treatment Note      Name: Brett Garcia  Clinic Number: [id number]    Therapy Diagnosis:   Encounter Diagnoses   Name Primary?    Lumbar pain Yes    Left leg pain     Abnormal gait      Physician: Wilfred Serrano MD    Visit Date: 12/12/2023       Physician Orders: PT Eval and Treat   Medical Diagnosis from Referral: M54.16 (ICD-10-CM) - Lumbar radiculopathy   Evaluation Date: 10/30/2023  Authorization Period Expiration: 10/17/2024  Plan of Care Expiration: 12/29/2023  Visit # / Visits authorized: 9/ 12 +eval   FOTO: 2/3      Precautions: Standard hairline fracture to L5     PTA Visit #: -/5     Time In: 4:45 pm   Time Out: 5:40 pm  Total Billable Time: 55 minutes     Subjective     Pt reports: pain to left knee with certain movements. Denies any pain at thus time.     He was partially compliant with home exercise program.  Response to previous treatment: minimal soreness   Functional change: n/a today     Pain: 0/10  Location: L low back     Objective      Objective Measures updated at progress report unless specified.     FOTO Lumbar Survey    Therapist reviewed FOTO scores for Brett Garcia on 12/12/2023.   FOTO documents entered into [employer] - see Media section.    Score: 62        Treatment     Brett received the following treatments:      Brett received therapeutic exercises to develop strength, endurance, ROM, and flexibility for (8) minutes including:   Upright bike 6 minutes   DKTC with SB x 20 - NOT TODAY   LTR with SB x 20 - NOT TODAY   L hamstring str 3x30"     Brett received the following manual therapy techniques applied for (00) minutes, including:   None performed     Brett participated in neuromuscular re-education activities to improve: Balance, Coordination, Kinesthetic Sense, Proprioception, and Posture for (47) minutes. The following activities were included:  PPT 3 x 10 5" holds   Single leg Bridges with PPT  2 x 10 ea   Side " plank Clamshells RTB 1 x 10   Cat/cow x 10   Quadruped Bird Dog 2 x 10 B - NOT TODAY   Chops at pulleys 20#  2 x 10 B - NOT TODAY      Scissor kicks 2 x 1'  6 inch leg holds x 1'  Single leg stance with core assist - YTB - 3x30s L   Sit to stands with RTB hip resistance 20# KB   Single leg sit to stand - x10 L    Brett participated in dynamic functional therapeutic activities to improve functional performance for 00  minutes, including:       Patient Education and Home Exercises       Education provided:   - explanation and justification of treatments performed  - importance of compliance with HEP     Written Home Exercises Provided: Patient instructed to cont prior HEP. Exercises were reviewed and Brett was able to demonstrate them prior to the end of the session.  Brett demonstrated good  understanding of the education provided. See EMR under Patient Instructions for exercises provided during therapy sessions    Assessment     Pt presented to clinic with no pain present. Pt with good tolerance to progression of exercises. Incorporated left single leg strengthening and balance to improve limping gait pattern. Continue to progress as tolerated.     Brett Is progressing well towards his goals.   Pt prognosis is Good.     Pt will continue to benefit from skilled outpatient physical therapy to address the deficits listed in the problem list box on initial evaluation, provide pt/family education and to maximize pt's level of independence in the home and community environment.     Pt's spiritual, cultural and educational needs considered and pt agreeable to plan of care and goals.     Anticipated barriers to physical therapy: co-morbidities, sedentary lifestyle, and chronicity of condition    Goals:   Short Term Goals:  4 weeks  HEP: Patient will demonstrate 50% independence with HEP   Pain: Pt will demonstrate improved pain less than or equal to 5/10 at worse  Function: Patient will increase functional score to  equal or greater than to 55 out of 100 on FOTO. MET 12/8/2023  Mobility: Patient will improve left hamstring length  Strength: Patient will improve impaired strength to greater or equal to 4/5.      Long Term Goals:  8 weeks  HEP: Pt will be independent with advanced HEP.  Pain: Pt will demonstrate improved pain less than or equal to 1/10 at worse in order to progress toward maximal functional ability and improve QOL.  Function: Patient will  increase functional score to equal or greater than to 58 out of 100 on FOTO to improve functional independence and quality of life.   Mobility: Patient will demonstrate no pain with functional trunk movement to return to prior level of function.   Strength: Patient will improve impaired strength to greater or equal to 4+/5 in order to perform work duties without limiation  Gait: Patient will demonstrate normalized gait mechanics with minimal compensation in order to return to PLOF.    Plan     Plan of care Certification: 10/30/2023 to 12/29/2023.     Outpatient Physical Therapy 2 times weekly for 8 weeks to include the following interventions: Cervical/Lumbar Traction, Electrical Stimulation , Manual Therapy, Moist Heat/ Ice, Neuromuscular Re-ed, Patient Education, Self Care, Therapeutic Activities, Therapeutic Exercise, and FDN.        Omayra Holliday, PT

## 2023-12-13 ENCOUNTER — TELEPHONE (OUTPATIENT)
Dept: GASTROENTEROLOGY | Facility: CLINIC | Age: 58
End: 2023-12-13
Payer: COMMERCIAL

## 2023-12-13 NOTE — TELEPHONE ENCOUNTER
----- Message from Kristina Wilson MD sent at 12/11/2023  9:31 AM CST -----  Polyp not retrieved.  Repeat colonoscopy in 5 years.    
Called pt.  Received no answer.  Left voicemail to return our call or to look on patient portal for more information.  This will be 2nd attempt to reach pt.   
Statement Selected

## 2023-12-14 ENCOUNTER — OFFICE VISIT (OUTPATIENT)
Dept: INTERNAL MEDICINE | Facility: CLINIC | Age: 58
End: 2023-12-14
Payer: COMMERCIAL

## 2023-12-14 VITALS
WEIGHT: 230.5 LBS | BODY MASS INDEX: 34.04 KG/M2 | SYSTOLIC BLOOD PRESSURE: 138 MMHG | DIASTOLIC BLOOD PRESSURE: 68 MMHG | HEART RATE: 100 BPM | RESPIRATION RATE: 18 BRPM | OXYGEN SATURATION: 97 % | TEMPERATURE: 98 F

## 2023-12-14 DIAGNOSIS — E66.09 CLASS 1 OBESITY DUE TO EXCESS CALORIES WITH SERIOUS COMORBIDITY AND BODY MASS INDEX (BMI) OF 34.0 TO 34.9 IN ADULT: ICD-10-CM

## 2023-12-14 DIAGNOSIS — E11.59 HYPERTENSION ASSOCIATED WITH DIABETES: ICD-10-CM

## 2023-12-14 DIAGNOSIS — Z23 IMMUNIZATION DUE: Primary | ICD-10-CM

## 2023-12-14 DIAGNOSIS — Z79.4 TYPE 2 DIABETES MELLITUS WITH DIABETIC NEUROPATHY, WITH LONG-TERM CURRENT USE OF INSULIN: ICD-10-CM

## 2023-12-14 DIAGNOSIS — I15.2 HYPERTENSION ASSOCIATED WITH DIABETES: ICD-10-CM

## 2023-12-14 DIAGNOSIS — E11.40 TYPE 2 DIABETES MELLITUS WITH DIABETIC NEUROPATHY, WITH LONG-TERM CURRENT USE OF INSULIN: ICD-10-CM

## 2023-12-14 PROCEDURE — 90677 PNEUMOCOCCAL CONJUGATE VACCINE 20-VALENT: ICD-10-PCS | Mod: S$GLB,,, | Performed by: FAMILY MEDICINE

## 2023-12-14 PROCEDURE — 4010F ACE/ARB THERAPY RXD/TAKEN: CPT | Mod: CPTII,S$GLB,, | Performed by: FAMILY MEDICINE

## 2023-12-14 PROCEDURE — 3078F PR MOST RECENT DIASTOLIC BLOOD PRESSURE < 80 MM HG: ICD-10-PCS | Mod: CPTII,S$GLB,, | Performed by: FAMILY MEDICINE

## 2023-12-14 PROCEDURE — 99999 PR PBB SHADOW E&M-EST. PATIENT-LVL V: ICD-10-PCS | Mod: PBBFAC,,, | Performed by: FAMILY MEDICINE

## 2023-12-14 PROCEDURE — 3066F PR DOCUMENTATION OF TREATMENT FOR NEPHROPATHY: ICD-10-PCS | Mod: CPTII,S$GLB,, | Performed by: FAMILY MEDICINE

## 2023-12-14 PROCEDURE — 3078F DIAST BP <80 MM HG: CPT | Mod: CPTII,S$GLB,, | Performed by: FAMILY MEDICINE

## 2023-12-14 PROCEDURE — 3052F PR MOST RECENT HEMOGLOBIN A1C LEVEL 8.0 - < 9.0%: ICD-10-PCS | Mod: CPTII,S$GLB,, | Performed by: FAMILY MEDICINE

## 2023-12-14 PROCEDURE — 99214 OFFICE O/P EST MOD 30 MIN: CPT | Mod: 25,S$GLB,, | Performed by: FAMILY MEDICINE

## 2023-12-14 PROCEDURE — 1159F PR MEDICATION LIST DOCUMENTED IN MEDICAL RECORD: ICD-10-PCS | Mod: CPTII,S$GLB,, | Performed by: FAMILY MEDICINE

## 2023-12-14 PROCEDURE — 3061F NEG MICROALBUMINURIA REV: CPT | Mod: CPTII,S$GLB,, | Performed by: FAMILY MEDICINE

## 2023-12-14 PROCEDURE — 99214 PR OFFICE/OUTPT VISIT, EST, LEVL IV, 30-39 MIN: ICD-10-PCS | Mod: 25,S$GLB,, | Performed by: FAMILY MEDICINE

## 2023-12-14 PROCEDURE — 3066F NEPHROPATHY DOC TX: CPT | Mod: CPTII,S$GLB,, | Performed by: FAMILY MEDICINE

## 2023-12-14 PROCEDURE — 3052F HG A1C>EQUAL 8.0%<EQUAL 9.0%: CPT | Mod: CPTII,S$GLB,, | Performed by: FAMILY MEDICINE

## 2023-12-14 PROCEDURE — 3008F BODY MASS INDEX DOCD: CPT | Mod: CPTII,S$GLB,, | Performed by: FAMILY MEDICINE

## 2023-12-14 PROCEDURE — 90471 PNEUMOCOCCAL CONJUGATE VACCINE 20-VALENT: ICD-10-PCS | Mod: S$GLB,,, | Performed by: FAMILY MEDICINE

## 2023-12-14 PROCEDURE — 99999 PR PBB SHADOW E&M-EST. PATIENT-LVL V: CPT | Mod: PBBFAC,,, | Performed by: FAMILY MEDICINE

## 2023-12-14 PROCEDURE — 90471 IMMUNIZATION ADMIN: CPT | Mod: S$GLB,,, | Performed by: FAMILY MEDICINE

## 2023-12-14 PROCEDURE — 3008F PR BODY MASS INDEX (BMI) DOCUMENTED: ICD-10-PCS | Mod: CPTII,S$GLB,, | Performed by: FAMILY MEDICINE

## 2023-12-14 PROCEDURE — 90677 PCV20 VACCINE IM: CPT | Mod: S$GLB,,, | Performed by: FAMILY MEDICINE

## 2023-12-14 PROCEDURE — 4010F PR ACE/ARB THEARPY RXD/TAKEN: ICD-10-PCS | Mod: CPTII,S$GLB,, | Performed by: FAMILY MEDICINE

## 2023-12-14 PROCEDURE — 1159F MED LIST DOCD IN RCRD: CPT | Mod: CPTII,S$GLB,, | Performed by: FAMILY MEDICINE

## 2023-12-14 PROCEDURE — 3075F SYST BP GE 130 - 139MM HG: CPT | Mod: CPTII,S$GLB,, | Performed by: FAMILY MEDICINE

## 2023-12-14 PROCEDURE — 3075F PR MOST RECENT SYSTOLIC BLOOD PRESS GE 130-139MM HG: ICD-10-PCS | Mod: CPTII,S$GLB,, | Performed by: FAMILY MEDICINE

## 2023-12-14 PROCEDURE — 3061F PR NEG MICROALBUMINURIA RESULT DOCUMENTED/REVIEW: ICD-10-PCS | Mod: CPTII,S$GLB,, | Performed by: FAMILY MEDICINE

## 2023-12-14 RX ORDER — DEXTROMETHORPHAN HYDROBROMIDE, GUAIFENESIN 5; 100 MG/5ML; MG/5ML
650 LIQUID ORAL DAILY
COMMUNITY
End: 2024-01-19

## 2023-12-14 NOTE — PROGRESS NOTES
Subjective:       Patient ID: Brett Garcia is a 57 y.o. male.    Chief Complaint: Follow-up (3M F/U - MICHEL signed by patient to obtain diabetic eye exam from Dr. Sohan Lindo at the [facility] [place]. )    Follow-up  Pertinent negatives include no chest pain, chills or fever.       Patient Active Problem List   Diagnosis    Hyperlipidemia    BMI 33.0-33.9,adult    Hypertension associated with diabetes    Skin lesions    Diabetic polyneuropathy associated with type 2 diabetes mellitus    Stress reaction    Hyperlipidemia associated with type 2 diabetes mellitus    Type 2 diabetes mellitus with diabetic neuropathy, with long-term current use of insulin    Elevated diaphragm    Decreased range of motion (ROM) of left knee    Weakness of trunk musculature    Lumbar pain    Left leg pain    Abnormal gait       Past Medical History:   Diagnosis Date    Diabetes mellitus with neuropathy 2009    BS 57 am 12/18/2015    Hyperlipidemia 2009    Hypertension associated with diabetes 4/21/2017       Past Surgical History:   Procedure Laterality Date    COLONOSCOPY N/A 11/01/2018    Procedure: COLONOSCOPY;  Surgeon: Kristina Wilson MD;  Location: [facility];  Service: Endoscopy;  Laterality: N/A;    COLONOSCOPY N/A 12/01/2023    Procedure: COLONOSCOPY;  Surgeon: Kristina Wilson MD;  Location: [facility];  Service: Endoscopy;  Laterality: N/A;    COLONOSCOPY W/ POLYPECTOMY  11/01/2018    Polyp x1, repeat 5 years; Dr. Wilson    SELECTIVE INJECTION OF ANESTHETIC AGENT AROUND LUMBAR SPINAL NERVE ROOT BY TRANSFORAMINAL APPROACH Bilateral 11/02/2023    Procedure: Bilateral L5/S1 TF BRE;  Surgeon: Wilfred Serrano MD;  [facility];  Service: Pain Management;  Laterality: Bilateral;       Family History   Problem Relation Age of Onset    Diabetes Father     Hypertension Father     Heart attack Father     Cataracts Father     Hearing loss Father     Heart disease Father     Vision loss Father     Hypertension  Mother     Hypertension Sister        Social History     Tobacco Use   Smoking Status Never    Passive exposure: Never   Smokeless Tobacco Former    Types: Snuff    Quit date: 2008       Wt Readings from Last 5 Encounters:   12/14/23 104.5 kg (230 lb 7.9 oz)   12/01/23 98.4 kg (217 lb)   11/02/23 103 kg (227 lb 1.2 oz)   09/28/23 104.6 kg (230 lb 9.6 oz)   08/25/23 104.3 kg (230 lb)       For further HPI details, see assessment and plan.    Review of Systems   Constitutional:  Negative for chills and fever.   Respiratory:  Negative for shortness of breath.    Cardiovascular:  Negative for chest pain.   Neurological:  Negative for dizziness and light-headedness.       Objective:      Vitals:    12/14/23 1450   BP: 138/68   Pulse: 100   Resp: 18   Temp: 98 °F (36.7 °C)       Physical Exam  Constitutional:       General: He is not in acute distress.     Appearance: He is not ill-appearing.   Pulmonary:      Effort: Pulmonary effort is normal. No respiratory distress.   Neurological:      General: No focal deficit present.      Mental Status: He is alert.   Psychiatric:         Mood and Affect: Mood normal.         Behavior: Behavior normal.         Assessment:       1. Immunization due    2. Class 1 obesity due to excess calories with serious comorbidity and body mass index (BMI) of 34.0 to 34.9 in adult    3. Hypertension associated with diabetes    4. Type 2 diabetes mellitus with diabetic neuropathy, with long-term current use of insulin        Plan:   Immunization due  -     (In Office Administered) Pneumococcal Conjugate Vaccine (20 Valent) (IM) (Preferred)    Class 1 obesity due to excess calories with serious comorbidity and body mass index (BMI) of 34.0 to 34.9 in adult    Hypertension associated with diabetes    Type 2 diabetes mellitus with diabetic neuropathy, with long-term current use of insulin    Other orders  -     tirzepatide 12.5 mg/0.5 mL PnIj; Inject 12.5 mg into the skin every 7 days.  Dispense: 4  Pen; Refill: 0  -     tirzepatide 15 mg/0.5 mL PnIj; Inject 15 mg into the skin every 7 days.  Dispense: 4 Pen; Refill: 6      Patient presents today for follow-up     Hypertension  His blood pressure is well controlled   The only medication on his drug list at impact his pressures a low dose of lisinopril.  5 mg.  Continue medication.  Monitor for any swelling of his lips his mouth in his tongue and if any develops he is to discontinue lisinopril and seek medical attention.    Type 2 diabetes & HLD  Working with the diabetes management team   Lab Results   Component Value Date    HGBA1C 8.6 (H) 12/08/2023    HGBA1C 8.2 (H) 08/25/2023    HGBA1C 8.3 (H) 05/05/2023     Lab Results   Component Value Date    LDLCALC 39.2 (L) 08/25/2023    CREATININE 0.7 08/25/2023     He is on a statin (LFT at goal).  He has on an ACE inhibitor.    His hemoglobin A1c has been exceedingly difficult to control and persistently uncontrolled.      Current medications that impact his blood glucose are as follows   Prandin 2 mg take 2 tablets 3 times a day   Tresiba insulin 50 units every evening   Trulicity 4.5 mg every 7 days   Metformin 1000 mg twice daily    Continue working with the diabetes management team   Today.  Given the greater weight loss potential an A1c reducing capabilities of Mounjaro we will transition him from Trulicity to Mounjaro.  Given he is on the highest dose of Trulicity we will start him at the higher end of the spectrum in terms of Mounjaro dosing.  Starting him at 12.5 mg dose.  If he does well on tolerates that we will increase it further to its maximum dose of 15 mg.    Body mass index 34.04   Placing him in obesity category   Patient does have a substantial amount of muscle mass so the body mass index scale is somewhat warped but he does still have significant amounts of adipose tissue he could use to lose.  Mounjaro could be beneficial.  Continue his active lifestyle.  Continue to monitor.  He does have a  history of fatty liver disease and so glucose control and weight loss is quite prudent.    I will send in a month supply of the 12.5 mg and refills of the higher dose if he is able to titrate to the 15 mg    4-5 months  PNA 20     This note was verbally dictated, please excuse any type errors.

## 2023-12-18 ENCOUNTER — TELEPHONE (OUTPATIENT)
Dept: PAIN MEDICINE | Facility: CLINIC | Age: 58
End: 2023-12-18
Payer: COMMERCIAL

## 2023-12-19 ENCOUNTER — OFFICE VISIT (OUTPATIENT)
Dept: PAIN MEDICINE | Facility: CLINIC | Age: 58
End: 2023-12-19
Payer: COMMERCIAL

## 2023-12-19 DIAGNOSIS — M43.06 PARS DEFECT OF LUMBAR SPINE: ICD-10-CM

## 2023-12-19 DIAGNOSIS — M54.16 LUMBAR RADICULOPATHY: ICD-10-CM

## 2023-12-19 DIAGNOSIS — M51.36 DDD (DEGENERATIVE DISC DISEASE), LUMBAR: ICD-10-CM

## 2023-12-19 DIAGNOSIS — M43.16 SPONDYLOLISTHESIS OF LUMBAR REGION: ICD-10-CM

## 2023-12-19 DIAGNOSIS — M47.816 LUMBAR SPONDYLOSIS: ICD-10-CM

## 2023-12-19 PROCEDURE — 3052F PR MOST RECENT HEMOGLOBIN A1C LEVEL 8.0 - < 9.0%: ICD-10-PCS | Mod: CPTII,95,, | Performed by: ANESTHESIOLOGY

## 2023-12-19 PROCEDURE — 99213 PR OFFICE/OUTPT VISIT, EST, LEVL III, 20-29 MIN: ICD-10-PCS | Mod: 95,,, | Performed by: ANESTHESIOLOGY

## 2023-12-19 PROCEDURE — 3061F NEG MICROALBUMINURIA REV: CPT | Mod: CPTII,95,, | Performed by: ANESTHESIOLOGY

## 2023-12-19 PROCEDURE — 4010F PR ACE/ARB THEARPY RXD/TAKEN: ICD-10-PCS | Mod: CPTII,95,, | Performed by: ANESTHESIOLOGY

## 2023-12-19 PROCEDURE — 3061F PR NEG MICROALBUMINURIA RESULT DOCUMENTED/REVIEW: ICD-10-PCS | Mod: CPTII,95,, | Performed by: ANESTHESIOLOGY

## 2023-12-19 PROCEDURE — 3066F PR DOCUMENTATION OF TREATMENT FOR NEPHROPATHY: ICD-10-PCS | Mod: CPTII,95,, | Performed by: ANESTHESIOLOGY

## 2023-12-19 PROCEDURE — 3066F NEPHROPATHY DOC TX: CPT | Mod: CPTII,95,, | Performed by: ANESTHESIOLOGY

## 2023-12-19 PROCEDURE — 4010F ACE/ARB THERAPY RXD/TAKEN: CPT | Mod: CPTII,95,, | Performed by: ANESTHESIOLOGY

## 2023-12-19 PROCEDURE — 99213 OFFICE O/P EST LOW 20 MIN: CPT | Mod: 95,,, | Performed by: ANESTHESIOLOGY

## 2023-12-19 PROCEDURE — 3052F HG A1C>EQUAL 8.0%<EQUAL 9.0%: CPT | Mod: CPTII,95,, | Performed by: ANESTHESIOLOGY

## 2023-12-19 RX ORDER — PREGABALIN 50 MG/1
50 CAPSULE ORAL NIGHTLY
Qty: 30 CAPSULE | Refills: 2 | Status: SHIPPED | OUTPATIENT
Start: 2023-12-19 | End: 2024-02-18 | Stop reason: SDUPTHER

## 2023-12-19 NOTE — PROGRESS NOTES
Interventional Pain Progress Note     The patient location is: home  The chief complaint leading to consultation is: chronic pain      Visit type: audiovisual     Face to Face time with patient: 10-15 minutes  20 minutes of total time spent on the encounter, which includes face to face time and non-face to face time preparing to see the patient (eg, review of tests), Obtaining and/or reviewing separately obtained history, Documenting clinical information in the electronic or other health record, Independently interpreting results (not separately reported) and communicating results to the patient/family/caregiver, or Care coordination (not separately reported).      Each patient to whom he or she provides medical services by telemedicine is:  (1) informed of the relationship between the physician and patient and the respective role of any other health care provider with respect to management of the patient; and (2) notified that he or she may decline to receive medical services by telemedicine and may withdraw from such care at any time.      Referring Physician: No ref. provider found    PCP: Herb Lord MD    Chief Complaint:  Lower back pain       SUBJECTIVE:    Interval History (12/19/2023):  Patient returns to clinic after procedure.  Patient reports 90% relief in lower back and lower extremity pain after bilateral L5-S1 transforaminal epidural steroid injection on 12/01/2023.  Patient reports that lower back pain is much improved.  Primary pain today is left knee pain secondary to swelling over the posterior aspect of left knee.  Pain is worse with prolonged walking and standing, better with elevation of the leg.  Pain is currently rated a 1/10. Denies any fevers, chills, changes in gait, saddle anesthesia, or bowel and bladder incontinence        Interval History (10/18/23):  Patient returns to clinic to review MRI.  Since last being seen, patient reports that lower back pain has improved.  Patient  reports that pain is typically mild with rest, however when he begins to be active at work pain increases.  Lower back pain described as a throbbing aching pain in the band across the lower back, left-greater-than-right.  Patient reports in his pain will radiate down his left lower extremity on the posterior aspect to his mid calf in a burning pain.  Patient denies any significant radiation down his right lower extremity.  Pain is worse with prolonged standing and walking, better with heat and anti-inflammatories.  Pain is currently rated a 4/10, but can increase to a 7/10 with exacerbating activities. Denies any fevers, chills, changes in gait, saddle anesthesia, or bowel and bladder incontinence        Initial HPI:     Brett Garcia is a 57 y.o. male who presents to the clinic for the evaluation of lower back pain.   Patient reports 4 month history of lower back pain.  He denies any inciting traumas to his low back pain.  He denies any previous surgery in his lumbar spine.  Patient reports that pain initially started as a stabbing aching pain over his left foot that rated up to his left knee and localized over his left knee.  Patient reports that this is progressively surgery radiated up to his left lower back and left buttocks as well.  Pain is worse with flexion and prolonged standing, better with heat and rest.  Pain is currently rated 2/10, but can increase to a 7/10 with exacerbating activity. Denies any fevers, chills, changes in gait, saddle anesthesia, or bowel and bladder incontinence  Of note, patient has recently seen viscosupplementation injection for left knee with moderate relief.  Patient also reports history of numbness in his bilateral feet secondary to diabetic neuropathy.      Non-Pharmacologic Treatments:  Physical Therapy/Home Exercise: yes  Ice/Heat:yes  TENS: no  Acupuncture: no  Massage: no  Chiropractic: yes        Previous Pain Medications:  NSAIDs, Tylenol, muscle relaxers,  neuropathic     report:  Reviewed and consistent with medication use as prescribed.    Pain Procedures:   -11/02/2023: Bilateral L5-S1 transforaminal epidural steroid injection, 90% relief    Pain Disability Index Review:         8/4/2023    11:40 AM   Last 3 PDI Scores   Pain Disability Index (PDI) 7       Imaging:   Results for orders placed during the hospital encounter of 08/11/23    MRI Lumbar Spine Without Contrast    Narrative  EXAMINATION:  MRI LUMBAR SPINE WITHOUT CONTRAST    CLINICAL HISTORY:  Low back pain, symptoms persist with > 6wks conservative treatment; Radiculopathy, lumbar region    TECHNIQUE:  Multiplanar, multisequence MR images were acquired from the thoracolumbar junction to the sacrum without contrast.    COMPARISON:  Lumbar radiograph 05/12/2023    FINDINGS:  Alignment: Grade 2 L5-S1 anterolisthesis.    Vertebrae: Lumbar vertebral body heights are maintained.  Prominent L5-S1 endplate degenerative changes with only minor endplate edema.  No evidence of an acute fracture.  Marrow signal is otherwise within normal limits.    Discs: L5-S1 significant disc height loss and desiccation.  Remaining disc heights are maintained with multilevel disc desiccation.    Cord: Within normal limits.  Conus terminates at L1-L2.    Degenerative findings:    T12-L1: Tiny central disc extrusion.  No significant spinal canal stenosis or neural foraminal stenosis.    L1-L2: No significant posterior disc bulge.  Mild right-sided facet arthropathy.  No significant spinal canal stenosis or neural foraminal stenosis.    L2-L3: No significant disc bulge.  Mild facet arthropathy.  No significant spinal canal stenosis or neural foraminal stenosis.    L3-L4: Minimal broad-based posterior disc bulge and mild facet arthropathy.  Mild bilateral neural foraminal stenosis.  No significant spinal canal stenosis.    L4-L5: Mild broad-based disc bulge and bilateral facet arthropathy.  Minor narrowing of the lateral recesses  without significant spinal canal stenosis.  Moderate bilateral neural foraminal stenosis.    L5-S1: Grade 2 anterolisthesis with roughly 8 mm of posterior disc space uncovering.  Mild broad-based posterior disc bulge is slightly asymmetric to the right with narrowing of the right lateral recess.  Bilateral facet arthropathy contributes to severe bilateral neural foraminal stenosis.  No significant spinal canal stenosis.    Paraspinal muscles & soft tissues: Within normal limits.    Impression  1. L5-S1 grade 2 anterolisthesis contributes to severe bilateral neural foraminal stenosis and minor narrowing of the right lateral recess.  No significant lumbar spinal canal stenosis.  2. Moderate bilateral L4-L5 neural foraminal stenosis.      Electronically signed by: Wyatt Erickson  Date:    08/11/2023  Time:    09:41            Results for orders placed during the hospital encounter of 05/12/23    X-Ray Lumbar Spine 5 View    Narrative  EXAMINATION:  XR LUMBAR SPINE COMPLETE 5 VIEW    CLINICAL HISTORY:  Low back pain, unspecified    TECHNIQUE:  AP, lateral, spot and bilateral oblique views of the lumbar spine were performed.    COMPARISON:  None    FINDINGS:  Vertebral body heights maintained.  L5 pars defects with grade 2 anterolisthesis.  Severe degenerative disc height loss with vacuum phenomenon at L5-S1.  Lower lumbar spine facet arthropathy.  Soft tissues unremarkable.    Impression  L5 pars defect with grade 2 anterolisthesis      Electronically signed by: Thang Garcia MD  Date:    05/12/2023  Time:    11:58      Past Medical History:   Diagnosis Date    Diabetes mellitus with neuropathy 2009    BS 57 am 12/18/2015    Hyperlipidemia 2009    Hypertension associated with diabetes 4/21/2017     Past Surgical History:   Procedure Laterality Date    COLONOSCOPY N/A 11/01/2018    Procedure: COLONOSCOPY;  Surgeon: Kristina Wilson MD;  Location: [facility];  Service: Endoscopy;  Laterality: N/A;    COLONOSCOPY N/A  12/01/2023    Procedure: COLONOSCOPY;  Surgeon: Kristina Wilson MD;  Location: [facility] ENDO;  Service: Endoscopy;  Laterality: N/A;    COLONOSCOPY W/ POLYPECTOMY  11/01/2018    Polyp x1, repeat 5 years; Dr. Wilson    SELECTIVE INJECTION OF ANESTHETIC AGENT AROUND LUMBAR SPINAL NERVE ROOT BY TRANSFORAMINAL APPROACH Bilateral 11/02/2023    Procedure: Bilateral L5/S1 TF BRE;  Surgeon: Wilfred Serrano MD;  Location: [facility] PAIN MGT;  Service: Pain Management;  Laterality: Bilateral;     Social History     Socioeconomic History    Marital status:     Number of children: 2   Occupational History    Occupation: Plant work      Employer: [employer]     Comment: [employer]   Tobacco Use    Smoking status: Never     Passive exposure: Never    Smokeless tobacco: Former     Types: Snuff     Quit date: 2008   Substance and Sexual Activity    Alcohol use: Yes     Alcohol/week: 5.0 standard drinks of alcohol     Types: 1 Glasses of wine, 2 Cans of beer, 2 Drinks containing 0.5 oz of alcohol per week    Drug use: No    Sexual activity: Yes     Partners: Female     Birth control/protection: Condom, Post-menopausal     Family History   Problem Relation Age of Onset    Diabetes Father     Hypertension Father     Heart attack Father     Cataracts Father     Hearing loss Father     Heart disease Father     Vision loss Father     Hypertension Mother     Hypertension Sister        Review of patient's allergies indicates:   Allergen Reactions    Hay fever and allergy relief        Current Outpatient Medications   Medication Sig    acetaminophen (TYLENOL) 650 MG TbSR Take 650 mg by mouth once daily.    amitriptyline (ELAVIL) 25 MG tablet Take 2 tablets (50 mg total) by mouth every evening.    ascorbic acid, vitamin C, (VITAMIN C) 1000 MG tablet Take by mouth once daily.    aspirin (ECOTRIN) 81 MG EC tablet Take 81 mg by mouth once daily.    atorvastatin (LIPITOR) 40 MG tablet Take 1 tablet (40 mg total) by mouth once daily.    blood  "sugar diagnostic Strp 1 each by Misc.(Non-Drug; Combo Route) route 3 (three) times daily.    blood-glucose meter kit Use as instructed    fluticasone propionate (FLONASE) 50 mcg/actuation nasal spray 2 sprays (100 mcg total) by Each Nostril route once daily. Seasonal allergic rhinitis [J30.2] (Patient taking differently: 2 sprays by Each Nostril route daily as needed. Seasonal allergic rhinitis [J30.2])    insulin degludec (TRESIBA FLEXTOUCH U-200) 200 unit/mL (3 mL) insulin pen Inject 50 Units into the skin every evening.    lancets Misc 1 each by Misc.(Non-Drug; Combo Route) route 3 (three) times daily.    lisinopriL (PRINIVIL,ZESTRIL) 5 MG tablet Take 1 tablet (5 mg total) by mouth once daily.    metFORMIN (GLUCOPHAGE) 1000 MG tablet Take 1 tablet (1,000 mg total) by mouth 2 (two) times daily with meals.    pedi multivit no.12 w-fluoride (MULTIVITAMINS-FLUORIDE-FOLIC A ORAL) Take 1 tablet by mouth once daily.    pen needle, diabetic 32 gauge x 5/32" Ndle 1 each by Misc.(Non-Drug; Combo Route) route once daily.    pregabalin (LYRICA) 50 MG capsule Take 1 capsule (50 mg total) by mouth every evening.    psyllium husk (METAMUCIL ORAL) Take 17 g by mouth once daily.    repaglinide (PRANDIN) 2 MG tablet TAKE 2 TABLETS BY MOUTH 3 (THREE) TIMES DAILY BEFORE MEALS. (Patient not taking: Reported on 12/14/2023)    sodium chloride (OCEAN) 0.65 % nasal spray 1 spray by Nasal route every other day.    tirzepatide 12.5 mg/0.5 mL PnIj Inject 12.5 mg into the skin every 7 days.    tirzepatide 15 mg/0.5 mL PnIj Inject 15 mg into the skin every 7 days.    vitamin D (VITAMIN D3) 1000 units Tab Take 1,000 Units by mouth once daily.    zinc gluconate 50 mg tablet Take 50 mg by mouth once daily.     No current facility-administered medications for this visit.         ROS  Review of Systems   Constitutional:  Negative for activity change, appetite change and fever.   Respiratory:  Negative for cough, chest tightness, shortness of " breath, wheezing and stridor.    Cardiovascular:  Positive for leg swelling. Negative for chest pain and palpitations.   Gastrointestinal:  Negative for abdominal pain, blood in stool, constipation, diarrhea, nausea and vomiting.   Endocrine: Negative for polydipsia, polyphagia and polyuria.   Genitourinary:  Negative for dysuria, hematuria and urgency.   Musculoskeletal:  Positive for arthralgias, back pain, gait problem, joint swelling and myalgias. Negative for neck pain and neck stiffness.   Skin:  Negative for rash.   Allergic/Immunologic: Negative for food allergies.   Neurological:  Positive for weakness and numbness. Negative for dizziness, tremors, seizures, syncope, facial asymmetry, speech difficulty, light-headedness and headaches.   Psychiatric/Behavioral:  Negative for agitation, hallucinations, self-injury and suicidal ideas. The patient is not nervous/anxious and is not hyperactive.             OBJECTIVE:  There were no vitals taken for this visit.    Virtual visit, physical exam from previous clinic    Physical Exam  Constitutional:       General: He is not in acute distress.     Appearance: Normal appearance. He is not ill-appearing.   HENT:      Head: Normocephalic and atraumatic.      Nose: No congestion or rhinorrhea.      Mouth/Throat:      Mouth: Mucous membranes are moist.   Eyes:      Extraocular Movements: Extraocular movements intact.      Pupils: Pupils are equal, round, and reactive to light.   Cardiovascular:      Pulses: Normal pulses.   Pulmonary:      Effort: Pulmonary effort is normal.   Abdominal:      General: Abdomen is flat.      Palpations: Abdomen is soft.   Skin:     General: Skin is warm and dry.      Capillary Refill: Capillary refill takes less than 2 seconds.   Neurological:      General: No focal deficit present.      Mental Status: He is alert and oriented to person, place, and time.      Sensory: Sensory deficit present.      Motor: Weakness present. No abnormal muscle  tone.      Gait: Gait abnormal.      Deep Tendon Reflexes:      Reflex Scores:       Patellar reflexes are 2+ on the right side and 2+ on the left side.       Achilles reflexes are 2+ on the right side and 2+ on the left side.     Comments: Decreased light touch sensation over bilateral plantar aspect of the feet   4/5 strength in left dorsiflexion   Psychiatric:         Mood and Affect: Mood normal.         Behavior: Behavior normal.         Thought Content: Thought content normal.           Musculoskeletal:  Lumbar Exam  Incision: no  Pain with Flexion: yes, flexion greater than extension  Pain with Extension: yes  ROM:  Decreased  Paraspinous TTP:  Left-greater-than-right  Facet TTP:  L5-S1  Facet Loading:  Positive on left  SLR:  Positive on the left at 80°  SIJ TTP:  Positive on left  ELANA:  Positive on left      LABS:  Lab Results   Component Value Date    WBC 6.63 08/25/2023    HGB 13.5 (L) 08/25/2023    HCT 40.7 08/25/2023    MCV 93 08/25/2023     08/25/2023       CMP  Sodium   Date Value Ref Range Status   08/25/2023 140 136 - 145 mmol/L Final     Potassium   Date Value Ref Range Status   08/25/2023 4.0 3.5 - 5.1 mmol/L Final     Chloride   Date Value Ref Range Status   08/25/2023 104 95 - 110 mmol/L Final     CO2   Date Value Ref Range Status   08/25/2023 25 23 - 29 mmol/L Final     Glucose   Date Value Ref Range Status   08/25/2023 104 70 - 110 mg/dL Final     BUN   Date Value Ref Range Status   08/25/2023 17 6 - 20 mg/dL Final     Creatinine   Date Value Ref Range Status   08/25/2023 0.7 0.5 - 1.4 mg/dL Final     Calcium   Date Value Ref Range Status   08/25/2023 9.1 8.7 - 10.5 mg/dL Final     Total Protein   Date Value Ref Range Status   08/25/2023 6.9 6.0 - 8.4 g/dL Final     Albumin   Date Value Ref Range Status   08/25/2023 3.7 3.5 - 5.2 g/dL Final     Total Bilirubin   Date Value Ref Range Status   08/25/2023 0.5 0.1 - 1.0 mg/dL Final     Comment:     For infants and newborns, interpretation  of results should be based  on gestational age, weight and in agreement with clinical  observations.    Premature Infant recommended reference ranges:  Up to 24 hours.............<8.0 mg/dL  Up to 48 hours............<12.0 mg/dL  3-5 days..................<15.0 mg/dL  6-29 days.................<15.0 mg/dL       Alkaline Phosphatase   Date Value Ref Range Status   08/25/2023 72 55 - 135 U/L Final     AST   Date Value Ref Range Status   08/25/2023 56 (H) 10 - 40 U/L Final     ALT   Date Value Ref Range Status   08/25/2023 59 (H) 10 - 44 U/L Final     Anion Gap   Date Value Ref Range Status   08/25/2023 11 8 - 16 mmol/L Final     eGFR if    Date Value Ref Range Status   02/19/2022 >60.0 >60 mL/min/1.73 m^2 Final     eGFR if non    Date Value Ref Range Status   02/19/2022 >60.0 >60 mL/min/1.73 m^2 Final     Comment:     Calculation used to obtain the estimated glomerular filtration  rate (eGFR) is the CKD-EPI equation.          Lab Results   Component Value Date    HGBA1C 8.6 (H) 12/08/2023             ASSESSMENT:       57 y.o. year old male with lower back pain, consistent with     1. Lumbar radiculopathy  pregabalin (LYRICA) 50 MG capsule      2. Lumbar spondylosis  pregabalin (LYRICA) 50 MG capsule      3. DDD (degenerative disc disease), lumbar  pregabalin (LYRICA) 50 MG capsule      4. Spondylolisthesis of lumbar region  pregabalin (LYRICA) 50 MG capsule      5. Pars defect of lumbar spine  pregabalin (LYRICA) 50 MG capsule        Lumbar radiculopathy  -     pregabalin (LYRICA) 50 MG capsule; Take 1 capsule (50 mg total) by mouth every evening.  Dispense: 30 capsule; Refill: 2    Lumbar spondylosis  -     pregabalin (LYRICA) 50 MG capsule; Take 1 capsule (50 mg total) by mouth every evening.  Dispense: 30 capsule; Refill: 2    DDD (degenerative disc disease), lumbar  -     pregabalin (LYRICA) 50 MG capsule; Take 1 capsule (50 mg total) by mouth every evening.  Dispense: 30 capsule;  Refill: 2    Spondylolisthesis of lumbar region  -     pregabalin (LYRICA) 50 MG capsule; Take 1 capsule (50 mg total) by mouth every evening.  Dispense: 30 capsule; Refill: 2    Pars defect of lumbar spine  -     pregabalin (LYRICA) 50 MG capsule; Take 1 capsule (50 mg total) by mouth every evening.  Dispense: 30 capsule; Refill: 2             PLAN:   - Interventions:    None at this time  -11/02/2023: Bilateral L5-S1 transforaminal epidural steroid injection, 90% relief  - Anticoagulation use:   Yes aspirin    - Medications:   Refill Lyrica 50 mg at night   Continue Elavil 25 mg at night   Continue Voltaren 50mg PO BID PRN    - Therapy:   Patient has completed 8 weeks of formal physical therapy with significant improvement.  Continue home exercises  Continue wearing LSO brace for spondylolisthesis    - Imaging/Diagnostic:   MRI lumbar spine reviewed.  Significant for grade 2 spondylolisthesis measuring 8 mm.  There is noted facet arthropathy at L4-5 and L5-S1. Moderate foraminal stenosis at L4-5 and severe foraminal stenosis at L5-S1.   X-rays of lumbar spine reviewed.  Significant for grade 2 anterolisthesis of L5 upon S1 were noted L5 pars defect       - Consults:   Continue follow-up with orthopedics for left knee pain    - Counseled patient regarding the importance of activity modification    - Patient Questions: Answered all of the patient's questions regarding diagnosis, therapy, and treatment     This condition does not require this patient to take time off of work, and the primary goal of our Pain Management services is to improve the patient's functional capacity.     - Follow up visit: return to clinic p.r.n.        The above plan and management options were discussed at length with patient. Patient is in agreement with the above and verbalized understanding.    I discussed the goals of interventional chronic pain management with the patient on today's visit.  I explained the utility of injections for  diagnostic and therapeutic purposes.  We discussed a multimodal approach to pain including treating the patient's given worst pain at any given time.  We will use a systematic approach to addressing pain.  We will also adopt a multimodal approach that includes injections, adjuvant medications, physical therapy, at times psychiatry.  There may be a limited role for opioid use intermittently in the treatment of pain, more particularly for acute pain although no one approach can be used as a sole treatment modality.    I emphasized the importance of regular exercise, core strengthening and stretching, diet and weight loss as a cornerstone of long-term pain management.      Wilfred Serrano MD  Interventional Pain Management  [facility]    Disclaimer:  This note was prepared using voice recognition system and is likely to have sound alike errors that may have been overlooked even after proof reading.  Please call me with any questions

## 2023-12-20 ENCOUNTER — PATIENT OUTREACH (OUTPATIENT)
Dept: ADMINISTRATIVE | Facility: HOSPITAL | Age: 58
End: 2023-12-20
Payer: COMMERCIAL

## 2023-12-20 NOTE — PROGRESS NOTES
Uploaded [facility] EYE EXAM 12/14/2023 ; faxed to Dr. Sohan Lindo 1x to request. Reminder set 1 week.

## 2023-12-22 ENCOUNTER — PATIENT MESSAGE (OUTPATIENT)
Dept: INTERNAL MEDICINE | Facility: CLINIC | Age: 58
End: 2023-12-22
Payer: COMMERCIAL

## 2023-12-22 DIAGNOSIS — E11.40 TYPE 2 DIABETES MELLITUS WITH DIABETIC NEUROPATHY, WITH LONG-TERM CURRENT USE OF INSULIN: ICD-10-CM

## 2023-12-22 DIAGNOSIS — Z79.4 TYPE 2 DIABETES MELLITUS WITH DIABETIC NEUROPATHY, WITH LONG-TERM CURRENT USE OF INSULIN: ICD-10-CM

## 2023-12-22 RX ORDER — INSULIN DEGLUDEC 200 U/ML
INJECTION, SOLUTION SUBCUTANEOUS
Qty: 3 PEN | Refills: 0 | Status: SHIPPED | OUTPATIENT
Start: 2023-12-22 | End: 2024-01-19

## 2023-12-22 NOTE — TELEPHONE ENCOUNTER
----- Message from Laura Henderson sent at 12/22/2023 11:03 AM CST -----  Contact: Patient   Would like a call back at [phone], in regards to getting his tirzepatide 12.5 mg/0.5 mL PnIj prescription transferred to [facility], due to [facility] being out of stock.

## 2023-12-22 NOTE — TELEPHONE ENCOUNTER
Courtesy refill of tresiba given for coverage of OSVALDO Jorge. Please make sure patient has follow up scheduled with above provider.     Manda Melvin PA-C  Diabetes Management     VM left for pt requesting call back to the clinic to discuss upcoming pulmonary follow up.     Pt scheduled to see Dr. Decker Thursday 04/09/2020. D/t COVID 19, this appointment must be converted to telephone visit or be rescheduled for a later time/date.    Informed pt via VM to not physically come to clinic on 04/09/2020.    Requested call back to the clinic to discuss options. Will await call back.    Steven Grant LPN    Rheumatology / Pulmonology  [facility]

## 2023-12-22 NOTE — TELEPHONE ENCOUNTER
Patient advises that [facility] is out of stock of mounjaro pens. He would like them sent to the [facility] at [facility].

## 2023-12-22 NOTE — TELEPHONE ENCOUNTER
No care due was identified.  [facility] Embedded Care Due Messages. Reference number: [id number].   12/22/2023 12:00:55 PM CST

## 2023-12-27 NOTE — PROGRESS NOTES
2nd attempt  [facility] EYE EXAM 12/14/2023 ; faxed to Dr. Sohan Lindo 1x to request. Reminder set 1 week.

## 2024-01-03 NOTE — PROGRESS NOTES
Called facility to f/u on request - Unable to reach anyone in office.  3rd attempt  [facility] EYE EXAM 12/14/2023 ; faxed to Dr. Sohan Lindo 1x to request. Reminder set 1 week.

## 2024-01-18 NOTE — PROGRESS NOTES
Established Patient - Virtual Telehealth Visit     The patient location is: Home ([place])  The chief complaint leading to consultation is: Diabetes Follow-up  Visit type: Virtual visit with synchronous audio and video via Epic Haiku isabela  Total time spent with patient: 16 minutes     Each patient to whom I provide medical services by telemedicine is:  (1) informed of the relationship between the physician and patient and the respective role of any other health care provider with respect to management of the patient; and (2) notified that they may decline to receive medical services by telemedicine and may withdraw from such care at any time. Patient verbally consented to receive this service via voice-only telephone call.    PCP: Herb Lord MD    Subjective:     Chief Complaint: Diabetes follow up.  Last visit with me: 9/28/23    History of Present Illness:   58 y.o.  male for diabetes follow up.   Previously managed by JANE Ross  Last clinic visit 8/4/2021.  Type II diabetes since 2014.  Comorbidities: HTN and HLD    Known diabetes complications:  DKA/HHS: no  Retinopathy: yes  Peripheral neuropathy: yes, numbness and tingling bilateral feet  Nephropathy: yes    Interval history:  Since last visit, GLP switched to Mounjaro.  Started 3 weeks ago.  Reports mild nausea however tolerable.   Self adjusted Tresiba dose due to low readings.    Denies recent hospital admissions or ER visits.   Admits having hypoglycemia.  See BG readings below.  Endorses diabetes related symptoms: Numbness and Tingling in both feet    Blood glucose monitoring: fingerstick: 1x/day   Per patient's recall, over the last 3 weeks   Fasting BG  range  average 85   AC dinner 110's    No BG log for review.    Activity Level: Walking 4x/day, 1400 steps/day  Meal Planning:3 meals/day;1 snacks/day.    Medication compliance all of the time, diet noncompliance some of the time.   Has attended diabetes education in the  past.     Previous regimen:    Soliqua - on max dose  Trulicity - failed to control    Past failed treatment:   Jardiance - yeast infection  Ozempic - diarrhea    Current DM Medications:  Diabetes Medications               insulin degludec (TRESIBA FLEXTOUCH U-200) 200 unit/mL (3 mL) insulin pen INJECT 46 UNITS INTO THE SKIN EVERY EVENING.. (MAY TITRATE UP TO A MAX DOSE OF 60 UNITS/DAY)  Taking 40 units    metFORMIN (GLUCOPHAGE) 1000 MG tablet Take 1 tablet (1,000 mg total) by mouth 2 (two) times daily with meals.    repaglinide (PRANDIN) 2 MG tablet TAKE 2 TABLETS BY MOUTH 3 (THREE) TIMES DAILY BEFORE MEALS.    tirzepatide 12.5 mg/0.5 mL PnIj Inject 12.5 mg into the skin every 7 days.  Taken  3 doses    tirzepatide 15 mg/0.5 mL PnIj Inject 15 mg into the skin every 7 days.  Not started     Allergies  Review of patient's allergies indicates:   Allergen Reactions    Hay fever and allergy relief        Labs Reviewed:  His most recent A1C is:  Lab Results   Component Value Date    HGBA1C 8.6 (H) 12/08/2023    HGBA1C 8.2 (H) 08/25/2023    HGBA1C 8.3 (H) 05/05/2023       His most recent BMP is:  Lab Results   Component Value Date     08/25/2023    K 4.0 08/25/2023     08/25/2023    CO2 25 08/25/2023    BUN 17 08/25/2023    CREATININE 0.7 08/25/2023    CALCIUM 9.1 08/25/2023    ANIONGAP 11 08/25/2023    ESTGFRAFRICA >60.0 02/19/2022    EGFRNONAA >60.0 02/19/2022       Lab Results   Component Value Date    CPEPTIDE 2.4 04/21/2017     Lab Results   Component Value Date    GLUTAMICACID 0.00 04/21/2017       There is no height or weight on file to calculate BMI.    Wt Readings from Last 3 Encounters:   12/14/23 1450 104.5 kg (230 lb 7.9 oz)   12/01/23 1106 98.4 kg (217 lb)   11/02/23 1225 103 kg (227 lb 1.2 oz)        His blood sugar in clinic today is: Not assessed due to type of visit.    Diabetes Management Status  Statin: Taking  ACE/ARB: Taking    Screening or Prevention Patient's value Goal  Complete/Controlled?   HgA1C Testing and Control   Lab Results   Component Value Date    HGBA1C 8.6 (H) 12/08/2023      Annually/Less than 8% No   Lipid profile : 08/25/2023 Annually Yes   LDL control Lab Results   Component Value Date    LDLCALC 39.2 (L) 08/25/2023    Annually/Less than 100 mg/dl  Yes   Nephropathy screening Lab Results   Component Value Date    MICALBCREAT 9.0 08/25/2023     Lab Results   Component Value Date    PROTEINUA Trace (A) 06/30/2016    Annually Yes   Blood pressure BP Readings from Last 1 Encounters:   12/14/23 138/68    Less than 140/90 Yes   Dilated retinal exam : 10/07/2022 Annually Yes    Foot exam   : 08/25/2023 Annually Yes     Social History     Socioeconomic History    Marital status:     Number of children: 2   Occupational History    Occupation: Plant work      Employer: [employer]     Comment: [employer]   Tobacco Use    Smoking status: Never     Passive exposure: Never    Smokeless tobacco: Former     Types: Snuff     Quit date: 2008   Substance and Sexual Activity    Alcohol use: Yes     Alcohol/week: 5.0 standard drinks of alcohol     Types: 1 Glasses of wine, 2 Cans of beer, 2 Drinks containing 0.5 oz of alcohol per week    Drug use: No    Sexual activity: Yes     Partners: Female     Birth control/protection: Condom, Post-menopausal     Past Medical History:   Diagnosis Date    Diabetes mellitus with neuropathy 2009    BS 57 am 12/18/2015    Hyperlipidemia 2009    Hypertension associated with diabetes 4/21/2017     Review of Systems   Constitutional:  Negative for activity change, appetite change, fatigue and unexpected weight change.   HENT:  Negative for dental problem and trouble swallowing.    Eyes:  Negative for visual disturbance.   Respiratory:  Negative for chest tightness and shortness of breath.    Cardiovascular:  Negative for chest pain, palpitations and leg swelling.   Gastrointestinal:  Negative for abdominal pain, constipation, diarrhea, nausea and  vomiting.   Endocrine: Negative for polydipsia, polyphagia and polyuria.   Genitourinary:  Negative for difficulty urinating, dysuria, frequency and urgency.   Musculoskeletal:  Negative for gait problem, myalgias and neck pain.   Integumentary:  Negative for rash and wound.   Allergic/Immunologic: Negative.    Neurological:  Positive for numbness (tingling BLE). Negative for dizziness, syncope, weakness and headaches.   Hematological: Negative.    Psychiatric/Behavioral:  Negative for confusion and sleep disturbance.    All other systems reviewed and are negative.     Objective:      Physical Exam  Constitutional:       General: Awake.      Appearance: Normal appearance. Well-developed and well-groomed.   HENT:      Head: Normocephalic and atraumatic.   Eyes:      General: Lids are normal. Gaze aligned appropriately.   Pulmonary:      Effort: Pulmonary effort is normal. No respiratory distress.   Neurological:      Mental Status: Alert and oriented to person, place, and time.   Psychiatric:         Attention and Perception: Attention normal.         Mood and Affect: Mood and affect normal.         Speech: Speech normal.         Behavior: Behavior normal.         Thought Content: Thought content normal.         Cognition and Memory: Cognition normal.         Judgment: Judgment normal.     Assessment / Plan:     Diagnoses and all orders for this visit:    Type 2 diabetes mellitus with diabetic neuropathy, with long-term current use of insulin  -     Hemoglobin A1C; Future  -     insulin degludec (TRESIBA FLEXTOUCH U-200) 200 unit/mL (3 mL) insulin pen; Inject 40 Units into the skin once daily.    Hyperlipidemia associated with type 2 diabetes mellitus    Hypertension associated with diabetes    Seasonal allergic rhinitis    Additional Plan Details:  - Condition: Uncontrolled, most recent A1C of 8.6% was completed 3 weeks ago. Increased from previous reading.  - Monitor blood glucose:  4x daily.Goals reviewed.  Maintain BG log.   - Hypoglycemia protocol: Reviewed and risk discussed.  Notify if BG readings below 80. Protocol printout provided.   - Meal Planning: Limit carbohydrate intake 30-45 grams/meal, 3x daily and 15 grams/snack, limit 2/day.   - Activity level: Recommend at least 150 minutes of exercise in a week.  - BP and LDL: Recommend lifestyle modifications and follow up with PCP for management.   - Medication Regimen:     Continue Metformin 1000 mg twice daily with meals  Continue Prandin 2 mg,  2  tablets three times daily before meals   Continue Mounjaro 12.5 mg  weekly x 1 more dose then increase to 15 mg weekly thereafter.  Continue Tresiba 40 units every morning. Decrease to 34 units once Mounjaro dose is increased.    - Medication consideration: Continue regimen, proceed with Mounjaro dose escalation.   - Health Maintenance standards addressed today:  A1c scheduled. Eye exam UTD with Dr. Lindo,  9/8/23.  - Risks of uncontrolled DM explained. Importance of routine foot and eye exams reviewed. Scheduled as appropriate.  -The patient was explained the above plan and given opportunity to ask questions.  He understands, chooses and consents to this plan and accepts all the risks, which include but are not limited to the risks mentioned above.   - Labs ordered as above.  - CDE follow up: Scheduled  - Follow up: 3 months in clinic, [facility].         Charlotte T. Delacruz, FNP-C [facility]    A total of 16 minutes was spent in face to face time, of which over 50 % was spent in counseling patient on disease process, complications, treatment, and side effects of medications.

## 2024-01-19 ENCOUNTER — TELEPHONE (OUTPATIENT)
Dept: INTERNAL MEDICINE | Facility: CLINIC | Age: 59
End: 2024-01-19
Payer: COMMERCIAL

## 2024-01-19 ENCOUNTER — OFFICE VISIT (OUTPATIENT)
Dept: DIABETES | Facility: CLINIC | Age: 59
End: 2024-01-19
Payer: COMMERCIAL

## 2024-01-19 DIAGNOSIS — E11.59 HYPERTENSION ASSOCIATED WITH DIABETES: ICD-10-CM

## 2024-01-19 DIAGNOSIS — E11.40 TYPE 2 DIABETES MELLITUS WITH DIABETIC NEUROPATHY, WITH LONG-TERM CURRENT USE OF INSULIN: Primary | ICD-10-CM

## 2024-01-19 DIAGNOSIS — E11.69 HYPERLIPIDEMIA ASSOCIATED WITH TYPE 2 DIABETES MELLITUS: ICD-10-CM

## 2024-01-19 DIAGNOSIS — Z79.4 TYPE 2 DIABETES MELLITUS WITH DIABETIC NEUROPATHY, WITH LONG-TERM CURRENT USE OF INSULIN: Primary | ICD-10-CM

## 2024-01-19 DIAGNOSIS — E78.5 HYPERLIPIDEMIA ASSOCIATED WITH TYPE 2 DIABETES MELLITUS: ICD-10-CM

## 2024-01-19 DIAGNOSIS — I15.2 HYPERTENSION ASSOCIATED WITH DIABETES: ICD-10-CM

## 2024-01-19 PROCEDURE — 99214 OFFICE O/P EST MOD 30 MIN: CPT | Mod: 95,,, | Performed by: NURSE PRACTITIONER

## 2024-01-19 PROCEDURE — 1159F MED LIST DOCD IN RCRD: CPT | Mod: CPTII,95,, | Performed by: NURSE PRACTITIONER

## 2024-01-19 PROCEDURE — 1160F RVW MEDS BY RX/DR IN RCRD: CPT | Mod: CPTII,95,, | Performed by: NURSE PRACTITIONER

## 2024-01-19 RX ORDER — INSULIN DEGLUDEC 200 U/ML
40 INJECTION, SOLUTION SUBCUTANEOUS DAILY
Qty: 6 PEN | Refills: 1
Start: 2024-01-19 | End: 2024-01-24 | Stop reason: SDUPTHER

## 2024-01-19 NOTE — TELEPHONE ENCOUNTER
----- Message from Gege Bianchi sent at 1/19/2024  2:33 PM CST -----  Contact: armida  Patient has questions about insulin medication. He would like to take a different insulin tresbia. Please call him back at [phone].       Thanks  DD

## 2024-01-19 NOTE — PATIENT INSTRUCTIONS
Medication Regimen:   Continue Metformin 1000 mg twice daily with meals  Continue Prandin 2 mg,  2  tablets three times daily before meals   Continue Mounjaro 12.5 mg  weekly x 1 more dose then increase to 15 mg weekly thereafter.  Continue Tresiba 40 units every morning. Decrease to 34 units once Mounjaro dose is increased.    Patient Instructions:  Carbohydrate Count: 30-45 grams/meal and 15 grams/snack with limit of 2 snacks per day.  Exercise: Goal is 150 minutes or more per week.  Bring meter and blood sugar log to each appointment.     Goals for Blood Sugar:  Fastin-130 mg/dl  2 hours after meal:  mg/dl  Before Bed: 100-150 mg/dl  If Blood sugar is below 70 mg/dl, DO NOT take diabetes medication. Eat first and then recheck blood sugar in 20 minutes.  Foods to eat if blood sugar is below 70 mg/dl  1/2 glass of orange juice   1/2 regular cola can   3-4 hard candies   3-4 small glucose tabs.   Foods to eat if blood sugar is below 50 mg/dl  1 glass of orange juice  1 regular cola can  8 hard candies  8 small glucose tabs.   If you have repeated eating/blood sugar recheck process 2 times and blood sugar still below 70 mg/dl, call 911.

## 2024-01-19 NOTE — TELEPHONE ENCOUNTER
Patient wants to know if there is anything else other than tresiba that he can take with mounjaro. The tresiba is costing him more than he can afford with his current insurance.

## 2024-01-22 ENCOUNTER — TELEPHONE (OUTPATIENT)
Dept: DIABETES | Facility: CLINIC | Age: 59
End: 2024-01-22
Payer: COMMERCIAL

## 2024-01-22 NOTE — TELEPHONE ENCOUNTER
----- Message from Candy Rivas sent at 1/22/2024  4:35 PM CST -----  Contact: Brett Jackson is calling in regards to getting a call back to discuss what options of other medications can be used along with tirzepatide 15 mg/0.5 mL PnIj  besides the insulin degludec (TRESIBA FLEXTOUCH U-200) 200 unit/mL (3 mL) insulin pen due to the cost.  Please call back at  [phone]      Thanks

## 2024-01-24 ENCOUNTER — TELEPHONE (OUTPATIENT)
Dept: DIABETES | Facility: CLINIC | Age: 59
End: 2024-01-24
Payer: COMMERCIAL

## 2024-01-24 DIAGNOSIS — E11.40 TYPE 2 DIABETES MELLITUS WITH DIABETIC NEUROPATHY, WITH LONG-TERM CURRENT USE OF INSULIN: Primary | ICD-10-CM

## 2024-01-24 DIAGNOSIS — Z79.4 TYPE 2 DIABETES MELLITUS WITH DIABETIC NEUROPATHY, WITH LONG-TERM CURRENT USE OF INSULIN: Primary | ICD-10-CM

## 2024-01-24 RX ORDER — INSULIN GLARGINE 300 U/ML
40 INJECTION, SOLUTION SUBCUTANEOUS NIGHTLY
Qty: 4.5 ML | Refills: 5 | Status: SHIPPED | OUTPATIENT
Start: 2024-01-24

## 2024-02-18 DIAGNOSIS — M43.16 SPONDYLOLISTHESIS OF LUMBAR REGION: ICD-10-CM

## 2024-02-18 DIAGNOSIS — M54.16 LUMBAR RADICULOPATHY: ICD-10-CM

## 2024-02-18 DIAGNOSIS — M47.816 LUMBAR SPONDYLOSIS: ICD-10-CM

## 2024-02-18 DIAGNOSIS — M51.36 DDD (DEGENERATIVE DISC DISEASE), LUMBAR: ICD-10-CM

## 2024-02-18 DIAGNOSIS — M43.06 PARS DEFECT OF LUMBAR SPINE: ICD-10-CM

## 2024-02-19 RX ORDER — PREGABALIN 50 MG/1
50 CAPSULE ORAL NIGHTLY
Qty: 30 CAPSULE | Refills: 2 | Status: SHIPPED | OUTPATIENT
Start: 2024-02-19 | End: 2024-05-22 | Stop reason: SDUPTHER

## 2024-02-19 NOTE — TELEPHONE ENCOUNTER
Good Morning/Afternoon,     Pt is requesting for a refill of: Pregabalin 50 mg  Last filed: 12/19/23  Last encounter: 12/19/23  Up coming apt: N/A  Pharmacy: [facility]  Is this something you can do?      Sawyer Blair  Medical Assistant

## 2024-02-26 DIAGNOSIS — I15.2 HYPERTENSION ASSOCIATED WITH DIABETES: ICD-10-CM

## 2024-02-26 DIAGNOSIS — E11.59 HYPERTENSION ASSOCIATED WITH DIABETES: ICD-10-CM

## 2024-02-26 DIAGNOSIS — E78.2 MIXED HYPERLIPIDEMIA: ICD-10-CM

## 2024-02-26 DIAGNOSIS — G47.00 INSOMNIA, UNSPECIFIED TYPE: ICD-10-CM

## 2024-02-26 RX ORDER — REPAGLINIDE 2 MG/1
TABLET ORAL
Qty: 540 TABLET | Refills: 0 | Status: CANCELLED | OUTPATIENT
Start: 2024-02-26

## 2024-02-26 NOTE — TELEPHONE ENCOUNTER
----- Message from Alexis Lozano sent at 2/26/2024  3:55 PM CST -----  Contact: Brett Jacksno is calling in regards to getting a refill on repaglinide (PRANDIN) 2 MG tablet. TAKE 2 TABLETS BY MOUTH 3 (THREE) TIMES DAILY BEFORE MEALS.atorvastatin (LIPITOR) 40 MG tablet. Route: Take 1 tablet (40 mg total) by mouth once daily. lisinopriL (PRINIVIL,ZESTRIL) 5 MG tablet. Route: Take 1 tablet (5 mg total) by mouth once daily. - Oral. Please call back at [phone]                        [facility]                     Thanks  KT

## 2024-02-26 NOTE — TELEPHONE ENCOUNTER
No care due was identified.  Health [facility] Embedded Care Due Messages. Reference number: [id number].   2/26/2024 4:03:50 PM CST

## 2024-02-27 RX ORDER — ATORVASTATIN CALCIUM 40 MG/1
40 TABLET, FILM COATED ORAL DAILY
Qty: 90 TABLET | Refills: 2 | Status: SHIPPED | OUTPATIENT
Start: 2024-02-27

## 2024-02-27 RX ORDER — LISINOPRIL 5 MG/1
5 TABLET ORAL DAILY
Qty: 90 TABLET | Refills: 0 | Status: SHIPPED | OUTPATIENT
Start: 2024-02-27 | End: 2024-03-21 | Stop reason: SDUPTHER

## 2024-02-27 RX ORDER — AMITRIPTYLINE HYDROCHLORIDE 25 MG/1
50 TABLET, FILM COATED ORAL NIGHTLY
Qty: 180 TABLET | Refills: 1 | Status: SHIPPED | OUTPATIENT
Start: 2024-02-27 | End: 2024-04-26

## 2024-02-27 NOTE — TELEPHONE ENCOUNTER
----- Message from Candy Rivas sent at 2/27/2024  3:54 PM CST -----  Contact: Brett Jackson is calling in regards to checking on the status of the medication refills on the medication repaglinide (PRANDIN) 2 MG tablet (completely out of), lisinopriL (PRINIVIL,ZESTRIL) 5 MG tablet, and all other medications that needs to be refills.  Pt states that [facility] sent refill request last week and [facility] pharmacy also sent a request on 02/26  Please call pt back with a status update of fills to [phone]     [facility] [address] [place] [address]  Phone: [phone] Fax: [phone]  Hours: Mon-Fri, 8a-5:30p      Thanks

## 2024-02-29 RX ORDER — REPAGLINIDE 2 MG/1
TABLET ORAL
Qty: 540 TABLET | Refills: 0 | Status: SHIPPED | OUTPATIENT
Start: 2024-02-29 | End: 2024-05-22 | Stop reason: SDUPTHER

## 2024-02-29 NOTE — TELEPHONE ENCOUNTER
----- Message from Mya Coleman sent at 2/29/2024 10:13 AM CST -----  Contact: [phone]  Patient needs a refill on repaglinide (PRANDIN) 2 MG tablet called into [facility] at [phone].  Please call patient at [phone]  if you have any questions.           [facility] [address]  [facility] [place] [address]  Phone: [phone] Fax: [phone]           Thanks KB

## 2024-02-29 NOTE — TELEPHONE ENCOUNTER
No care due was identified.  Health [facility] Embedded Care Due Messages. Reference number: [id number].   2/29/2024 10:18:34 AM CST

## 2024-03-21 DIAGNOSIS — I15.2 HYPERTENSION ASSOCIATED WITH DIABETES: ICD-10-CM

## 2024-03-21 DIAGNOSIS — E11.59 HYPERTENSION ASSOCIATED WITH DIABETES: ICD-10-CM

## 2024-03-21 RX ORDER — LISINOPRIL 5 MG/1
5 TABLET ORAL DAILY
Qty: 90 TABLET | Refills: 1 | Status: SHIPPED | OUTPATIENT
Start: 2024-03-21

## 2024-03-22 NOTE — TELEPHONE ENCOUNTER
Care Due:                  Date            Visit Type   Department     Provider  --------------------------------------------------------------------------------                                EP -                              PRIMARY      [facility] INTERNAL  Last Visit: 12-      CARE ([facility])   OH Lord                              EP -                              PRIMARY      [facility] INTERNAL  Next Visit: 04-      CARE ([facility])   [facility]       Herb Lord                                                            Last  Test          Frequency    Reason                     Performed    Due Date  --------------------------------------------------------------------------------    HBA1C.......  6 months...  metFORMIN, repaglinide,    12- 06-                             tirzepatide..............    [employer] Embedded Care Due Messages. Reference number: [id number].   3/21/2024 7:21:16 PM CDT

## 2024-03-22 NOTE — TELEPHONE ENCOUNTER
Refill Decision Note   Brett Garcia  is requesting a refill authorization.  Brief Assessment and Rationale for Refill:  Approve     Medication Therapy Plan:  FLOS      Comments:     Note composed:10:54 PM 03/21/2024

## 2024-03-26 ENCOUNTER — TELEPHONE (OUTPATIENT)
Dept: INTERNAL MEDICINE | Facility: CLINIC | Age: 59
End: 2024-03-26
Payer: COMMERCIAL

## 2024-03-26 NOTE — TELEPHONE ENCOUNTER
----- Message from Caprice Grider sent at 3/26/2024  4:02 PM CDT -----  Contact: [phone]   Pt states pharmacy is out of [facility] and does not know when they will have it back in stock. Pt wants to know if this will have a bad effect on him to stop taking suddenly like this. Also, he wants to know what he should do re not having the medication? Please call pt      [facility] [address] [place] [address]  Phone: [phone] Fax: [phone]

## 2024-03-26 NOTE — TELEPHONE ENCOUNTER
Spoke with the stating Mounjaro 15mg is on backorder. Asking if he can stayed on 12.5mg until the other one is ready or get into different medication. Please advise.

## 2024-03-26 NOTE — TELEPHONE ENCOUNTER
----- Message from Mona uBckley sent at 3/26/2024  1:27 PM CDT -----  Contact: Joy, [phone]  Calling because Rx tirzepatide 15 mg/0.5 mL PnIj is on back order. Please advise. Thanks.

## 2024-03-27 NOTE — PROGRESS NOTES
Subjective:       Patient ID: Brett Garcia is a 56 y.o. male.    Chief Complaint: Follow-up (Urgent care f/u)      Patient Care Team:  Herb Lord MD as PCP - General (Family Medicine)  Delia Lora RN as Diabetes Educator (Diabetes)  Jermaine Guillermo Jr., OD (Ophthalmology)    Follow-up  Pertinent negatives include no chest pain, chills, fatigue, fever, headaches or myalgias.   Patient is new to me.    Brett Garcia is a 56 y.o. male who presents today with complaints of Follow-up (Urgent care f/u)  Patient presents for follow-up from recent urgent care visit on November 26 for upper respiratory symptoms.  Patient reports improvement of URI symptoms but concerned about the report on his chest x-ray.  Radiologist report states elevation of right hemidiaphragm otherwise negative.  There are no previous images to compare within EMR system.  Patient is otherwise without complaints.    Review of Systems   Constitutional:  Negative for chills, fatigue, fever and unexpected weight change.   Eyes:  Negative for visual disturbance.   Respiratory:  Negative for shortness of breath.    Cardiovascular:  Negative for chest pain.   Musculoskeletal:  Negative for myalgias.   Neurological:  Negative for headaches.     Objective:      Physical Exam  Vitals and nursing note reviewed.   Constitutional:       General: He is not in acute distress.     Appearance: He is well-developed.   HENT:      Head: Normocephalic and atraumatic.   Eyes:      General: Lids are normal. No scleral icterus.     Extraocular Movements: Extraocular movements intact.      Conjunctiva/sclera: Conjunctivae normal.   Cardiovascular:      Rate and Rhythm: Normal rate and regular rhythm.   Pulmonary:      Effort: Pulmonary effort is normal.      Breath sounds: Normal breath sounds. No decreased breath sounds, wheezing, rhonchi or rales.   Neurological:      Mental Status: He is alert.      Cranial Nerves: No cranial nerve deficit.    Psychiatric:         Mood and Affect: Mood and affect normal.       Assessment:       1. Upper respiratory tract infection, unspecified type    2. Elevated hemidiaphragm          Plan:   1. Upper respiratory tract infection, unspecified type    2. Elevated hemidiaphragm  -     X-Ray Chest PA And Lateral; Future; Expected date: 11/29/2022  -     Ambulatory referral/consult to Pulmonology; Future; Expected date: 12/06/2022    X-Ray Chest PA And Lateral  Narrative: EXAMINATION:  XR CHEST PA AND LATERAL    CLINICAL HISTORY:  Disorders of diaphragm    TECHNIQUE:  PA and lateral views of the chest were performed.    COMPARISON:  None    FINDINGS:  The lungs are clear and free of infiltrate.  No pleural effusion or pneumothorax. The heart is not enlarged. There is significant elevation of the right hemidiaphragm.  Azygous fissure incidentally noted.  Impression: 1.  No acute cardiopulmonary process.    Electronically signed by: Bora Quiroga DO  Date:    11/29/2022  Time:    10:21        Ambulatory

## 2024-03-28 RX ORDER — DULAGLUTIDE 3 MG/.5ML
3 INJECTION, SOLUTION SUBCUTANEOUS
Qty: 4 PEN | Refills: 5 | Status: SHIPPED | OUTPATIENT
Start: 2024-03-28 | End: 2024-06-06

## 2024-03-28 NOTE — PROGRESS NOTES
Subjective:       Patient ID: Brett Garcia is a 58 y.o. male.    Chief Complaint: No chief complaint on file.    HPI     Mounjaro stock issue   Transitioning to Trulicity.  3 mg.  Sent to the pharmacy discussed with the patient.  We will transition back to Mounjaro once available

## 2024-04-22 DIAGNOSIS — E11.40 TYPE 2 DIABETES MELLITUS WITH DIABETIC NEUROPATHY, WITH LONG-TERM CURRENT USE OF INSULIN: ICD-10-CM

## 2024-04-22 DIAGNOSIS — J30.2 SEASONAL ALLERGIC RHINITIS: ICD-10-CM

## 2024-04-22 DIAGNOSIS — Z79.4 TYPE 2 DIABETES MELLITUS WITH DIABETIC NEUROPATHY, WITH LONG-TERM CURRENT USE OF INSULIN: ICD-10-CM

## 2024-04-22 NOTE — TELEPHONE ENCOUNTER
No care due was identified.  Health [facility] Embedded Care Due Messages. Reference number: [id number].   4/22/2024 6:37:49 PM CDT

## 2024-04-23 RX ORDER — FLUTICASONE PROPIONATE 50 MCG
2 SPRAY, SUSPENSION (ML) NASAL DAILY
Qty: 48 G | Refills: 1 | Status: SHIPPED | OUTPATIENT
Start: 2024-04-23

## 2024-04-23 RX ORDER — METFORMIN HYDROCHLORIDE 1000 MG/1
1000 TABLET ORAL 2 TIMES DAILY WITH MEALS
Qty: 180 TABLET | Refills: 3 | Status: SHIPPED | OUTPATIENT
Start: 2024-04-23

## 2024-04-26 ENCOUNTER — OFFICE VISIT (OUTPATIENT)
Dept: INTERNAL MEDICINE | Facility: CLINIC | Age: 59
End: 2024-04-26
Payer: COMMERCIAL

## 2024-04-26 VITALS
RESPIRATION RATE: 18 BRPM | HEART RATE: 103 BPM | DIASTOLIC BLOOD PRESSURE: 68 MMHG | SYSTOLIC BLOOD PRESSURE: 130 MMHG | OXYGEN SATURATION: 95 % | WEIGHT: 222 LBS | BODY MASS INDEX: 32.78 KG/M2

## 2024-04-26 DIAGNOSIS — E78.5 HYPERLIPIDEMIA, UNSPECIFIED HYPERLIPIDEMIA TYPE: ICD-10-CM

## 2024-04-26 DIAGNOSIS — E11.40 TYPE 2 DIABETES MELLITUS WITH DIABETIC NEUROPATHY, WITH LONG-TERM CURRENT USE OF INSULIN: ICD-10-CM

## 2024-04-26 DIAGNOSIS — I15.2 HYPERTENSION ASSOCIATED WITH DIABETES: Primary | ICD-10-CM

## 2024-04-26 DIAGNOSIS — G47.9 TROUBLE IN SLEEPING: ICD-10-CM

## 2024-04-26 DIAGNOSIS — Z79.899 ENCOUNTER FOR LONG-TERM (CURRENT) USE OF MEDICATIONS: ICD-10-CM

## 2024-04-26 DIAGNOSIS — E11.59 HYPERTENSION ASSOCIATED WITH DIABETES: Primary | ICD-10-CM

## 2024-04-26 DIAGNOSIS — Z12.5 SCREENING FOR PROSTATE CANCER: ICD-10-CM

## 2024-04-26 DIAGNOSIS — Z79.4 TYPE 2 DIABETES MELLITUS WITH DIABETIC NEUROPATHY, WITH LONG-TERM CURRENT USE OF INSULIN: ICD-10-CM

## 2024-04-26 DIAGNOSIS — J30.9 ALLERGIC RHINITIS, UNSPECIFIED SEASONALITY, UNSPECIFIED TRIGGER: ICD-10-CM

## 2024-04-26 PROCEDURE — 99999 PR PBB SHADOW E&M-EST. PATIENT-LVL V: CPT | Mod: PBBFAC,,, | Performed by: FAMILY MEDICINE

## 2024-04-26 PROCEDURE — 1159F MED LIST DOCD IN RCRD: CPT | Mod: CPTII,S$GLB,, | Performed by: FAMILY MEDICINE

## 2024-04-26 PROCEDURE — G2211 COMPLEX E/M VISIT ADD ON: HCPCS | Mod: S$GLB,,, | Performed by: FAMILY MEDICINE

## 2024-04-26 PROCEDURE — 4010F ACE/ARB THERAPY RXD/TAKEN: CPT | Mod: CPTII,S$GLB,, | Performed by: FAMILY MEDICINE

## 2024-04-26 PROCEDURE — 3078F DIAST BP <80 MM HG: CPT | Mod: CPTII,S$GLB,, | Performed by: FAMILY MEDICINE

## 2024-04-26 PROCEDURE — 3008F BODY MASS INDEX DOCD: CPT | Mod: CPTII,S$GLB,, | Performed by: FAMILY MEDICINE

## 2024-04-26 PROCEDURE — 99214 OFFICE O/P EST MOD 30 MIN: CPT | Mod: S$GLB,,, | Performed by: FAMILY MEDICINE

## 2024-04-26 PROCEDURE — 3075F SYST BP GE 130 - 139MM HG: CPT | Mod: CPTII,S$GLB,, | Performed by: FAMILY MEDICINE

## 2024-04-26 RX ORDER — AMITRIPTYLINE HYDROCHLORIDE 50 MG/1
50 TABLET, FILM COATED ORAL NIGHTLY
Qty: 90 TABLET | Refills: 1 | Status: SHIPPED | OUTPATIENT
Start: 2024-04-26 | End: 2025-04-26

## 2024-04-26 RX ORDER — SYRINGE,SAFETY WITH NEEDLE,1ML 25GX1"
1 SYRINGE (EA) MISCELLANEOUS 3 TIMES DAILY
Qty: 100 EACH | Refills: 0 | Status: SHIPPED | OUTPATIENT
Start: 2024-04-26

## 2024-04-26 RX ORDER — INSULIN ASPART 100 [IU]/ML
5 INJECTION, SOLUTION INTRAVENOUS; SUBCUTANEOUS
Qty: 4.5 ML | Refills: 11 | Status: SHIPPED | OUTPATIENT
Start: 2024-04-26 | End: 2024-06-15 | Stop reason: SDUPTHER

## 2024-04-26 NOTE — PROGRESS NOTES
Subjective:       Patient ID: Brett Garcia is a 58 y.o. male.    Chief Complaint: Follow-up (A1C out of control due to trulicity and toujeo. /)    HPI    Patient Active Problem List   Diagnosis    Hyperlipidemia    BMI 33.0-33.9,adult    Hypertension associated with diabetes    Skin lesions    Diabetic polyneuropathy associated with type 2 diabetes mellitus    Stress reaction    Hyperlipidemia associated with type 2 diabetes mellitus    Type 2 diabetes mellitus with diabetic neuropathy, with long-term current use of insulin    Elevated diaphragm    Decreased range of motion (ROM) of left knee    Weakness of trunk musculature    Lumbar pain    Left leg pain    Abnormal gait       Past Medical History:   Diagnosis Date    Diabetes mellitus with neuropathy 2009    BS 57 am 12/18/2015    Hyperlipidemia 2009    Hypertension associated with diabetes 4/21/2017       Past Surgical History:   Procedure Laterality Date    COLONOSCOPY N/A 11/01/2018    Procedure: COLONOSCOPY;  Surgeon: Kristina Wilson MD;  Location: [facility];  Service: Endoscopy;  Laterality: N/A;    COLONOSCOPY N/A 12/01/2023    Procedure: COLONOSCOPY;  Surgeon: Kristina Wilson MD;  Location: [facility];  Service: Endoscopy;  Laterality: N/A;    COLONOSCOPY W/ POLYPECTOMY  11/01/2018    Polyp x1, repeat 5 years; Dr. Wilson    SELECTIVE INJECTION OF ANESTHETIC AGENT AROUND LUMBAR SPINAL NERVE ROOT BY TRANSFORAMINAL APPROACH Bilateral 11/02/2023    Procedure: Bilateral L5/S1 TF BRE;  Surgeon: Wilfred Serrano MD;  Location: [facility];  Service: Pain Management;  Laterality: Bilateral;       Family History   Problem Relation Name Age of Onset    Diabetes Father Herman V.     Hypertension Father Herman V.     Heart attack Father Herman V.     Cataracts Father Herman V.     Hearing loss Father Herman V.     Heart disease Father Herman V.     Vision loss Father Herman V.     Hypertension Mother Catia     Hypertension Sister Amanda        Social  History     Tobacco Use   Smoking Status Never    Passive exposure: Never   Smokeless Tobacco Former    Types: Snuff    Quit date: 2008       Wt Readings from Last 5 Encounters:   04/26/24 100.7 kg (222 lb)   12/14/23 104.5 kg (230 lb 7.9 oz)   12/01/23 98.4 kg (217 lb)   11/02/23 103 kg (227 lb 1.2 oz)   09/28/23 104.6 kg (230 lb 9.6 oz)       For further HPI details, see assessment and plan.    Review of Systems    Objective:      Vitals:    04/26/24 1616   BP: 130/68   Pulse: 103   Resp: 18       Physical Exam  Constitutional:       General: He is not in acute distress.     Appearance: He is not ill-appearing.   Pulmonary:      Effort: Pulmonary effort is normal. No respiratory distress.   Neurological:      General: No focal deficit present.      Mental Status: He is alert.   Psychiatric:         Mood and Affect: Mood normal.         Behavior: Behavior normal.         Assessment:       1. Hypertension associated with diabetes    2. Type 2 diabetes mellitus with diabetic neuropathy, with long-term current use of insulin    3. Encounter for long-term (current) use of medications    4. Screening for prostate cancer    5. Trouble in sleeping    6. Allergic rhinitis, unspecified seasonality, unspecified trigger    7. Hyperlipidemia, unspecified hyperlipidemia type        Plan:       Patient presents for follow-up on chronic conditions     Type 2 diabetes  Uncontrolled  Sugars have been running high on home checks.  Patient was doing well on Mounjaro but unfortunately ran into stock issues.      At present time he is on   Toujeo long-acting insulin at 60 units nightly   Trulicity 3 mg   Metformin 1000 mg b.i.d.   Prandin 2 mg 3 times daily   Unable to use SGLT drugs - given h/o genital irrigation   I would like patient was transitioned back to Mounjaro but unfortunately the stock issues have been problematic.  We will continue with Trulicity.  Given his uncontrolled glucose levels at home plan to introduce a  short-acting insulin to be used before meals.  We will start at 5 units 3 times daily before meals.  We will provide with sliding scale instructions.  We will ensure he gets a follow up with the diabetes management team     Hypertension  Blood pressure is well controlled   He is on lisinopril 5   Continue medication     Hyperlipidemia  Lipids are well controlled  Continue Lipitor 40  Patient was no bleeding issues.  Would encouraged returned to use of aspirin    Allergic rhinitis   Patient finds benefit with saline nasal sprays periodic use of Flonase.      Back pain  Lyrica   Continue medication  Patient had concerns it would interact with amitriptyline.  He is tolerating both.  I am comfortable continuation     Trouble in sleeping  Patient found benefit with a amitriptyline 50.  Continue medication as is.  Refilled medication     Check out note  Setting up labs today are at his convenience.  Set him up to see diabetes management   Arranging for follow up in 3 months   Arranging to get eye records    This note was verbally dictated, please excuse any type errors.

## 2024-05-07 ENCOUNTER — TELEPHONE (OUTPATIENT)
Dept: DIABETES | Facility: CLINIC | Age: 59
End: 2024-05-07
Payer: COMMERCIAL

## 2024-05-09 ENCOUNTER — LAB VISIT (OUTPATIENT)
Dept: LAB | Facility: HOSPITAL | Age: 59
End: 2024-05-09
Attending: FAMILY MEDICINE
Payer: COMMERCIAL

## 2024-05-09 DIAGNOSIS — Z12.5 SCREENING FOR PROSTATE CANCER: ICD-10-CM

## 2024-05-09 DIAGNOSIS — Z79.899 ENCOUNTER FOR LONG-TERM (CURRENT) USE OF MEDICATIONS: ICD-10-CM

## 2024-05-09 LAB
ALBUMIN SERPL BCP-MCNC: 3.5 G/DL (ref 3.5–5.2)
ALP SERPL-CCNC: 98 U/L (ref 55–135)
ALT SERPL W/O P-5'-P-CCNC: 79 U/L (ref 10–44)
ANION GAP SERPL CALC-SCNC: 10 MMOL/L (ref 8–16)
AST SERPL-CCNC: 72 U/L (ref 10–40)
BASOPHILS # BLD AUTO: 0.04 K/UL (ref 0–0.2)
BASOPHILS NFR BLD: 0.6 % (ref 0–1.9)
BILIRUB SERPL-MCNC: 0.4 MG/DL (ref 0.1–1)
BUN SERPL-MCNC: 22 MG/DL (ref 6–20)
CALCIUM SERPL-MCNC: 9.2 MG/DL (ref 8.7–10.5)
CHLORIDE SERPL-SCNC: 102 MMOL/L (ref 95–110)
CO2 SERPL-SCNC: 24 MMOL/L (ref 23–29)
COMPLEXED PSA SERPL-MCNC: 0.48 NG/ML (ref 0–4)
CREAT SERPL-MCNC: 0.9 MG/DL (ref 0.5–1.4)
DIFFERENTIAL METHOD BLD: ABNORMAL
EOSINOPHIL # BLD AUTO: 0.5 K/UL (ref 0–0.5)
EOSINOPHIL NFR BLD: 7.3 % (ref 0–8)
ERYTHROCYTE [DISTWIDTH] IN BLOOD BY AUTOMATED COUNT: 13.8 % (ref 11.5–14.5)
EST. GFR  (NO RACE VARIABLE): >60 ML/MIN/1.73 M^2
ESTIMATED AVG GLUCOSE: 232 MG/DL (ref 68–131)
GLUCOSE SERPL-MCNC: 191 MG/DL (ref 70–110)
HBA1C MFR BLD: 9.7 % (ref 4–5.6)
HCT VFR BLD AUTO: 39.7 % (ref 40–54)
HGB BLD-MCNC: 13.7 G/DL (ref 14–18)
IMM GRANULOCYTES # BLD AUTO: 0.02 K/UL (ref 0–0.04)
IMM GRANULOCYTES NFR BLD AUTO: 0.3 % (ref 0–0.5)
LYMPHOCYTES # BLD AUTO: 1.9 K/UL (ref 1–4.8)
LYMPHOCYTES NFR BLD: 29.3 % (ref 18–48)
MCH RBC QN AUTO: 31.2 PG (ref 27–31)
MCHC RBC AUTO-ENTMCNC: 34.5 G/DL (ref 32–36)
MCV RBC AUTO: 90 FL (ref 82–98)
MONOCYTES # BLD AUTO: 0.5 K/UL (ref 0.3–1)
MONOCYTES NFR BLD: 7.3 % (ref 4–15)
NEUTROPHILS # BLD AUTO: 3.7 K/UL (ref 1.8–7.7)
NEUTROPHILS NFR BLD: 55.2 % (ref 38–73)
NRBC BLD-RTO: 0 /100 WBC
PLATELET # BLD AUTO: 164 K/UL (ref 150–450)
PMV BLD AUTO: 11.9 FL (ref 9.2–12.9)
POTASSIUM SERPL-SCNC: 4.5 MMOL/L (ref 3.5–5.1)
PROT SERPL-MCNC: 6.9 G/DL (ref 6–8.4)
RBC # BLD AUTO: 4.39 M/UL (ref 4.6–6.2)
SODIUM SERPL-SCNC: 136 MMOL/L (ref 136–145)
TSH SERPL DL<=0.005 MIU/L-ACNC: 0.67 UIU/ML (ref 0.4–4)
WBC # BLD AUTO: 6.62 K/UL (ref 3.9–12.7)

## 2024-05-09 PROCEDURE — 36415 COLL VENOUS BLD VENIPUNCTURE: CPT | Performed by: FAMILY MEDICINE

## 2024-05-09 PROCEDURE — 84153 ASSAY OF PSA TOTAL: CPT | Performed by: FAMILY MEDICINE

## 2024-05-09 PROCEDURE — 83036 HEMOGLOBIN GLYCOSYLATED A1C: CPT | Performed by: FAMILY MEDICINE

## 2024-05-09 PROCEDURE — 80053 COMPREHEN METABOLIC PANEL: CPT | Performed by: FAMILY MEDICINE

## 2024-05-09 PROCEDURE — 85025 COMPLETE CBC W/AUTO DIFF WBC: CPT | Performed by: FAMILY MEDICINE

## 2024-05-09 PROCEDURE — 84443 ASSAY THYROID STIM HORMONE: CPT | Performed by: FAMILY MEDICINE

## 2024-05-21 ENCOUNTER — TELEPHONE (OUTPATIENT)
Dept: INTERNAL MEDICINE | Facility: CLINIC | Age: 59
End: 2024-05-21
Payer: COMMERCIAL

## 2024-05-21 NOTE — TELEPHONE ENCOUNTER
----- Message from Narendra Moon sent at 5/21/2024  4:09 PM CDT -----  Contact: armida West is calling regarding some prescriptions.  Please give hi  a call back at [phone] h

## 2024-05-21 NOTE — TELEPHONE ENCOUNTER
Patient states that the trulicity that he has been taking is on backorder and needs to know what to do. He says that ozempic makes him nauseated.

## 2024-05-22 DIAGNOSIS — M47.816 LUMBAR SPONDYLOSIS: ICD-10-CM

## 2024-05-22 DIAGNOSIS — M51.36 DDD (DEGENERATIVE DISC DISEASE), LUMBAR: ICD-10-CM

## 2024-05-22 DIAGNOSIS — M43.16 SPONDYLOLISTHESIS OF LUMBAR REGION: ICD-10-CM

## 2024-05-22 DIAGNOSIS — M54.16 LUMBAR RADICULOPATHY: ICD-10-CM

## 2024-05-22 DIAGNOSIS — M43.06 PARS DEFECT OF LUMBAR SPINE: ICD-10-CM

## 2024-05-22 NOTE — TELEPHONE ENCOUNTER
Care Due:                  Date            Visit Type   Department     Provider  --------------------------------------------------------------------------------                                EP -                              PRIMARY      [facility] INTERNAL  Last Visit: 04-      CARE ([facility])   OH Lord                              EP -                              PRIMARY      [facility] INTERNAL  Next Visit: 08-      CARE ([facility])   OH Lord                                                            Last  Test          Frequency    Reason                     Performed    Due Date  --------------------------------------------------------------------------------    Lipid Panel.  12 months..  atorvastatin.............  08- 08-    [facility] Embedded Care Due Messages. Reference number: [id number].   5/22/2024 6:37:01 PM CDT

## 2024-05-23 RX ORDER — REPAGLINIDE 2 MG/1
TABLET ORAL
Qty: 540 TABLET | Refills: 0 | Status: SHIPPED | OUTPATIENT
Start: 2024-05-23

## 2024-05-23 RX ORDER — PREGABALIN 50 MG/1
50 CAPSULE ORAL NIGHTLY
Qty: 30 CAPSULE | Refills: 2 | Status: SHIPPED | OUTPATIENT
Start: 2024-05-23

## 2024-05-23 NOTE — TELEPHONE ENCOUNTER
Refill Routing Note   Medication(s) are not appropriate for processing by [facility] for the following reason(s):        Outside of protocol    [facility] action(s):  Route   Requires labs : Yes             Appointments  past 12m or future 3m with PCP    Date Provider   Last Visit   4/26/2024 Herb Lord MD   Next Visit   5/22/2024 Herb Lord MD   ED visits in past 90 days: 0        Note composed:11:59 PM 05/22/2024

## 2024-05-24 ENCOUNTER — PATIENT MESSAGE (OUTPATIENT)
Dept: ADMINISTRATIVE | Facility: HOSPITAL | Age: 59
End: 2024-05-24
Payer: COMMERCIAL

## 2024-05-24 ENCOUNTER — PATIENT OUTREACH (OUTPATIENT)
Dept: ADMINISTRATIVE | Facility: HOSPITAL | Age: 59
End: 2024-05-24
Payer: COMMERCIAL

## 2024-05-24 NOTE — LETTER
[id number]    AUTHORIZATION FOR RELEASE OF   CONFIDENTIAL INFORMATION      We are seeing Brett Garcia, date of birth 1965, in the clinic at [facility] INTERNAL MEDICINE. Herb Lord MD is the patient's PCP. Brett Garcia has an outstanding lab/procedure at the time we reviewed his chart. In order to help keep his health information updated, he has authorized us to request the following medical record(s):          ( x ) DIABETIC EYE EXAM                  Please fax records to [facility], [place], Timothy M., MD, [phone]     If you have any questions, please contact    DANIEL Brunson at [phone]          Patient Name: Brett Garcia  : 1965  Patient Phone #: [phone]

## 2024-05-24 NOTE — PROGRESS NOTES
Replying to Campaign Questionnaire for Overdue HM: DM Eye Exam    Per pt response, eye exam completed at outside facility. Per chart review, pt sees Dr. Gerard Guillermo at [facility].   MICHEL faxed to [facility] 1x to request dm eye exam record. Reminder set.

## 2024-05-31 NOTE — PROGRESS NOTES
Found 09/08/2023 dm eye exam in media. Staff message sent to [facility] for hyperlink of record to HM

## 2024-06-06 ENCOUNTER — OFFICE VISIT (OUTPATIENT)
Dept: INTERNAL MEDICINE | Facility: CLINIC | Age: 59
End: 2024-06-06
Payer: COMMERCIAL

## 2024-06-06 VITALS
HEART RATE: 95 BPM | BODY MASS INDEX: 32.93 KG/M2 | RESPIRATION RATE: 18 BRPM | DIASTOLIC BLOOD PRESSURE: 66 MMHG | WEIGHT: 223 LBS | SYSTOLIC BLOOD PRESSURE: 136 MMHG | OXYGEN SATURATION: 95 %

## 2024-06-06 DIAGNOSIS — R79.89 ELEVATED LFTS: ICD-10-CM

## 2024-06-06 DIAGNOSIS — E11.59 HYPERTENSION ASSOCIATED WITH DIABETES: Primary | ICD-10-CM

## 2024-06-06 DIAGNOSIS — E11.40 TYPE 2 DIABETES MELLITUS WITH DIABETIC NEUROPATHY, WITH LONG-TERM CURRENT USE OF INSULIN: ICD-10-CM

## 2024-06-06 DIAGNOSIS — K76.0 FATTY LIVER: ICD-10-CM

## 2024-06-06 DIAGNOSIS — I15.2 HYPERTENSION ASSOCIATED WITH DIABETES: Primary | ICD-10-CM

## 2024-06-06 DIAGNOSIS — D64.9 MILD ANEMIA: ICD-10-CM

## 2024-06-06 DIAGNOSIS — Z79.4 TYPE 2 DIABETES MELLITUS WITH DIABETIC NEUROPATHY, WITH LONG-TERM CURRENT USE OF INSULIN: ICD-10-CM

## 2024-06-06 PROCEDURE — 3046F HEMOGLOBIN A1C LEVEL >9.0%: CPT | Mod: CPTII,S$GLB,, | Performed by: FAMILY MEDICINE

## 2024-06-06 PROCEDURE — 4010F ACE/ARB THERAPY RXD/TAKEN: CPT | Mod: CPTII,S$GLB,, | Performed by: FAMILY MEDICINE

## 2024-06-06 PROCEDURE — 3075F SYST BP GE 130 - 139MM HG: CPT | Mod: CPTII,S$GLB,, | Performed by: FAMILY MEDICINE

## 2024-06-06 PROCEDURE — 99214 OFFICE O/P EST MOD 30 MIN: CPT | Mod: S$GLB,,, | Performed by: FAMILY MEDICINE

## 2024-06-06 PROCEDURE — 3078F DIAST BP <80 MM HG: CPT | Mod: CPTII,S$GLB,, | Performed by: FAMILY MEDICINE

## 2024-06-06 PROCEDURE — 99999 PR PBB SHADOW E&M-EST. PATIENT-LVL III: CPT | Mod: PBBFAC,,, | Performed by: FAMILY MEDICINE

## 2024-06-06 PROCEDURE — G2211 COMPLEX E/M VISIT ADD ON: HCPCS | Mod: S$GLB,,, | Performed by: FAMILY MEDICINE

## 2024-06-06 PROCEDURE — 3008F BODY MASS INDEX DOCD: CPT | Mod: CPTII,S$GLB,, | Performed by: FAMILY MEDICINE

## 2024-06-06 RX ORDER — TIRZEPATIDE 12.5 MG/.5ML
12.5 INJECTION, SOLUTION SUBCUTANEOUS
Qty: 12 PEN | Refills: 0 | Status: SHIPPED | OUTPATIENT
Start: 2024-06-06 | End: 2024-06-11 | Stop reason: RX

## 2024-06-06 NOTE — PROGRESS NOTES
Subjective:       Patient ID: Brett Garcia is a 58 y.o. male.    Chief Complaint: No chief complaint on file.    HPI  58    Patient Active Problem List   Diagnosis    Hyperlipidemia    BMI 33.0-33.9,adult    Hypertension associated with diabetes    Skin lesions    Diabetic polyneuropathy associated with type 2 diabetes mellitus    Stress reaction    Hyperlipidemia associated with type 2 diabetes mellitus    Type 2 diabetes mellitus with diabetic neuropathy, with long-term current use of insulin    Elevated diaphragm    Decreased range of motion (ROM) of left knee    Weakness of trunk musculature    Lumbar pain    Left leg pain    Abnormal gait       Past Medical History:   Diagnosis Date    Diabetes mellitus with neuropathy 2009    BS 57 am 12/18/2015    Hyperlipidemia 2009    Hypertension associated with diabetes 4/21/2017       Past Surgical History:   Procedure Laterality Date    COLONOSCOPY N/A 11/01/2018    Procedure: COLONOSCOPY;  Surgeon: Kristina Wilson MD;  Location: [facility] ENDO;  Service: Endoscopy;  Laterality: N/A;    COLONOSCOPY N/A 12/01/2023    Procedure: COLONOSCOPY;  Surgeon: Kristina Wilson MD;  Location: [facility];  Service: Endoscopy;  Laterality: N/A;    COLONOSCOPY W/ POLYPECTOMY  11/01/2018    Polyp x1, repeat 5 years; Dr. Wilson    SELECTIVE INJECTION OF ANESTHETIC AGENT AROUND LUMBAR SPINAL NERVE ROOT BY TRANSFORAMINAL APPROACH Bilateral 11/02/2023    Procedure: Bilateral L5/S1 TF BRE;  Surgeon: Wilfred Serrano MD;  Location: [facility] PAIN [facility]GT;  Service: Pain Management;  Laterality: Bilateral;       Family History   Problem Relation Name Age of Onset    Diabetes Father Herman V.     Hypertension Father Herman V.     Heart attack Father Herman V.     Cataracts Father Herman V.     Hearing loss Father Herman V.     Heart disease Father Herman V.     Vision loss Father Herman V.     Hypertension Mother Catia     Hypertension Sister Amanda        Social History     Tobacco Use   Smoking  Status Never    Passive exposure: Never   Smokeless Tobacco Former    Types: Snuff    Quit date: 2008       Wt Readings from Last 5 Encounters:   06/06/24 101.2 kg (223 lb)   04/26/24 100.7 kg (222 lb)   12/14/23 104.5 kg (230 lb 7.9 oz)   12/01/23 98.4 kg (217 lb)   11/02/23 103 kg (227 lb 1.2 oz)       For further HPI details, see assessment and plan.    Review of Systems    Objective:      Vitals:    06/06/24 1332   BP: 136/66   Pulse: 95   Resp: 18       Physical Exam  Constitutional:       General: He is not in acute distress.     Appearance: He is not ill-appearing.   Pulmonary:      Effort: Pulmonary effort is normal. No respiratory distress.   Neurological:      General: No focal deficit present.      Mental Status: He is alert.   Psychiatric:         Mood and Affect: Mood normal.         Behavior: Behavior normal.         Assessment:       1. Hypertension associated with diabetes    2. Type 2 diabetes mellitus with diabetic neuropathy, with long-term current use of insulin    3. Elevated LFTs    4. Mild anemia    5. Fatty liver        Plan:       Type 2 diabetes   Uncontrolled   It has been a difficult journey trying to find the right medication. Ongoing issue.  Patient was on Mounjaro and doing good with the medication but unfortunately stock issues became problematic.  Transitioned to Trulicity but now having stock issues with Trulicity.  Patient had increased amounts of GI issues with the Ozempic as compared to Trulicity and Mounjaro.  He did well with Mounjaro.    Spoke to the pharmacy and they have a 12.5 mg dosage of Mounjaro.  We will prescribe such today for a three-month supply in hopes we will be able to transitioned to the 15 mg once it becomes available.      In addition to Mounjaro continued the following approach   Long-acting insulin Toujeo 60 units   Short-acting NovoLog t.I.d. sliding scale   Prandin 2 mg t.I.d.   Metformin 1000 mg b.I.d.       Patient was diabetes has been quite difficult  to control and I would greatly appreciate assistance from the diabetes management team.  I can see they attempted to call and were unable to reach to schedule.  We will ask staff to help him get a scheduled follow up with them.  I would like to talk to the patient myself in roughly 3 months     Hypertension   Controlled   No change to his lisinopril 5     Hyperlipidemia   Lipitor 40.  Continue medication  Patient was back on aspirin and tolerating.  Continue medication     Anemia  Mild  Recheck in 3 month w/ other markers to determine caus    Fatty liver  Persistent elevations  Repeat hepatic function panel w/ next round of labs.    Lab 3 mo  F/u me after  This note was verbally dictated, please excuse any type errors.

## 2024-06-07 ENCOUNTER — TELEPHONE (OUTPATIENT)
Dept: DIABETES | Facility: CLINIC | Age: 59
End: 2024-06-07
Payer: COMMERCIAL

## 2024-06-07 NOTE — TELEPHONE ENCOUNTER
Patient call returned to schedule follow up appointment. Patient scheduled soonest available, voiced understanding of time and date.  ----- Message from Jael Camacho sent at 6/7/2024  3:19 PM CDT -----  Patient is requesting a call back regarding scheduling an appointment. Call back number is [phone]. Thx.EL

## 2024-06-11 ENCOUNTER — OFFICE VISIT (OUTPATIENT)
Dept: DIABETES | Facility: CLINIC | Age: 59
End: 2024-06-11
Payer: COMMERCIAL

## 2024-06-11 DIAGNOSIS — E11.59 HYPERTENSION ASSOCIATED WITH DIABETES: ICD-10-CM

## 2024-06-11 DIAGNOSIS — Z79.4 TYPE 2 DIABETES MELLITUS WITH DIABETIC NEUROPATHY, WITH LONG-TERM CURRENT USE OF INSULIN: Primary | ICD-10-CM

## 2024-06-11 DIAGNOSIS — I15.2 HYPERTENSION ASSOCIATED WITH DIABETES: ICD-10-CM

## 2024-06-11 DIAGNOSIS — E11.69 HYPERLIPIDEMIA ASSOCIATED WITH TYPE 2 DIABETES MELLITUS: ICD-10-CM

## 2024-06-11 DIAGNOSIS — E78.5 HYPERLIPIDEMIA ASSOCIATED WITH TYPE 2 DIABETES MELLITUS: ICD-10-CM

## 2024-06-11 DIAGNOSIS — E11.40 TYPE 2 DIABETES MELLITUS WITH DIABETIC NEUROPATHY, WITH LONG-TERM CURRENT USE OF INSULIN: Primary | ICD-10-CM

## 2024-06-11 PROCEDURE — 3046F HEMOGLOBIN A1C LEVEL >9.0%: CPT | Mod: CPTII,95,, | Performed by: NURSE PRACTITIONER

## 2024-06-11 PROCEDURE — 4010F ACE/ARB THERAPY RXD/TAKEN: CPT | Mod: CPTII,95,, | Performed by: NURSE PRACTITIONER

## 2024-06-11 PROCEDURE — 99214 OFFICE O/P EST MOD 30 MIN: CPT | Mod: 95,,, | Performed by: NURSE PRACTITIONER

## 2024-06-11 RX ORDER — TIRZEPATIDE 12.5 MG/.5ML
12.5 INJECTION, SOLUTION SUBCUTANEOUS
Qty: 2 ML | Refills: 11 | Status: SHIPPED | OUTPATIENT
Start: 2024-06-11 | End: 2025-06-11

## 2024-06-11 RX ORDER — BLOOD-GLUCOSE SENSOR
1 EACH MISCELLANEOUS DAILY
Qty: 2 EACH | Refills: 11 | Status: SHIPPED | OUTPATIENT
Start: 2024-06-11 | End: 2025-06-11

## 2024-06-11 NOTE — PATIENT INSTRUCTIONS
PATIENT INSTRUCTIONS    Continue Toujeo 60 units subcutaneously daily.   Continue Metformin 1000 mg by mouth twice daily.   Continue Prandin 4 mg by mouth twice daily before meals.   Continue Mounjaro 12.5 mg subcutaneously every 7 days.     Restart Unique 3 CGM  Blood Sugar Goals:       Fastin-130.       1-2 hours after a meal: Less than 180.

## 2024-06-11 NOTE — PROGRESS NOTES
Telemedicine Visit    The patient location is home  The chief complaint leading to consultation is: Diabetes    Visit type: audiovisual    Face to Face time with patient: 30  30 minutes of total time spent on the encounter, which includes face to face time and non-face to face time preparing to see the patient (eg, review of tests), Obtaining and/or reviewing separately obtained history, Documenting clinical information in the electronic or other health record, Independently interpreting results (not separately reported) and communicating results to the patient/family/caregiver, or Care coordination (not separately reported).  Each patient to whom he or she provides medical services by telemedicine is:  (1) informed of the relationship between the physician and patient and the respective role of any other health care provider with respect to management of the patient; and (2) notified that he or she may decline to receive medical services by telemedicine and may withdraw from such care at any time.  Notes:         Brett Garcia is a 58 y.o. male who  has a past medical history of Diabetes mellitus with neuropathy (2009), Hyperlipidemia (2009), and Hypertension associated with diabetes (4/21/2017)., who present for an initial evaluation of Type 2 diabetes mellitus.     CHIEF COMPLAINT: Diabetes Consultation    PCP: Herb Lord MD     The patient was initially diagnosed with diabetes 9 years ago.     Previous failed treatments include:  Trulicity - Ineffective  Ozempic - n/v.     Social Documentation:  Patient lives in [place], with wife.   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   Diet: states has worked hard on diet.     Current monitoring regimen: capillary blood glucose monitoring with finger sticks.   Fasting - 70-120s.     Current Diabetes related symptoms/problems include none and have been unchanged.     Diabetes related  "complications:   none.   denies Pancreatitis  denies Gastroparesis  denies DKA  denies Hx/family Hx of MEN2/MTC  denies Frequent UTIs/yeast infections    Cardiovascular Risk Factors: advanced age (>55 for men, >65 for women), dyslipidemia, hypertension, male gender, and obesity (BMI >30 kg/m2).    Any episodes of hypoglycemia?  YES, when he start Mounjaro 15 mg   Hypoglycemia Unawareness? No  Severe hypoglycemia requiring 3rd party? No    Last visit with Diabetes Education: none    Diabetes Medications               insulin aspart U-100 (NOVOLOG U-100 INSULIN ASPART) 100 unit/mL injection Inject 5 Units into the skin 3 (three) times daily before meals.    insulin glargine, TOUJEO, (TOUJEO SOLOSTAR U-300 INSULIN) 300 unit/mL (1.5 mL) InPn pen Inject 40 Units into the skin every evening. May titrate up to a max dose of 45 units/day. - TAKING 60 UNITS DAILY.     metFORMIN (GLUCOPHAGE) 1000 MG tablet Take 1 tablet (1,000 mg total) by mouth 2 (two) times daily with meals.    repaglinide (PRANDIN) 2 MG tablet TAKE 2 TABLETS BY MOUTH 3 (THREE) TIMES DAILY BEFORE MEALS. - TAKING BEFORE LUNCH AND SUPPER.     tirzepatide (MOUNJARO) 12.5 mg/0.5 mL PnIj Inject 12.5 mg into the skin every 7 days. - OFF 3 MONTHS DUE TO SHORTAGE, RESTARTED LAST WEEK AFTER BEING ON TRULICITY FOR 3 MONTHS.      DIABETES DISEASE MANAGEMENT STATUS  Statin: Taking  ACE/ARB: Taking  Screening or Prevention Patient's value Goal Complete/Controlled?   HgA1C Testing and Control   Lab Results   Component Value Date    HGBA1C 9.7 (H) 05/09/2024      Annually/Less than 8% No   Lipid profile : 08/25/2023 Annually Yes   LDL control Lab Results   Component Value Date    LDLCALC 39.2 (L) 08/25/2023    Annually/Less than 100 mg/dl  Yes   Nephropathy screening Lab Results   Component Value Date    LABMICR 12.0 08/25/2023     Lab Results   Component Value Date    PROTEINUA Trace (A) 06/30/2016     No results found for: "UTPCR"   Annually Yes   Blood pressure BP " Readings from Last 1 Encounters:   06/06/24 136/66    Less than 140/90 Yes   Dilated retinal exam : 09/08/2023 Annually Yes   Foot exam   : 08/25/2023 Annually Yes   Patient's medications, allergies, past medical, surgical, social and family histories were reviewed and updated as appropriate.     Review of Systems   Constitutional:  Negative for weight loss.   Eyes:  Negative for blurred vision and double vision.   Cardiovascular:  Negative for chest pain.   Gastrointestinal:  Negative for nausea and vomiting.   Genitourinary:  Negative for frequency.   Musculoskeletal:  Negative for falls.   Neurological:  Negative for dizziness and weakness.   Endo/Heme/Allergies:  Negative for polydipsia.   Psychiatric/Behavioral:  Negative for depression.    All other systems reviewed and are negative.       Physical Exam  Constitutional:       General: He is not in acute distress.     Appearance: Normal appearance.   Pulmonary:      Effort: No respiratory distress.   Neurological:      Mental Status: He is alert and oriented to person, place, and time.   Psychiatric:         Mood and Affect: Mood normal.         Behavior: Behavior normal.        There were no vitals taken for this visit.  Wt Readings from Last 3 Encounters:   06/06/24 101.2 kg (223 lb)   04/26/24 100.7 kg (222 lb)   12/14/23 104.5 kg (230 lb 7.9 oz)       LAB REVIEW  Lab Results   Component Value Date     05/09/2024    K 4.5 05/09/2024     05/09/2024    CO2 24 05/09/2024    BUN 22 (H) 05/09/2024    CREATININE 0.9 05/09/2024    CALCIUM 9.2 05/09/2024    ANIONGAP 10 05/09/2024    EGFRNORACEVR >60.0 05/09/2024     Lab Results   Component Value Date    CPEPTIDE 2.4 04/21/2017    GLUTAMICACID 0.00 04/21/2017     Hemoglobin A1C   Date Value Ref Range Status   05/09/2024 9.7 (H) 4.0 - 5.6 % Final     Comment:     ADA Screening Guidelines:  5.7-6.4%  Consistent with prediabetes  >or=6.5%  Consistent with diabetes    High levels of fetal hemoglobin interfere  with the HbA1C  assay. Heterozygous hemoglobin variants (HbS, HgC, etc)do  not significantly interfere with this assay.   However, presence of multiple variants may affect accuracy.     12/08/2023 8.6 (H) 4.0 - 5.6 % Final     Comment:     ADA Screening Guidelines:  5.7-6.4%  Consistent with prediabetes  >or=6.5%  Consistent with diabetes    High levels of fetal hemoglobin interfere with the HbA1C  assay. Heterozygous hemoglobin variants (HbS, HgC, etc)do  not significantly interfere with this assay.   However, presence of multiple variants may affect accuracy.     08/25/2023 8.2 (H) 4.0 - 5.6 % Final     Comment:     ADA Screening Guidelines:  5.7-6.4%  Consistent with prediabetes  >or=6.5%  Consistent with diabetes    High levels of fetal hemoglobin interfere with the HbA1C  assay. Heterozygous hemoglobin variants (HbS, HgC, etc)do  not significantly interfere with this assay.   However, presence of multiple variants may affect accuracy.         ASSESSMENT    ICD-10-CM ICD-9-CM   1. Type 2 diabetes mellitus with diabetic neuropathy, with long-term current use of insulin  E11.40 250.60    Z79.4 357.2     V58.67   2. Hypertension associated with diabetes  E11.59 250.80    I15.2 401.9   3. Hyperlipidemia associated with type 2 diabetes mellitus  E11.69 250.80    E78.5 272.4        PLAN  Diagnoses and all orders for this visit:    Type 2 diabetes mellitus with diabetic neuropathy, with long-term current use of insulin  -     blood-glucose sensor (FREESTYLE CLEOPATRA 3 SENSOR) Elisha; 1 each by Misc.(Non-Drug; Combo Route) route once daily.  -     tirzepatide (MOUNJARO) 12.5 mg/0.5 mL PnIj; Inject 12.5 mg into the skin every 7 days.    Hypertension associated with diabetes    Hyperlipidemia associated with type 2 diabetes mellitus        Reviewed pathophysiology of diabetes, complications related to the disease, importance of annual dilated eye exam and daily foot examination. Explained SE YEE, dosage of medications.  Written instructions given and reviewed with patient and patient verbalizes understanding.     PATIENT INSTRUCTIONS    Continue Toujeo 60 units subcutaneously daily.   Continue Metformin 1000 mg by mouth twice daily.   Continue Prandin 4 mg by mouth twice daily before meals.   Continue Mounjaro 12.5 mg subcutaneously every 7 days.     Restart Unique 3 CGM  Blood Sugar Goals:       Fastin-130.       1-2 hours after a meal: Less than 180.     Follow up in about 6 weeks (around 2024) for Robert Calhoun.    Portions of this note were prepared with [employer] voice recognition transcription software. Grammatical errors, including garbled syntax, mangle pronouns, and other bizarre constructions may be attributed to that software system.

## 2024-06-14 ENCOUNTER — OFFICE VISIT (OUTPATIENT)
Dept: DERMATOLOGY | Facility: CLINIC | Age: 59
End: 2024-06-14
Payer: COMMERCIAL

## 2024-06-14 DIAGNOSIS — L82.1 SEBORRHEIC KERATOSES: ICD-10-CM

## 2024-06-14 DIAGNOSIS — Z12.83 SCREENING, MALIGNANT NEOPLASM, SKIN: ICD-10-CM

## 2024-06-14 DIAGNOSIS — B35.1 ONYCHOMYCOSIS: Primary | ICD-10-CM

## 2024-06-14 DIAGNOSIS — L81.4 LENTIGINES: ICD-10-CM

## 2024-06-14 PROCEDURE — 99999 PR PBB SHADOW E&M-EST. PATIENT-LVL IV: CPT | Mod: PBBFAC,,, | Performed by: STUDENT IN AN ORGANIZED HEALTH CARE EDUCATION/TRAINING PROGRAM

## 2024-06-14 PROCEDURE — 4010F ACE/ARB THERAPY RXD/TAKEN: CPT | Mod: CPTII,S$GLB,, | Performed by: STUDENT IN AN ORGANIZED HEALTH CARE EDUCATION/TRAINING PROGRAM

## 2024-06-14 PROCEDURE — 99214 OFFICE O/P EST MOD 30 MIN: CPT | Mod: S$GLB,,, | Performed by: STUDENT IN AN ORGANIZED HEALTH CARE EDUCATION/TRAINING PROGRAM

## 2024-06-14 PROCEDURE — 1160F RVW MEDS BY RX/DR IN RCRD: CPT | Mod: CPTII,S$GLB,, | Performed by: STUDENT IN AN ORGANIZED HEALTH CARE EDUCATION/TRAINING PROGRAM

## 2024-06-14 PROCEDURE — 1159F MED LIST DOCD IN RCRD: CPT | Mod: CPTII,S$GLB,, | Performed by: STUDENT IN AN ORGANIZED HEALTH CARE EDUCATION/TRAINING PROGRAM

## 2024-06-14 PROCEDURE — G2211 COMPLEX E/M VISIT ADD ON: HCPCS | Mod: S$GLB,,, | Performed by: STUDENT IN AN ORGANIZED HEALTH CARE EDUCATION/TRAINING PROGRAM

## 2024-06-14 PROCEDURE — 3046F HEMOGLOBIN A1C LEVEL >9.0%: CPT | Mod: CPTII,S$GLB,, | Performed by: STUDENT IN AN ORGANIZED HEALTH CARE EDUCATION/TRAINING PROGRAM

## 2024-06-14 RX ORDER — FLUCONAZOLE 200 MG/1
200 TABLET ORAL WEEKLY
Qty: 12 TABLET | Refills: 3 | Status: SHIPPED | OUTPATIENT
Start: 2024-06-14 | End: 2024-09-06

## 2024-06-14 NOTE — PROGRESS NOTES
Subjective:       Patient ID:  Brett Garcia is a 58 y.o. male who presents for   Chief Complaint   Patient presents with    S[place] Check     [facility] [place] concern of the spot located on the forearms s/s none tx none .      History of Present Illness: The patient presents for follow up of skin check. Last seen in 2021. Patient reports having several scattered brownish patches and growths on the arms and back. Denies any rapidly growing, bleeding or non healing skin lesions. Does report having increased yellowing and thickening of the toenails on both feet. Has not tried anything for symptoms and is interested in treatment.         Review of Systems   Constitutional:  Negative for fever and chills.   Skin:  Negative for itching, rash and dry skin.        Objective:    Physical Exam   Constitutional: He appears well-developed and well-nourished. No distress.   Neurological: He is alert and oriented to person, place, and time. He is not disoriented.   Psychiatric: He has a normal mood and affect.   Skin:   Areas Examined (abnormalities noted in diagram):   Scalp / Hair Palpated and Inspected  Head / Face Inspection Performed  Neck Inspection Performed  Chest / Axilla Inspection Performed  Abdomen Inspection Performed  Genitals / Buttocks / Groin Inspection Performed  Back Inspection Performed  RUE Inspected  LUE Inspection Performed  RLE Inspected  LLE Inspection Performed  Nails and Digits Inspection Performed                  Diagram Legend     Erythematous scaling macule/papule c/w actinic keratosis       Vascular papule c/w angioma      Pigmented verrucoid papule/plaque c/w seborrheic keratosis      Yellow umbilicated papule c/w sebaceous hyperplasia      Irregularly shaped tan macule c/w lentigo     1-2 mm smooth white papules consistent with Milia      Movable subcutaneous cyst with punctum c/w epidermal inclusion cyst      Subcutaneous movable cyst c/w pilar cyst      Firm pink to brown papule c/w dermatofibroma       Pedunculated fleshy papule(s) c/w skin tag(s)      Evenly pigmented macule c/w junctional nevus     Mildly variegated pigmented, slightly irregular-bordered macule c/w mildly atypical nevus      Flesh colored to evenly pigmented papule c/w intradermal nevus       Pink pearly papule/plaque c/w basal cell carcinoma      Erythematous hyperkeratotic cursted plaque c/w SCC      Surgical scar with no sign of skin cancer recurrence      Open and closed comedones      Inflammatory papules and pustules      Verrucoid papule consistent consistent with wart     Erythematous eczematous patches and plaques     Dystrophic onycholytic nail with subungual debris c/w onychomycosis     Umbilicated papule    Erythematous-base heme-crusted tan verrucoid plaque consistent with inflamed seborrheic keratosis     Erythematous Silvery Scaling Plaque c/w Psoriasis     See annotation      Assessment / Plan:        Onychomycosis - discussed treatment options. Will proceed with once weekly fluconazole.   -     fluconazole (DIFLUCAN) 200 MG Tab; Take 1 tablet (200 mg total) by mouth once a week.  Dispense: 12 tablet; Refill: 3    Seborrheic keratoses  These are benign inherited growths without a malignant potential. Reassurance given to patient. No treatment is necessary.     Lentigines  This is a benign hyperpigmented sun induced lesion. Recommend daily sun protection/avoidance and use of at least SPF 30, broad spectrum sunscreen (OTC drug) will reduce the number of new lesions. Treatment of these benign lesions are considered cosmetic.    Screening, malignant neoplasm, skin  total body skin examination performed today including at least 12 points as noted in physical examination. No lesions suspicious for malignancy noted.  Reassurance provided.    Instructed patient to observe lesion(s) for changes and follow up in clinic if changes are noted. Patient to monitor skin at home for new or changing lesions and follow up in clinic if  noted.    Discussed ABCDE's of moles and brochure provided.             Follow up in about 1 year (around 6/14/2025).

## 2024-06-15 NOTE — TELEPHONE ENCOUNTER
No care due was identified.  [facility] Embedded Care Due Messages. Reference number: [id number].   6/15/2024 2:14:05 PM CDT

## 2024-06-17 RX ORDER — INSULIN ASPART 100 [IU]/ML
5 INJECTION, SOLUTION INTRAVENOUS; SUBCUTANEOUS
Qty: 20 ML | Refills: 1 | Status: SHIPPED | OUTPATIENT
Start: 2024-06-17 | End: 2025-06-17

## 2024-06-17 NOTE — TELEPHONE ENCOUNTER
Refill Routing Note   Medication(s) are not appropriate for processing by [facility] for the following reason(s):        New or recently adjusted medication    [facility] action(s):  Defer        Medication Therapy Plan: New start (4/26/24)      Appointments  past 12m or future 3m with PCP    Date Provider   Last Visit   6/6/2024 Herb Lord MD   Next Visit   8/2/2024 Herb Lord MD   ED visits in past 90 days: 0        Note composed:10:30 AM 06/17/2024

## 2024-06-21 ENCOUNTER — TELEPHONE (OUTPATIENT)
Dept: DIABETES | Facility: CLINIC | Age: 59
End: 2024-06-21
Payer: COMMERCIAL

## 2024-06-21 NOTE — TELEPHONE ENCOUNTER
Please tell him to drop his Toujeo to 40 units and update Eli Monday.     Spoke with patient and he voiced understanding

## 2024-06-21 NOTE — TELEPHONE ENCOUNTER
----- Message from Hamilton Arreola sent at 6/21/2024  3:34 PM CDT -----  Contact: Brett West would like a call back at [phone] in regards to wanting to speak with with nurse about the recent switch of taking the medication,Mounjaro in the evening. Patient states his blood sugar is going super low in the middle of the night and would like to discuss tweaking his medication he is taking   Thanks   Am

## 2024-06-21 NOTE — TELEPHONE ENCOUNTER
Spoke with patient and he states he tweaked his medications a little bit by taking 1 Prandin 3x daily instead of 2 3x daily Patient also states he's been taking 46 units of Toujeo, but in the middle of the night he's getting lows 66-65. So he wants to know what to do

## 2024-06-25 ENCOUNTER — TELEPHONE (OUTPATIENT)
Dept: DIABETES | Facility: CLINIC | Age: 59
End: 2024-06-25
Payer: COMMERCIAL

## 2024-06-25 NOTE — TELEPHONE ENCOUNTER
Patient call returned regarding unique 3 sensor. Patient stated he had to use his last sensor because of it the first sensor falling off, patient has 5 days left and to early for refill. Patient informed we will give him sensor and it is at the  for  at [facility], patient voiced understanding.  ----- Message from Jessica Arambula sent at 6/25/2024  4:06 PM CDT -----  Pt is calling in regards to getting assistance with Freestyle Unique 3. Pt stated he put second device on instead of first device. Pt can be reached at [phone].            Thanks  JOSEPH

## 2024-06-25 NOTE — TELEPHONE ENCOUNTER
----- Message from Kristina Pereira sent at 6/25/2024  4:00 PM CDT -----  Contact: ANTWON LARIOS [[id number]]  ..Type:  Patient Requesting Call    Who Called: ANTWON LARIOS [[id number]]  Does the patient know what this is regarding?: pt having issues with freestyle patches that he put on arm, Reports using both and currently out   Would the patient rather a call back or a response via [facility]? call  Best Call Back Number: .[phone] (home)   Additional Information:

## 2024-06-29 DIAGNOSIS — E11.40 TYPE 2 DIABETES MELLITUS WITH DIABETIC NEUROPATHY, WITH LONG-TERM CURRENT USE OF INSULIN: ICD-10-CM

## 2024-06-29 DIAGNOSIS — Z79.4 TYPE 2 DIABETES MELLITUS WITH DIABETIC NEUROPATHY, WITH LONG-TERM CURRENT USE OF INSULIN: ICD-10-CM

## 2024-07-01 ENCOUNTER — TELEPHONE (OUTPATIENT)
Dept: DIABETES | Facility: CLINIC | Age: 59
End: 2024-07-01
Payer: COMMERCIAL

## 2024-07-01 RX ORDER — INSULIN DEGLUDEC 200 U/ML
INJECTION, SOLUTION SUBCUTANEOUS
OUTPATIENT
Start: 2024-07-01

## 2024-07-01 NOTE — TELEPHONE ENCOUNTER
----- Message from Gricelda Huerta sent at 7/1/2024  3:56 PM CDT -----  The pt is needing a call a back in regards to his blood-glucose sensor (FREESTYLE CLEOPATRA 3 SENSOR) Elisha. He is stating that his blood sugar is going up and down consistently and a few concerns.  It is keeping him up at night. Please give a call back at [phone]

## 2024-07-01 NOTE — TELEPHONE ENCOUNTER
Patient called regarding glucose levels fluctuating patient denies any signs and symptoms, advised patient to calibrate device and please provide updated if issue is resolved

## 2024-07-24 ENCOUNTER — PATIENT OUTREACH (OUTPATIENT)
Dept: ADMINISTRATIVE | Facility: HOSPITAL | Age: 59
End: 2024-07-24
Payer: COMMERCIAL

## 2024-07-24 DIAGNOSIS — E78.5 HYPERLIPIDEMIA ASSOCIATED WITH TYPE 2 DIABETES MELLITUS: ICD-10-CM

## 2024-07-24 DIAGNOSIS — Z79.4 TYPE 2 DIABETES MELLITUS WITH DIABETIC NEUROPATHY, WITH LONG-TERM CURRENT USE OF INSULIN: ICD-10-CM

## 2024-07-24 DIAGNOSIS — E11.69 HYPERLIPIDEMIA ASSOCIATED WITH TYPE 2 DIABETES MELLITUS: ICD-10-CM

## 2024-07-24 DIAGNOSIS — E11.40 TYPE 2 DIABETES MELLITUS WITH DIABETIC NEUROPATHY, WITH LONG-TERM CURRENT USE OF INSULIN: ICD-10-CM

## 2024-07-24 DIAGNOSIS — E11.59 HYPERTENSION ASSOCIATED WITH DIABETES: Primary | ICD-10-CM

## 2024-07-24 DIAGNOSIS — I15.2 HYPERTENSION ASSOCIATED WITH DIABETES: Primary | ICD-10-CM

## 2024-07-24 NOTE — PROGRESS NOTES
VBHM Score: 0     Patient is not due for any topics at this time.    RSV Vaccine            Health Maintenance Topic(s) Outreach Outcomes & Actions Taken:    Lab(s) - Outreach Outcomes & Actions Taken  : Overdue Lab(s) Ordered and Overdue Lab(s) Scheduled dm labs scheduled for 9/06/24.    Primary Care Appt - Outreach Outcomes & Actions Taken  : Pt has 2 follow up appts scheduled for Aug and Sept. Was last seen in June, told to follow up in 3 mon. Aug appt cancelled.    Eye Exam - Outreach Outcomes & Actions Taken  : Pt Will Schedule with External Provider / Order Routed & Care Team Updated if Applicable States he has not schedule eye exam for this year yet, but will schedule appt in Sept.        Care Management, Digital Medicine, and/or Education Referrals    OPCM Risk Score: 25.2         Next Steps - Referral Actions: Digital Medicine Outcomes and Actions Taken: Pt Declined or Not Eligible        Additional Notes:  Not eligible for Dig Med.  No referrals placed.  SDOH reviewed April 2024.

## 2024-07-27 NOTE — TELEPHONE ENCOUNTER
Care Due:                  Date            Visit Type   Department     Provider  --------------------------------------------------------------------------------                                EP -                              PRIMARY      [facility] INTERNAL  Last Visit: 06-      CARE ([facility])   OH Lord                              EP -                              PRIMARY      [facility] INTERNAL  Next Visit: 09-      CARE ([facility])   OH Lord                                                            Last  Test          Frequency    Reason                     Performed    Due Date  --------------------------------------------------------------------------------    Lipid Panel.  12 months..  atorvastatin.............  08- 08-    [facility] Embedded Care Due Messages. Reference number: [id number].   7/27/2024 9:43:01 AM CDT

## 2024-07-29 ENCOUNTER — OFFICE VISIT (OUTPATIENT)
Dept: DIABETES | Facility: CLINIC | Age: 59
End: 2024-07-29
Payer: COMMERCIAL

## 2024-07-29 DIAGNOSIS — I15.2 HYPERTENSION ASSOCIATED WITH DIABETES: ICD-10-CM

## 2024-07-29 DIAGNOSIS — E11.69 HYPERLIPIDEMIA ASSOCIATED WITH TYPE 2 DIABETES MELLITUS: ICD-10-CM

## 2024-07-29 DIAGNOSIS — E11.59 HYPERTENSION ASSOCIATED WITH DIABETES: ICD-10-CM

## 2024-07-29 DIAGNOSIS — Z79.4 TYPE 2 DIABETES MELLITUS WITH DIABETIC NEUROPATHY, WITH LONG-TERM CURRENT USE OF INSULIN: Primary | ICD-10-CM

## 2024-07-29 DIAGNOSIS — E78.5 HYPERLIPIDEMIA ASSOCIATED WITH TYPE 2 DIABETES MELLITUS: ICD-10-CM

## 2024-07-29 DIAGNOSIS — E11.40 TYPE 2 DIABETES MELLITUS WITH DIABETIC NEUROPATHY, WITH LONG-TERM CURRENT USE OF INSULIN: Primary | ICD-10-CM

## 2024-07-29 PROCEDURE — 4010F ACE/ARB THERAPY RXD/TAKEN: CPT | Mod: CPTII,95,, | Performed by: NURSE PRACTITIONER

## 2024-07-29 PROCEDURE — 3046F HEMOGLOBIN A1C LEVEL >9.0%: CPT | Mod: CPTII,95,, | Performed by: NURSE PRACTITIONER

## 2024-07-29 PROCEDURE — 99214 OFFICE O/P EST MOD 30 MIN: CPT | Mod: 95,,, | Performed by: NURSE PRACTITIONER

## 2024-07-29 PROCEDURE — G2211 COMPLEX E/M VISIT ADD ON: HCPCS | Mod: 95,,, | Performed by: NURSE PRACTITIONER

## 2024-07-29 PROCEDURE — 95251 CONT GLUC MNTR ANALYSIS I&R: CPT | Mod: NDTC,,, | Performed by: NURSE PRACTITIONER

## 2024-07-29 RX ORDER — INSULIN ASPART 100 [IU]/ML
5 INJECTION, SOLUTION INTRAVENOUS; SUBCUTANEOUS
Qty: 20 ML | Refills: 1 | Status: SHIPPED | OUTPATIENT
Start: 2024-07-29 | End: 2025-07-29

## 2024-07-29 NOTE — PROGRESS NOTES
Physical Therapy  Visit Type: treatment  Precautions:  Medical precautions: ; contact & special precautions. Covid      SUBJECTIVE                                                                                                              Patient does not agree for therapy, however, he asked therapist to get him dressed and transfer to wheel chair, so this session that is what took place.       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 Some assistance/cuing to move upper body and R leg to the EOB.)  Transfers:    Assistive devices: 2 person, gait belt and rogers-walker    Sit to stand: moderate assist (Mod assistance with sit to stand due to L side being paralized, he is able to use his rogers and pivot to the right to get into the wheelchair, for d/c.)    Stand pivot: Max with the L side, but he did great pivioting to the right with use of rogers walker.  Training completed:      Patient uses Rogers walker, but also needs Max assistance on the L side. Cuing needed with all transfers for R side hand placement and to move the LE's.               ASSESSMENT                                                                                                                Impairments: range of motion, strength, balance deficits, activity tolerance, endurance, safety awareness and abnormal tone  Functional Limitations: all functional mobility              Documented in the chart in the following areas: Assessment.         The patient location is: Home  The chief complaint leading to consultation is: Diabetes    Visit type: audiovisual    Face to Face time with patient: 15  30 minutes of total time spent on the encounter, which includes face to face time and non-face to face time preparing to see the patient (eg, review of tests), Obtaining and/or reviewing separately obtained history, Documenting clinical information in the electronic or other health record, Independently interpreting results (not separately reported) and communicating results to the patient/family/caregiver, or Care coordination (not separately reported).  Each patient to whom he or she provides medical services by telemedicine is:  (1) informed of the relationship between the physician and patient and the respective role of any other health care provider with respect to management of the patient; and (2) notified that he or she may decline to receive medical services by telemedicine and may withdraw from such care at any time.    Notes:    Brett Garcia is a 58 y.o. male who presents for a follow up evaluation of Type 2 diabetes mellitus.     CHIEF COMPLAINT: Diabetes Consultation    PCP: Herb Lord MD      Initial visit with me - 6/11/2024    The patient was initially diagnosed with diabetes 9 years ago.      Previous failed treatments include:  Trulicity - Ineffective  Ozempic - n/v.      Social Documentation:  Patient lives in [place], with wife.   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   Diet: states has worked hard on diet.       Diabetes related complications:   none.   denies Pancreatitis  denies Gastroparesis  denies DKA  denies Hx/family Hx of MEN2/MTC  denies Frequent UTIs/yeast infections     Cardiovascular Risk Factors: advanced age (>55 for men, >65 for women), dyslipidemia, hypertension, male gender, and obesity (BMI >30 kg/m2).     Diabetes Medications                "insulin aspart U-100 (NOVOLOG U-100 INSULIN ASPART) 100 unit/mL injection Inject 5 Units into the skin 3 (three) times daily before meals.    insulin glargine, TOUJEO, (TOUJEO SOLOSTAR U-300 INSULIN) 300 unit/mL (1.5 mL) InPn pen Inject 40 Units into the skin every evening. May titrate up to a max dose of 45 units/day.    metFORMIN (GLUCOPHAGE) 1000 MG tablet Take 1 tablet (1,000 mg total) by mouth 2 (two) times daily with meals.    repaglinide (PRANDIN) 2 MG tablet TAKE 2 TABLETS BY MOUTH 3 (THREE) TIMES DAILY BEFORE MEALS.    tirzepatide (MOUNJARO) 12.5 mg/0.5 mL PnIj Inject 12.5 mg into the skin every 7 days.     Current monitoring regimen:  Unique 3 CGM    The patient's Unique CGM was downloaded and reviewed. For 14 days, patient average glucose was 166 mg/dL. He was above range 34% of the time, in range 65% of the time, and below range 1% of the time. The target range for this patient was 70 - 180 mg/dL. Overall, there was a pattern of post prandial hyperglycemia..      Patient currently taking insulin or sulfonylurea?  Yes. Emergency Glucagon Prescription current? yes  Recent hypoglycemic episodes: No.     Patient compliant with glucose checks and medication administration? Yes    DIABETES MANAGEMENT STATUS  Statin: Taking  ACE/ARB: Taking  Screening or Prevention Patient's value Goal Complete/Controlled?   HgA1C Testing and Control   Lab Results   Component Value Date    HGBA1C 9.7 (H) 05/09/2024      Annually/Less than 8% No   Lipid profile : 08/25/2023 Annually Yes   LDL control Lab Results   Component Value Date    LDLCALC 39.2 (L) 08/25/2023    Annually/Less than 100 mg/dl  Yes   Nephropathy screening Lab Results   Component Value Date    LABMICR 12.0 08/25/2023     Lab Results   Component Value Date    PROTEINUA Trace (A) 06/30/2016     No results found for: "UTPCR"   Annually Yes   Blood pressure BP Readings from Last 1 Encounters:   06/06/24 136/66    Less than 140/90 Yes   Dilated retinal exam : " 09/08/2023 Annually Yes   Foot exam   : 08/25/2023 Annually Yes   Patient's medications, allergies, surgical, social and family histories were reviewed and updated as appropriate.     Review of Systems   Constitutional:  Negative for weight loss.   Eyes:  Negative for blurred vision and double vision.   Cardiovascular:  Negative for chest pain.   Gastrointestinal:  Negative for nausea and vomiting.   Genitourinary:  Negative for frequency.   Musculoskeletal:  Negative for falls.   Neurological:  Negative for dizziness and weakness.   Endo/Heme/Allergies:  Negative for polydipsia.   Psychiatric/Behavioral:  Negative for depression.    All other systems reviewed and are negative.       Physical Exam  Constitutional:       General: He is not in acute distress.     Appearance: Normal appearance.   Pulmonary:      Effort: No respiratory distress.   Neurological:      Mental Status: He is alert and oriented to person, place, and time.   Psychiatric:         Mood and Affect: Mood normal.         Behavior: Behavior normal.        There were no vitals taken for this visit.  Wt Readings from Last 3 Encounters:   06/06/24 101.2 kg (223 lb)   04/26/24 100.7 kg (222 lb)   12/14/23 104.5 kg (230 lb 7.9 oz)       LAB REVIEW  Lab Results   Component Value Date     05/09/2024    K 4.5 05/09/2024     05/09/2024    CO2 24 05/09/2024    BUN 22 (H) 05/09/2024    CREATININE 0.9 05/09/2024    CALCIUM 9.2 05/09/2024    ANIONGAP 10 05/09/2024    EGFRNORACEVR >60.0 05/09/2024     Lab Results   Component Value Date    CPEPTIDE 2.4 04/21/2017    GLUTAMICACID 0.00 04/21/2017     Hemoglobin A1C   Date Value Ref Range Status   05/09/2024 9.7 (H) 4.0 - 5.6 % Final     Comment:     ADA Screening Guidelines:  5.7-6.4%  Consistent with prediabetes  >or=6.5%  Consistent with diabetes    High levels of fetal hemoglobin interfere with the HbA1C  assay. Heterozygous hemoglobin variants (HbS, HgC, etc)do  not significantly interfere with this  assay.   However, presence of multiple variants may affect accuracy.     12/08/2023 8.6 (H) 4.0 - 5.6 % Final     Comment:     ADA Screening Guidelines:  5.7-6.4%  Consistent with prediabetes  >or=6.5%  Consistent with diabetes    High levels of fetal hemoglobin interfere with the HbA1C  assay. Heterozygous hemoglobin variants (HbS, HgC, etc)do  not significantly interfere with this assay.   However, presence of multiple variants may affect accuracy.     08/25/2023 8.2 (H) 4.0 - 5.6 % Final     Comment:     ADA Screening Guidelines:  5.7-6.4%  Consistent with prediabetes  >or=6.5%  Consistent with diabetes    High levels of fetal hemoglobin interfere with the HbA1C  assay. Heterozygous hemoglobin variants (HbS, HgC, etc)do  not significantly interfere with this assay.   However, presence of multiple variants may affect accuracy.          ASSESSMENT    ICD-10-CM ICD-9-CM   1. Type 2 diabetes mellitus with diabetic neuropathy, with long-term current use of insulin  E11.40 250.60    Z79.4 357.2     V58.67   2. BMI 33.0-33.9,adult  Z68.33 V85.33   3. Hypertension associated with diabetes  E11.59 250.80    I15.2 401.9   4. Hyperlipidemia associated with type 2 diabetes mellitus  E11.69 250.80    E78.5 272.4       PLAN  Diagnoses and all orders for this visit:    Type 2 diabetes mellitus with diabetic neuropathy, with long-term current use of insulin  -     tirzepatide 15 mg/0.5 mL PnIj; Inject 15 mg into the skin every 7 days.  -     Ambulatory referral/consult to Diabetes Education; Future    BMI 33.0-33.9,adult    Hypertension associated with diabetes    Hyperlipidemia associated with type 2 diabetes mellitus        Reviewed pathophysiology of diabetes, complications related to the disease, importance of annual dilated eye exam and daily foot examination. Explained MOA, SE, dosage of medications. Written instructions given and reviewed with patient and patient verbalizes understanding.     PATIENT INSTRUCTIONS      Refer to diabetes education.      Lifestyle modification with well balanced diet and at least 30 minutes of physical activity daily recommended.   Increase water intake.   Increase protein and non-starchy vegetable servings with meals.   Decrease carbohydrate servings with meals.   Take 10 minute walk after each meal.   Avoid snacking on carbohydrates, choose low carb, high protein snacks.   Incorporate resistance training with weights or resistance bands with exercise regimen at least 3 days a week.      Decrease Toujeo 32 units subcutaneously daily.   Continue Metformin 1000 mg by mouth twice daily.   Continue Prandin 4 mg by mouth twice daily before meals.   Increase Mounjaro to 15 mg subcutaneously every 7 days.      Unique 3 CGM  Blood Sugar Goals:       Fastin-130.       1-2 hours after a meal: Less than 180.     Follow up in about 2 months (around 2024) for Unique Jones.    Portions of this note were prepared with [employer] Naturally Speaking voice recognition transcription software. Grammatical errors, including garbled syntax, mangle pronouns, and other bizarre constructions may be attributed to that software system.

## 2024-07-29 NOTE — PATIENT INSTRUCTIONS
PATIENT INSTRUCTIONS     Refer to diabetes education.      Lifestyle modification with well balanced diet and at least 30 minutes of physical activity daily recommended.   Increase water intake.   Increase protein and non-starchy vegetable servings with meals.   Decrease carbohydrate servings with meals.   Take 10 minute walk after each meal.   Avoid snacking on carbohydrates, choose low carb, high protein snacks.   Incorporate resistance training with weights or resistance bands with exercise regimen at least 3 days a week.      Decrease Toujeo 32 units subcutaneously daily.   Continue Metformin 1000 mg by mouth twice daily.   Continue Prandin 4 mg by mouth twice daily before meals.   Increase Mounjaro to 15 mg subcutaneously every 7 days.      Unique 3 CGM  Blood Sugar Goals:       Fastin-130.       1-2 hours after a meal: Less than 180.

## 2024-08-05 RX ORDER — SYRINGE,SAFETY WITH NEEDLE,1ML 25GX1"
1 SYRINGE (EA) MISCELLANEOUS 3 TIMES DAILY
Qty: 100 EACH | Refills: 0 | Status: SHIPPED | OUTPATIENT
Start: 2024-08-05

## 2024-08-07 ENCOUNTER — TELEPHONE (OUTPATIENT)
Dept: PHARMACY | Facility: CLINIC | Age: 59
End: 2024-08-07
Payer: COMMERCIAL

## 2024-08-20 ENCOUNTER — TELEPHONE (OUTPATIENT)
Dept: DIABETES | Facility: CLINIC | Age: 59
End: 2024-08-20
Payer: COMMERCIAL

## 2024-08-20 ENCOUNTER — PATIENT MESSAGE (OUTPATIENT)
Dept: ADMINISTRATIVE | Facility: HOSPITAL | Age: 59
End: 2024-08-20
Payer: COMMERCIAL

## 2024-08-20 DIAGNOSIS — E11.40 TYPE 2 DIABETES MELLITUS WITH DIABETIC NEUROPATHY, WITH LONG-TERM CURRENT USE OF INSULIN: ICD-10-CM

## 2024-08-20 DIAGNOSIS — Z79.4 TYPE 2 DIABETES MELLITUS WITH DIABETIC NEUROPATHY, WITH LONG-TERM CURRENT USE OF INSULIN: ICD-10-CM

## 2024-08-20 RX ORDER — INSULIN GLARGINE 300 [IU]/ML
40 INJECTION, SOLUTION SUBCUTANEOUS NIGHTLY
Qty: 3 ML | Refills: 11 | Status: SHIPPED | OUTPATIENT
Start: 2024-08-20 | End: 2025-08-20

## 2024-08-20 NOTE — TELEPHONE ENCOUNTER
Patient wants refill on Toujeo it is no longer on the formulary    Lantus Solostar U-100 3mL  Levemir U-100 Insulin 100 unit  Tresiba U-100 Insulin 100 unit  Toujeo Max U-300 Solostar  Basaglar Kwikpen U-100 Insulin 100

## 2024-08-20 NOTE — TELEPHONE ENCOUNTER
----- Message from Ernestine Gil sent at 8/19/2024  4:22 PM CDT -----  e:  RX Refill Request        Who Called: pt        RX Name and Strength:   TOUJEO SOLOSTAR U-300 INSULIN  insulin glargine, TOUJEO, (TOUJEO SOLOSTAR U-300 INSULIN) 300 unit/mL (1.5 mL) InPn pen          How is the patient currently taking it? (ex. 1XDay): 1 X day         Is this a 30 day or 90 day RX: \        Preferred Pharmacy with phone number:  [facility] #[id number] - [place], [place] - [address] AT [facility] (Ph: [phone])        Local or Mail Order: local        Ordering Provider: [facility] Diabetes clinical team        Would the patient rather a call back or a response via [facility]? call        Best Call Back Number: [phone]        Additional Information: call back need nurse to call pt please. Provider is no longer with [facility] and pt is out of insulin

## 2024-08-23 ENCOUNTER — CLINICAL SUPPORT (OUTPATIENT)
Dept: DIABETES | Facility: CLINIC | Age: 59
End: 2024-08-23
Payer: COMMERCIAL

## 2024-08-23 VITALS — BODY MASS INDEX: 32.43 KG/M2 | WEIGHT: 219.56 LBS

## 2024-08-23 DIAGNOSIS — Z79.4 TYPE 2 DIABETES MELLITUS WITH DIABETIC NEUROPATHY, WITH LONG-TERM CURRENT USE OF INSULIN: ICD-10-CM

## 2024-08-23 DIAGNOSIS — E11.40 TYPE 2 DIABETES MELLITUS WITH DIABETIC NEUROPATHY, WITH LONG-TERM CURRENT USE OF INSULIN: ICD-10-CM

## 2024-08-23 PROCEDURE — 99999 PR PBB SHADOW E&M-EST. PATIENT-LVL IV: CPT | Mod: PBBFAC,,,

## 2024-08-23 NOTE — PROGRESS NOTES
Diabetes Care Specialist Progress Note  Author: Delia Chappell RN  Date: 8/23/2024    Intake    Program Intake  Reason for Diabetes Program Visit:: Initial Diabetes Assessment  Current diabetes risk level:: high  In the last 12 months, have you:: none  Permission to speak with others about care:: yes    Current Diabetes Treatment: Oral Medications, DM Injectables, Insulin  Oral Medication Type/Dose: Metformin 1,000 mg BID. Prandin 4 mg BID AC.  DM Injectables Type/Dose: Mounjaro 15 mg weekly.  Method of insulin delivery?: Injections  Injection Type: Pens  Pen Type/Dose: Toujeo 32 units daily.    Continuous Glucose Monitoring  Patient has CGM: Yes  Personal CGM type:: Freestyle Unique 3  GMI Date: 08/23/24  GMI Value: 7.9 %        Lab Results   Component Value Date    HGBA1C 9.7 (H) 05/09/2024       Weight: 99.6 kg (219 lb 9.3 oz)   Body mass index is 32.43 kg/m².    Lifestyle Coping Support & Clinical    Lifestyle/Coping/Support  Compared to other people your age, how would you rate your health?: Excellent  Does anyone in your family have diabetes or does anyone in your family support you in your diabetes care?: Wife is present and supportive.  List anything about Diabetes that causes you stress?: High blood sugars.  How do you deal with stress/distress?: Trying to learn more about DM.  Learning Barriers:: None  Culture or [employer] beliefs that may impact ability to access healthcare: No  Psychosocial/Coping Skills Assessment Completed: : Yes  Assessment indicates:: Knowledge deficit  Area of need?: Yes    Problem Review  Active Comorbidities: Neuropathy, Hyperlipidemia/Dyslipidemia, Hypertension    Diabetes Self-Management Skills Assessment    Medication Skills Assessment  Patient is able to identify current diabetes medications, dosages, and appropriate timing of medications.: yes  Patient reports problems or concerns with current medication regimen.: yes  Medication regimen problems/concerns:: does not feel  like regimen is working  Patient is  aware that some diabetes medications can cause low blood sugar?: Yes  Medication Skills Assessment Completed:: Yes  Assessment indicates:: Knowledge deficit, Instruction Needed  Area of need?: Yes    Diabetes Disease Process/Treatment Options  Diabetes Type?: Type II  When were you diagnosed?: 9 years ago.  If previous diabetes education, when/where:: [facility] April 2023.  What are your goals for this education session?: Learn how to get blood sugars back down.  Is patient aware of what causes diabetes?: Yes  Does patient understand the pathophysiology of diabetes?: Yes  Diabetes Disease Process/Treatment Options: Skills Assessment Completed: Yes  Assessment indicates:: Adequate understanding  Area of need?: No    Nutrition/Healthy Eating  Meal Plan 24 Hour Recall - Breakfast: Breakfast bar; Coffee & Metamucil  Meal Plan 24 Hour Recall - Lunch: 1/2 sandwich; Water  Meal Plan 24 Hour Recall - Dinner: 2 pieces of baked fish, vegetable medley, small piece of bread, and small portion on rice  Meal Plan 24 Hour Recall - Snack: Popeyes fried chicken breast. Hot tamales.  Who shops/cooks?: Wife and self.  Patient can identify foods that impact blood sugar.: yes  Challenges to healthy eating:: snacking between meals and at night, lack of will power  Nutrition/Healthy Eating Skills Assessment Completed:: Yes  Assessment indicates:: Knowledge deficit, Instruction Needed  Area of need?: Yes    Physical Activity/Exercise  Patient's daily activity level:: constantly moving  Patient formally exercises outside of work.: no  Reasons for not exercising:: work schedule  Patient can identify forms of physical activity.: yes  Physical Activity/Exercise Skills Assessment Completed: : Yes  Assessment indicates:: Adequate understanding  Area of need?: No    Home Blood Glucose Monitoring  Patient states that blood sugar is checked at home daily.: yes  Monitoring Method:: personal continuous glucose  monitor  Personal CGM type:: Freestyle Unique 3   What is your current Time in Range?: 43%  What is your A1c Target?: <7%  Home Blood Glucose Monitoring Skills Assessment Completed: : Yes  Assessment indicates:: Knowledge deficit  Area of need?: Yes    Acute Complications  Have you ever had hypoglycemia (low BG 70 or less)?: yes  How often and what are your symptoms?: 1-2x/week.  How do you treat hypoglycemia?: Fast acting sugar.  Have you ever had hyperglycemia (high  or more)?: yes  How often and what are your symptoms?: More than 1x/week.  How do you treat hyperglycemia? : Take medication as directed.  Have you ever had DKA?: no  Do you ever test for ketones?: no  Acute Complications Skills Assessment Completed: : Yes  Assessment indicates:: Knowledge deficit, Instruction Needed  Area of need?: Yes    Chronic Complications  Chronic Complications Skills Assessment Completed: : No  Deferred due to:: Time  Area of need?: Deferred      Assessment Summary and Plan    Based on today's diabetes care assessment, the following areas of need were identified:          8/23/2024    12:01 AM   Areas of Need   Medications/Current Diabetes Treatment Yes-Mr. Garcia is taking medication as prescribed. Unsure if he is supposed to be taking Novolog before meals; will clarify with OSVALDO Benitez.      Lifestyle Coping Support Yes-Has support from wife.      Diabetes Disease Process/Treatment Options No.     Nutrition/Healthy Eating Yes-See care plan.     Physical Activity/Exercise No-Walks a lot at work.     Home Blood Glucose Monitoring Yes-Educated on compression lows.      Acute Complications Yes-Reviewed blood glucose goals, prevention, detection, signs and symptoms, and treatment of hypoglycemia and hyperglycemia, and when to contact the clinic.     Chronic Complications Deferred.         Today's interventions were provided through individual discussion, instruction, and written materials were provided.      Patient  verbalized understanding of instruction and written materials.  Pt was able to return back demonstration of instructions today. Patient understood key points, needs reinforcement and further instruction.     Diabetes Self-Management Care Plan:    Today's Diabetes Self-Management Care Plan was developed with Brett's input. Brett has agreed to work toward the following goal(s) to improve his/her overall diabetes control.      Care Plan: Diabetes Management   Updates made since 7/24/2024 12:00 AM        Problem: Healthy Eating         Goal: Eat 3 meals daily with ~45 grams of carbs/meal.    Note:    Educated to always pair carbs with protein and/or healthy fat.  Educated on drinking MR arrieta for breakfast (Glucerna Hunger Smart).  Overall he is doing well with eating, but educated to keep food log until next visit to assess for any thing that may need further assessment.        Task: Reviewed the sources and role of Carbohydrate, Protein, and Fat and how each nutrient impacts blood sugar. Completed 8/23/2024        Task: Explained how to count carbohydrates using the food label and the use of dry measuring cups for accurate carb counting. Completed 8/23/2024        Task: Review the importance of balancing carbohydrates with each meal using portion control techniques to count servings of carbohydrate and label reading to identify serving size and amount of total carbs per serving. Completed 8/23/2024          Follow Up Plan     Follow up in about 2 weeks (around 9/6/2024) for completion of assessment.    Today's care plan and follow up schedule was discussed with patient.  Brett verbalized understanding of the care plan, goals, and agrees to follow up plan.        The patient was encouraged to communicate with his/her health care provider/physician and care team regarding his/her condition(s) and treatment.  I provided the patient with my contact information today and encouraged to contact me via phone or  [facility] Patient Portal as needed.     Length of Visit   Total Time: 60 Minutes

## 2024-08-23 NOTE — Clinical Note
Hi, I met with Mr. Garcia today. His blood sugars are running high; his TIR is 43%. I wanted to clarify if you want him taking 5 units of Novolog before meals or just the Prandin BID AC breakfast and dinner? I think he would benefit from a SS or meal dosing. Let me know what you would like to do. Thank you! ALONDRA Lin

## 2024-08-26 ENCOUNTER — PATIENT MESSAGE (OUTPATIENT)
Dept: DIABETES | Facility: CLINIC | Age: 59
End: 2024-08-26
Payer: COMMERCIAL

## 2024-08-26 ENCOUNTER — TELEPHONE (OUTPATIENT)
Dept: DIABETES | Facility: CLINIC | Age: 59
End: 2024-08-26
Payer: COMMERCIAL

## 2024-08-26 NOTE — TELEPHONE ENCOUNTER
Patient called with Novolog medication update per Provider there was no answer will send via my chart

## 2024-08-26 NOTE — TELEPHONE ENCOUNTER
----- Message from Delia Chappell RN sent at 8/23/2024 12:57 PM CDT -----  Hi, I met with Mr. Garcia today. His blood sugars are running high; his TIR is 43%. I wanted to clarify if you want him taking 5 units of Novolog before meals or just the Prandin BID AC breakfast and dinner? I think he would benefit from a SS or meal dosing. Let me know what you would like to do. Thank you! ALONDRA Lin

## 2024-08-27 ENCOUNTER — PATIENT MESSAGE (OUTPATIENT)
Dept: DIABETES | Facility: CLINIC | Age: 59
End: 2024-08-27
Payer: COMMERCIAL

## 2024-09-01 DIAGNOSIS — M54.16 LUMBAR RADICULOPATHY: ICD-10-CM

## 2024-09-01 DIAGNOSIS — M47.816 LUMBAR SPONDYLOSIS: ICD-10-CM

## 2024-09-01 DIAGNOSIS — M43.16 SPONDYLOLISTHESIS OF LUMBAR REGION: ICD-10-CM

## 2024-09-01 DIAGNOSIS — M43.06 PARS DEFECT OF LUMBAR SPINE: ICD-10-CM

## 2024-09-01 DIAGNOSIS — M51.36 DDD (DEGENERATIVE DISC DISEASE), LUMBAR: ICD-10-CM

## 2024-09-02 NOTE — TELEPHONE ENCOUNTER
No care due was identified.  [facility] Embedded Care Due Messages. Reference number: [id number].   9/01/2024 8:05:50 PM CDT

## 2024-09-02 NOTE — TELEPHONE ENCOUNTER
Refill Routing Note   Medication(s) are not appropriate for processing by [facility] for the following reason(s):        Outside of protocol    [facility] action(s):  Route             Appointments  past 12m or future 3m with PCP    Date Provider   Last Visit   6/6/2024 Herb Lord MD   Next Visit   9/9/2024 Herb Lord MD   ED visits in past 90 days: 0        Note composed:10:52 PM 09/01/2024

## 2024-09-03 ENCOUNTER — PATIENT OUTREACH (OUTPATIENT)
Dept: ADMINISTRATIVE | Facility: HOSPITAL | Age: 59
End: 2024-09-03
Payer: COMMERCIAL

## 2024-09-03 DIAGNOSIS — Z79.4 TYPE 2 DIABETES MELLITUS WITH DIABETIC NEUROPATHY, WITH LONG-TERM CURRENT USE OF INSULIN: ICD-10-CM

## 2024-09-03 DIAGNOSIS — E11.40 TYPE 2 DIABETES MELLITUS WITH DIABETIC NEUROPATHY, WITH LONG-TERM CURRENT USE OF INSULIN: ICD-10-CM

## 2024-09-03 RX ORDER — PREGABALIN 50 MG/1
50 CAPSULE ORAL NIGHTLY
Qty: 30 CAPSULE | Refills: 2 | Status: SHIPPED | OUTPATIENT
Start: 2024-09-03

## 2024-09-03 RX ORDER — REPAGLINIDE 2 MG/1
TABLET ORAL
Qty: 540 TABLET | Refills: 0 | Status: SHIPPED | OUTPATIENT
Start: 2024-09-03 | End: 2024-09-05 | Stop reason: SDUPTHER

## 2024-09-03 RX ORDER — PEN NEEDLE, DIABETIC 30 GX3/16"
1 NEEDLE, DISPOSABLE MISCELLANEOUS DAILY
Qty: 100 EACH | Refills: 0 | Status: SHIPPED | OUTPATIENT
Start: 2024-09-03

## 2024-09-03 NOTE — TELEPHONE ENCOUNTER
----- Message from Kristina Pereira sent at 9/3/2024  2:00 PM CDT -----  Contact: ANTWON LARIOS [[id number]]  .Type:  RX Refill Request    Who Called:  ANTWON LARIOS [[id number]]  Refill or New Rx: refill  RX Name and Strength:   How is the patient currently taking it? (ex. 1XDay):   Is this a 30 day or 90 day RX:   Preferred Pharmacy with phone number: .  [facility] #[id number] - [place] - [address]  [address] [address]  Phone: [phone] Fax: [phone]  Local or Mail Order: local  Ordering Provider: Brigitte   Would the patient rather a call back or a response via [facility]?  call  Best Call Back Number: .[phone] (home)   Additional Information: \

## 2024-09-03 NOTE — TELEPHONE ENCOUNTER
Courtesy refill of pen needles given for coverage of Eli Sneed NP basket.     Manda Melvin PA-C  Diabetes Management

## 2024-09-05 RX ORDER — REPAGLINIDE 2 MG/1
TABLET ORAL
Qty: 540 TABLET | Refills: 0 | Status: SHIPPED | OUTPATIENT
Start: 2024-09-05

## 2024-09-05 RX ORDER — AMITRIPTYLINE HYDROCHLORIDE 50 MG/1
50 TABLET, FILM COATED ORAL NIGHTLY
Qty: 90 TABLET | Refills: 1 | Status: SHIPPED | OUTPATIENT
Start: 2024-09-05 | End: 2025-09-05

## 2024-09-05 NOTE — TELEPHONE ENCOUNTER
No care due was identified.  [facility] Embedded Care Due Messages. Reference number: [id number].   9/05/2024 3:59:20 PM CDT

## 2024-09-06 ENCOUNTER — CLINICAL SUPPORT (OUTPATIENT)
Dept: DIABETES | Facility: CLINIC | Age: 59
End: 2024-09-06
Payer: COMMERCIAL

## 2024-09-06 ENCOUNTER — LAB VISIT (OUTPATIENT)
Dept: LAB | Facility: HOSPITAL | Age: 59
End: 2024-09-06
Attending: FAMILY MEDICINE
Payer: COMMERCIAL

## 2024-09-06 ENCOUNTER — PATIENT MESSAGE (OUTPATIENT)
Dept: DIABETES | Facility: CLINIC | Age: 59
End: 2024-09-06

## 2024-09-06 VITALS — BODY MASS INDEX: 32.75 KG/M2 | WEIGHT: 221.81 LBS

## 2024-09-06 DIAGNOSIS — E11.9 TYPE 2 DIABETES MELLITUS WITHOUT COMPLICATION: ICD-10-CM

## 2024-09-06 DIAGNOSIS — Z79.4 TYPE 2 DIABETES MELLITUS WITH DIABETIC NEUROPATHY, WITH LONG-TERM CURRENT USE OF INSULIN: Primary | ICD-10-CM

## 2024-09-06 DIAGNOSIS — R79.89 ELEVATED LFTS: ICD-10-CM

## 2024-09-06 DIAGNOSIS — I15.2 HYPERTENSION ASSOCIATED WITH DIABETES: ICD-10-CM

## 2024-09-06 DIAGNOSIS — Z79.4 TYPE 2 DIABETES MELLITUS WITH DIABETIC NEUROPATHY, WITH LONG-TERM CURRENT USE OF INSULIN: ICD-10-CM

## 2024-09-06 DIAGNOSIS — D64.9 MILD ANEMIA: ICD-10-CM

## 2024-09-06 DIAGNOSIS — E11.40 TYPE 2 DIABETES MELLITUS WITH DIABETIC NEUROPATHY, WITH LONG-TERM CURRENT USE OF INSULIN: ICD-10-CM

## 2024-09-06 DIAGNOSIS — E11.59 HYPERTENSION ASSOCIATED WITH DIABETES: ICD-10-CM

## 2024-09-06 DIAGNOSIS — E11.40 TYPE 2 DIABETES MELLITUS WITH DIABETIC NEUROPATHY, WITH LONG-TERM CURRENT USE OF INSULIN: Primary | ICD-10-CM

## 2024-09-06 DIAGNOSIS — E11.69 HYPERLIPIDEMIA ASSOCIATED WITH TYPE 2 DIABETES MELLITUS: ICD-10-CM

## 2024-09-06 DIAGNOSIS — E78.5 HYPERLIPIDEMIA ASSOCIATED WITH TYPE 2 DIABETES MELLITUS: ICD-10-CM

## 2024-09-06 LAB
ALBUMIN SERPL BCP-MCNC: 3.8 G/DL (ref 3.5–5.2)
ALBUMIN/CREAT UR: 10.9 UG/MG (ref 0–30)
ALP SERPL-CCNC: 73 U/L (ref 55–135)
ALT SERPL W/O P-5'-P-CCNC: 63 U/L (ref 10–44)
AST SERPL-CCNC: 43 U/L (ref 10–40)
BASOPHILS # BLD AUTO: 0.04 K/UL (ref 0–0.2)
BASOPHILS NFR BLD: 0.7 % (ref 0–1.9)
BILIRUB DIRECT SERPL-MCNC: 0.2 MG/DL (ref 0.1–0.3)
BILIRUB SERPL-MCNC: 0.4 MG/DL (ref 0.1–1)
CHOLEST SERPL-MCNC: 102 MG/DL (ref 120–199)
CHOLEST/HDLC SERPL: 2.7 {RATIO} (ref 2–5)
CREAT UR-MCNC: 110 MG/DL (ref 23–375)
DIFFERENTIAL METHOD BLD: ABNORMAL
EOSINOPHIL # BLD AUTO: 0.3 K/UL (ref 0–0.5)
EOSINOPHIL NFR BLD: 4.7 % (ref 0–8)
ERYTHROCYTE [DISTWIDTH] IN BLOOD BY AUTOMATED COUNT: 14.6 % (ref 11.5–14.5)
ESTIMATED AVG GLUCOSE: 183 MG/DL (ref 68–131)
FERRITIN SERPL-MCNC: 82 NG/ML (ref 20–300)
FOLATE SERPL-MCNC: 13.4 NG/ML (ref 4–24)
HBA1C MFR BLD: 8 % (ref 4–5.6)
HCT VFR BLD AUTO: 39.6 % (ref 40–54)
HDLC SERPL-MCNC: 38 MG/DL (ref 40–75)
HDLC SERPL: 37.3 % (ref 20–50)
HGB BLD-MCNC: 13.4 G/DL (ref 14–18)
IMM GRANULOCYTES # BLD AUTO: 0.01 K/UL (ref 0–0.04)
IMM GRANULOCYTES NFR BLD AUTO: 0.2 % (ref 0–0.5)
IRON SERPL-MCNC: 74 UG/DL (ref 45–160)
LDLC SERPL CALC-MCNC: 45.8 MG/DL (ref 63–159)
LYMPHOCYTES # BLD AUTO: 2 K/UL (ref 1–4.8)
LYMPHOCYTES NFR BLD: 34.3 % (ref 18–48)
MCH RBC QN AUTO: 31.5 PG (ref 27–31)
MCHC RBC AUTO-ENTMCNC: 33.8 G/DL (ref 32–36)
MCV RBC AUTO: 93 FL (ref 82–98)
MICROALBUMIN UR DL<=1MG/L-MCNC: 12 UG/ML
MONOCYTES # BLD AUTO: 0.4 K/UL (ref 0.3–1)
MONOCYTES NFR BLD: 7.3 % (ref 4–15)
NEUTROPHILS # BLD AUTO: 3.1 K/UL (ref 1.8–7.7)
NEUTROPHILS NFR BLD: 52.8 % (ref 38–73)
NONHDLC SERPL-MCNC: 64 MG/DL
NRBC BLD-RTO: 0 /100 WBC
PLATELET # BLD AUTO: 171 K/UL (ref 150–450)
PMV BLD AUTO: 11.9 FL (ref 9.2–12.9)
PROT SERPL-MCNC: 7.1 G/DL (ref 6–8.4)
RBC # BLD AUTO: 4.26 M/UL (ref 4.6–6.2)
SATURATED IRON: 16 % (ref 20–50)
TOTAL IRON BINDING CAPACITY: 465 UG/DL (ref 250–450)
TRANSFERRIN SERPL-MCNC: 314 MG/DL (ref 200–375)
TRIGL SERPL-MCNC: 91 MG/DL (ref 30–150)
VIT B12 SERPL-MCNC: 433 PG/ML (ref 210–950)
WBC # BLD AUTO: 5.78 K/UL (ref 3.9–12.7)

## 2024-09-06 PROCEDURE — 82043 UR ALBUMIN QUANTITATIVE: CPT | Performed by: FAMILY MEDICINE

## 2024-09-06 PROCEDURE — 80076 HEPATIC FUNCTION PANEL: CPT | Performed by: FAMILY MEDICINE

## 2024-09-06 PROCEDURE — 83036 HEMOGLOBIN GLYCOSYLATED A1C: CPT | Performed by: FAMILY MEDICINE

## 2024-09-06 PROCEDURE — 82746 ASSAY OF FOLIC ACID SERUM: CPT | Performed by: FAMILY MEDICINE

## 2024-09-06 PROCEDURE — 82607 VITAMIN B-12: CPT | Performed by: FAMILY MEDICINE

## 2024-09-06 PROCEDURE — 82570 ASSAY OF URINE CREATININE: CPT | Performed by: FAMILY MEDICINE

## 2024-09-06 PROCEDURE — 82728 ASSAY OF FERRITIN: CPT | Performed by: FAMILY MEDICINE

## 2024-09-06 PROCEDURE — 85025 COMPLETE CBC W/AUTO DIFF WBC: CPT | Performed by: FAMILY MEDICINE

## 2024-09-06 PROCEDURE — 36415 COLL VENOUS BLD VENIPUNCTURE: CPT | Performed by: FAMILY MEDICINE

## 2024-09-06 PROCEDURE — 99999 PR PBB SHADOW E&M-EST. PATIENT-LVL IV: CPT | Mod: PBBFAC,,,

## 2024-09-06 PROCEDURE — 83540 ASSAY OF IRON: CPT | Performed by: FAMILY MEDICINE

## 2024-09-06 PROCEDURE — 80061 LIPID PANEL: CPT | Performed by: FAMILY MEDICINE

## 2024-09-06 NOTE — Clinical Note
Hi, I met with Mr. Garcia today. His logs are in the media for review. He started 7 units of Novolog before meals but still having baseline and PP hyperglycemia. Please advise.

## 2024-09-06 NOTE — PROGRESS NOTES
Diabetes Care Specialist Follow-up Note  Author: Delia Chappell RN  Date: 9/6/2024    Intake    Program Intake  Reason for Diabetes Program Visit:: Intervention  Type of Intervention:: Individual  Individual: Education  Education: Self-Management Skill Review  Current diabetes risk level:: high  In the last 12 months, have you:: none  Permission to speak with others about care:: yes    Current Diabetes Treatment: Diet/Exercise, DM Injectables, Oral Medications, Insulin  Diet/Exercise Type/Dose: Healthy eating, watching portions.  Oral Medication Type/Dose: Metformin 1,000 mg BID. Prandin 4 mg BID AC.  DM Injectables Type/Dose: Mounjaro 15 mg weekly.  Method of insulin delivery?: Injections  Injection Type: Pens  Pen Type/Dose: Toujeo 32 units daily. Novolog 7 units before meals.    Continuous Glucose Monitoring  Patient has CGM: Yes  Personal CGM type:: Freestyle Unique 3  GMI Date: 09/06/24    Lab Results   Component Value Date    HGBA1C 9.7 (H) 05/09/2024     A1c Pre Diabetes Care Specialist Intervention:  9.7%      Weight: 100.6 kg (221 lb 12.5 oz)   Body mass index is 32.75 kg/m².  Wt increase of 3 lbs since last visit on 8/23/2025      Physical activity/Exercise:   Continues to be active.     SMBG: Freestyle Unique 3. Report in media.       Diabetes Self-Management Skills Assessment    Nutrition/Healthy Eating  Meal Plan 24 Hour Recall - Breakfast: Protein shake, mini bar; Coffee  Meal Plan 24 Hour Recall - Lunch: Turkey & cheese sandwich; Coke Zero  Meal Plan 24 Hour Recall - Dinner: Mediterranian chicken salad with dressing, protein bar (15 grams), and ice cream (29 grams).  Meal Plan 24 Hour Recall - Snack: Peanuts.  Meal Plan 24 Hour Recall - Beverage: SF drinks.    Home Blood Glucose Monitoring  Personal CGM type:: Freestyle Unique 3    Chronic Complications  Reviewed health maintenance: yes  Have you completed your annual diabetes maintenance labwork? : yes  Has your doctor examined your feet?:  yes  Chronic Complications Skills Assessment Completed: : Yes  Assessment indicates:: Adequate understanding  Area of need?: No      During today's follow-up visit,  the following areas required further assessment and content was provided/reviewed.    Based on today's diabetes care assessment, the following areas of need were identified:          9/6/2024    12:02 AM   Areas of Need   Healthy Eating   See care plan for update.    Chronic Complications No.          Today's interventions were provided through individual discussion, instruction, and written materials were provided.    Patient verbalized understanding of instruction and written materials.  Pt was able to return back demonstration of instructions today. Patient understood key points, needs reinforcement and further instruction.     Diabetes Self-Management Care Plan Review and Evaluation of Progress:    During today's follow-up Brett's Diabetes Self-Management Care Plan progress was reviewed and progress was evaluated including his/her input. Brett has agreed to continue his/her journey to improve/maintain overall diabetes control by continuing to set health goals. See care plan progress below.      Care Plan: Diabetes Management   Updates made since 8/7/2024 12:00 AM        Problem: Healthy Eating         Goal: Eat 3 meals daily with ~45 grams of carbs/meal.    Note:    Educated to always pair carbs with protein and/or healthy fat.  Educated on drinking MR arrieta for breakfast (Glucerna Hunger Smart).  Overall he is doing well with eating, but educated to keep food log until next visit to assess for any thing that may need further assessment.     9/06/24: He is staying around 30-70 grams of carbs/meals. He is documenting his meals; doing well with pairing protein with carb.        Task: Reviewed the sources and role of Carbohydrate, Protein, and Fat and how each nutrient impacts blood sugar. Completed 8/23/2024        Task: Explained how to count  carbohydrates using the food label and the use of dry measuring cups for accurate carb counting. Completed 8/23/2024        Task: Review the importance of balancing carbohydrates with each meal using portion control techniques to count servings of carbohydrate and label reading to identify serving size and amount of total carbs per serving. Completed 8/23/2024          Follow Up Plan     Follow up in about 3 months (around 12/6/2024) for review.    Today's care plan and follow up schedule was discussed with patient.  Brett verbalized understanding of the care plan, goals, and agrees to follow up plan.        The patient was encouraged to communicate with his/her health care provider/physician and care team regarding his/her condition(s) and treatment.  I provided the patient with my contact information today and encouraged to contact me via phone or [facility] Patient Portal as needed.     Length of Visit   Total Time: 30 Minutes

## 2024-09-09 ENCOUNTER — PATIENT MESSAGE (OUTPATIENT)
Dept: CARDIOLOGY | Facility: HOSPITAL | Age: 59
End: 2024-09-09
Payer: COMMERCIAL

## 2024-09-09 ENCOUNTER — OFFICE VISIT (OUTPATIENT)
Dept: INTERNAL MEDICINE | Facility: CLINIC | Age: 59
End: 2024-09-09
Payer: COMMERCIAL

## 2024-09-09 ENCOUNTER — HOSPITAL ENCOUNTER (OUTPATIENT)
Dept: CARDIOLOGY | Facility: HOSPITAL | Age: 59
Discharge: HOME | End: 2024-09-09
Attending: INTERNAL MEDICINE
Payer: COMMERCIAL

## 2024-09-09 ENCOUNTER — OFFICE VISIT (OUTPATIENT)
Dept: CARDIOLOGY | Facility: CLINIC | Age: 59
End: 2024-09-09
Payer: COMMERCIAL

## 2024-09-09 ENCOUNTER — TELEPHONE (OUTPATIENT)
Dept: INTERNAL MEDICINE | Facility: CLINIC | Age: 59
End: 2024-09-09
Payer: COMMERCIAL

## 2024-09-09 VITALS
HEART RATE: 95 BPM | RESPIRATION RATE: 18 BRPM | DIASTOLIC BLOOD PRESSURE: 68 MMHG | SYSTOLIC BLOOD PRESSURE: 130 MMHG | WEIGHT: 222 LBS | TEMPERATURE: 97 F | BODY MASS INDEX: 32.78 KG/M2 | OXYGEN SATURATION: 96 %

## 2024-09-09 VITALS
HEIGHT: 69 IN | HEART RATE: 82 BPM | SYSTOLIC BLOOD PRESSURE: 132 MMHG | BODY MASS INDEX: 32.99 KG/M2 | OXYGEN SATURATION: 97 % | DIASTOLIC BLOOD PRESSURE: 78 MMHG | WEIGHT: 222.75 LBS

## 2024-09-09 DIAGNOSIS — I44.4 LAFB (LEFT ANTERIOR FASCICULAR BLOCK): ICD-10-CM

## 2024-09-09 DIAGNOSIS — I45.10 RBBB: ICD-10-CM

## 2024-09-09 DIAGNOSIS — E78.2 MIXED HYPERLIPIDEMIA: ICD-10-CM

## 2024-09-09 DIAGNOSIS — Z82.49 FAMILY HISTORY OF HEART DISEASE: ICD-10-CM

## 2024-09-09 DIAGNOSIS — E11.69 HYPERLIPIDEMIA ASSOCIATED WITH TYPE 2 DIABETES MELLITUS: Primary | ICD-10-CM

## 2024-09-09 DIAGNOSIS — D64.9 CHRONIC ANEMIA: ICD-10-CM

## 2024-09-09 DIAGNOSIS — I15.2 HYPERTENSION ASSOCIATED WITH DIABETES: ICD-10-CM

## 2024-09-09 DIAGNOSIS — E11.59 HYPERTENSION ASSOCIATED WITH DIABETES: ICD-10-CM

## 2024-09-09 DIAGNOSIS — E78.5 HYPERLIPIDEMIA ASSOCIATED WITH TYPE 2 DIABETES MELLITUS: Primary | ICD-10-CM

## 2024-09-09 DIAGNOSIS — R94.31 ABNORMAL EKG: ICD-10-CM

## 2024-09-09 DIAGNOSIS — M17.12 PRIMARY OSTEOARTHRITIS OF LEFT KNEE: Primary | ICD-10-CM

## 2024-09-09 LAB
OHS QRS DURATION: 130 MS
OHS QTC CALCULATION: 491 MS

## 2024-09-09 PROCEDURE — 3078F DIAST BP <80 MM HG: CPT | Mod: CPTII,S$GLB,, | Performed by: INTERNAL MEDICINE

## 2024-09-09 PROCEDURE — 3052F HG A1C>EQUAL 8.0%<EQUAL 9.0%: CPT | Mod: CPTII,S$GLB,, | Performed by: INTERNAL MEDICINE

## 2024-09-09 PROCEDURE — 99999 PR PBB SHADOW E&M-EST. PATIENT-LVL V: CPT | Mod: PBBFAC,,, | Performed by: FAMILY MEDICINE

## 2024-09-09 PROCEDURE — 3008F BODY MASS INDEX DOCD: CPT | Mod: CPTII,S$GLB,, | Performed by: FAMILY MEDICINE

## 2024-09-09 PROCEDURE — 3066F NEPHROPATHY DOC TX: CPT | Mod: CPTII,S$GLB,, | Performed by: INTERNAL MEDICINE

## 2024-09-09 PROCEDURE — 1160F RVW MEDS BY RX/DR IN RCRD: CPT | Mod: CPTII,S$GLB,, | Performed by: INTERNAL MEDICINE

## 2024-09-09 PROCEDURE — 4010F ACE/ARB THERAPY RXD/TAKEN: CPT | Mod: CPTII,S$GLB,, | Performed by: FAMILY MEDICINE

## 2024-09-09 PROCEDURE — 99214 OFFICE O/P EST MOD 30 MIN: CPT | Mod: S$GLB,,, | Performed by: FAMILY MEDICINE

## 2024-09-09 PROCEDURE — 3061F NEG MICROALBUMINURIA REV: CPT | Mod: CPTII,S$GLB,, | Performed by: FAMILY MEDICINE

## 2024-09-09 PROCEDURE — 99999 PR PBB SHADOW E&M-EST. PATIENT-LVL V: CPT | Mod: PBBFAC,,, | Performed by: INTERNAL MEDICINE

## 2024-09-09 PROCEDURE — 93010 ELECTROCARDIOGRAM REPORT: CPT | Mod: ,,, | Performed by: INTERNAL MEDICINE

## 2024-09-09 PROCEDURE — 3075F SYST BP GE 130 - 139MM HG: CPT | Mod: CPTII,S$GLB,, | Performed by: INTERNAL MEDICINE

## 2024-09-09 PROCEDURE — G2211 COMPLEX E/M VISIT ADD ON: HCPCS | Mod: S$GLB,,, | Performed by: FAMILY MEDICINE

## 2024-09-09 PROCEDURE — 1159F MED LIST DOCD IN RCRD: CPT | Mod: CPTII,S$GLB,, | Performed by: INTERNAL MEDICINE

## 2024-09-09 PROCEDURE — 3061F NEG MICROALBUMINURIA REV: CPT | Mod: CPTII,S$GLB,, | Performed by: INTERNAL MEDICINE

## 2024-09-09 PROCEDURE — 3052F HG A1C>EQUAL 8.0%<EQUAL 9.0%: CPT | Mod: CPTII,S$GLB,, | Performed by: FAMILY MEDICINE

## 2024-09-09 PROCEDURE — 93005 ELECTROCARDIOGRAM TRACING: CPT

## 2024-09-09 PROCEDURE — 3078F DIAST BP <80 MM HG: CPT | Mod: CPTII,S$GLB,, | Performed by: FAMILY MEDICINE

## 2024-09-09 PROCEDURE — 3008F BODY MASS INDEX DOCD: CPT | Mod: CPTII,S$GLB,, | Performed by: INTERNAL MEDICINE

## 2024-09-09 PROCEDURE — 4010F ACE/ARB THERAPY RXD/TAKEN: CPT | Mod: CPTII,S$GLB,, | Performed by: INTERNAL MEDICINE

## 2024-09-09 PROCEDURE — 3075F SYST BP GE 130 - 139MM HG: CPT | Mod: CPTII,S$GLB,, | Performed by: FAMILY MEDICINE

## 2024-09-09 PROCEDURE — 99205 OFFICE O/P NEW HI 60 MIN: CPT | Mod: S$GLB,,, | Performed by: INTERNAL MEDICINE

## 2024-09-09 PROCEDURE — 3066F NEPHROPATHY DOC TX: CPT | Mod: CPTII,S$GLB,, | Performed by: FAMILY MEDICINE

## 2024-09-09 NOTE — TELEPHONE ENCOUNTER
----- Message from Linda Macias sent at 9/9/2024 11:01 AM CDT -----  Name of Who is Calling:ANTWON LARIOS [[id number]]        What is the request in detail: Pt is going to be at least 15-20 mins stuck on the bridge in traffic please advise           Can the clinic reply by [facility]: call back        [facility]  Telephone Information:  Mobile          [phone]

## 2024-09-09 NOTE — PROGRESS NOTES
Subjective:   Patient ID:  Brett Garcia is a 58 y.o. male who presents for follow-up of [facility]  Pt presents for Cv evaluation for family hx of CAD- father with MI at 63 y/o.  Patient denies CP, angina or anginal equivalent.Pt active without sx  EKG shows NSR RBBB, LAHB ( No old EKGs to compare)    Cardiac risk factors- male/age, DM, HTN, HLP, family hx of CAD    HPI  Hypertension  This is a chronic problem. The current episode started more than 1 year ago. The problem has been gradually improving since onset. The problem is controlled. Pertinent negatives include no chest pain, palpitations or shortness of breath. Past treatments include ace (-). The current treatment provides moderate improvement. There are no compliance problems.       Hyperlipidemia  This is a chronic problem. The current episode started more than 1 year ago. The problem is controlled. Pertinent negatives include no chest pain or shortness of breath. Current antihyperlipidemic treatment includes statins. The current treatment provides moderate improvement of lipids. There are no compliance problems.   Review of Systems   Constitutional: Negative. Negative for weight gain.   HENT: Negative.     Eyes: Negative.    Cardiovascular: Negative.  Negative for chest pain, leg swelling and palpitations.   Respiratory: Negative.  Negative for shortness of breath.    Endocrine: Negative.    Hematologic/Lymphatic: Negative.    Skin: Negative.    Musculoskeletal:  Negative for muscle weakness.   Gastrointestinal: Negative.    Genitourinary: Negative.    Neurological: Negative.  Negative for dizziness.   Psychiatric/Behavioral: Negative.     Allergic/Immunologic: Negative.    All other systems reviewed and are negative.    Family History   Problem Relation Name Age of Onset    Diabetes Father Herman DOMINGO.     Hypertension Father Herman DOMINGO.     Heart attack Father Herman ANGEL     Cataracts Father Herman ANGEL     Hearing loss Father Herman ANGEL     Heart  disease Father Herman ANGEL     Vision loss Father Herman ANGEL     Hypertension Mother Catia     Hypertension Sister Amanda       Past Medical History:   Diagnosis Date    Diabetes mellitus with neuropathy 2009    BS 57 am 12/18/2015    Hyperlipidemia 2009    Hypertension associated with diabetes 4/21/2017     Social History     Socioeconomic History    Marital status:     Number of children: 2   Occupational History    Occupation: Plant work      Employer: [employer]     Comment: [employer]   Tobacco Use    Smoking status: Never     Passive exposure: Never    Smokeless tobacco: Former     Types: Snuff     Quit date: 2008   Substance and Sexual Activity    Alcohol use: Yes     Alcohol/week: 5.0 standard drinks of alcohol     Types: 1 Glasses of wine, 2 Cans of beer, 2 Drinks containing 0.5 oz of alcohol per week    Drug use: No    Sexual activity: Yes     Partners: Female     Birth control/protection: Condom, Post-menopausal     Social Determinants of Health     Financial Resource Strain: Medium Risk (4/22/2024)    Overall Financial Resource Strain (CARDIA)     Difficulty of Paying Living Expenses: Somewhat hard   Food Insecurity: No Food Insecurity (4/22/2024)    Hunger Vital Sign     Worried About Running Out of Food in the Last Year: Never true     Ran Out of Food in the Last Year: Never true   Transportation Needs: No Transportation Needs (4/22/2024)    PRAPARE - Transportation     Lack of Transportation (Medical): No     Lack of Transportation (Non-Medical): No   Physical Activity: Sufficiently Active (4/22/2024)    Exercise Vital Sign     Days of Exercise per Week: 6 days     Minutes of Exercise per Session: 150+ min   Stress: No Stress Concern Present (4/22/2024)    [employer] - Occupational Stress Questionnaire     Feeling of Stress : Not at all   Housing Stability: Unknown (4/22/2024)    Housing Stability Vital Sign     Unable to Pay for Housing in the Last Year: No  "    Current Outpatient Medications on File Prior to Visit   Medication Sig Dispense Refill    amitriptyline (ELAVIL) 50 MG tablet Take 1 tablet (50 mg total) by mouth every evening. 90 tablet 1    aspirin (ECOTRIN) 81 MG EC tablet Take 81 mg by mouth once daily.      atorvastatin (LIPITOR) 40 MG tablet Take 1 tablet (40 mg total) by mouth once daily. 90 tablet 2    blood sugar diagnostic Strp 1 each by Misc.(Non-Drug; Combo Route) route 3 (three) times daily. 100 strip 11    blood-glucose meter kit Use as instructed 1 each 0    blood-glucose sensor (FREESTYLE CLEOPATRA 3 SENSOR) Elisha CHANGE EVERY 14 DAYS AS DIRECTED 2 each 11    fluticasone propionate (FLONASE) 50 mcg/actuation nasal spray 2 sprays (100 mcg total) by Each Nostril route once daily. Seasonal allergic rhinitis [J30.2] 48 g 1    insulin aspart U-100 (NOVOLOG U-100 INSULIN ASPART) 100 unit/mL injection Inject 5 Units into the skin 3 (three) times daily before meals. 20 mL 1    insulin glargine U-300 conc (TOUJEO MAX U-300 SOLOSTAR) 300 unit/mL (3 mL) insulin pen Inject 40 Units into the skin every evening. 3 mL 11    insulin syringe-needle U-100 1 mL 31 gauge x 5/16 Syrg 1 Application by Misc.(Non-Drug; Combo Route) route 3 (three) times daily. 300 each 3    lancets Misc 1 each by Misc.(Non-Drug; Combo Route) route 3 (three) times daily. 100 each 11    lisinopriL (PRINIVIL,ZESTRIL) 5 MG tablet Take 1 tablet (5 mg total) by mouth once daily. 90 tablet 1    metFORMIN (GLUCOPHAGE) 1000 MG tablet Take 1 tablet (1,000 mg total) by mouth 2 (two) times daily with meals. 180 tablet 3    pedi multivit no.12 w-fluoride (MULTIVITAMINS-FLUORIDE-FOLIC A ORAL) Take 1 tablet by mouth once daily.      pen needle, diabetic 32 gauge x 5/32" Ndle 1 each by Misc.(Non-Drug; Combo Route) route once daily. 100 each 0    pregabalin (LYRICA) 50 MG capsule Take 1 capsule (50 mg total) by mouth every evening. 30 capsule 2    psyllium husk (METAMUCIL ORAL) Take 17 g by mouth once " daily.      repaglinide (PRANDIN) 2 MG tablet TAKE 2 TABLETS BY MOUTH 3 (THREE) TIMES DAILY BEFORE MEALS. 540 tablet 0    sodium chloride (OCEAN) 0.65 % nasal spray 1 spray by Nasal route every other day.      tirzepatide 15 mg/0.5 mL PnIj Inject 15 mg into the skin every 7 days. 2 mL 11    vitamin D (VITAMIN D3) 1000 units Tab Take 1,000 Units by mouth once daily.      zinc gluconate 50 mg tablet Take 50 mg by mouth once daily.      fluconazole (DIFLUCAN) 200 MG Tab Take 1 tablet (200 mg total) by mouth once a week. (Patient not taking: Reported on 9/9/2024) 12 tablet 3    [DISCONTINUED] ascorbic acid, vitamin C, (VITAMIN C) 1000 MG tablet Take by mouth once daily.      [DISCONTINUED] tirzepatide (MOUNJARO) 12.5 mg/0.5 mL PnIj Inject 12.5 mg into the skin every 7 days. 2 mL 11     No current facility-administered medications on file prior to visit.     Review of patient's allergies indicates:   Allergen Reactions    Hay fever and allergy relief        Objective:     Physical Exam  Vitals and nursing note reviewed.   Constitutional:       Appearance: He is well-developed.   HENT:      Head: Normocephalic and atraumatic.   Eyes:      Conjunctiva/sclera: Conjunctivae normal.      Pupils: Pupils are equal, round, and reactive to light.   Cardiovascular:      Rate and Rhythm: Normal rate and regular rhythm.      Pulses: Intact distal pulses.      Heart sounds: Normal heart sounds.   Pulmonary:      Effort: Pulmonary effort is normal.      Breath sounds: Normal breath sounds.   Abdominal:      General: Bowel sounds are normal.      Palpations: Abdomen is soft.   Musculoskeletal:      Cervical back: Normal range of motion and neck supple.   Skin:     General: Skin is warm and dry.   Neurological:      Mental Status: He is alert and oriented to person, place, and time.         Assessment:     1. Hyperlipidemia associated with type 2 diabetes mellitus    2. Family history of heart disease    3. Hypertension associated with  diabetes    4. BMI 33.0-33.9,adult        Plan:     Hyperlipidemia associated with type 2 diabetes mellitus    Family history of heart disease  -     Ambulatory referral/consult to Cardiology    Hypertension associated with diabetes    BMI 33.0-33.9,adult      Echo, NMT/stress test  Continue statin-HLP  Continue lisinopril-HTN

## 2024-09-09 NOTE — PROGRESS NOTES
Subjective:       Patient ID: Brett Garcia is a 58 y.o. male.    Chief Complaint: f/u on chronic issues    HPI    Patient Active Problem List   Diagnosis    Hyperlipidemia    BMI 33.0-33.9,adult    Hypertension associated with diabetes    Skin lesions    Diabetic polyneuropathy associated with type 2 diabetes mellitus    Stress reaction    Hyperlipidemia associated with type 2 diabetes mellitus    Type 2 diabetes mellitus with diabetic neuropathy, with long-term current use of insulin    Elevated diaphragm    Decreased range of motion (ROM) of left knee    Weakness of trunk musculature    Lumbar pain    Left leg pain    Abnormal gait       Past Medical History:   Diagnosis Date    Diabetes mellitus with neuropathy 2009    BS 57 am 12/18/2015    Hyperlipidemia 2009    Hypertension associated with diabetes 4/21/2017       Past Surgical History:   Procedure Laterality Date    COLONOSCOPY N/A 11/01/2018    Procedure: COLONOSCOPY;  Surgeon: Kristina Wilson MD;  Location: [facility] ENDO;  Service: Endoscopy;  Laterality: N/A;    COLONOSCOPY N/A 12/01/2023    Procedure: COLONOSCOPY;  Surgeon: Kristina Wilson MD;  Location: [facility];  Service: Endoscopy;  Laterality: N/A;    COLONOSCOPY W/ POLYPECTOMY  11/01/2018    Polyp x1, repeat 5 years; Dr. Wilson    SELECTIVE INJECTION OF ANESTHETIC AGENT AROUND LUMBAR SPINAL NERVE ROOT BY TRANSFORAMINAL APPROACH Bilateral 11/02/2023    Procedure: Bilateral L5/S1 TF BRE;  Surgeon: Wilfred Serrano MD;  Location: [facility] PAIN MGT;  Service: Pain Management;  Laterality: Bilateral;       Family History   Problem Relation Name Age of Onset    Diabetes Father Herman V.     Hypertension Father Herman V.     Heart attack Father Herman V.     Cataracts Father Herman V.     Hearing loss Father Herman V.     Heart disease Father Herman V.     Vision loss Father Herman V.     Hypertension Mother Catia     Hypertension Sister Amanda        Social History     Tobacco Use   Smoking Status  Never    Passive exposure: Never   Smokeless Tobacco Former    Types: Snuff    Quit date: 2008       Wt Readings from Last 5 Encounters:   09/09/24 100.7 kg (222 lb)   09/06/24 100.6 kg (221 lb 12.5 oz)   08/23/24 99.6 kg (219 lb 9.3 oz)   06/06/24 101.2 kg (223 lb)   04/26/24 100.7 kg (222 lb)     History of Present Illness    CHIEF COMPLAINT:  Patient presents today for follow up.    DIABETES MANAGEMENT:  He reports improvement in diabetes management, with recent A1c decreasing from 9.7 to 8, though still uncontrolled. Current regimen includes NovoLog on a sliding scale (recently increased to 9 units), Toujeo 32 units in the morning, Metformin 1000 mg twice daily, Mounjaro 15 mg, and Prandin 4 mg 3 times daily. He experiences hypoglycemic episodes at night and is considering reducing the Toujeo dosage. He uses a continuous glucose monitor for better tracking. He denies issues with medication adherence or tolerability.    CARDIOVASCULAR HEALTH:  Hyperlipidemia is well-controlled with an LDL of 45. He takes Lipitor 40 mg and tolerates it well. Hypertension is well-controlled with lisinopril 5 mg. He denies angioedema symptoms and understands to stop lisinopril if such symptoms occur. Family history notable for father's heart attack at age 62.    LIVER FUNCTION:  Hepatic function panel shows some improvement, although liver function tests remain somewhat elevated. He has fatty liver disease.    HEMATOLOGY:  He has chronic anemia dating back a few years. Ferritin, B12, and folate levels are normal. Blood counts remain stable compared to four months ago, showing mildly decreased levels. The anemia does not appear to be iron-deficiency related given the normal ferritin level.    WEIGHT MANAGEMENT AND LIFESTYLE:  Current weight is 222 lbs, which is stable but needs to decrease. He reports 13,000 to 15,000 steps a day, decreased from previous 22,000 to 26,000 steps when working on project turnarounds. 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 He expresses awareness of the need for continued weight loss efforts. He reports sleeping well.    PAIN MANAGEMENT:  He takes amitriptyline for neuropathic pain in his legs, which helps control the sensation of sheets on his legs. He also takes pregabalin (Lyrica) for back pain related to L5, but is uncertain about the duration of this treatment.    MUSCULOSKELETAL:  He reports ongoing issues with his left knee, diagnosed with primary osteoarthritis last addressed in April 2023. He also mentions having a Baker's cyst. He denies pain but reports limited knee movement. He expresses interest in orthopedic follow-up for these issues.    ALCOHOL USE:  He reports moderate alcohol consumption limited to weekends: three Yuenglings on Fridays and 1-2 vodka martinis on Saturdays. He denies excessive drinking and states he can only drink three alcoholic beverages at a time.    IMMUNIZATIONS:  He expresses interest in receiving the flu vaccine at a later date. He is informed that the vaccine is not currently in stock at the clinic and is advised he can obtain it at his local pharmacy.      ROS:  General: -fever, -chills, -fatigue, -weight gain, -weight loss  Eyes: -vision changes, -redness, -discharge  ENT: -ear pain, -nasal congestion, -sore throat  Cardiovascular: -chest pain, -palpitations, -lower extremity edema  Respiratory: -cough, -shortness of breath  Gastrointestinal: -abdominal pain, -nausea, -vomiting, -diarrhea, -constipation, -blood in stool  Genitourinary: -dysuria, -hematuria, -frequency  Musculoskeletal: -joint pain, -muscle pain, +back pain  Skin: -rash, -lesion  Neurological: -headache, -dizziness, -numbness, -tingling  Psychiatric: -anxiety, -depression, -sleep difficulty  Allergic: -allergic reactions         Objective:      Vitals:    09/09/24 1133   BP: 130/68   Pulse: 95   Resp: 18   Temp: 97.4 °F (36.3 °C)       Physical Exam  Constitutional:       General: He is not  in acute distress.     Appearance: He is not ill-appearing.   Pulmonary:      Effort: Pulmonary effort is normal. No respiratory distress.   Neurological:      General: No focal deficit present.      Mental Status: He is alert.   Psychiatric:         Mood and Affect: Mood normal.         Behavior: Behavior normal.       Assessment:       1. Primary osteoarthritis of left knee    2. Hypertension associated with diabetes    3. Mixed hyperlipidemia    4. Family history of heart disease    5. Chronic anemia        Plan:       Assessed diabetes management: A1c improved from 9.7 to 8, but still uncontrolled  Evaluated lipid panel: well-controlled with LDL of 45  Reviewed hypertension management: blood pressure well-controlled on current regimen  Monitored fatty liver: hepatic function panel shows some improvement, but still elevated  Assessed chronic anemia: blood counts stable, no iron deficiency, B12 and folate normal  Evaluated kidney function: urine microalbumin and recent GFR/creatinine normal  Considered cardiovascular risk factors: family history of early heart attack, multiple comorbidities  Reviewed orthopedic concerns: primary osteoarthritis of left knee, previously treated by Dr. Ace Mustafa    DIABETES:  - Continued NovoLog insulin: sliding scale, recently increased to 9 units before meals.  - Continued Toujeo insulin: 32 units in the morning.  - Continued Metformin: 1000 mg twice daily.  - Continued Mounjaro: 15 mg.  - Continued Prandin: 4 mg 3 times daily.  - CBC, metabolic panel, and A1C test ordered in 4 months.    HYPERTENSION:  - Continued Lisinopril: 5 mg daily.  - Educated on potential adverse effect of lisinopril (angioedema): instructed to stop medication immediately if swelling of lips, mouth, or tongue occurs & seek medical attention.    HYPERLIPIDEMIA:  - Continued Lipitor: 40 mg daily.    CARDIOVASCULAR HEALTH:  - Discussed cardiovascular risk factors and importance of cardiac evaluation.  -  Referred to Cardiology for consultation.    WEIGHT MANAGEMENT:  - Patient to continue efforts at weight loss.    OSTEOARTHRITIS:  - Referred to Orthopedics for left knee osteoarthritis consultation.    EYE HEALTH:  - Referred to Optometry for eye exam.    FLU VACCINATION:  - Patient to get flu vaccination at pharmacy.    FOLLOW UP:  - Follow up in 4 months after completion of ordered labs.  - Foot exam to be performed at next encounter.           This note was verbally dictated, please excuse any type errors.

## 2024-09-16 ENCOUNTER — TELEPHONE (OUTPATIENT)
Dept: PHARMACY | Facility: CLINIC | Age: 59
End: 2024-09-16
Payer: COMMERCIAL

## 2024-09-16 ENCOUNTER — OFFICE VISIT (OUTPATIENT)
Dept: ORTHOPEDICS | Facility: CLINIC | Age: 59
End: 2024-09-16
Payer: COMMERCIAL

## 2024-09-16 ENCOUNTER — HOSPITAL ENCOUNTER (OUTPATIENT)
Dept: RADIOLOGY | Facility: HOSPITAL | Age: 59
Discharge: HOME | End: 2024-09-16
Attending: PHYSICIAN ASSISTANT
Payer: COMMERCIAL

## 2024-09-16 VITALS — WEIGHT: 222.88 LBS | BODY MASS INDEX: 33.01 KG/M2 | HEIGHT: 69 IN

## 2024-09-16 DIAGNOSIS — M25.562 LEFT KNEE PAIN, UNSPECIFIED CHRONICITY: ICD-10-CM

## 2024-09-16 DIAGNOSIS — M25.562 LEFT KNEE PAIN, UNSPECIFIED CHRONICITY: Primary | ICD-10-CM

## 2024-09-16 DIAGNOSIS — M17.12 PRIMARY OSTEOARTHRITIS OF LEFT KNEE: Primary | ICD-10-CM

## 2024-09-16 PROCEDURE — 99204 OFFICE O/P NEW MOD 45 MIN: CPT | Mod: S$GLB,,, | Performed by: PHYSICIAN ASSISTANT

## 2024-09-16 PROCEDURE — 99999 PR PBB SHADOW E&M-EST. PATIENT-LVL IV: CPT | Mod: PBBFAC,,, | Performed by: PHYSICIAN ASSISTANT

## 2024-09-16 PROCEDURE — 73562 X-RAY EXAM OF KNEE 3: CPT | Mod: TC,RT

## 2024-09-16 PROCEDURE — 3061F NEG MICROALBUMINURIA REV: CPT | Mod: CPTII,S$GLB,, | Performed by: PHYSICIAN ASSISTANT

## 2024-09-16 PROCEDURE — 3052F HG A1C>EQUAL 8.0%<EQUAL 9.0%: CPT | Mod: CPTII,S$GLB,, | Performed by: PHYSICIAN ASSISTANT

## 2024-09-16 PROCEDURE — 3008F BODY MASS INDEX DOCD: CPT | Mod: CPTII,S$GLB,, | Performed by: PHYSICIAN ASSISTANT

## 2024-09-16 PROCEDURE — 3066F NEPHROPATHY DOC TX: CPT | Mod: CPTII,S$GLB,, | Performed by: PHYSICIAN ASSISTANT

## 2024-09-16 PROCEDURE — 4010F ACE/ARB THERAPY RXD/TAKEN: CPT | Mod: CPTII,S$GLB,, | Performed by: PHYSICIAN ASSISTANT

## 2024-09-16 PROCEDURE — 1159F MED LIST DOCD IN RCRD: CPT | Mod: CPTII,S$GLB,, | Performed by: PHYSICIAN ASSISTANT

## 2024-09-16 RX ORDER — MELOXICAM 15 MG/1
15 TABLET ORAL DAILY
Qty: 30 TABLET | Refills: 0 | Status: SHIPPED | OUTPATIENT
Start: 2024-09-16

## 2024-09-16 NOTE — PROGRESS NOTES
Patient ID: Brett Garcia is a 58 y.o. male.    Chief Complaint: Pain of the Left Knee      HPI: Brett Garcia  is a 58 y.o. male who c/o Pain of the Left Knee       Patient presents as a new patient to me today with chief complaint of left knee pain.  Patient notes pain is at present a 1/10 but can get as bad as a 10/10 affecting activity of daily living and thus his quality of life.  He has a hard time walking long distances, going from a sitting to standing or standing to seated position, unlevel terrain or stairs.  The patient is not using any devices to assist with ambulation nor is he wearing any knee braces presently but he does have should he need.  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  He is fully independent drove himself to his appointment today.  Remains quite active in his daily routine.  Patient has tried physical therapy as well as gel injections in the past and has seen quite an improvement with these ; last received June of 2023 by our sports Medicine Department  He is unable to received short-acting steroids as his A1c is 8.0   He takes Tylenol on an as-needed basis as well as ibuprofen and Lyrica       Patient is presently denying any shortness of breath, chest pain, fever/chills, nausea/vomiting, loss of taste or smell, numbness/tingling or sensation changes, loss of bladder or bowel function.    Past Medical History:   Diagnosis Date    Diabetes mellitus with neuropathy 2009    BS 57 am 12/18/2015    Hyperlipidemia 2009    Hypertension associated with diabetes 4/21/2017       Past Surgical History:   Procedure Laterality Date    COLONOSCOPY N/A 11/01/2018    Procedure: COLONOSCOPY;  Surgeon: Kristina Wilson MD;  Location: [facility];  Service: Endoscopy;  Laterality: N/A;    COLONOSCOPY N/A 12/01/2023    Procedure: COLONOSCOPY;  Surgeon: Kristina Wilson  MD MIA;  Location: [facility] ENDO;  Service: Endoscopy;  Laterality: N/A;    COLONOSCOPY W/ POLYPECTOMY  11/01/2018    Polyp x1, repeat 5 years; Dr. Wilson    SELECTIVE INJECTION OF ANESTHETIC AGENT AROUND LUMBAR SPINAL NERVE ROOT BY TRANSFORAMINAL APPROACH Bilateral 11/02/2023    Procedure: Bilateral L5/S1 TF BRE;  Surgeon: Wilfred Serrano MD;  Location: [facility] PAIN MGT;  Service: Pain Management;  Laterality: Bilateral;       Family History   Problem Relation Name Age of Onset    Diabetes Father Herman V.     Hypertension Father Herman V.     Heart attack Father Herman V.     Cataracts Father Herman V.     Hearing loss Father Herman V.     Heart disease Father Herman V.     Vision loss Father Herman V.     Hypertension Mother Catia     Hypertension Sister Amanda        Social History     Socioeconomic History    Marital status:     Number of children: 2   Occupational History    Occupation: Plant work      Employer: [employer]     Comment: [employer]   Tobacco Use    Smoking status: Never     Passive exposure: Never    Smokeless tobacco: Former     Types: Snuff     Quit date: 2008   Substance and Sexual Activity    Alcohol use: Yes     Alcohol/week: 5.0 standard drinks of alcohol     Types: 1 Glasses of wine, 2 Cans of beer, 2 Drinks containing 0.5 oz of alcohol per week    Drug use: No    Sexual activity: Yes     Partners: Female     Birth control/protection: Condom, Post-menopausal     Social Determinants of Health     Financial Resource Strain: Medium Risk (4/22/2024)    Overall Financial Resource Strain (CARDIA)     Difficulty of Paying Living Expenses: Somewhat hard   Food Insecurity: No Food Insecurity (4/22/2024)    Hunger Vital Sign     Worried About Running Out of Food in the Last Year: Never true     Ran Out of Food in the Last Year: Never true   Transportation Needs: No Transportation Needs (4/22/2024)    PRAPARE - Transportation     Lack of Transportation (Medical): No     Lack of Transportation  (Non-Medical): No   Physical Activity: Sufficiently Active (4/22/2024)    Exercise Vital Sign     Days of Exercise per Week: 6 days     Minutes of Exercise per Session: 150+ min   Stress: No Stress Concern Present (4/22/2024)    [employer] - Occupational Stress Questionnaire     Feeling of Stress : Not at all   Housing Stability: Unknown (4/22/2024)    Housing Stability Vital Sign     Unable to Pay for Housing in the Last Year: No       Medication List with Changes/Refills   Current Medications    AMITRIPTYLINE (ELAVIL) 50 MG TABLET    Take 1 tablet (50 mg total) by mouth every evening.    ASPIRIN (ECOTRIN) 81 MG EC TABLET    Take 81 mg by mouth once daily.    ATORVASTATIN (LIPITOR) 40 MG TABLET    Take 1 tablet (40 mg total) by mouth once daily.    BLOOD SUGAR DIAGNOSTIC STRP    1 each by Misc.(Non-Drug; Combo Route) route 3 (three) times daily.    BLOOD-GLUCOSE METER KIT    Use as instructed    BLOOD-GLUCOSE SENSOR (MC10STYLE CLEOPATRA 3 SENSOR) DONY    CHANGE EVERY 14 DAYS AS DIRECTED    FLUCONAZOLE (DIFLUCAN) 200 MG TAB    Take 1 tablet (200 mg total) by mouth once a week.    FLUTICASONE PROPIONATE (FLONASE) 50 MCG/ACTUATION NASAL SPRAY    2 sprays (100 mcg total) by Each Nostril route once daily. Seasonal allergic rhinitis [J30.2]    INSULIN ASPART U-100 (NOVOLOG U-100 INSULIN ASPART) 100 UNIT/ML INJECTION    Inject 5 Units into the skin 3 (three) times daily before meals.    INSULIN GLARGINE U-300 CONC (TOUJEO MAX U-300 SOLOSTAR) 300 UNIT/ML (3 ML) INSULIN PEN    Inject 40 Units into the skin every evening.    INSULIN SYRINGE-NEEDLE U-100 1 ML 31 GAUGE X 5/16 SYRG    1 Application by Misc.(Non-Drug; Combo Route) route 3 (three) times daily.    LANCETS MISC    1 each by Misc.(Non-Drug; Combo Route) route 3 (three) times daily.    LISINOPRIL (PRINIVIL,ZESTRIL) 5 MG TABLET    Take 1 tablet (5 mg total) by mouth once daily.    METFORMIN (GLUCOPHAGE) 1000 MG TABLET    Take 1 tablet (1,000  "mg total) by mouth 2 (two) times daily with meals.    PEDI MULTIVIT NO.12 W-FLUORIDE (MULTIVITAMINS-FLUORIDE-FOLIC A ORAL)    Take 1 tablet by mouth once daily.    PEN NEEDLE, DIABETIC 32 GAUGE X 5/32" NDLE    1 each by Misc.(Non-Drug; Combo Route) route once daily.    PREGABALIN (LYRICA) 50 MG CAPSULE    Take 1 capsule (50 mg total) by mouth every evening.    PSYLLIUM HUSK (METAMUCIL ORAL)    Take 17 g by mouth once daily.    REPAGLINIDE (PRANDIN) 2 MG TABLET    TAKE 2 TABLETS BY MOUTH 3 (THREE) TIMES DAILY BEFORE MEALS.    SODIUM CHLORIDE (OCEAN) 0.65 % NASAL SPRAY    1 spray by Nasal route every other day.    TIRZEPATIDE 15 MG/0.5 ML PNIJ    Inject 15 mg into the skin every 7 days.    VITAMIN D (VITAMIN D3) 1000 UNITS TAB    Take 1,000 Units by mouth once daily.    ZINC GLUCONATE 50 MG TABLET    Take 50 mg by mouth once daily.       Review of patient's allergies indicates:   Allergen Reactions    Hay fever and allergy relief          Objective:     Left Lower Extremity    KNEE:  ROM: passive flex/ ext full  Patella midling, moderate crepitus noted   Ligaments stable  Pain on palpation to medial more so than lateral  No defect in the patellar quadriceps tendon  Calf NT, soft  (-) Tal sign  DF/PF full  Wiggles toes  Sensation intact to light touch   No pitting edema appreciated   NVI  Cap refill < 2 sec    Skin warm to touch, no obvious lesion noted       IMAGING:    XRAY:  FINDINGS:  Right: No evidence of acute fracture.  Relative joint space presentation with mild sclerosis in the medial compartment.  Sclerotic focus in the right medial femoral condyle unchanged since 05/12/2023 5th represent enostosis.     Left: Tricompartmental joint space narrowing and hypertrophy with bone on bone apposition in the medial compartment.  Mildly worsened since prior.  Atherosclerotic disease.     Impression:     As above.    Kellgren William scale :  Kellgren William 3 left       Assessment:       Encounter Diagnosis   Name " Primary?    Primary osteoarthritis of left knee Yes          Plan:       Brett was seen today for pain.    Diagnoses and all orders for this visit:    Primary osteoarthritis of left knee  -     Ambulatory referral/consult to Orthopedics  -     Prior authorization Order        Brett Garcia is a new patient who presents to me today with chief complaint of left knee pain. We had a long discussion today regarding degenerative arthritis in the knees. The patient understands that arthritis is chronic and will worsen over time.  The patient also understands that arthritis may cause episodic flare-ups in pain. Management or if arthritis is achieved through a multi-modal approach including weight loss in obese individuals, activity modification, NSAIDs (topical vs oral) where appropriate, periodic intra-articular steroid injections, viscosupplementation, physical therapy, knee bracing, ambulatory aids, as well as geniculate nerve blocks.  Patient is not a candidate to receive short-acting steroid injection today secondary to elevated A1c.  I would like him to stop the ibuprofen and naproxen and changed to Mobic.  I would like him to take 1 pill daily for 2 weeks then as needed.  Additionally he has had significant relief with Euflexxa series in the past I would like to get him approved for these injections.  I would like to see him back in 2-3 weeks once authorization has been obtained.  May call with any questions or concerns in the interim.    Dr. Cervantes is aware of the patient & current presentation. He agrees with the current plan above.     Patient verbalized understanding of all instructions and agreed with the above plan.    No follow-ups on file.    The patient understands, chooses and consents to this plan and accepts all   the risks which include but are not limited to the risks mentioned above.     Disclaimer: This note was prepared using a voice recognition system and is likely to have sound alike errors  within the text.

## 2024-09-16 NOTE — TELEPHONE ENCOUNTER
[facility]/Population Health Chart Review & Patient Outreach Details For Medication Adherence Project    Reason for Outreach Encounter: 3rd Party payor non-compliance report ([employer], [employer], C, etc)  2.  Patient Outreach Method: Reviewed patient chart   3.   Medication in question:   Hyperlipidemia Medications               atorvastatin (LIPITOR) 40 MG tablet Take 1 tablet (40 mg total) by mouth once daily.                LAST FILLED: 8/23/24 for 90 day supply  4.  Reviewed and or Updates Made To: Patient Chart  5. Outreach Outcomes and/or actions taken: Patient filled medication and is on track to be adherent  Additional Notes:

## 2024-09-27 ENCOUNTER — OFFICE VISIT (OUTPATIENT)
Dept: OPHTHALMOLOGY | Facility: CLINIC | Age: 59
End: 2024-09-27
Payer: COMMERCIAL

## 2024-09-27 DIAGNOSIS — H25.13 NUCLEAR SCLEROSIS OF BOTH EYES: ICD-10-CM

## 2024-09-27 DIAGNOSIS — E11.9 DIABETES MELLITUS TYPE 2 WITHOUT RETINOPATHY: Primary | ICD-10-CM

## 2024-09-27 DIAGNOSIS — I15.2 HYPERTENSION ASSOCIATED WITH DIABETES: ICD-10-CM

## 2024-09-27 DIAGNOSIS — E11.59 HYPERTENSION ASSOCIATED WITH DIABETES: ICD-10-CM

## 2024-09-27 PROCEDURE — 99999 PR PBB SHADOW E&M-EST. PATIENT-LVL IV: CPT | Mod: PBBFAC,,, | Performed by: OPHTHALMOLOGY

## 2024-09-27 NOTE — PROGRESS NOTES
HPI    1. DM2 2009    Hemoglobin A1C       Date                     Value               Ref Range             Status                09/06/2024               8.0 (H)             4.0 - 5.6 %           Final              Vision changes since last eye exam?: Pt states his VA has been   experiencing some blurriness with near and far vision. Pt is currently   suing OTC +3.50, and PAL. Pt has been using OTC preservative free   lubricating eyedrops.     Any eye pain today: None noticed by pt.     Other ocular symptoms: None noticed by pt.     Interested in contact lens fitting today? No.     Last edited by Katrin Urias on 9/27/2024  8:26 AM.            Assessment /Plan     For exam results, see Encounter Report.    Diabetes mellitus type 2 without retinopathy  Last A1c 8.0 . Diabetes controlled with no diabetic retinopathy on dilated exam. Reviewed diabetic eye precautions including excellent blood sugar control, and importance of regular follow up.     Nuclear sclerosis of both eyes  Cataracts are present but not visually significant. Will continue to monitor. Mrx provided.      Return to clinic in 1 year or sooner PRN

## 2024-09-30 ENCOUNTER — OFFICE VISIT (OUTPATIENT)
Dept: DIABETES | Facility: CLINIC | Age: 59
End: 2024-09-30
Payer: COMMERCIAL

## 2024-09-30 DIAGNOSIS — E11.69 HYPERLIPIDEMIA ASSOCIATED WITH TYPE 2 DIABETES MELLITUS: ICD-10-CM

## 2024-09-30 DIAGNOSIS — E78.5 HYPERLIPIDEMIA ASSOCIATED WITH TYPE 2 DIABETES MELLITUS: ICD-10-CM

## 2024-09-30 DIAGNOSIS — Z79.4 TYPE 2 DIABETES MELLITUS WITH DIABETIC NEUROPATHY, WITH LONG-TERM CURRENT USE OF INSULIN: Primary | ICD-10-CM

## 2024-09-30 DIAGNOSIS — I15.2 HYPERTENSION ASSOCIATED WITH DIABETES: ICD-10-CM

## 2024-09-30 DIAGNOSIS — E11.40 TYPE 2 DIABETES MELLITUS WITH DIABETIC NEUROPATHY, WITH LONG-TERM CURRENT USE OF INSULIN: Primary | ICD-10-CM

## 2024-09-30 DIAGNOSIS — E11.59 HYPERTENSION ASSOCIATED WITH DIABETES: ICD-10-CM

## 2024-09-30 PROCEDURE — 3066F NEPHROPATHY DOC TX: CPT | Mod: CPTII,95,, | Performed by: NURSE PRACTITIONER

## 2024-09-30 PROCEDURE — 95251 CONT GLUC MNTR ANALYSIS I&R: CPT | Mod: NDTC,,, | Performed by: NURSE PRACTITIONER

## 2024-09-30 PROCEDURE — 3061F NEG MICROALBUMINURIA REV: CPT | Mod: CPTII,95,, | Performed by: NURSE PRACTITIONER

## 2024-09-30 PROCEDURE — 4010F ACE/ARB THERAPY RXD/TAKEN: CPT | Mod: CPTII,95,, | Performed by: NURSE PRACTITIONER

## 2024-09-30 PROCEDURE — 99214 OFFICE O/P EST MOD 30 MIN: CPT | Mod: 95,,, | Performed by: NURSE PRACTITIONER

## 2024-09-30 PROCEDURE — 3052F HG A1C>EQUAL 8.0%<EQUAL 9.0%: CPT | Mod: CPTII,95,, | Performed by: NURSE PRACTITIONER

## 2024-09-30 PROCEDURE — G2211 COMPLEX E/M VISIT ADD ON: HCPCS | Mod: 95,,, | Performed by: NURSE PRACTITIONER

## 2024-09-30 RX ORDER — DAPAGLIFLOZIN AND METFORMIN HYDROCHLORIDE 5; 1000 MG/1; MG/1
1 TABLET, FILM COATED, EXTENDED RELEASE ORAL EVERY MORNING
Qty: 30 TABLET | Refills: 11 | Status: SHIPPED | OUTPATIENT
Start: 2024-09-30 | End: 2025-09-30

## 2024-09-30 NOTE — PROGRESS NOTES
The patient location is: Home  The chief complaint leading to consultation is: Diabetes    Visit type: audiovisual    Face to Face time with patient: 15  30 minutes of total time spent on the encounter, which includes face to face time and non-face to face time preparing to see the patient (eg, review of tests), Obtaining and/or reviewing separately obtained history, Documenting clinical information in the electronic or other health record, Independently interpreting results (not separately reported) and communicating results to the patient/family/caregiver, or Care coordination (not separately reported).  Each patient to whom he or she provides medical services by telemedicine is:  (1) informed of the relationship between the physician and patient and the respective role of any other health care provider with respect to management of the patient; and (2) notified that he or she may decline to receive medical services by telemedicine and may withdraw from such care at any time.    Notes:    Brett Garcia is a 58 y.o. male who presents for a follow up evaluation of Type 2 diabetes mellitus.     CHIEF COMPLAINT: Diabetes Consultation    PCP: Herb Lord MD      Initial visit with me - 6/11/2024    The patient was initially diagnosed with diabetes 9 years ago.      Previous failed treatments include:  Trulicity - Ineffective  Ozempic - n/v.      Social Documentation:  Patient lives in [place], with wife.   Occupation: Electritcian in [employer]  Exercise: 10-12k steps a day, 400-500 stairs, weekends yardwork.   Diet: states has worked hard on diet.       Diabetes related complications:   none.   denies Pancreatitis  denies Gastroparesis  denies DKA  denies Hx/family Hx of MEN2/MTC  denies Frequent UTIs/yeast infections     Cardiovascular Risk Factors: advanced age (>55 for men, >65 for women), dyslipidemia, hypertension, male gender, and obesity (BMI >30 kg/m2).     Diabetes Medications                "insulin aspart U-100 (NOVOLOG U-100 INSULIN ASPART) 100 unit/mL injection Inject 5 Units into the skin 3 (three) times daily before meals. - TAKING 9 UNITS BEFORE SUPPER    insulin glargine U-300 conc (TOUJEO MAX U-300 SOLOSTAR) 300 unit/mL (3 mL) insulin pen Inject 40 Units into the skin every evening.  - TAKING 26 UNITS DAILY.     metFORMIN (GLUCOPHAGE) 1000 MG tablet Take 1 tablet (1,000 mg total) by mouth 2 (two) times daily with meals.    repaglinide (PRANDIN) 2 MG tablet TAKE 2 TABLETS BY MOUTH 3 (THREE) TIMES DAILY BEFORE MEALS.    tirzepatide 15 mg/0.5 mL PnIj Inject 15 mg into the skin every 7 days.     Current monitoring regimen:  Unique 3 CGM    The patient's Unique CGM was downloaded and reviewed. For 14 days, patient average glucose was 166 mg/dL. He was above range 38% of the time, in range 62% of the time, and below range 0% of the time. The target range for this patient was 70 - 180 mg/dL. Overall, there was a pattern of post prandial hyperglycemia, typically after supper..         Patient currently taking insulin or sulfonylurea?  Yes. Emergency Glucagon Prescription current? yes  Recent hypoglycemic episodes: No.     Patient compliant with glucose checks and medication administration? Yes    DIABETES MANAGEMENT STATUS  Statin: Taking  ACE/ARB: Taking  Screening or Prevention Patient's value Goal Complete/Controlled?   HgA1C Testing and Control   Lab Results   Component Value Date    HGBA1C 8.0 (H) 09/06/2024      Annually/Less than 8% No   Lipid profile : 09/06/2024 Annually Yes   LDL control Lab Results   Component Value Date    LDLCALC 45.8 (L) 09/06/2024    Annually/Less than 100 mg/dl  Yes   Nephropathy screening Lab Results   Component Value Date    LABMICR 12.0 09/06/2024     Lab Results   Component Value Date    PROTEINUA Trace (A) 06/30/2016     No results found for: "UTPCR"   Annually Yes   Blood pressure BP Readings from Last 1 Encounters:   09/09/24 132/78    Less than 140/90 Yes   Dilated " retinal exam : 09/27/2024 Annually Yes   Foot exam   : 08/25/2023 Annually Yes   Patient's medications, allergies, surgical, social and family histories were reviewed and updated as appropriate.     Review of Systems   Constitutional:  Negative for weight loss.   Eyes:  Negative for blurred vision and double vision.   Cardiovascular:  Negative for chest pain.   Gastrointestinal:  Negative for nausea and vomiting.   Genitourinary:  Negative for frequency.   Musculoskeletal:  Negative for falls.   Neurological:  Negative for dizziness and weakness.   Endo/Heme/Allergies:  Negative for polydipsia.   Psychiatric/Behavioral:  Negative for depression.    All other systems reviewed and are negative.       Physical Exam  Constitutional:       General: He is not in acute distress.     Appearance: Normal appearance.   Pulmonary:      Effort: No respiratory distress.   Neurological:      Mental Status: He is alert and oriented to person, place, and time.   Psychiatric:         Mood and Affect: Mood normal.         Behavior: Behavior normal.        There were no vitals taken for this visit.  Wt Readings from Last 3 Encounters:   09/16/24 101.1 kg (222 lb 14.2 oz)   09/09/24 101.1 kg (222 lb 12.4 oz)   09/09/24 100.7 kg (222 lb)       LAB REVIEW  Lab Results   Component Value Date     05/09/2024    K 4.5 05/09/2024     05/09/2024    CO2 24 05/09/2024    BUN 22 (H) 05/09/2024    CREATININE 0.9 05/09/2024    CALCIUM 9.2 05/09/2024    ANIONGAP 10 05/09/2024    EGFRNORACEVR >60.0 05/09/2024     Lab Results   Component Value Date    CPEPTIDE 2.4 04/21/2017    GLUTAMICACID 0.00 04/21/2017     Hemoglobin A1C   Date Value Ref Range Status   09/06/2024 8.0 (H) 4.0 - 5.6 % Final     Comment:     ADA Screening Guidelines:  5.7-6.4%  Consistent with prediabetes  >or=6.5%  Consistent with diabetes    High levels of fetal hemoglobin interfere with the HbA1C  assay. Heterozygous hemoglobin variants (HbS, HgC, etc)do  not  significantly interfere with this assay.   However, presence of multiple variants may affect accuracy.     05/09/2024 9.7 (H) 4.0 - 5.6 % Final     Comment:     ADA Screening Guidelines:  5.7-6.4%  Consistent with prediabetes  >or=6.5%  Consistent with diabetes    High levels of fetal hemoglobin interfere with the HbA1C  assay. Heterozygous hemoglobin variants (HbS, HgC, etc)do  not significantly interfere with this assay.   However, presence of multiple variants may affect accuracy.     12/08/2023 8.6 (H) 4.0 - 5.6 % Final     Comment:     ADA Screening Guidelines:  5.7-6.4%  Consistent with prediabetes  >or=6.5%  Consistent with diabetes    High levels of fetal hemoglobin interfere with the HbA1C  assay. Heterozygous hemoglobin variants (HbS, HgC, etc)do  not significantly interfere with this assay.   However, presence of multiple variants may affect accuracy.        ASSESSMENT    ICD-10-CM ICD-9-CM   1. Type 2 diabetes mellitus with diabetic neuropathy, with long-term current use of insulin  E11.40 250.60    Z79.4 357.2     V58.67   2. Hypertension associated with diabetes  E11.59 250.80    I15.2 401.9   3. Hyperlipidemia associated with type 2 diabetes mellitus  E11.69 250.80    E78.5 272.4       PLAN  Diagnoses and all orders for this visit:    Type 2 diabetes mellitus with diabetic neuropathy, with long-term current use of insulin  -     dapaglifloz propaned-metformin (XIGDUO XR) 5-1,000 mg; Take 1 tablet by mouth every morning.    Hypertension associated with diabetes    Hyperlipidemia associated with type 2 diabetes mellitus      Reviewed pathophysiology of diabetes, complications related to the disease, importance of annual dilated eye exam and daily foot examination. Explained MOA, SE, dosage of medications. Written instructions given and reviewed with patient and patient verbalizes understanding.     PATIENT INSTRUCTIONS    Labs ordered and will be scheduled by [facility] staff.       Lifestyle modification with well balanced diet and at least 30 minutes of physical activity daily recommended.   Increase water intake.   Increase protein and non-starchy vegetable servings with meals.   Decrease carbohydrate servings with meals.   Take 10 minute walk after each meal.   Avoid snacking on carbohydrates, choose low carb, high protein snacks.   Incorporate resistance training with weights or resistance bands with exercise regimen at least 3 days a week.      Continue Toujeo 32 units subcutaneously daily.   Discontinue Metformin  Start Xigduo 5-1000 mg by mouth daily.   Decrease Prandin 2 mg by mouth twice daily before meals.   Continue Mounjaro 15 mg subcutaneously every 7 days.      Unique 3 CGM  Blood Sugar Goals:       Fastin-130.       1-2 hours after a meal: Less than 180.     Follow up in about 10 weeks (around 2024) for Unique, Schedule fasting labs.

## 2024-09-30 NOTE — PATIENT INSTRUCTIONS
PATIENT INSTRUCTIONS    Labs ordered and will be scheduled by [facility] staff.      Lifestyle modification with well balanced diet and at least 30 minutes of physical activity daily recommended.   Increase water intake.   Increase protein and non-starchy vegetable servings with meals.   Decrease carbohydrate servings with meals.   Take 10 minute walk after each meal.   Avoid snacking on carbohydrates, choose low carb, high protein snacks.   Incorporate resistance training with weights or resistance bands with exercise regimen at least 3 days a week.      Continue Toujeo 32 units subcutaneously daily.   Discontinue Metformin  Start Xigduo 5-1000 mg by mouth daily.   Decrease Prandin 2 mg by mouth twice daily before meals.   Continue Mounjaro 15 mg subcutaneously every 7 days.      Unique 3 CGM  Blood Sugar Goals:       Fastin-130.       1-2 hours after a meal: Less than 180.

## 2024-10-05 DIAGNOSIS — I15.2 HYPERTENSION ASSOCIATED WITH DIABETES: ICD-10-CM

## 2024-10-05 DIAGNOSIS — E78.2 MIXED HYPERLIPIDEMIA: ICD-10-CM

## 2024-10-05 DIAGNOSIS — E11.40 TYPE 2 DIABETES MELLITUS WITH DIABETIC NEUROPATHY, WITH LONG-TERM CURRENT USE OF INSULIN: ICD-10-CM

## 2024-10-05 DIAGNOSIS — E11.59 HYPERTENSION ASSOCIATED WITH DIABETES: ICD-10-CM

## 2024-10-05 DIAGNOSIS — Z79.4 TYPE 2 DIABETES MELLITUS WITH DIABETIC NEUROPATHY, WITH LONG-TERM CURRENT USE OF INSULIN: ICD-10-CM

## 2024-10-05 DIAGNOSIS — J30.2 SEASONAL ALLERGIC RHINITIS: ICD-10-CM

## 2024-10-05 NOTE — TELEPHONE ENCOUNTER
No care due was identified.  Health [facility] Embedded Care Due Messages. Reference number: [id number].   10/05/2024 10:50:58 AM CDT

## 2024-10-05 NOTE — TELEPHONE ENCOUNTER
No care due was identified.  Health [facility] Embedded Care Due Messages. Reference number: [id number].   10/05/2024 10:48:40 AM CDT

## 2024-10-06 RX ORDER — FLUTICASONE PROPIONATE 50 MCG
2 SPRAY, SUSPENSION (ML) NASAL DAILY
Qty: 48 G | Refills: 3 | Status: SHIPPED | OUTPATIENT
Start: 2024-10-06

## 2024-10-06 RX ORDER — ATORVASTATIN CALCIUM 40 MG/1
40 TABLET, FILM COATED ORAL DAILY
Qty: 90 TABLET | Refills: 3 | Status: SHIPPED | OUTPATIENT
Start: 2024-10-06

## 2024-10-06 RX ORDER — LISINOPRIL 5 MG/1
5 TABLET ORAL DAILY
Qty: 90 TABLET | Refills: 2 | Status: SHIPPED | OUTPATIENT
Start: 2024-10-06

## 2024-10-06 NOTE — TELEPHONE ENCOUNTER
Refill Decision Note   Brett Garcia  is requesting a refill authorization.  Brief Assessment and Rationale for Refill:  Approve     Medication Therapy Plan:         Comments:     Note composed:9:43 AM 10/06/2024

## 2024-10-07 RX ORDER — PEN NEEDLE, DIABETIC 30 GX3/16"
1 NEEDLE, DISPOSABLE MISCELLANEOUS DAILY
Qty: 100 EACH | Refills: 0 | Status: SHIPPED | OUTPATIENT
Start: 2024-10-07

## 2024-10-07 RX ORDER — REPAGLINIDE 2 MG/1
TABLET ORAL
Qty: 540 TABLET | Refills: 0 | Status: SHIPPED | OUTPATIENT
Start: 2024-10-07

## 2024-10-07 NOTE — TELEPHONE ENCOUNTER
Refill Routing Note   Medication(s) are not appropriate for processing by [facility] for the following reason(s):        Outside of protocol    [facility] action(s):  Route               Appointments  past 12m or future 3m with PCP    Date Provider   Last Visit   9/9/2024 Herb Lord MD   Next Visit   10/5/2024 Herb Lord MD   ED visits in past 90 days: 0        Note composed:9:03 AM 10/07/2024

## 2024-10-10 ENCOUNTER — PROCEDURE VISIT (OUTPATIENT)
Dept: ORTHOPEDICS | Facility: CLINIC | Age: 59
End: 2024-10-10
Payer: COMMERCIAL

## 2024-10-10 VITALS — BODY MASS INDEX: 33.01 KG/M2 | WEIGHT: 222.88 LBS | HEIGHT: 69 IN

## 2024-10-10 DIAGNOSIS — M25.562 LEFT KNEE PAIN, UNSPECIFIED CHRONICITY: Primary | ICD-10-CM

## 2024-10-10 DIAGNOSIS — M17.12 PRIMARY OSTEOARTHRITIS OF LEFT KNEE: ICD-10-CM

## 2024-10-10 NOTE — PROCEDURES
Large Joint Aspiration/Injection: L knee    Date/Time: 10/10/2024 1:00 PM    Performed by: Olivia Alejandro PA  Authorized by: Olivia Alejandro PA    Consent Done?:  Yes (Verbal)  Indications:  Arthritis, joint swelling and pain  Site marked: the procedure site was marked      Local anesthesia used?: Yes    Local anesthetic:  Topical anesthetic    Details:  Needle Size:  21 G  Approach:  Anterolateral  Location:  Knee  Site:  L knee  Medications:  20 mg sodium hyaluronate (EUFLEXXA) 10 mg/mL(mw 2.4 -3.6 million)  Patient tolerance:  Patient tolerated the procedure well with no immediate complications     Verbal consent was obtained  The patient's ID, site, side was verified  The site was sterile prepped in standard fashion  The injection was performed in the patricia-lateral side without complication  A sterile Band-Aid was applied    Patient was directed to apply ice today at roughly 15 minutes at a time as needed.  It was discussed that they may be sore for the next few days or so.  Please avoid strenuous activity over the next 24 hours.  It was also discussed that the patient may have a increase in glucose if diabetic and should monitor levels.  Patient was instructed to call as needed.

## 2024-10-11 ENCOUNTER — HOSPITAL ENCOUNTER (OUTPATIENT)
Dept: RADIOLOGY | Facility: HOSPITAL | Age: 59
Discharge: HOME | End: 2024-10-11
Attending: INTERNAL MEDICINE
Payer: COMMERCIAL

## 2024-10-11 ENCOUNTER — HOSPITAL ENCOUNTER (OUTPATIENT)
Dept: CARDIOLOGY | Facility: HOSPITAL | Age: 59
Discharge: HOME | End: 2024-10-11
Attending: INTERNAL MEDICINE
Payer: COMMERCIAL

## 2024-10-11 ENCOUNTER — PATIENT OUTREACH (OUTPATIENT)
Dept: ADMINISTRATIVE | Facility: HOSPITAL | Age: 59
End: 2024-10-11
Payer: COMMERCIAL

## 2024-10-11 ENCOUNTER — HOSPITAL ENCOUNTER (OUTPATIENT)
Dept: PULMONOLOGY | Facility: HOSPITAL | Age: 59
Discharge: HOME | End: 2024-10-11
Attending: INTERNAL MEDICINE
Payer: COMMERCIAL

## 2024-10-11 VITALS
HEART RATE: 84 BPM | BODY MASS INDEX: 32.88 KG/M2 | HEIGHT: 69 IN | WEIGHT: 222 LBS | WEIGHT: 222 LBS | DIASTOLIC BLOOD PRESSURE: 66 MMHG | HEART RATE: 84 BPM | HEIGHT: 69 IN | SYSTOLIC BLOOD PRESSURE: 118 MMHG | SYSTOLIC BLOOD PRESSURE: 118 MMHG | BODY MASS INDEX: 32.88 KG/M2 | DIASTOLIC BLOOD PRESSURE: 66 MMHG

## 2024-10-11 DIAGNOSIS — E11.69 HYPERLIPIDEMIA ASSOCIATED WITH TYPE 2 DIABETES MELLITUS: ICD-10-CM

## 2024-10-11 DIAGNOSIS — Z82.49 FAMILY HISTORY OF HEART DISEASE: ICD-10-CM

## 2024-10-11 DIAGNOSIS — E11.59 HYPERTENSION ASSOCIATED WITH DIABETES: ICD-10-CM

## 2024-10-11 DIAGNOSIS — E78.5 HYPERLIPIDEMIA ASSOCIATED WITH TYPE 2 DIABETES MELLITUS: ICD-10-CM

## 2024-10-11 DIAGNOSIS — R94.31 ABNORMAL EKG: ICD-10-CM

## 2024-10-11 DIAGNOSIS — I15.2 HYPERTENSION ASSOCIATED WITH DIABETES: ICD-10-CM

## 2024-10-11 LAB
AORTIC ROOT ANNULUS: 3.18 CM
ASCENDING AORTA: 3.18 CM
AV INDEX (PROSTH): 0.83
AV MEAN GRADIENT: 3.9 MMHG
AV PEAK GRADIENT: 5.8 MMHG
AV VALVE AREA BY VELOCITY RATIO: 2.4 CM²
AV VALVE AREA: 2.6 CM²
AV VELOCITY RATIO: 0.75
BSA FOR ECHO PROCEDURE: 2.21 M2
CV ECHO LV RWT: 0.7 CM
CV STRESS BASE HR: 83 BPM
DIASTOLIC BLOOD PRESSURE: 66 MMHG
DOP CALC AO PEAK VEL: 1.2 M/S
DOP CALC AO VTI: 21.2 CM
DOP CALC LVOT AREA: 3.1 CM2
DOP CALC LVOT DIAMETER: 2 CM
DOP CALC LVOT PEAK VEL: 0.9 M/S
DOP CALC LVOT STROKE VOLUME: 55 CM3
DOP CALC RVOT PEAK VEL: 0.72 M/S
DOP CALC RVOT VTI: 10.8 CM
DOP CALCLVOT PEAK VEL VTI: 17.5 CM
E WAVE DECELERATION TIME: 181.3 MSEC
E/A RATIO: 0.68
E/E' RATIO: 5.4 M/S
ECHO LV POSTERIOR WALL: 1.4 CM (ref 0.6–1.1)
EJECTION FRACTION- HIGH: 73 %
EJECTION FRACTION: 60 %
END DIASTOLIC INDEX-HIGH: 165 ML/M2
END DIASTOLIC INDEX-LOW: 101 ML/M2
END SYSTOLIC INDEX-HIGH: 64 ML/M2
END SYSTOLIC INDEX-LOW: 28 ML/M2
FRACTIONAL SHORTENING: 32.5 % (ref 28–44)
INTERVENTRICULAR SEPTUM: 1.4 CM (ref 0.6–1.1)
IVC DIAMETER: 1.7 CM
IVRT: 76.12 MSEC
LA MAJOR: 4.8 CM
LA MINOR: 4.75 CM
LA WIDTH: 3.5 CM
LEFT ATRIUM SIZE: 3.53 CM
LEFT ATRIUM VOLUME INDEX: 23.2 ML/M2
LEFT ATRIUM VOLUME: 50.14 CM3
LEFT INTERNAL DIMENSION IN SYSTOLE: 2.7 CM (ref 2.1–4)
LEFT VENTRICLE DIASTOLIC VOLUME INDEX: 32.6 ML/M2
LEFT VENTRICLE DIASTOLIC VOLUME: 70.42 ML
LEFT VENTRICLE MASS INDEX: 96.7 G/M2
LEFT VENTRICLE SYSTOLIC VOLUME INDEX: 12.5 ML/M2
LEFT VENTRICLE SYSTOLIC VOLUME: 27.08 ML
LEFT VENTRICULAR INTERNAL DIMENSION IN DIASTOLE: 4 CM (ref 3.5–6)
LEFT VENTRICULAR MASS: 209 G
LV LATERAL E/E' RATIO: 4.5 M/S
LV SEPTAL E/E' RATIO: 6.75 M/S
LVED V (TEICH): 70.42 ML
LVES V (TEICH): 27.08 ML
LVOT MG: 2.35 MMHG
LVOT MV: 0.74 CM/S
MV PEAK A VEL: 0.79 M/S
MV PEAK E VEL: 0.54 M/S
MV STENOSIS PRESSURE HALF TIME: 52.58 MS
MV VALVE AREA P 1/2 METHOD: 4.18 CM2
NUC REST DIASTOLIC VOLUME INDEX: 102
NUC REST EJECTION FRACTION: 56
NUC REST SYSTOLIC VOLUME INDEX: 45
NUC STRESS DIASTOLIC VOLUME INDEX: 103
NUC STRESS EJECTION FRACTION: 63 %
NUC STRESS SYSTOLIC VOLUME INDEX: 38
OHS CV CPX 85 PERCENT MAX PREDICTED HEART RATE MALE: 136
OHS CV CPX MAX PREDICTED HEART RATE: 160
OHS CV CPX PATIENT IS FEMALE: 0
OHS CV CPX PATIENT IS MALE: 1
OHS CV CPX PEAK DIASTOLIC BLOOD PRESSURE: 67 MMHG
OHS CV CPX PEAK HEAR RATE: 153 BPM
OHS CV CPX PEAK RATE PRESSURE PRODUCT: NORMAL
OHS CV CPX PEAK SYSTOLIC BLOOD PRESSURE: 219 MMHG
OHS CV CPX PERCENT MAX PREDICTED HEART RATE ACHIEVED: 96
OHS CV CPX RATE PRESSURE PRODUCT PRESENTING: 9794
OHS CV INITIAL DOSE: 9.9 MCG/KG/MIN
OHS CV PEAK DOSE: 28.4 MCG/KG/MIN
PISA TR MAX VEL: 1.97 M/S
PV MEAN GRADIENT: 1 MMHG
RA MAJOR: 3.65 CM
RA PRESSURE ESTIMATED: 3 MMHG
RA WIDTH: 2.8 CM
RETIRED EF AND QEF - SEE NOTES: 59 %
RV TB RVSP: 5 MMHG
STJ: 3.25 CM
SYSTOLIC BLOOD PRESSURE: 118 MMHG
TDI LATERAL: 0.12 M/S
TDI SEPTAL: 0.08 M/S
TDI: 0.1 M/S
TR MAX PG: 16 MMHG
TRICUSPID ANNULAR PLANE SYSTOLIC EXCURSION: 1.91 CM
TV REST PULMONARY ARTERY PRESSURE: 19 MMHG
Z-SCORE OF LEFT VENTRICULAR DIMENSION IN END DIASTOLE: -5.71
Z-SCORE OF LEFT VENTRICULAR DIMENSION IN END SYSTOLE: -3.7

## 2024-10-11 PROCEDURE — 93016 CV STRESS TEST SUPVJ ONLY: CPT | Mod: ,,, | Performed by: STUDENT IN AN ORGANIZED HEALTH CARE EDUCATION/TRAINING PROGRAM

## 2024-10-11 PROCEDURE — 93018 CV STRESS TEST I&R ONLY: CPT | Mod: ,,, | Performed by: STUDENT IN AN ORGANIZED HEALTH CARE EDUCATION/TRAINING PROGRAM

## 2024-10-11 PROCEDURE — 93306 TTE W/DOPPLER COMPLETE: CPT

## 2024-10-11 PROCEDURE — 93017 CV STRESS TEST TRACING ONLY: CPT

## 2024-10-11 PROCEDURE — 78452 HT MUSCLE IMAGE SPECT MULT: CPT | Mod: 26,,, | Performed by: STUDENT IN AN ORGANIZED HEALTH CARE EDUCATION/TRAINING PROGRAM

## 2024-10-11 PROCEDURE — 93306 TTE W/DOPPLER COMPLETE: CPT | Mod: 26,,, | Performed by: STUDENT IN AN ORGANIZED HEALTH CARE EDUCATION/TRAINING PROGRAM

## 2024-10-11 PROCEDURE — A9502 TC99M TETROFOSMIN: HCPCS | Performed by: INTERNAL MEDICINE

## 2024-10-11 PROCEDURE — 78452 HT MUSCLE IMAGE SPECT MULT: CPT

## 2024-10-11 RX ADMIN — TETROFOSMIN 28.4 MILLICURIE: 1.38 INJECTION, POWDER, LYOPHILIZED, FOR SOLUTION INTRAVENOUS at 09:10

## 2024-10-11 RX ADMIN — TETROFOSMIN 9.9 MILLICURIE: 1.38 INJECTION, POWDER, LYOPHILIZED, FOR SOLUTION INTRAVENOUS at 08:10

## 2024-10-11 NOTE — PROGRESS NOTES
VBC activation completed.    VBHM Score: 1     Foot Exam    Influenza Vaccine  Tetanus Vaccine  RSV Vaccine            Pt is current on . Has follow up scheduled, 1/09/25.

## 2024-10-14 ENCOUNTER — PATIENT MESSAGE (OUTPATIENT)
Dept: CARDIOLOGY | Facility: CLINIC | Age: 59
End: 2024-10-14
Payer: COMMERCIAL

## 2024-10-15 ENCOUNTER — TELEPHONE (OUTPATIENT)
Dept: DIABETES | Facility: CLINIC | Age: 59
End: 2024-10-15
Payer: COMMERCIAL

## 2024-10-15 NOTE — TELEPHONE ENCOUNTER
----- Message from Summer sent at 10/15/2024  4:19 PM CDT -----  Contact: Brett West is needing a call back regarding his blood-glucose sensor (FREESTYLE CLEOPATRA 3 SENSOR) Elisha. He got an error code and he had to change his sensor and now he will need another sensor in 7/8 days. Please call back at [phone].

## 2024-10-15 NOTE — TELEPHONE ENCOUNTER
Spoke with patient to let him know that we have karin 3 sensor he can  at the [place] location Patient voiced understanding

## 2024-10-17 ENCOUNTER — PROCEDURE VISIT (OUTPATIENT)
Dept: ORTHOPEDICS | Facility: CLINIC | Age: 59
End: 2024-10-17
Payer: COMMERCIAL

## 2024-10-17 VITALS — BODY MASS INDEX: 32.88 KG/M2 | HEIGHT: 69 IN | WEIGHT: 222 LBS

## 2024-10-17 DIAGNOSIS — M17.12 PRIMARY OSTEOARTHRITIS OF LEFT KNEE: Primary | ICD-10-CM

## 2024-10-17 NOTE — PROCEDURES
Large Joint Aspiration/Injection: L knee    Date/Time: 10/17/2024 1:15 PM    Performed by: Olivia Alejandro PA  Authorized by: Olivia Alejandro PA    Consent Done?:  Yes (Verbal)  Indications:  Arthritis, joint swelling and pain  Site marked: the procedure site was marked      Local anesthesia used?: Yes    Local anesthetic:  Topical anesthetic    Details:  Needle Size:  21 G  Approach:  Anterolateral  Location:  Knee  Site:  L knee  Medications:  20 mg sodium hyaluronate (EUFLEXXA) 10 mg/mL(mw 2.4 -3.6 million)  Patient tolerance:  Patient tolerated the procedure well with no immediate complications     Verbal consent was obtained  The patient's ID, site, side was verified  The site was sterile prepped in standard fashion  The injection was performed in the patricia-lateral side without complication  A sterile Band-Aid was applied    Patient was directed to apply ice today at roughly 15 minutes at a time as needed.  It was discussed that they may be sore for the next few days or so.  Please avoid strenuous activity over the next 24 hours.  It was also discussed that the patient may have a increase in glucose if diabetic and should monitor levels.  Patient was instructed to call as needed.

## 2024-10-24 ENCOUNTER — PROCEDURE VISIT (OUTPATIENT)
Dept: ORTHOPEDICS | Facility: CLINIC | Age: 59
End: 2024-10-24
Payer: COMMERCIAL

## 2024-10-24 VITALS — BODY MASS INDEX: 32.88 KG/M2 | WEIGHT: 222 LBS | HEIGHT: 69 IN

## 2024-10-24 DIAGNOSIS — M17.12 PRIMARY OSTEOARTHRITIS OF LEFT KNEE: Primary | ICD-10-CM

## 2024-10-24 RX ORDER — METHOCARBAMOL 500 MG/1
500 TABLET, FILM COATED ORAL 3 TIMES DAILY
Qty: 30 TABLET | Refills: 0 | Status: SHIPPED | OUTPATIENT
Start: 2024-10-24

## 2024-10-24 NOTE — PROCEDURES
Large Joint Aspiration/Injection: L knee    Date/Time: 10/24/2024 9:45 AM    Performed by: Olivia Alejandro PA  Authorized by: Olivia Alejandro PA    Consent Done?:  Yes (Verbal)  Indications:  Arthritis, joint swelling and pain  Site marked: the procedure site was marked      Local anesthesia used?: Yes    Local anesthetic:  Topical anesthetic    Details:  Needle Size:  21 G  Approach:  Anterolateral  Location:  Knee  Site:  L knee  Medications:  20 mg sodium hyaluronate (EUFLEXXA) 10 mg/mL(mw 2.4 -3.6 million)  Patient tolerance:  Patient tolerated the procedure well with no immediate complications     Verbal consent was obtained  The patient's ID, site, side was verified  The site was sterile prepped in standard fashion  The injection was performed in the patricia-lateral side without complication  A sterile Band-Aid was applied    Patient was directed to apply ice today at roughly 15 minutes at a time as needed.  It was discussed that they may be sore for the next few days or so.  Please avoid strenuous activity over the next 24 hours.  It was also discussed that the patient may have a increase in glucose if diabetic and should monitor levels.  Patient was instructed to call as needed.

## 2024-11-15 ENCOUNTER — TELEPHONE (OUTPATIENT)
Dept: INTERNAL MEDICINE | Facility: CLINIC | Age: 59
End: 2024-11-15
Payer: COMMERCIAL

## 2024-11-19 NOTE — TELEPHONE ENCOUNTER
Refill Routing Note   Medication(s) are not appropriate for processing by [facility] for the following reason(s):        New or recently adjusted medication    [facility] action(s):  Defer               Appointments  past 12m or future 3m with PCP    Date Provider   Last Visit   9/9/2024 Herb Lord MD   Next Visit   1/9/2025 Herb Lord MD   ED visits in past 90 days: 0        Note composed:1:24 PM 11/19/2024

## 2024-11-19 NOTE — TELEPHONE ENCOUNTER
No care due was identified.  [facility] Embedded Care Due Messages. Reference number: [id number].   11/19/2024 11:21:50 AM CST

## 2024-11-21 RX ORDER — REPAGLINIDE 2 MG/1
TABLET ORAL
Qty: 540 TABLET | Refills: 1 | Status: SHIPPED | OUTPATIENT
Start: 2024-11-21

## 2024-11-26 ENCOUNTER — OFFICE VISIT (OUTPATIENT)
Dept: PAIN MEDICINE | Facility: CLINIC | Age: 59
End: 2024-11-26
Payer: COMMERCIAL

## 2024-11-26 VITALS
DIASTOLIC BLOOD PRESSURE: 87 MMHG | BODY MASS INDEX: 33.24 KG/M2 | HEART RATE: 89 BPM | SYSTOLIC BLOOD PRESSURE: 139 MMHG | WEIGHT: 224.44 LBS | RESPIRATION RATE: 17 BRPM | HEIGHT: 69 IN

## 2024-11-26 DIAGNOSIS — M17.12 LOCALIZED OSTEOARTHRITIS OF LEFT KNEE: Primary | ICD-10-CM

## 2024-11-26 PROCEDURE — 3079F DIAST BP 80-89 MM HG: CPT | Mod: CPTII,S$GLB,, | Performed by: ANESTHESIOLOGY

## 2024-11-26 PROCEDURE — 99213 OFFICE O/P EST LOW 20 MIN: CPT | Mod: S$GLB,,, | Performed by: ANESTHESIOLOGY

## 2024-11-26 PROCEDURE — 1159F MED LIST DOCD IN RCRD: CPT | Mod: CPTII,S$GLB,, | Performed by: ANESTHESIOLOGY

## 2024-11-26 PROCEDURE — 3075F SYST BP GE 130 - 139MM HG: CPT | Mod: CPTII,S$GLB,, | Performed by: ANESTHESIOLOGY

## 2024-11-26 PROCEDURE — 3061F NEG MICROALBUMINURIA REV: CPT | Mod: CPTII,S$GLB,, | Performed by: ANESTHESIOLOGY

## 2024-11-26 PROCEDURE — 4010F ACE/ARB THERAPY RXD/TAKEN: CPT | Mod: CPTII,S$GLB,, | Performed by: ANESTHESIOLOGY

## 2024-11-26 PROCEDURE — 3008F BODY MASS INDEX DOCD: CPT | Mod: CPTII,S$GLB,, | Performed by: ANESTHESIOLOGY

## 2024-11-26 PROCEDURE — 3066F NEPHROPATHY DOC TX: CPT | Mod: CPTII,S$GLB,, | Performed by: ANESTHESIOLOGY

## 2024-11-26 PROCEDURE — 99999 PR PBB SHADOW E&M-EST. PATIENT-LVL IV: CPT | Mod: PBBFAC,,, | Performed by: ANESTHESIOLOGY

## 2024-11-26 PROCEDURE — 3052F HG A1C>EQUAL 8.0%<EQUAL 9.0%: CPT | Mod: CPTII,S$GLB,, | Performed by: ANESTHESIOLOGY

## 2024-11-26 NOTE — PROGRESS NOTES
Interventional Pain Progress Note           Referring Physician: No ref. provider found    PCP: Herb Lord MD    Chief Complaint:  Left knee pain       SUBJECTIVE:    Interval History (11/26/2024):      Patient returns to clinic for evaluation of left knee pain.  Patient reports that he underwent Euflexxa injections in October this year with orthopedics and reports significant improvement.  Patient reports that only pain residual is posterior pain secondary to Baker cyst.  Patient reports that lower back pain is well-controlled.  Patient reports that pain is worse with prolonged walking, better with sitting down.  Pain is currently rated a 4/10. Denies any fevers, chills, changes in gait, saddle anesthesia, or bowel and bladder incontinence          Interval History (12/19/2023):  Patient returns to clinic after procedure.  Patient reports 90% relief in lower back and lower extremity pain after bilateral L5-S1 transforaminal epidural steroid injection on 12/01/2023.  Patient reports that lower back pain is much improved.  Primary pain today is left knee pain secondary to swelling over the posterior aspect of left knee.  Pain is worse with prolonged walking and standing, better with elevation of the leg.  Pain is currently rated a 1/10. Denies any fevers, chills, changes in gait, saddle anesthesia, or bowel and bladder incontinence        Interval History (10/18/23):  Patient returns to clinic to review MRI.  Since last being seen, patient reports that lower back pain has improved.  Patient reports that pain is typically mild with rest, however when he begins to be active at work pain increases.  Lower back pain described as a throbbing aching pain in the band across the lower back, left-greater-than-right.  Patient reports in his pain will radiate down his left lower extremity on the posterior aspect to his mid calf in a burning pain.  Patient denies any significant radiation down his right lower  extremity.  Pain is worse with prolonged standing and walking, better with heat and anti-inflammatories.  Pain is currently rated a 4/10, but can increase to a 7/10 with exacerbating activities. Denies any fevers, chills, changes in gait, saddle anesthesia, or bowel and bladder incontinence        Initial HPI:     Brett Garcia is a 58 y.o. male who presents to the clinic for the evaluation of lower back pain.   Patient reports 4 month history of lower back pain.  He denies any inciting traumas to his low back pain.  He denies any previous surgery in his lumbar spine.  Patient reports that pain initially started as a stabbing aching pain over his left foot that rated up to his left knee and localized over his left knee.  Patient reports that this is progressively surgery radiated up to his left lower back and left buttocks as well.  Pain is worse with flexion and prolonged standing, better with heat and rest.  Pain is currently rated 2/10, but can increase to a 7/10 with exacerbating activity. Denies any fevers, chills, changes in gait, saddle anesthesia, or bowel and bladder incontinence  Of note, patient has recently seen viscosupplementation injection for left knee with moderate relief.  Patient also reports history of numbness in his bilateral feet secondary to diabetic neuropathy.      Non-Pharmacologic Treatments:  Physical Therapy/Home Exercise: yes  Ice/Heat:yes  TENS: no  Acupuncture: no  Massage: no  Chiropractic: yes        Previous Pain Medications:  NSAIDs, Tylenol, muscle relaxers, neuropathic     report:  Reviewed and consistent with medication use as prescribed.    Pain Procedures:   -11/02/2023: Bilateral L5-S1 transforaminal epidural steroid injection, 90% relief    Pain Disability Index Review:         11/26/2024     2:40 PM 8/4/2023    11:40 AM   Last 3 PDI Scores   Pain Disability Index (PDI) 20 7       Imaging:     Results for orders placed during the hospital encounter of  05/12/23    X-ray Knee Ortho Left    Narrative  EXAMINATION:  XR KNEE ORTHO LEFT    CLINICAL HISTORY:  Pain in left knee    TECHNIQUE:  AP standing of both knees, Merchant views of both knees as well as a lateral view of the left knee were performed.    COMPARISON:  None    FINDINGS:  No acute abnormality.  Bilateral multi compartment degenerative findings most prevalent within the left knee medial compartment including mild joint space loss.  There is lateral left patellar tilt and mild subluxation.  Left suprapatellar joint effusion suspected.    Impression  As above      Electronically signed by: Thang Garcia MD  Date:    05/12/2023  Time:    11:59          Results for orders placed during the hospital encounter of 08/11/23    MRI Lumbar Spine Without Contrast    Narrative  EXAMINATION:  MRI LUMBAR SPINE WITHOUT CONTRAST    CLINICAL HISTORY:  Low back pain, symptoms persist with > 6wks conservative treatment; Radiculopathy, lumbar region    TECHNIQUE:  Multiplanar, multisequence MR images were acquired from the thoracolumbar junction to the sacrum without contrast.    COMPARISON:  Lumbar radiograph 05/12/2023    FINDINGS:  Alignment: Grade 2 L5-S1 anterolisthesis.    Vertebrae: Lumbar vertebral body heights are maintained.  Prominent L5-S1 endplate degenerative changes with only minor endplate edema.  No evidence of an acute fracture.  Marrow signal is otherwise within normal limits.    Discs: L5-S1 significant disc height loss and desiccation.  Remaining disc heights are maintained with multilevel disc desiccation.    Cord: Within normal limits.  Conus terminates at L1-L2.    Degenerative findings:    T12-L1: Tiny central disc extrusion.  No significant spinal canal stenosis or neural foraminal stenosis.    L1-L2: No significant posterior disc bulge.  Mild right-sided facet arthropathy.  No significant spinal canal stenosis or neural foraminal stenosis.    L2-L3: No significant disc bulge.  Mild facet  arthropathy.  No significant spinal canal stenosis or neural foraminal stenosis.    L3-L4: Minimal broad-based posterior disc bulge and mild facet arthropathy.  Mild bilateral neural foraminal stenosis.  No significant spinal canal stenosis.    L4-L5: Mild broad-based disc bulge and bilateral facet arthropathy.  Minor narrowing of the lateral recesses without significant spinal canal stenosis.  Moderate bilateral neural foraminal stenosis.    L5-S1: Grade 2 anterolisthesis with roughly 8 mm of posterior disc space uncovering.  Mild broad-based posterior disc bulge is slightly asymmetric to the right with narrowing of the right lateral recess.  Bilateral facet arthropathy contributes to severe bilateral neural foraminal stenosis.  No significant spinal canal stenosis.    Paraspinal muscles & soft tissues: Within normal limits.    Impression  1. L5-S1 grade 2 anterolisthesis contributes to severe bilateral neural foraminal stenosis and minor narrowing of the right lateral recess.  No significant lumbar spinal canal stenosis.  2. Moderate bilateral L4-L5 neural foraminal stenosis.      Electronically signed by: Wyatt Erickson  Date:    08/11/2023  Time:    09:41            Results for orders placed during the hospital encounter of 05/12/23    X-Ray Lumbar Spine 5 View    Narrative  EXAMINATION:  XR LUMBAR SPINE COMPLETE 5 VIEW    CLINICAL HISTORY:  Low back pain, unspecified    TECHNIQUE:  AP, lateral, spot and bilateral oblique views of the lumbar spine were performed.    COMPARISON:  None    FINDINGS:  Vertebral body heights maintained.  L5 pars defects with grade 2 anterolisthesis.  Severe degenerative disc height loss with vacuum phenomenon at L5-S1.  Lower lumbar spine facet arthropathy.  Soft tissues unremarkable.    Impression  L5 pars defect with grade 2 anterolisthesis      Electronically signed by: Thang Garcia MD  Date:    05/12/2023  Time:    11:58      Past Medical History:   Diagnosis Date     Diabetes mellitus with neuropathy 2009    BS 57 am 12/18/2015    Hyperlipidemia 2009    Hypertension associated with diabetes 4/21/2017     Past Surgical History:   Procedure Laterality Date    COLONOSCOPY N/A 11/01/2018    Procedure: COLONOSCOPY;  Surgeon: Kristina Wilson MD;  Location: [facility] ENDO;  Service: Endoscopy;  Laterality: N/A;    COLONOSCOPY N/A 12/01/2023    Procedure: COLONOSCOPY;  Surgeon: Kristina Wilson MD;  Location: [facility] ENDO;  Service: Endoscopy;  Laterality: N/A;    COLONOSCOPY W/ POLYPECTOMY  11/01/2018    Polyp x1, repeat 5 years; Dr. Wilson    SELECTIVE INJECTION OF ANESTHETIC AGENT AROUND LUMBAR SPINAL NERVE ROOT BY TRANSFORAMINAL APPROACH Bilateral 11/02/2023    Procedure: Bilateral L5/S1 TF BRE;  Surgeon: Wilfred Serrano MD;  Location: [facility] PAIN MGT;  Service: Pain Management;  Laterality: Bilateral;     Social History     Socioeconomic History    Marital status:     Number of children: 2   Occupational History    Occupation: Plant work      Employer: [employer]     Comment: [employer]   Tobacco Use    Smoking status: Never     Passive exposure: Never    Smokeless tobacco: Former     Types: Snuff     Quit date: 2008   Substance and Sexual Activity    Alcohol use: Yes     Alcohol/week: 5.0 standard drinks of alcohol     Types: 1 Glasses of wine, 2 Cans of beer, 2 Drinks containing 0.5 oz of alcohol per week    Drug use: No    Sexual activity: Yes     Partners: Female     Birth control/protection: Condom, Post-menopausal     Social Drivers of Health     Financial Resource Strain: Medium Risk (4/22/2024)    Overall Financial Resource Strain (CARDIA)     Difficulty of Paying Living Expenses: Somewhat hard   Food Insecurity: No Food Insecurity (4/22/2024)    Hunger Vital Sign     Worried About Running Out of Food in the Last Year: Never true     Ran Out of Food in the Last Year: Never true   Transportation Needs: No Transportation Needs (4/22/2024)    PRAPARE - Transportation      Lack of Transportation (Medical): No     Lack of Transportation (Non-Medical): No   Physical Activity: Sufficiently Active (4/22/2024)    Exercise Vital Sign     Days of Exercise per Week: 6 days     Minutes of Exercise per Session: 150+ min   Stress: No Stress Concern Present (4/22/2024)    [employer] - Occupational Stress Questionnaire     Feeling of Stress : Not at all   Housing Stability: Unknown (4/22/2024)    Housing Stability Vital Sign     Unable to Pay for Housing in the Last Year: No     Family History   Problem Relation Name Age of Onset    Diabetes Father Herman DOMINGO.     Hypertension Father Herman DOMINGO.     Heart attack Father Herman DOMINGO.     Cataracts Father Herman DOMINGO.     Hearing loss Father Herman DOMINGO.     Heart disease Father Herman DOMINGO.     Vision loss Father Herman DOMINGO.     Hypertension Mother Catia     Hypertension Sister Amanda        Review of patient's allergies indicates:   Allergen Reactions    Hay fever and allergy relief        Current Outpatient Medications   Medication Sig    amitriptyline (ELAVIL) 50 MG tablet Take 1 tablet (50 mg total) by mouth every evening.    aspirin (ECOTRIN) 81 MG EC tablet Take 81 mg by mouth once daily.    atorvastatin (LIPITOR) 40 MG tablet Take 1 tablet (40 mg total) by mouth once daily.    blood sugar diagnostic Strp 1 each by Misc.(Non-Drug; Combo Route) route 3 (three) times daily.    blood-glucose meter kit Use as instructed    blood-glucose sensor (FREESTYLE CLEOPATRA 3 SENSOR) Elisha CHANGE EVERY 14 DAYS AS DIRECTED    dapaglifloz propaned-metformin (XIGDUO XR) 5-1,000 mg Take 1 tablet by mouth every morning.    fluconazole (DIFLUCAN) 200 MG Tab Take 1 tablet (200 mg total) by mouth once a week.    fluticasone propionate (FLONASE) 50 mcg/actuation nasal spray 2 sprays (100 mcg total) by Each Nostril route once daily. Seasonal allergic rhinitis [J30.2]    insulin aspart U-100 (NOVOLOG U-100 INSULIN ASPART) 100 unit/mL injection Inject 5 Units  "into the skin 3 (three) times daily before meals.    insulin glargine U-300 conc (TOUJEO MAX U-300 SOLOSTAR) 300 unit/mL (3 mL) insulin pen Inject 40 Units into the skin every evening.    insulin syringe-needle U-100 1 mL 31 gauge x 5/16 Syrg 1 Application by Misc.(Non-Drug; Combo Route) route 3 (three) times daily.    lancets Misc 1 each by Misc.(Non-Drug; Combo Route) route 3 (three) times daily.    lisinopriL (PRINIVIL,ZESTRIL) 5 MG tablet Take 1 tablet (5 mg total) by mouth once daily.    meloxicam (MOBIC) 15 MG tablet Take 1 tablet (15 mg total) by mouth once daily.    methocarbamoL (ROBAXIN) 500 MG Tab Take 1 tablet (500 mg total) by mouth 3 (three) times daily.    pedi multivit no.12 w-fluoride (MULTIVITAMINS-FLUORIDE-FOLIC A ORAL) Take 1 tablet by mouth once daily.    pen needle, diabetic 32 gauge x 5/32" Ndle 1 each by Misc.(Non-Drug; Combo Route) route once daily.    psyllium husk (METAMUCIL ORAL) Take 17 g by mouth once daily.    repaglinide (PRANDIN) 2 MG tablet TAKE 2 TABLETS BY MOUTH 3 (THREE) TIMES DAILY BEFORE MEALS.    sodium chloride (OCEAN) 0.65 % nasal spray 1 spray by Nasal route every other day.    tirzepatide 15 mg/0.5 mL PnIj Inject 15 mg into the skin every 7 days.    vitamin D (VITAMIN D3) 1000 units Tab Take 1,000 Units by mouth once daily.    zinc gluconate 50 mg tablet Take 50 mg by mouth once daily.     No current facility-administered medications for this visit.         ROS  Review of Systems   Constitutional:  Negative for activity change, appetite change and fever.   Respiratory:  Negative for cough, chest tightness, shortness of breath, wheezing and stridor.    Cardiovascular:  Positive for leg swelling. Negative for chest pain and palpitations.   Gastrointestinal:  Negative for abdominal pain, blood in stool, constipation, diarrhea, nausea and vomiting.   Endocrine: Negative for polydipsia, polyphagia and polyuria.   Genitourinary:  Negative for dysuria, hematuria and urgency. " "  Musculoskeletal:  Positive for arthralgias, gait problem and joint swelling. Negative for back pain, myalgias, neck pain and neck stiffness.   Skin:  Negative for rash.   Allergic/Immunologic: Negative for food allergies.   Neurological:  Positive for weakness. Negative for dizziness, tremors, seizures, syncope, facial asymmetry, speech difficulty, light-headedness, numbness and headaches.   Psychiatric/Behavioral:  Negative for agitation, hallucinations, self-injury and suicidal ideas. The patient is not nervous/anxious and is not hyperactive.             OBJECTIVE:  /87   Pulse 89   Resp 17   Ht 5' 9" (1.753 m)   Wt 101.8 kg (224 lb 6.9 oz)   BMI 33.14 kg/m²     Virtual visit, physical exam from previous clinic    Physical Exam  Constitutional:       General: He is not in acute distress.     Appearance: Normal appearance. He is not ill-appearing.   HENT:      Head: Normocephalic and atraumatic.      Nose: No congestion or rhinorrhea.      Mouth/Throat:      Mouth: Mucous membranes are moist.   Eyes:      Extraocular Movements: Extraocular movements intact.      Pupils: Pupils are equal, round, and reactive to light.   Cardiovascular:      Pulses: Normal pulses.   Pulmonary:      Effort: Pulmonary effort is normal.   Abdominal:      General: Abdomen is flat.      Palpations: Abdomen is soft.   Musculoskeletal:      Right knee: Normal.      Left knee: Crepitus present. No swelling or bony tenderness. Normal range of motion. Tenderness present over the PCL. Normal pulse.   Skin:     General: Skin is warm and dry.      Capillary Refill: Capillary refill takes less than 2 seconds.   Neurological:      General: No focal deficit present.      Mental Status: He is alert and oriented to person, place, and time.      Sensory: Sensory deficit present.      Motor: No weakness or abnormal muscle tone.      Gait: Gait normal.      Deep Tendon Reflexes:      Reflex Scores:       Patellar reflexes are 2+ on the right " side and 2+ on the left side.       Achilles reflexes are 2+ on the right side and 2+ on the left side.     Comments: Decreased light touch sensation over bilateral plantar aspect of the feet      Psychiatric:         Mood and Affect: Mood normal.         Behavior: Behavior normal.         Thought Content: Thought content normal.             LABS:  Lab Results   Component Value Date    WBC 5.78 09/06/2024    HGB 13.4 (L) 09/06/2024    HCT 39.6 (L) 09/06/2024    MCV 93 09/06/2024     09/06/2024       CMP  Sodium   Date Value Ref Range Status   05/09/2024 136 136 - 145 mmol/L Final     Potassium   Date Value Ref Range Status   05/09/2024 4.5 3.5 - 5.1 mmol/L Final     Chloride   Date Value Ref Range Status   05/09/2024 102 95 - 110 mmol/L Final     CO2   Date Value Ref Range Status   05/09/2024 24 23 - 29 mmol/L Final     Glucose   Date Value Ref Range Status   05/09/2024 191 (H) 70 - 110 mg/dL Final     BUN   Date Value Ref Range Status   05/09/2024 22 (H) 6 - 20 mg/dL Final     Creatinine   Date Value Ref Range Status   05/09/2024 0.9 0.5 - 1.4 mg/dL Final     Calcium   Date Value Ref Range Status   05/09/2024 9.2 8.7 - 10.5 mg/dL Final     Total Protein   Date Value Ref Range Status   09/06/2024 7.1 6.0 - 8.4 g/dL Final     Albumin   Date Value Ref Range Status   09/06/2024 3.8 3.5 - 5.2 g/dL Final     Total Bilirubin   Date Value Ref Range Status   09/06/2024 0.4 0.1 - 1.0 mg/dL Final     Comment:     For infants and newborns, interpretation of results should be based  on gestational age, weight and in agreement with clinical  observations.    Premature Infant recommended reference ranges:  Up to 24 hours.............<8.0 mg/dL  Up to 48 hours............<12.0 mg/dL  3-5 days..................<15.0 mg/dL  6-29 days.................<15.0 mg/dL       Alkaline Phosphatase   Date Value Ref Range Status   09/06/2024 73 55 - 135 U/L Final     AST   Date Value Ref Range Status   09/06/2024 43 (H) 10 - 40 U/L Final      ALT   Date Value Ref Range Status   09/06/2024 63 (H) 10 - 44 U/L Final     Anion Gap   Date Value Ref Range Status   05/09/2024 10 8 - 16 mmol/L Final     eGFR if    Date Value Ref Range Status   02/19/2022 >60.0 >60 mL/min/1.73 m^2 Final     eGFR if non    Date Value Ref Range Status   02/19/2022 >60.0 >60 mL/min/1.73 m^2 Final     Comment:     Calculation used to obtain the estimated glomerular filtration  rate (eGFR) is the CKD-EPI equation.          Lab Results   Component Value Date    HGBA1C 8.0 (H) 09/06/2024             ASSESSMENT:       58 y.o. year old male with lower back pain, consistent with     1. Localized osteoarthritis of left knee            Localized osteoarthritis of left knee               PLAN:   - Interventions:    None at this time.  Discussed left genicular nerve block if pain continues.  Patient has received Euflexxa injections with orthopedics and reports significant improvement.  Only pain currently in his posterior pain from Baker's cyst  -11/02/2023: Bilateral L5-S1 transforaminal epidural steroid injection, 90% relief  - Anticoagulation use:   Yes aspirin    - Medications:   Continue methocarbamol 500 mg 3 times a day p.r.n.   Continue Elavil 25 mg at night   Continue Mobic 15 mg daily p.r.n.    - Therapy:   Patient has completed 8 weeks of formal physical therapy with significant improvement.  Continue home exercises  Continue wearing LSO brace for spondylolisthesis    - Imaging/Diagnostic:   MRI lumbar spine reviewed.  Significant for grade 2 spondylolisthesis measuring 8 mm.  There is noted facet arthropathy at L4-5 and L5-S1. Moderate foraminal stenosis at L4-5 and severe foraminal stenosis at L5-S1.   X-rays of lumbar spine reviewed.  Significant for grade 2 anterolisthesis of L5 upon S1 were noted L5 pars defect   X-rays of left knee reviewed    - Consults:   Continue follow-up with orthopedics for left knee pain    - Counseled patient  regarding the importance of activity modification    - Patient Questions: Answered all of the patient's questions regarding diagnosis, therapy, and treatment     This condition does not require this patient to take time off of work, and the primary goal of our Pain Management services is to improve the patient's functional capacity.     - Follow up visit: return to clinic p.r.n.        The above plan and management options were discussed at length with patient. Patient is in agreement with the above and verbalized understanding.    I discussed the goals of interventional chronic pain management with the patient on today's visit.  I explained the utility of injections for diagnostic and therapeutic purposes.  We discussed a multimodal approach to pain including treating the patient's given worst pain at any given time.  We will use a systematic approach to addressing pain.  We will also adopt a multimodal approach that includes injections, adjuvant medications, physical therapy, at times psychiatry.  There may be a limited role for opioid use intermittently in the treatment of pain, more particularly for acute pain although no one approach can be used as a sole treatment modality.    I emphasized the importance of regular exercise, core strengthening and stretching, diet and weight loss as a cornerstone of long-term pain management.      Wilfred Serrano MD  Interventional Pain Management  [facility]    Disclaimer:  This note was prepared using voice recognition system and is likely to have sound alike errors that may have been overlooked even after proof reading.  Please call me with any questions      I spent a total of 20 minutes on the day of the visit.  This includes face to face time and non-face to face time preparing to see the patient (eg, review of tests), obtaining and/or reviewing separately obtained history, documenting clinical information in the electronic or other health record,  independently interpreting results and communicating results to the patient/family/caregiver, or care coordinator.

## 2024-11-30 DIAGNOSIS — Z79.4 TYPE 2 DIABETES MELLITUS WITH DIABETIC NEUROPATHY, WITH LONG-TERM CURRENT USE OF INSULIN: ICD-10-CM

## 2024-11-30 DIAGNOSIS — E11.40 TYPE 2 DIABETES MELLITUS WITH DIABETIC NEUROPATHY, WITH LONG-TERM CURRENT USE OF INSULIN: ICD-10-CM

## 2024-12-02 RX ORDER — PEN NEEDLE, DIABETIC 32GX 5/32"
NEEDLE, DISPOSABLE MISCELLANEOUS
Qty: 100 EACH | Refills: 0 | Status: SHIPPED | OUTPATIENT
Start: 2024-12-02

## 2024-12-06 ENCOUNTER — LAB VISIT (OUTPATIENT)
Dept: LAB | Facility: HOSPITAL | Age: 59
End: 2024-12-06
Attending: NURSE PRACTITIONER
Payer: COMMERCIAL

## 2024-12-06 ENCOUNTER — CLINICAL SUPPORT (OUTPATIENT)
Dept: DIABETES | Facility: CLINIC | Age: 59
End: 2024-12-06
Payer: COMMERCIAL

## 2024-12-06 VITALS — BODY MASS INDEX: 33.17 KG/M2 | WEIGHT: 224.63 LBS

## 2024-12-06 DIAGNOSIS — Z79.4 TYPE 2 DIABETES MELLITUS WITH DIABETIC NEUROPATHY, WITH LONG-TERM CURRENT USE OF INSULIN: Primary | ICD-10-CM

## 2024-12-06 DIAGNOSIS — Z79.4 TYPE 2 DIABETES MELLITUS WITH DIABETIC NEUROPATHY, WITH LONG-TERM CURRENT USE OF INSULIN: ICD-10-CM

## 2024-12-06 DIAGNOSIS — E11.40 TYPE 2 DIABETES MELLITUS WITH DIABETIC NEUROPATHY, WITH LONG-TERM CURRENT USE OF INSULIN: Primary | ICD-10-CM

## 2024-12-06 DIAGNOSIS — E11.40 TYPE 2 DIABETES MELLITUS WITH DIABETIC NEUROPATHY, WITH LONG-TERM CURRENT USE OF INSULIN: ICD-10-CM

## 2024-12-06 LAB
ANION GAP SERPL CALC-SCNC: 10 MMOL/L (ref 8–16)
BUN SERPL-MCNC: 9 MG/DL (ref 6–20)
CALCIUM SERPL-MCNC: 9.1 MG/DL (ref 8.7–10.5)
CHLORIDE SERPL-SCNC: 105 MMOL/L (ref 95–110)
CO2 SERPL-SCNC: 24 MMOL/L (ref 23–29)
CREAT SERPL-MCNC: 0.8 MG/DL (ref 0.5–1.4)
EST. GFR  (NO RACE VARIABLE): >60 ML/MIN/1.73 M^2
ESTIMATED AVG GLUCOSE: 169 MG/DL (ref 68–131)
GLUCOSE SERPL-MCNC: 112 MG/DL (ref 70–110)
HBA1C MFR BLD: 7.5 % (ref 4–5.6)
POTASSIUM SERPL-SCNC: 4.2 MMOL/L (ref 3.5–5.1)
SODIUM SERPL-SCNC: 139 MMOL/L (ref 136–145)

## 2024-12-06 PROCEDURE — 80048 BASIC METABOLIC PNL TOTAL CA: CPT | Performed by: NURSE PRACTITIONER

## 2024-12-06 PROCEDURE — 83036 HEMOGLOBIN GLYCOSYLATED A1C: CPT | Performed by: NURSE PRACTITIONER

## 2024-12-06 PROCEDURE — 36415 COLL VENOUS BLD VENIPUNCTURE: CPT | Performed by: NURSE PRACTITIONER

## 2024-12-06 PROCEDURE — 99999 PR PBB SHADOW E&M-EST. PATIENT-LVL III: CPT | Mod: PBBFAC,,,

## 2024-12-06 NOTE — PROGRESS NOTES
Diabetes Care Specialist Follow-up Note  Author: Delia Chappell RN  Date: 12/6/2024    Intake    Program Intake  Reason for Diabetes Program Visit:: Intervention  Type of Intervention:: Individual  Individual: Education  Education: Self-Management Skill Review  Current diabetes risk level:: moderate    Current Diabetes Treatment: Oral Medications, DM Injectables, Insulin  Oral Medication Type/Dose: Xigduo 5-1,000 mg daily. Prandin 2 mg AC breakfast.  DM Injectables Type/Dose: Mounjaro 15 mg weekly.  Method of insulin delivery?: Injections  Injection Type: Pens  Pen Type/Dose: Toujeo 32 units daily. Novolog 5 units before meals.    Continuous Glucose Monitoring  Patient has CGM: Yes  Personal CGM type:: Freestyle Unique 3  GMI Date: 12/06/24  GMI Value: 7.5 %    Lab Results   Component Value Date    HGBA1C 8.0 (H) 09/06/2024     A1c Pre Diabetes Care Specialist Intervention:  9.7%      Weight: 101.9 kg (224 lb 10.4 oz)   Body mass index is 33.17 kg/m².  Wt gain of 3 lbs since last visit on 09/2024.       Physical activity/Exercise:   Deferred.    SMBG: Freestyle Unique 3. Report in media.       Diabetes Self-Management Skills Assessment    Nutrition/Healthy Eating  Meal Plan 24 Hour Recall - Breakfast: 1/2 protein bar and 1/2 an apple.  Meal Plan 24 Hour Recall - Lunch: [place] and pretzels.  Meal Plan 24 Hour Recall - Dinner: Shrimp and crab bisque with rice.  Meal Plan 24 Hour Recall - Snack: Cookies.  Meal Plan 24 Hour Recall - Beverage: SF drinks.    Home Blood Glucose Monitoring  Personal CGM type:: Freestyle Unique 3      During today's follow-up visit,  the following areas required further assessment and content was provided/reviewed.    Based on today's diabetes care assessment, the following areas of need were identified:      Identified Areas of Need      Nutrition/Healthy Eating: See care plan for update.         Today's interventions were provided through individual discussion, instruction, and written  materials were provided.    Patient verbalized understanding of instruction and written materials.  Pt was able to return back demonstration of instructions today. Patient understood key points, needs reinforcement and further instruction.     Diabetes Self-Management Care Plan Review and Evaluation of Progress:    During today's follow-up Brett's Diabetes Self-Management Care Plan progress was reviewed and progress was evaluated including his/her input. Brett has agreed to continue his/her journey to improve/maintain overall diabetes control by continuing to set health goals. See care plan progress below.      Care Plan: Diabetes Management   Updates made since 12/7/2023 12:00 AM        Problem: Healthy Eating         Goal: Eat 3 meals daily with ~45 grams of carbs/meal.    Note:    Educated to always pair carbs with protein and/or healthy fat.  Educated on drinking MR arrieta for breakfast (Glucerna Hunger Smart).  Overall he is doing well with eating, but educated to keep food log until next visit to assess for any thing that may need further assessment.     9/06/24: He is staying around 30-70 grams of carbs/meals. He is documenting his meals; doing well with pairing protein with carb.     12/06/24: Concerned about his weight. States he does well during the day but tends to overeat in the evenings. Re-educated on the importance of portions even for protein and vegetables. Encouraged consistency and discipline. Encouraged mid afternoon snack, so he's less likely to overeat when he gets home in the evenings.        Task: Reviewed the sources and role of Carbohydrate, Protein, and Fat and how each nutrient impacts blood sugar. Completed 8/23/2024        Task: Explained how to count carbohydrates using the food label and the use of dry measuring cups for accurate carb counting. Completed 8/23/2024        Task: Review the importance of balancing carbohydrates with each meal using portion control techniques to count  servings of carbohydrate and label reading to identify serving size and amount of total carbs per serving. Completed 8/23/2024          Follow Up Plan     Follow up in about 3 months (around 3/6/2025) for review of goals.    Today's care plan and follow up schedule was discussed with patient.  Brett verbalized understanding of the care plan, goals, and agrees to follow up plan.        The patient was encouraged to communicate with his/her health care provider/physician and care team regarding his/her condition(s) and treatment.  I provided the patient with my contact information today and encouraged to contact me via phone or [facility] Portal as needed.     Length of Visit   Total Time: 30 Minutes

## 2024-12-09 ENCOUNTER — OFFICE VISIT (OUTPATIENT)
Dept: DIABETES | Facility: CLINIC | Age: 59
End: 2024-12-09
Payer: COMMERCIAL

## 2024-12-09 DIAGNOSIS — E11.40 TYPE 2 DIABETES MELLITUS WITH DIABETIC NEUROPATHY, WITH LONG-TERM CURRENT USE OF INSULIN: Primary | ICD-10-CM

## 2024-12-09 DIAGNOSIS — E11.69 HYPERLIPIDEMIA ASSOCIATED WITH TYPE 2 DIABETES MELLITUS: ICD-10-CM

## 2024-12-09 DIAGNOSIS — I15.2 HYPERTENSION ASSOCIATED WITH DIABETES: ICD-10-CM

## 2024-12-09 DIAGNOSIS — E78.5 HYPERLIPIDEMIA ASSOCIATED WITH TYPE 2 DIABETES MELLITUS: ICD-10-CM

## 2024-12-09 DIAGNOSIS — Z79.4 TYPE 2 DIABETES MELLITUS WITH DIABETIC NEUROPATHY, WITH LONG-TERM CURRENT USE OF INSULIN: Primary | ICD-10-CM

## 2024-12-09 DIAGNOSIS — E11.59 HYPERTENSION ASSOCIATED WITH DIABETES: ICD-10-CM

## 2024-12-09 PROCEDURE — G2211 COMPLEX E/M VISIT ADD ON: HCPCS | Mod: 95,,, | Performed by: NURSE PRACTITIONER

## 2024-12-09 PROCEDURE — 95251 CONT GLUC MNTR ANALYSIS I&R: CPT | Mod: NDTC,,, | Performed by: NURSE PRACTITIONER

## 2024-12-09 PROCEDURE — 99214 OFFICE O/P EST MOD 30 MIN: CPT | Mod: 95,,, | Performed by: NURSE PRACTITIONER

## 2024-12-09 PROCEDURE — 4010F ACE/ARB THERAPY RXD/TAKEN: CPT | Mod: CPTII,95,, | Performed by: NURSE PRACTITIONER

## 2024-12-09 PROCEDURE — 3051F HG A1C>EQUAL 7.0%<8.0%: CPT | Mod: CPTII,95,, | Performed by: NURSE PRACTITIONER

## 2024-12-09 PROCEDURE — 3066F NEPHROPATHY DOC TX: CPT | Mod: CPTII,95,, | Performed by: NURSE PRACTITIONER

## 2024-12-09 PROCEDURE — 3061F NEG MICROALBUMINURIA REV: CPT | Mod: CPTII,95,, | Performed by: NURSE PRACTITIONER

## 2024-12-09 NOTE — PATIENT INSTRUCTIONS
PATIENT INSTRUCTIONS    Lifestyle modification with well balanced diet and at least 30 minutes of physical activity daily recommended.   Increase water intake.   Increase protein and non-starchy vegetable servings with meals.   Decrease carbohydrate servings with meals.   Take 10 minute walk after each meal.   Avoid snacking on carbohydrates, choose low carb, high protein snacks.   Incorporate resistance training with weights or resistance bands with exercise regimen at least 3 days a week.      Continue Toujeo 32 units subcutaneously daily.   Continue Novolog 5 units subcutaneously three times daily before meals.   Continue Xigduo 5-1000 mg by mouth daily.   Continue Prandin 2 mg by mouth twice daily before meals.   Continue Mounjaro 15 mg subcutaneously every 7 days.      Unique 3 CGM  Blood Sugar Goals:       Fastin-130.       1-2 hours after a meal: Less than 180.

## 2024-12-09 NOTE — PROGRESS NOTES
The patient location is: Home  The chief complaint leading to consultation is: Diabetes    Visit type: audiovisual    Face to Face time with patient: 15  30 minutes of total time spent on the encounter, which includes face to face time and non-face to face time preparing to see the patient (eg, review of tests), Obtaining and/or reviewing separately obtained history, Documenting clinical information in the electronic or other health record, Independently interpreting results (not separately reported) and communicating results to the patient/family/caregiver, or Care coordination (not separately reported).  Each patient to whom he or she provides medical services by telemedicine is:  (1) informed of the relationship between the physician and patient and the respective role of any other health care provider with respect to management of the patient; and (2) notified that he or she may decline to receive medical services by telemedicine and may withdraw from such care at any time.    Notes:    Brett Garcia is a 58 y.o. male who presents for a follow up evaluation of Type 2 diabetes mellitus.     CHIEF COMPLAINT: Diabetes Consultation    PCP: Herb Lord MD      Initial visit with me - 6/11/2024    The patient was initially diagnosed with diabetes 9 years ago.      Previous failed treatments include:  Trulicity - Ineffective  Ozempic - n/v.      Social Documentation:  Patient lives in [place], with wife.   Occupation: Electritcian in [employer]  Exercise: 10-12k steps a day, 400-500 stairs, weekends yardwork.   Diet: states has worked hard on diet.       Diabetes related complications:   none.   denies Pancreatitis  denies Gastroparesis  denies DKA  denies Hx/family Hx of MEN2/MTC  denies Frequent UTIs/yeast infections     Diabetes Medications               dapaglifloz propaned-metformin (XIGDUO XR) 5-1,000 mg Take 1 tablet by mouth every morning.    insulin aspart U-100 (NOVOLOG U-100 INSULIN ASPART)  "100 unit/mL injection Inject 5 Units into the skin 3 (three) times daily before meals.    insulin glargine U-300 conc (TOUJEO MAX U-300 SOLOSTAR) 300 unit/mL (3 mL) insulin pen Inject 40 Units into the skin every evening.    repaglinide (PRANDIN) 2 MG tablet TAKE 2 TABLETS BY MOUTH 3 (THREE) TIMES DAILY BEFORE MEALS.    tirzepatide 15 mg/0.5 mL PnIj Inject 15 mg into the skin every 7 days.     Current monitoring regimen:  Unique 3 CGM    The patient's Unique CGM was downloaded and reviewed. For 14 days, patient average glucose was 174 mg/dL. He was above range 36% of the time, in range 64% of the time, and below range 0% of the time. The target range for this patient was 70 - 180 mg/dL. Overall, there was a pattern of post prandial hyperglycemia.          Patient currently taking insulin or sulfonylurea?  Yes. Emergency Glucagon Prescription current? yes  Recent hypoglycemic episodes: No.     Patient compliant with glucose checks and medication administration? Yes    DIABETES MANAGEMENT STATUS  Statin: Taking  ACE/ARB: Taking  Screening or Prevention Patient's value Goal Complete/Controlled?   HgA1C Testing and Control   Lab Results   Component Value Date    HGBA1C 7.5 (H) 12/06/2024      Annually/Less than 8% No   Lipid profile : 09/06/2024 Annually Yes   LDL control Lab Results   Component Value Date    LDLCALC 45.8 (L) 09/06/2024    Annually/Less than 100 mg/dl  Yes   Nephropathy screening Lab Results   Component Value Date    LABMICR 12.0 09/06/2024     Lab Results   Component Value Date    PROTEINUA Trace (A) 06/30/2016     No results found for: "UTPCR"   Annually Yes   Blood pressure BP Readings from Last 1 Encounters:   11/26/24 139/87    Less than 140/90 Yes   Dilated retinal exam : 09/27/2024 Annually Yes   Foot exam   : 08/25/2023 Annually Yes   Patient's medications, allergies, surgical, social and family histories were reviewed and updated as appropriate.     Review of Systems   Constitutional:  Negative " for weight loss.   Eyes:  Negative for blurred vision and double vision.   Cardiovascular:  Negative for chest pain.   Gastrointestinal:  Negative for nausea and vomiting.   Genitourinary:  Negative for frequency.   Musculoskeletal:  Negative for falls.   Neurological:  Negative for dizziness and weakness.   Endo/Heme/Allergies:  Negative for polydipsia.   Psychiatric/Behavioral:  Negative for depression.    All other systems reviewed and are negative.       Physical Exam  Constitutional:       General: He is not in acute distress.     Appearance: Normal appearance.   Pulmonary:      Effort: No respiratory distress.   Neurological:      Mental Status: He is alert and oriented to person, place, and time.   Psychiatric:         Mood and Affect: Mood normal.         Behavior: Behavior normal.        There were no vitals taken for this visit.  Wt Readings from Last 3 Encounters:   12/06/24 101.9 kg (224 lb 10.4 oz)   11/26/24 101.8 kg (224 lb 6.9 oz)   10/24/24 100.7 kg (222 lb 0.1 oz)       LAB REVIEW  Lab Results   Component Value Date     12/06/2024    K 4.2 12/06/2024     12/06/2024    CO2 24 12/06/2024    BUN 9 12/06/2024    CREATININE 0.8 12/06/2024    CALCIUM 9.1 12/06/2024    ANIONGAP 10 12/06/2024    EGFRNORACEVR >60.0 12/06/2024     Lab Results   Component Value Date    CPEPTIDE 2.4 04/21/2017    GLUTAMICACID 0.00 04/21/2017     Hemoglobin A1C   Date Value Ref Range Status   12/06/2024 7.5 (H) 4.0 - 5.6 % Final     Comment:     ADA Screening Guidelines:  5.7-6.4%  Consistent with prediabetes  >or=6.5%  Consistent with diabetes    High levels of fetal hemoglobin interfere with the HbA1C  assay. Heterozygous hemoglobin variants (HbS, HgC, etc)do  not significantly interfere with this assay.   However, presence of multiple variants may affect accuracy.     09/06/2024 8.0 (H) 4.0 - 5.6 % Final     Comment:     ADA Screening Guidelines:  5.7-6.4%  Consistent with prediabetes  >or=6.5%  Consistent with  diabetes    High levels of fetal hemoglobin interfere with the HbA1C  assay. Heterozygous hemoglobin variants (HbS, HgC, etc)do  not significantly interfere with this assay.   However, presence of multiple variants may affect accuracy.     05/09/2024 9.7 (H) 4.0 - 5.6 % Final     Comment:     ADA Screening Guidelines:  5.7-6.4%  Consistent with prediabetes  >or=6.5%  Consistent with diabetes    High levels of fetal hemoglobin interfere with the HbA1C  assay. Heterozygous hemoglobin variants (HbS, HgC, etc)do  not significantly interfere with this assay.   However, presence of multiple variants may affect accuracy.        ASSESSMENT    ICD-10-CM ICD-9-CM   1. Type 2 diabetes mellitus with diabetic neuropathy, with long-term current use of insulin  E11.40 250.60    Z79.4 357.2     V58.67   2. Hypertension associated with diabetes  E11.59 250.80    I15.2 401.9   3. Hyperlipidemia associated with type 2 diabetes mellitus  E11.69 250.80    E78.5 272.4   4. BMI 33.0-33.9,adult  Z68.33 V85.33         PLAN  Diagnoses and all orders for this visit:    Type 2 diabetes mellitus with diabetic neuropathy, with long-term current use of insulin  -     Basic Metabolic Panel; Future    Hypertension associated with diabetes    Hyperlipidemia associated with type 2 diabetes mellitus    BMI 33.0-33.9,adult        Reviewed pathophysiology of diabetes, complications related to the disease, importance of annual dilated eye exam and daily foot examination. Explained MOA, SE, dosage of medications. Written instructions given and reviewed with patient and patient verbalizes understanding.     PATIENT INSTRUCTIONS    Lifestyle modification with well balanced diet and at least 30 minutes of physical activity daily recommended.   Increase water intake.   Increase protein and non-starchy vegetable servings with meals.   Decrease carbohydrate servings with meals.   Take 10 minute walk after each meal.   Avoid snacking on carbohydrates, choose low  carb, high protein snacks.   Incorporate resistance training with weights or resistance bands with exercise regimen at least 3 days a week.      Continue Toujeo 32 units subcutaneously daily.   Continue Novolog 5 units subcutaneously three times daily before meals.   Continue Xigduo 5-1000 mg by mouth daily.   Continue Prandin 2 mg by mouth twice daily before meals.   Continue Mounjaro 15 mg subcutaneously every 7 days.      Unique 3 CGM  Blood Sugar Goals:       Fastin-130.       1-2 hours after a meal: Less than 180.     Follow up in about 3 months (around 3/9/2025) for Unique.

## 2024-12-19 ENCOUNTER — TELEPHONE (OUTPATIENT)
Dept: DIABETES | Facility: CLINIC | Age: 59
End: 2024-12-19
Payer: COMMERCIAL

## 2024-12-19 ENCOUNTER — PATIENT MESSAGE (OUTPATIENT)
Dept: DIABETES | Facility: CLINIC | Age: 59
End: 2024-12-19
Payer: COMMERCIAL

## 2024-12-19 NOTE — TELEPHONE ENCOUNTER
----- Message from Dane sent at 12/19/2024 11:16 AM CST -----  Regarding: Returning Call for Jasmyne  Contact: Pt [phone]  Type: Returning a call    Who left a message? Jasmyne Lea    When did the practice call? Today    Does patient know what this is regarding: Diabetes Management    Would the patient rather a call back or a response via [facility]? Call    Comments:

## 2024-12-20 RX ORDER — METHOCARBAMOL 500 MG/1
500 TABLET, FILM COATED ORAL 3 TIMES DAILY
Qty: 30 TABLET | Refills: 0 | Status: SHIPPED | OUTPATIENT
Start: 2024-12-20

## 2024-12-29 RX ORDER — INSULIN ASPART 100 [IU]/ML
5 INJECTION, SOLUTION INTRAVENOUS; SUBCUTANEOUS
Qty: 30 ML | Refills: 1 | Status: SHIPPED | OUTPATIENT
Start: 2024-12-29

## 2024-12-29 NOTE — TELEPHONE ENCOUNTER
No care due was identified.  [facility] Embedded Care Due Messages. Reference number: [id number].   12/28/2024 11:09:24 PM CST

## 2024-12-30 NOTE — TELEPHONE ENCOUNTER
Refill Decision Note   Brett Garcia  is requesting a refill authorization.  Brief Assessment and Rationale for Refill:  Approve     Medication Therapy Plan:         Comments:     Note composed:10:35 PM 12/29/2024

## 2025-01-02 DIAGNOSIS — E11.40 TYPE 2 DIABETES MELLITUS WITH DIABETIC NEUROPATHY, WITH LONG-TERM CURRENT USE OF INSULIN: ICD-10-CM

## 2025-01-02 DIAGNOSIS — Z79.4 TYPE 2 DIABETES MELLITUS WITH DIABETIC NEUROPATHY, WITH LONG-TERM CURRENT USE OF INSULIN: ICD-10-CM

## 2025-01-02 RX ORDER — PEN NEEDLE, DIABETIC 31 GX5/16"
NEEDLE, DISPOSABLE MISCELLANEOUS
Qty: 100 EACH | OUTPATIENT
Start: 2025-01-02

## 2025-01-03 ENCOUNTER — LAB VISIT (OUTPATIENT)
Dept: LAB | Facility: HOSPITAL | Age: 60
End: 2025-01-03
Attending: FAMILY MEDICINE
Payer: COMMERCIAL

## 2025-01-03 DIAGNOSIS — E11.59 HYPERTENSION ASSOCIATED WITH DIABETES: ICD-10-CM

## 2025-01-03 DIAGNOSIS — D64.9 CHRONIC ANEMIA: ICD-10-CM

## 2025-01-03 DIAGNOSIS — I15.2 HYPERTENSION ASSOCIATED WITH DIABETES: ICD-10-CM

## 2025-01-03 LAB
ALBUMIN SERPL BCP-MCNC: 3.7 G/DL (ref 3.5–5.2)
ALP SERPL-CCNC: 90 U/L (ref 40–150)
ALT SERPL W/O P-5'-P-CCNC: 60 U/L (ref 10–44)
ANION GAP SERPL CALC-SCNC: 9 MMOL/L (ref 8–16)
AST SERPL-CCNC: 43 U/L (ref 10–40)
BASOPHILS # BLD AUTO: 0.02 K/UL (ref 0–0.2)
BASOPHILS NFR BLD: 0.3 % (ref 0–1.9)
BILIRUB SERPL-MCNC: 0.6 MG/DL (ref 0.1–1)
BUN SERPL-MCNC: 16 MG/DL (ref 8–23)
CALCIUM SERPL-MCNC: 9.1 MG/DL (ref 8.7–10.5)
CHLORIDE SERPL-SCNC: 104 MMOL/L (ref 95–110)
CO2 SERPL-SCNC: 27 MMOL/L (ref 23–29)
CREAT SERPL-MCNC: 0.8 MG/DL (ref 0.5–1.4)
DIFFERENTIAL METHOD BLD: NORMAL
EOSINOPHIL # BLD AUTO: 0.5 K/UL (ref 0–0.5)
EOSINOPHIL NFR BLD: 7.3 % (ref 0–8)
ERYTHROCYTE [DISTWIDTH] IN BLOOD BY AUTOMATED COUNT: 13.7 % (ref 11.5–14.5)
EST. GFR  (NO RACE VARIABLE): >60 ML/MIN/1.73 M^2
ESTIMATED AVG GLUCOSE: 166 MG/DL (ref 68–131)
GLUCOSE SERPL-MCNC: 113 MG/DL (ref 70–110)
HBA1C MFR BLD: 7.4 % (ref 4–5.6)
HCT VFR BLD AUTO: 44.5 % (ref 40–54)
HGB BLD-MCNC: 14.9 G/DL (ref 14–18)
IMM GRANULOCYTES # BLD AUTO: 0.02 K/UL (ref 0–0.04)
IMM GRANULOCYTES NFR BLD AUTO: 0.3 % (ref 0–0.5)
LYMPHOCYTES # BLD AUTO: 2.1 K/UL (ref 1–4.8)
LYMPHOCYTES NFR BLD: 30.3 % (ref 18–48)
MCH RBC QN AUTO: 30.8 PG (ref 27–31)
MCHC RBC AUTO-ENTMCNC: 33.5 G/DL (ref 32–36)
MCV RBC AUTO: 92 FL (ref 82–98)
MONOCYTES # BLD AUTO: 0.5 K/UL (ref 0.3–1)
MONOCYTES NFR BLD: 7.6 % (ref 4–15)
NEUTROPHILS # BLD AUTO: 3.7 K/UL (ref 1.8–7.7)
NEUTROPHILS NFR BLD: 54.2 % (ref 38–73)
NRBC BLD-RTO: 0 /100 WBC
PLATELET # BLD AUTO: 157 K/UL (ref 150–450)
PMV BLD AUTO: 11.7 FL (ref 9.2–12.9)
POTASSIUM SERPL-SCNC: 4.3 MMOL/L (ref 3.5–5.1)
PROT SERPL-MCNC: 7.4 G/DL (ref 6–8.4)
RBC # BLD AUTO: 4.84 M/UL (ref 4.6–6.2)
SODIUM SERPL-SCNC: 140 MMOL/L (ref 136–145)
WBC # BLD AUTO: 6.87 K/UL (ref 3.9–12.7)

## 2025-01-03 PROCEDURE — 83036 HEMOGLOBIN GLYCOSYLATED A1C: CPT | Performed by: FAMILY MEDICINE

## 2025-01-03 PROCEDURE — 80053 COMPREHEN METABOLIC PANEL: CPT | Performed by: FAMILY MEDICINE

## 2025-01-03 PROCEDURE — 85025 COMPLETE CBC W/AUTO DIFF WBC: CPT | Performed by: FAMILY MEDICINE

## 2025-01-03 PROCEDURE — 36415 COLL VENOUS BLD VENIPUNCTURE: CPT | Performed by: FAMILY MEDICINE

## 2025-01-09 ENCOUNTER — OFFICE VISIT (OUTPATIENT)
Dept: INTERNAL MEDICINE | Facility: CLINIC | Age: 60
End: 2025-01-09
Payer: COMMERCIAL

## 2025-01-09 VITALS
HEART RATE: 100 BPM | SYSTOLIC BLOOD PRESSURE: 130 MMHG | OXYGEN SATURATION: 98 % | DIASTOLIC BLOOD PRESSURE: 78 MMHG | TEMPERATURE: 99 F | RESPIRATION RATE: 18 BRPM | BODY MASS INDEX: 32.49 KG/M2 | WEIGHT: 220 LBS

## 2025-01-09 DIAGNOSIS — K76.0 FATTY LIVER: ICD-10-CM

## 2025-01-09 DIAGNOSIS — E78.2 MIXED HYPERLIPIDEMIA: Primary | ICD-10-CM

## 2025-01-09 DIAGNOSIS — M71.20 SYNOVIAL CYST OF POPLITEAL SPACE, UNSPECIFIED LATERALITY: ICD-10-CM

## 2025-01-09 DIAGNOSIS — I15.2 HYPERTENSION ASSOCIATED WITH DIABETES: ICD-10-CM

## 2025-01-09 DIAGNOSIS — E11.59 HYPERTENSION ASSOCIATED WITH DIABETES: ICD-10-CM

## 2025-01-09 PROCEDURE — 3072F LOW RISK FOR RETINOPATHY: CPT | Mod: CPTII,S$GLB,, | Performed by: FAMILY MEDICINE

## 2025-01-09 PROCEDURE — 3051F HG A1C>EQUAL 7.0%<8.0%: CPT | Mod: CPTII,S$GLB,, | Performed by: FAMILY MEDICINE

## 2025-01-09 PROCEDURE — G2211 COMPLEX E/M VISIT ADD ON: HCPCS | Mod: S$GLB,,, | Performed by: FAMILY MEDICINE

## 2025-01-09 PROCEDURE — 3008F BODY MASS INDEX DOCD: CPT | Mod: CPTII,S$GLB,, | Performed by: FAMILY MEDICINE

## 2025-01-09 PROCEDURE — 99214 OFFICE O/P EST MOD 30 MIN: CPT | Mod: S$GLB,,, | Performed by: FAMILY MEDICINE

## 2025-01-09 PROCEDURE — 3078F DIAST BP <80 MM HG: CPT | Mod: CPTII,S$GLB,, | Performed by: FAMILY MEDICINE

## 2025-01-09 PROCEDURE — 99999 PR PBB SHADOW E&M-EST. PATIENT-LVL V: CPT | Mod: PBBFAC,,, | Performed by: FAMILY MEDICINE

## 2025-01-09 PROCEDURE — 3075F SYST BP GE 130 - 139MM HG: CPT | Mod: CPTII,S$GLB,, | Performed by: FAMILY MEDICINE

## 2025-01-09 PROCEDURE — 1159F MED LIST DOCD IN RCRD: CPT | Mod: CPTII,S$GLB,, | Performed by: FAMILY MEDICINE

## 2025-01-09 RX ORDER — AMITRIPTYLINE HYDROCHLORIDE 50 MG/1
50 TABLET, FILM COATED ORAL NIGHTLY
Qty: 90 TABLET | Refills: 1 | Status: SHIPPED | OUTPATIENT
Start: 2025-01-09 | End: 2026-01-09

## 2025-01-09 RX ORDER — METHOCARBAMOL 500 MG/1
500 TABLET, FILM COATED ORAL 3 TIMES DAILY
Qty: 90 TABLET | Refills: 1 | Status: SHIPPED | OUTPATIENT
Start: 2025-01-09

## 2025-01-09 RX ORDER — PANTOPRAZOLE SODIUM 20 MG/1
20 TABLET, DELAYED RELEASE ORAL DAILY
Qty: 90 TABLET | Refills: 1 | Status: SHIPPED | OUTPATIENT
Start: 2025-01-09 | End: 2026-01-09

## 2025-01-09 NOTE — PROGRESS NOTES
Subjective:       Patient ID: Brett Garcia is a 59 y.o. male.    Chief Complaint: f/u  HPI    Patient Active Problem List   Diagnosis    Hyperlipidemia    BMI 33.0-33.9,adult    Hypertension associated with diabetes    Skin lesions    Diabetic polyneuropathy associated with type 2 diabetes mellitus    Stress reaction    Hyperlipidemia associated with type 2 diabetes mellitus    Type 2 diabetes mellitus with diabetic neuropathy, with long-term current use of insulin    Elevated diaphragm    Decreased range of motion (ROM) of left knee    Weakness of trunk musculature    Lumbar pain    Left leg pain    Abnormal gait    Family history of heart disease    Abnormal EKG    RBBB    LAFB (left anterior fascicular block)       Past Medical History:   Diagnosis Date    Diabetes mellitus with neuropathy 2009    BS 57 am 12/18/2015    Hyperlipidemia 2009    Hypertension associated with diabetes 4/21/2017       Past Surgical History:   Procedure Laterality Date    COLONOSCOPY N/A 11/01/2018    Procedure: COLONOSCOPY;  Surgeon: Kristina Wilson MD;  Location: [facility];  Service: Endoscopy;  Laterality: N/A;    COLONOSCOPY N/A 12/01/2023    Procedure: COLONOSCOPY;  Surgeon: Kristina Wilson MD;  Location: [facility];  Service: Endoscopy;  Laterality: N/A;    COLONOSCOPY W/ POLYPECTOMY  11/01/2018    Polyp x1, repeat 5 years; Dr. Wilson    SELECTIVE INJECTION OF ANESTHETIC AGENT AROUND LUMBAR SPINAL NERVE ROOT BY TRANSFORAMINAL APPROACH Bilateral 11/02/2023    Procedure: Bilateral L5/S1 TF BRE;  Surgeon: Wilfred Serrano MD;  Location: [facility];  Service: Pain Management;  Laterality: Bilateral;       Family History   Problem Relation Name Age of Onset    Diabetes Father Herman V.     Hypertension Father Herman V.     Heart attack Father Herman V.     Cataracts Father Herman V.     Hearing loss Father Herman V.     Heart disease Father Herman V.     Vision loss Father Herman V.     Hypertension Mother Catia      Hypertension Sister Amanda        Social History     Tobacco Use   Smoking Status Never    Passive exposure: Never   Smokeless Tobacco Former    Types: Snuff    Quit date: 2008       Wt Readings from Last 5 Encounters:   01/09/25 99.8 kg (220 lb)   12/06/24 101.9 kg (224 lb 10.4 oz)   11/26/24 101.8 kg (224 lb 6.9 oz)   10/24/24 100.7 kg (222 lb 0.1 oz)   10/17/24 100.7 kg (222 lb 0.1 oz)       History of Present Illness    CHIEF COMPLAINT:  Patient presents today for follow-up regarding diabetes management and knee pain.    DIABETES MANAGEMENT:  His A1C was 7.4 six days ago and has never been below 7 in recent times, though he is actively working towards this goal. Seems very motivated. He is currently on Toujeo 32 units, Novolog 5 units 3 times daily, xigduo (dapagliflozin with metformin) 5-1000 mg daily, Mounjaro 15 mg, and Prandin 2 mg 3 times daily before meals.    GASTROINTESTINAL:  He experiences indigestion and heartburn, possibly related to diabetes medications, noting symptoms even when drinking water. He uses [place] for relief. Mounjaro may be contributing to symptoms through delayed gastric emptying.    MUSCULOSKELETAL:  He has localized arthritis of the knee and a Baker's cyst causing hamstring issues. He uses compression stockings to manage the Baker's cyst. The knee pain significantly impacts his intimate relations.    MENTAL HEALTH:  His partner has expressed concerns about depression. He is currently on amitriptyline for chronic pain and nerve pain management, and denies need for additional antidepressant medications or dose adjustments.    RELATIONSHIP:  He reports intimate relationship difficulties, attributing erectile dysfunction to both physical pain and emotional factors.martial issues    CURRENT MEDICATIONS:  He takes amitriptyline, aspirin 81 mg OTC, Lipitor 40 mg, Diflucan, Flonase nasal spray, lisinopril, and Robaxin (methocarbamol). He is not currently taking Meloxicam.          Objective:      Vitals:    01/09/25 1147   BP: 130/78   Pulse: 100   Resp: 18   Temp: 99 °F (37.2 °C)       Physical Exam  Constitutional:       General: He is not in acute distress.     Appearance: He is not ill-appearing.   Pulmonary:      Effort: Pulmonary effort is normal. No respiratory distress.   Neurological:      General: No focal deficit present.      Mental Status: He is alert.   Psychiatric:         Mood and Affect: Mood normal.         Behavior: Behavior normal.         Assessment:       1. Mixed hyperlipidemia    2. Hypertension associated with diabetes    3. Fatty liver    4. Synovial cyst of popliteal space, unspecified laterality        Plan:   Assessment & Plan    PLAN SUMMARY:  Continue Lipitor 40 mg for cholesterol management  Refilled amitriptyline for chronic pain management/antidepressant properties  Refilled Robaxin (methocarbamol) for muscle relaxation  Continue xigduo (dapagliflozin with metformin) 5-1000 mg daily  Continue Mounjaro 15 mg as prescribed  Continue Prandin 2 mg 3 times daily before meals  Continue lisinopril for hypertension management  Plan to address knee injection for Baker's cyst once A1C is under control  Advised continued use of compression stockings for knee management  Recommend considering relationship counseling  Follow up in about 3 months    TYPE 2 DIABETES MELLITUS WITH OTHER CIRCULATORY COMPLICATIONS:  A1C levels have improved to 7.4 but remain above the target of <7.  Patient is instructed to continue efforts to reduce A1C.  We will continue monitoring A1C and weight.  Continue current meds    MEDICATION MANAGEMENT:  - Continue Zigduo (dapagliflozin with metformin) 5-1000 mg daily - Continue Mounjaro 15 mg as prescribed by Eli Bolivar - Continue Prandin 2 mg 3 times daily before each meal - Continue lisinopril for hypertension management    DIABETES:  Reviewed last encounter with diabetes management provider.  Acknowledged patient's A1C is the best  it's been in quite some time.  Insulin prescription refilled at the end of December.    FATTY LIVER DISEASE:  Discussed relationship between obesity and fatty liver disease.  Noted slight improvement in liver function.  Advised against alcohol consumption and emphasized the need for weight loss to protect the liver.  Will continue monitoring the condition.    HYPERLIPIDEMIA:  Continue Lipitor 40 mg for cholesterol management, prescribed in October for a 1-year supply.    HYPERTENSION:  Prescribed in October with a 9-month supply.  Continue current meds - lisinopril    KNEE ARTHRITIS / Baker cyst  Reviewed recent encounter with pain mngt regarding localized knee arthritis.  Assessed current medication regimen, including pain control.  Refilled amitriptyline for chronic pain management and Robaxin (methocarbamol) for muscle relaxation.  Discussed therapy continuation for knee arthritis.  Advised patient to continue using compression stockings for knee management.  .      FOLLOW UP:  Follow up in about 3 months.

## 2025-01-19 ENCOUNTER — TELEPHONE (OUTPATIENT)
Dept: PHARMACY | Facility: CLINIC | Age: 60
End: 2025-01-19
Payer: COMMERCIAL

## 2025-01-19 NOTE — TELEPHONE ENCOUNTER
[facility]/Population Health Chart Review & Patient Outreach Details For Medication Adherence Project    Reason for Outreach Encounter: 3rd Party payor non-compliance report ([employer], [employer], C, etc)  2.  Patient Outreach Method: Reviewed Patient Chart  3.   Medication in question: atorvastatin   LAST FILLED: 11/22/24 for 90 day supply  Hyperlipidemia Medications               atorvastatin (LIPITOR) 40 MG tablet Take 1 tablet (40 mg total) by mouth once daily.               4.  Reviewed and or Updates Made To: Patient Chart  5. Outreach Outcomes and/or actions taken: Patient filled medication and is on track to be adherent

## 2025-01-23 DIAGNOSIS — Z79.4 TYPE 2 DIABETES MELLITUS WITH DIABETIC NEUROPATHY, WITH LONG-TERM CURRENT USE OF INSULIN: ICD-10-CM

## 2025-01-23 DIAGNOSIS — E11.40 TYPE 2 DIABETES MELLITUS WITH DIABETIC NEUROPATHY, WITH LONG-TERM CURRENT USE OF INSULIN: ICD-10-CM

## 2025-01-23 RX ORDER — INSULIN GLARGINE 300 [IU]/ML
40 INJECTION, SOLUTION SUBCUTANEOUS NIGHTLY
Qty: 3 ML | Refills: 11 | Status: SHIPPED | OUTPATIENT
Start: 2025-01-23 | End: 2026-01-23

## 2025-01-23 RX ORDER — DAPAGLIFLOZIN AND METFORMIN HYDROCHLORIDE 5; 1000 MG/1; MG/1
1 TABLET, FILM COATED, EXTENDED RELEASE ORAL EVERY MORNING
Qty: 30 TABLET | Refills: 11 | Status: SHIPPED | OUTPATIENT
Start: 2025-01-23 | End: 2026-01-23

## 2025-01-23 RX ORDER — BLOOD-GLUCOSE SENSOR
1 EACH MISCELLANEOUS DAILY
Qty: 2 EACH | Refills: 11 | Status: SHIPPED | OUTPATIENT
Start: 2025-01-23 | End: 2026-01-23

## 2025-01-23 NOTE — TELEPHONE ENCOUNTER
----- Message from Mona sent at 1/23/2025  2:20 PM CST -----  Contact: Patient, [phone]  Calling to request Rx tirzepatide 15 mg/0.5 mL PnIj transferred to       [facility] [address] [place] [address]  Phone: [phone] Fax: [phone]

## 2025-01-23 NOTE — TELEPHONE ENCOUNTER
No care due was identified.  [facility] Embedded Care Due Messages. Reference number: [id number].   1/23/2025 12:30:27 PM CST

## 2025-01-24 RX ORDER — INSULIN ASPART 100 [IU]/ML
5 INJECTION, SOLUTION INTRAVENOUS; SUBCUTANEOUS
Qty: 15 ML | Refills: 1 | Status: SHIPPED | OUTPATIENT
Start: 2025-01-24

## 2025-01-24 NOTE — TELEPHONE ENCOUNTER
Refill Decision Note   Brett Garcia  is requesting a refill authorization.  Brief Assessment and Rationale for Refill:  Approve     Medication Therapy Plan:         Comments:     Note composed:8:15 AM 01/24/2025

## 2025-01-27 ENCOUNTER — PROCEDURE VISIT (OUTPATIENT)
Dept: ORTHOPEDICS | Facility: CLINIC | Age: 60
End: 2025-01-27
Payer: COMMERCIAL

## 2025-01-27 VITALS — BODY MASS INDEX: 32.58 KG/M2 | HEIGHT: 69 IN | WEIGHT: 220 LBS

## 2025-01-27 DIAGNOSIS — M17.12 PRIMARY OSTEOARTHRITIS OF LEFT KNEE: Primary | ICD-10-CM

## 2025-01-27 DIAGNOSIS — M25.562 LEFT KNEE PAIN, UNSPECIFIED CHRONICITY: ICD-10-CM

## 2025-01-27 PROCEDURE — 99499 UNLISTED E&M SERVICE: CPT | Mod: S$GLB,,, | Performed by: PHYSICIAN ASSISTANT

## 2025-01-27 NOTE — PROCEDURES
Procedures    Patient presented today with chief complaint of left knee pain   He notes pain is at worst a 1/10 affecting activity of daily living and thus his quality of life   He was hoping to receive left knee steroid injection today   I did discuss with him that his A1c was outside of range unfortunately it was not caught by staff previously   I apologize but did discuss with him that this would cause a potential bump in his A1c which he has been working quite adamantly with to bring down from 9.0 .  Most recent A1c 7.4     I would like to see him back in 3 months for repeat Euflexxa as this is providing great relief for him.  He may call with any questions or concerns in the interim

## 2025-01-29 ENCOUNTER — TELEPHONE (OUTPATIENT)
Dept: DIABETES | Facility: CLINIC | Age: 60
End: 2025-01-29
Payer: COMMERCIAL

## 2025-01-29 NOTE — TELEPHONE ENCOUNTER
I attempted to call and assist  with rescheduling his appointment with Eli Sneed NP on 3/12/25 to another day due to  not being in office on that day. I will attempt to call and reschedule at later time. I left a detailed voicemail with my name and the [facility] callback number.

## 2025-01-31 ENCOUNTER — TELEPHONE (OUTPATIENT)
Dept: DIABETES | Facility: CLINIC | Age: 60
End: 2025-01-31
Payer: COMMERCIAL

## 2025-01-31 NOTE — TELEPHONE ENCOUNTER
----- Message from Jyoti sent at 1/31/2025 12:49 PM CST -----  Name of Who is Calling:ANTWON LARIOS [[id number]]        What is the request in detail:Pt requesting a call back to reschedule pt upcoming appointment.        Can the clinic reply by [facility]:Call        What Number to Call Back if not in [facility]:Telephone Information:  [employer]          [phone]

## 2025-02-03 NOTE — TELEPHONE ENCOUNTER
I attempted to call and assist  with rescheduling his appointment on 3/12/25 with Eli Sneed NP but received his voicemail. I left a detailed voicemail with my name and the [facility] callback number. I will attempt to call and reschedule  again at later time.

## 2025-02-06 ENCOUNTER — TELEPHONE (OUTPATIENT)
Dept: DIABETES | Facility: CLINIC | Age: 60
End: 2025-02-06
Payer: COMMERCIAL

## 2025-02-06 NOTE — TELEPHONE ENCOUNTER
----- Message from Cherrie sent at 2/6/2025  4:22 PM CST -----  Contact: Self/[phone]  Patient is returning a phone call.    Who left a message for the patient:     Does patient know what this is regarding:  Yes    Would you like a call back, or a response through your [facility] portal?:   Call back     Comments:

## 2025-02-06 NOTE — TELEPHONE ENCOUNTER
----- Message from BarBird sent at 2/6/2025  4:45 PM CST -----  Contact: ANTWON LARIOS [[id number]]  .Type:  Patient Requesting Call    Who Called:ANTWON LARIOS [[id number]]  Does the patient know what this is regarding?:CALL BACK PHONE DISCONNECTED  Would the patient rather a call back or a response via [email or web address]? CALL  Best Call Back Number:.[phone]    Additional Information:

## 2025-02-28 DIAGNOSIS — Z79.4 TYPE 2 DIABETES MELLITUS WITH DIABETIC NEUROPATHY, WITH LONG-TERM CURRENT USE OF INSULIN: ICD-10-CM

## 2025-02-28 DIAGNOSIS — E11.40 TYPE 2 DIABETES MELLITUS WITH DIABETIC NEUROPATHY, WITH LONG-TERM CURRENT USE OF INSULIN: ICD-10-CM

## 2025-02-28 RX ORDER — PEN NEEDLE, DIABETIC 32GX 5/32"
NEEDLE, DISPOSABLE MISCELLANEOUS
Qty: 100 EACH | Refills: 0 | Status: SHIPPED | OUTPATIENT
Start: 2025-02-28

## 2025-03-04 DIAGNOSIS — E11.40 TYPE 2 DIABETES MELLITUS WITH DIABETIC NEUROPATHY, WITH LONG-TERM CURRENT USE OF INSULIN: ICD-10-CM

## 2025-03-04 DIAGNOSIS — Z79.4 TYPE 2 DIABETES MELLITUS WITH DIABETIC NEUROPATHY, WITH LONG-TERM CURRENT USE OF INSULIN: ICD-10-CM

## 2025-03-05 RX ORDER — BLOOD-GLUCOSE SENSOR
1 EACH MISCELLANEOUS DAILY
Qty: 2 EACH | Refills: 11 | Status: SHIPPED | OUTPATIENT
Start: 2025-03-05 | End: 2026-03-05

## 2025-03-07 ENCOUNTER — CLINICAL SUPPORT (OUTPATIENT)
Dept: DIABETES | Facility: CLINIC | Age: 60
End: 2025-03-07
Payer: COMMERCIAL

## 2025-03-07 VITALS — BODY MASS INDEX: 33.27 KG/M2 | WEIGHT: 225.31 LBS

## 2025-03-07 DIAGNOSIS — Z79.4 TYPE 2 DIABETES MELLITUS WITH DIABETIC NEUROPATHY, WITH LONG-TERM CURRENT USE OF INSULIN: Primary | ICD-10-CM

## 2025-03-07 DIAGNOSIS — E11.40 TYPE 2 DIABETES MELLITUS WITH DIABETIC NEUROPATHY, WITH LONG-TERM CURRENT USE OF INSULIN: Primary | ICD-10-CM

## 2025-03-07 PROCEDURE — 99999 PR PBB SHADOW E&M-EST. PATIENT-LVL III: CPT | Mod: PBBFAC,,,

## 2025-03-07 NOTE — PROGRESS NOTES
Diabetes Care Specialist Follow-up Note  Author: Delia Chappell RN  Date: 3/7/2025    Intake    Program Intake  Reason for Diabetes Program Visit:: Intervention  Type of Intervention:: Individual  Individual: Education  Education: Self-Management Skill Review  Current diabetes risk level:: moderate  In the last month, have you used the ER or been admitted to the hospital: No  Permission to speak with others about care:: yes    Current Diabetes Treatment: Oral Medications, DM Injectables, Insulin  Diet/Exercise Type/Dose: Healthy eating, watching portions.  Oral Medication Type/Dose: Xigduo 5-1,000 mg daily. Prandin 2 mg BID AC.  DM Injectables Type/Dose: Mounjaro 15 mg weekly.  Method of insulin delivery?: Injections  Injection Type: Pens  Pen Type/Dose: Toujeo 32 units daily. Novolog 5 units before meals.    Continuous Glucose Monitoring  Patient has CGM: Yes  Personal CGM type:: Freestyle Unique 3  GMI Date: 03/07/25  GMI Value: 7.6 %    Lab Results   Component Value Date    HGBA1C 7.4 (H) 01/03/2025     A1c Pre Diabetes Care Specialist Intervention:  9.7%      Weight: 102.2 kg (225 lb 5 oz)   Body mass index is 33.27 kg/m².      Physical activity/Exercise:   Deferred.    SMBG: Unique 3. Report in media.         Diabetes Self-Management Skills Assessment    Nutrition/Healthy Eating  Meal Plan 24 Hour Recall - Breakfast: Scrambled eggs x3, 1 grand biscuit, 2 flour tortillas, deer sausage links x3, and rotel; Coffee. (Eats big breakfast on Thursdays because work cooks and big breakfast on Saturday & Sunday. Other days of the week: Diabetic shake).  Meal Plan 24 Hour Recall - Lunch: Tuna fish sandwich, pretzels, and an apple; Water.  Meal Plan 24 Hour Recall - Dinner: Pot roast with carrots, potatoes, and celery and 3 slices of bread; Water.  Meal Plan 24 Hour Recall - Snack: Fit crunch bar between breakfast and lunch.    Home Blood Glucose Monitoring  Personal CGM type:: Freestyle Unique 3    During today's  follow-up visit,  the following areas required further assessment and content was provided/reviewed.    Based on today's diabetes care assessment, the following areas of need were identified:      Identified Areas of Need      Medication/Current Diabetes Treatment: See care plan.     Nutrition/Healthy Eating: See care plan for update.         Today's interventions were provided through individual discussion, instruction, and written materials were provided.    Patient verbalized understanding of instruction and written materials.  Pt was able to return back demonstration of instructions today. Patient understood key points, needs reinforcement and further instruction.     Diabetes Self-Management Care Plan Review and Evaluation of Progress:    During today's follow-up Brett's Diabetes Self-Management Care Plan progress was reviewed and progress was evaluated including his/her input. Brett has agreed to continue his/her journey to improve/maintain overall diabetes control by continuing to set health goals. See care plan progress below.      Care Plan: Diabetes Management   Updates made since 3/7/2024 12:00 AM        Problem: Healthy Eating         Goal: Eat 3 meals daily with ~45 grams of carbs/meal.    Note:    Educated to always pair carbs with protein and/or healthy fat.  Educated on drinking MR arrieta for breakfast (Glucerna Hunger Smart).  Overall he is doing well with eating, but educated to keep food log until next visit to assess for any thing that may need further assessment.     9/06/24: He is staying around 30-70 grams of carbs/meals. He is documenting his meals; doing well with pairing protein with carb.     12/06/24: Concerned about his weight. States he does well during the day but tends to overeat in the evenings. Re-educated on the importance of portions even for protein and vegetables. Encouraged consistency and discipline. Encouraged mid afternoon snack, so he's less likely to overeat when he  gets home in the evenings.     3/07/25: Overall doing better with healthy eating.        Task: Reviewed the sources and role of Carbohydrate, Protein, and Fat and how each nutrient impacts blood sugar. Completed 8/23/2024        Task: Explained how to count carbohydrates using the food label and the use of dry measuring cups for accurate carb counting. Completed 8/23/2024        Task: Review the importance of balancing carbohydrates with each meal using portion control techniques to count servings of carbohydrate and label reading to identify serving size and amount of total carbs per serving. Completed 8/23/2024        Problem: Medications         Goal: Patient agrees to take Novolog as  prescribed.    Start Date: 3/7/2025   Expected End Date: 7/29/2025   Note:    Sometimes taking Novolog after eating. Educated how this can cause hypoglycemia.  Biggest barrier to taking medication before eating is work schedule.  Discussed CeQur; he is interested. Will send message to NP.        Task: Reviewed with patient all current diabetes medications and provided basic review of the purpose, dosage, frequency, side effects, and storage of injectable diabetes medications. Completed 3/7/2025        Task: Discussed guidelines for preventing, detecting and treating hypoglycemia and hyperglycemia and reviewed the importance of meal and medication timing with diabetes mediations for prevention of hypoglycemia and maximum drug benefit. Completed 3/7/2025          Follow Up Plan     Follow up if CeQur supplies are received.    Today's care plan and follow up schedule was discussed with patient.  Brett verbalized understanding of the care plan, goals, and agrees to follow up plan.        The patient was encouraged to communicate with his/her health care provider/physician and care team regarding his/her condition(s) and treatment.  I provided the patient with my contact information today and encouraged to contact me via phone or  [facility] as needed.     Length of Visit   Total Time: 40 Minutes

## 2025-03-07 NOTE — Clinical Note
Good morning, I met with Mr. Garcia today. His FBGs are elevated; may benefit from an increase in Toujeo from 32 units to 35 units. Unique report is in the media for review.  We discussed CeQur, and he is interested in trying. Please send orders if appropriate.  Thank you, ALONDRA Lin

## 2025-03-10 ENCOUNTER — TELEPHONE (OUTPATIENT)
Dept: DIABETES | Facility: CLINIC | Age: 60
End: 2025-03-10
Payer: COMMERCIAL

## 2025-03-10 DIAGNOSIS — Z79.4 TYPE 2 DIABETES MELLITUS WITH DIABETIC NEUROPATHY, WITH LONG-TERM CURRENT USE OF INSULIN: Primary | ICD-10-CM

## 2025-03-10 DIAGNOSIS — E11.40 TYPE 2 DIABETES MELLITUS WITH DIABETIC NEUROPATHY, WITH LONG-TERM CURRENT USE OF INSULIN: Primary | ICD-10-CM

## 2025-03-10 RX ORDER — HUMAN INSULIN 100 [IU]/ML
INJECTION, SUSPENSION SUBCUTANEOUS
Qty: 20 ML | Refills: 11 | Status: SHIPPED | OUTPATIENT
Start: 2025-03-10 | End: 2025-03-10 | Stop reason: ALTCHOICE

## 2025-03-10 RX ORDER — BOLUS INSULIN PUMP, 200 UNIT 2 UNIT
EACH MISCELLANEOUS
Qty: 8 EACH | Refills: 11 | Status: SHIPPED | OUTPATIENT
Start: 2025-03-10

## 2025-03-10 RX ORDER — INSULIN ASPART 100 [IU]/ML
INJECTION, SOLUTION INTRAVENOUS; SUBCUTANEOUS
Qty: 20 ML | Refills: 11 | Status: SHIPPED | OUTPATIENT
Start: 2025-03-10

## 2025-03-10 RX ORDER — DIABETIC SUPPLIES,MISCELL
1 MISCELLANEOUS MISCELLANEOUS ONCE
Qty: 1 EACH | Refills: 1 | Status: SHIPPED | OUTPATIENT
Start: 2025-03-10 | End: 2025-03-10

## 2025-03-10 NOTE — TELEPHONE ENCOUNTER
----- Message from ALONDRA Lin sent at 3/7/2025 10:06 AM CST -----  Good morning, I met with Mr. Garcia today. His FBGs are elevated; may benefit from an increase in Toujeo from 32 units to 35 units. Unique report is in the media for review.   We discussed CeQur, and he is interested in trying. Please send orders if appropriate.   Thank you, ALONDRA Lin

## 2025-03-12 DIAGNOSIS — J30.2 SEASONAL ALLERGIC RHINITIS: ICD-10-CM

## 2025-03-13 RX ORDER — FLUTICASONE PROPIONATE 50 MCG
2 SPRAY, SUSPENSION (ML) NASAL DAILY
Qty: 48 G | Refills: 3 | Status: SHIPPED | OUTPATIENT
Start: 2025-03-13

## 2025-03-13 NOTE — TELEPHONE ENCOUNTER
No care due was identified.  [facility] Embedded Care Due Messages. Reference number: [id number].   3/12/2025 7:23:00 PM CDT

## 2025-03-13 NOTE — TELEPHONE ENCOUNTER
Refill Decision Note   Brett Garcia  is requesting a refill authorization.  Brief Assessment and Rationale for Refill:  Approve     Medication Therapy Plan:        Comments:     Note composed:9:31 AM 03/13/2025

## 2025-03-14 ENCOUNTER — OFFICE VISIT (OUTPATIENT)
Dept: CARDIOLOGY | Facility: CLINIC | Age: 60
End: 2025-03-14
Payer: COMMERCIAL

## 2025-03-14 VITALS
BODY MASS INDEX: 33.31 KG/M2 | HEIGHT: 69 IN | HEART RATE: 86 BPM | SYSTOLIC BLOOD PRESSURE: 132 MMHG | RESPIRATION RATE: 16 BRPM | OXYGEN SATURATION: 98 % | DIASTOLIC BLOOD PRESSURE: 70 MMHG | WEIGHT: 224.88 LBS

## 2025-03-14 DIAGNOSIS — I44.4 LAFB (LEFT ANTERIOR FASCICULAR BLOCK): ICD-10-CM

## 2025-03-14 DIAGNOSIS — E11.69 HYPERLIPIDEMIA ASSOCIATED WITH TYPE 2 DIABETES MELLITUS: ICD-10-CM

## 2025-03-14 DIAGNOSIS — I45.10 RBBB: Primary | ICD-10-CM

## 2025-03-14 DIAGNOSIS — E11.40 TYPE 2 DIABETES MELLITUS WITH DIABETIC NEUROPATHY, WITH LONG-TERM CURRENT USE OF INSULIN: ICD-10-CM

## 2025-03-14 DIAGNOSIS — E78.5 HYPERLIPIDEMIA ASSOCIATED WITH TYPE 2 DIABETES MELLITUS: ICD-10-CM

## 2025-03-14 DIAGNOSIS — Z82.49 FAMILY HISTORY OF HEART DISEASE: ICD-10-CM

## 2025-03-14 DIAGNOSIS — Z79.4 TYPE 2 DIABETES MELLITUS WITH DIABETIC NEUROPATHY, WITH LONG-TERM CURRENT USE OF INSULIN: ICD-10-CM

## 2025-03-14 DIAGNOSIS — E11.59 HYPERTENSION ASSOCIATED WITH DIABETES: ICD-10-CM

## 2025-03-14 DIAGNOSIS — I15.2 HYPERTENSION ASSOCIATED WITH DIABETES: ICD-10-CM

## 2025-03-14 PROCEDURE — 99999 PR PBB SHADOW E&M-EST. PATIENT-LVL III: CPT | Mod: PBBFAC,,, | Performed by: INTERNAL MEDICINE

## 2025-03-14 NOTE — PROGRESS NOTES
Subjective:   Patient ID:  Brett Garcia is a 59 y.o. male who presents for follow-up of No chief complaint on file.  NMT/stress and echo nml  Patient denies CP, angina or anginal equivalent.  HbA1c 7.4  HPI    Review of Systems   Constitutional: Negative. Negative for weight gain.   HENT: Negative.     Eyes: Negative.    Cardiovascular: Negative.  Negative for chest pain, leg swelling and palpitations.   Respiratory: Negative.  Negative for shortness of breath.    Endocrine: Negative.    Hematologic/Lymphatic: Negative.    Skin: Negative.    Musculoskeletal:  Negative for muscle weakness.   Gastrointestinal: Negative.    Genitourinary: Negative.    Neurological: Negative.  Negative for dizziness.   Psychiatric/Behavioral: Negative.     Allergic/Immunologic: Negative.    All other systems reviewed and are negative.    Hypertension  This is a chronic problem. The current episode started more than 1 year ago. The problem has been gradually improving since onset. The problem is controlled. Pertinent negatives include no chest pain, palpitations or shortness of breath. Past treatments include ace (-). The current treatment provides moderate improvement. There are no compliance problems.        Hyperlipidemia  This is a chronic problem. The current episode started more than 1 year ago. The problem is controlled. Pertinent negatives include no chest pain or shortness of breath. Current antihyperlipidemic treatment includes statins. The current treatment provides moderate improvement of lipids. There are no compliance problems.   Family History   Problem Relation Name Age of Onset    Diabetes Father Herman V.     Hypertension Father Herman V.     Heart attack Father Herman V.     Cataracts Father Herman V.     Hearing loss Father Herman V.     Heart disease Father Herman V.     Vision loss Father Herman V.     Hypertension Mother Catia     Hypertension Sister Amanda      Past Medical History:   Diagnosis Date     Diabetes mellitus with neuropathy 2009    BS 57 am 12/18/2015    Hyperlipidemia 2009    Hypertension associated with diabetes 4/21/2017     Social History[1]  Medications Ordered Prior to Encounter[2]  Review of patient's allergies indicates:   Allergen Reactions    Hay fever and allergy relief        Objective:     Physical Exam  Vitals and nursing note reviewed.   Constitutional:       Appearance: He is well-developed.   HENT:      Head: Normocephalic and atraumatic.   Eyes:      Conjunctiva/sclera: Conjunctivae normal.      Pupils: Pupils are equal, round, and reactive to light.   Cardiovascular:      Rate and Rhythm: Normal rate and regular rhythm.      Pulses: Intact distal pulses.      Heart sounds: Normal heart sounds.   Pulmonary:      Effort: Pulmonary effort is normal.      Breath sounds: Normal breath sounds.   Abdominal:      General: Bowel sounds are normal.      Palpations: Abdomen is soft.   Musculoskeletal:      Cervical back: Normal range of motion and neck supple.   Skin:     General: Skin is warm and dry.   Neurological:      Mental Status: He is alert and oriented to person, place, and time.         Assessment:     1. RBBB    2. LAFB (left anterior fascicular block)    3. Hypertension associated with diabetes    4. Hyperlipidemia associated with type 2 diabetes mellitus    5. Family history of heart disease    6. BMI 33.0-33.9,adult        Plan:     RBBB    LAFB (left anterior fascicular block)    Hypertension associated with diabetes    Hyperlipidemia associated with type 2 diabetes mellitus    Family history of heart disease    BMI 33.0-33.9,adult      Continue statin-HLP  Continue lisinopril-HTN  Asa- primary prevention        [1]   Social History  Socioeconomic History    Marital status:     Number of children: 2   Occupational History    Occupation: Plant work      Employer: [employer]     Comment: [employer]   Tobacco Use    Smoking status: Never     Passive exposure: Never     "Smokeless tobacco: Former     Types: Snuff     Quit date: 2008   Substance and Sexual Activity    Alcohol use: Yes     Alcohol/week: 5.0 standard drinks of alcohol     Types: 1 Glasses of wine, 2 Cans of beer, 2 Drinks containing 0.5 oz of alcohol per week    Drug use: No    Sexual activity: Yes     Partners: Female     Birth control/protection: Condom, Post-menopausal     Social Drivers of Health     Financial Resource Strain: Medium Risk (4/22/2024)    Overall Financial Resource Strain (CARDIA)     Difficulty of Paying Living Expenses: Somewhat hard   Food Insecurity: No Food Insecurity (4/22/2024)    Hunger Vital Sign     Worried About Running Out of Food in the Last Year: Never true     Ran Out of Food in the Last Year: Never true   Transportation Needs: No Transportation Needs (4/22/2024)    PRAPARE - Transportation     Lack of Transportation (Medical): No     Lack of Transportation (Non-Medical): No   Physical Activity: Sufficiently Active (4/22/2024)    Exercise Vital Sign     Days of Exercise per Week: 6 days     Minutes of Exercise per Session: 150+ min   Stress: No Stress Concern Present (4/22/2024)    [employer] [place] [employer] - Occupational Stress Questionnaire     Feeling of Stress : Not at all   Housing Stability: Unknown (4/22/2024)    Housing Stability Vital Sign     Unable to Pay for Housing in the Last Year: No   [2]   Current Outpatient Medications on File Prior to Visit   Medication Sig Dispense Refill    amitriptyline (ELAVIL) 50 MG tablet Take 1 tablet (50 mg total) by mouth every evening. 90 tablet 1    aspirin (ECOTRIN) 81 MG EC tablet Take 81 mg by mouth once daily.      atorvastatin (LIPITOR) 40 MG tablet Take 1 tablet (40 mg total) by mouth once daily. 90 tablet 3    BD JOAQUÍN 2ND GEN PEN NEEDLE 32 gauge x 5/32" Ndle USE TO INJECT INSULIN ONCE A  each 0    blood sugar diagnostic Strp 1 each by Misc.(Non-Drug; Combo Route) route 3 (three) times daily. 100 strip 11    " blood-glucose meter kit Use as instructed 1 each 0    blood-glucose sensor (FREESTYLE CLEOPATRA 3 SENSOR) Elisha CHANGE EVERY 15 DAYS AS DIRECTED 2 each 11    bolus insulin pump, 200 unit (CEQUR SIMPLICITY) 2 unit Elisha 1 patch every 4 days as directed. 8 each 11    dapaglifloz propaned-metformin (XIGDUO XR) 5-1,000 mg Take 1 tablet by mouth every morning. 30 tablet 11    fluticasone propionate (FLONASE) 50 mcg/actuation nasal spray 2 sprays (100 mcg total) by Each Nostril route once daily. Seasonal allergic rhinitis [J30.2] 48 g 3    insulin aspart U-100 (NOVOLOG U-100 INSULIN ASPART) 100 unit/mL injection 200 units every 4 days into Cequr Simplicity Insulin Patch 20 mL 11    insulin glargine U-300 conc (TOUJEO MAX U-300 SOLOSTAR) 300 unit/mL (3 mL) insulin pen Inject 40 Units into the skin every evening. 3 mL 11    insulin syringe-needle U-100 1 mL 31 gauge x 5/16 Syrg 1 Application by Misc.(Non-Drug; Combo Route) route 3 (three) times daily. 300 each 3    lancets Misc 1 each by Misc.(Non-Drug; Combo Route) route 3 (three) times daily. 100 each 11    lisinopriL (PRINIVIL,ZESTRIL) 5 MG tablet Take 1 tablet (5 mg total) by mouth once daily. 90 tablet 2    methocarbamoL (ROBAXIN) 500 MG Tab Take 1 tablet (500 mg total) by mouth 3 (three) times daily. 90 tablet 1    pantoprazole (PROTONIX) 20 MG tablet Take 1 tablet (20 mg total) by mouth once daily. 90 tablet 1    pedi multivit no.12 w-fluoride (MULTIVITAMINS-FLUORIDE-FOLIC A ORAL) Take 1 tablet by mouth once daily.      psyllium husk (METAMUCIL ORAL) Take 17 g by mouth once daily.      repaglinide (PRANDIN) 2 MG tablet TAKE 2 TABLETS BY MOUTH 3 (THREE) TIMES DAILY BEFORE MEALS. 540 tablet 1    sodium chloride (OCEAN) 0.65 % nasal spray 1 spray by Nasal route every other day.      tirzepatide 15 mg/0.5 mL PnIj Inject 15 mg into the skin every 7 days. 2 mL 11    vitamin D (VITAMIN D3) 1000 units Tab Take 1,000 Units by mouth once daily.      zinc gluconate 50 mg tablet Take  50 mg by mouth once daily.      diabetic supplies, miscellan. (CEQUR SIMPLICITY ) Misc 1 each by Misc.(Non-Drug; Combo Route) route once. for 1 dose 1 each 1     No current facility-administered medications on file prior to visit.

## 2025-03-17 RX ORDER — DAPAGLIFLOZIN AND METFORMIN HYDROCHLORIDE 5; 1000 MG/1; MG/1
1 TABLET, FILM COATED, EXTENDED RELEASE ORAL EVERY MORNING
Qty: 30 TABLET | Refills: 11 | Status: SHIPPED | OUTPATIENT
Start: 2025-03-17 | End: 2025-03-19 | Stop reason: DRUGHIGH

## 2025-03-19 ENCOUNTER — OFFICE VISIT (OUTPATIENT)
Dept: DIABETES | Facility: CLINIC | Age: 60
End: 2025-03-19
Payer: COMMERCIAL

## 2025-03-19 VITALS
BODY MASS INDEX: 33.24 KG/M2 | HEART RATE: 92 BPM | DIASTOLIC BLOOD PRESSURE: 76 MMHG | WEIGHT: 225.06 LBS | SYSTOLIC BLOOD PRESSURE: 137 MMHG

## 2025-03-19 DIAGNOSIS — E11.40 TYPE 2 DIABETES MELLITUS WITH DIABETIC NEUROPATHY, WITH LONG-TERM CURRENT USE OF INSULIN: Primary | ICD-10-CM

## 2025-03-19 DIAGNOSIS — Z79.4 TYPE 2 DIABETES MELLITUS WITH DIABETIC NEUROPATHY, WITH LONG-TERM CURRENT USE OF INSULIN: Primary | ICD-10-CM

## 2025-03-19 DIAGNOSIS — E11.40 TYPE 2 DIABETES MELLITUS WITH DIABETIC NEUROPATHY, WITH LONG-TERM CURRENT USE OF INSULIN: ICD-10-CM

## 2025-03-19 DIAGNOSIS — E11.69 HYPERLIPIDEMIA ASSOCIATED WITH TYPE 2 DIABETES MELLITUS: ICD-10-CM

## 2025-03-19 DIAGNOSIS — I15.2 HYPERTENSION ASSOCIATED WITH DIABETES: ICD-10-CM

## 2025-03-19 DIAGNOSIS — Z79.4 TYPE 2 DIABETES MELLITUS WITH DIABETIC NEUROPATHY, WITH LONG-TERM CURRENT USE OF INSULIN: ICD-10-CM

## 2025-03-19 DIAGNOSIS — E11.59 HYPERTENSION ASSOCIATED WITH DIABETES: ICD-10-CM

## 2025-03-19 DIAGNOSIS — E78.5 HYPERLIPIDEMIA ASSOCIATED WITH TYPE 2 DIABETES MELLITUS: ICD-10-CM

## 2025-03-19 LAB — GLUCOSE SERPL-MCNC: 96 MG/DL (ref 70–110)

## 2025-03-19 PROCEDURE — 99999 PR PBB SHADOW E&M-EST. PATIENT-LVL V: CPT | Mod: PBBFAC,,, | Performed by: NURSE PRACTITIONER

## 2025-03-19 PROCEDURE — 3072F LOW RISK FOR RETINOPATHY: CPT | Mod: CPTII,S$GLB,, | Performed by: NURSE PRACTITIONER

## 2025-03-19 PROCEDURE — 3075F SYST BP GE 130 - 139MM HG: CPT | Mod: CPTII,S$GLB,, | Performed by: NURSE PRACTITIONER

## 2025-03-19 PROCEDURE — 3008F BODY MASS INDEX DOCD: CPT | Mod: CPTII,S$GLB,, | Performed by: NURSE PRACTITIONER

## 2025-03-19 PROCEDURE — 3051F HG A1C>EQUAL 7.0%<8.0%: CPT | Mod: CPTII,S$GLB,, | Performed by: NURSE PRACTITIONER

## 2025-03-19 PROCEDURE — 95251 CONT GLUC MNTR ANALYSIS I&R: CPT | Mod: S$GLB,,, | Performed by: NURSE PRACTITIONER

## 2025-03-19 PROCEDURE — 82962 GLUCOSE BLOOD TEST: CPT | Mod: S$GLB,,, | Performed by: NURSE PRACTITIONER

## 2025-03-19 PROCEDURE — G2211 COMPLEX E/M VISIT ADD ON: HCPCS | Mod: S$GLB,,, | Performed by: NURSE PRACTITIONER

## 2025-03-19 PROCEDURE — 1159F MED LIST DOCD IN RCRD: CPT | Mod: CPTII,S$GLB,, | Performed by: NURSE PRACTITIONER

## 2025-03-19 PROCEDURE — 3078F DIAST BP <80 MM HG: CPT | Mod: CPTII,S$GLB,, | Performed by: NURSE PRACTITIONER

## 2025-03-19 PROCEDURE — 99214 OFFICE O/P EST MOD 30 MIN: CPT | Mod: S$GLB,,, | Performed by: NURSE PRACTITIONER

## 2025-03-19 RX ORDER — DAPAGLIFLOZIN AND METFORMIN HYDROCHLORIDE 10; 1000 MG/1; MG/1
1 TABLET, FILM COATED, EXTENDED RELEASE ORAL DAILY
Qty: 90 TABLET | Refills: 3 | Status: SHIPPED | OUTPATIENT
Start: 2025-03-19 | End: 2026-03-19

## 2025-03-19 NOTE — PROGRESS NOTES
Brett Garcia is a 59 y.o. male who presents for a follow up evaluation of Type 2 diabetes mellitus.     CHIEF COMPLAINT: Diabetes Consultation    PCP: Herb Lord MD      Initial visit with me - 6/11/2024    The patient was initially diagnosed with diabetes 9 years ago.      Previous failed treatments include:  Trulicity - Ineffective  Ozempic - n/v.      Social Documentation:  Patient lives in [place], with wife.   Occupation: Electritcian in [employer]  Exercise: 10-12k steps a day, 400-500 stairs, weekends yardwork.   Diet: states has worked hard on diet.       Diabetes related complications:   none.   denies Pancreatitis  denies Gastroparesis  denies DKA  denies Hx/family Hx of MEN2/MTC  denies Frequent UTIs/yeast infections     Diabetes Medications              dapaglifloz propaned-metformin (XIGDUO XR) 5-1,000 mg Take 1 tablet by mouth every morning.    insulin aspart U-100 (NOVOLOG U-100 INSULIN ASPART) 100 unit/mL injection 200 units every 4 days into Cequr Simplicity Insulin Patch    insulin glargine U-300 conc (TOUJEO MAX U-300 SOLOSTAR) 300 unit/mL (3 mL) insulin pen Inject 40 Units into the skin every evening.    repaglinide (PRANDIN) 2 MG tablet TAKE 2 TABLETS BY MOUTH 3 (THREE) TIMES DAILY BEFORE MEALS.    tirzepatide 15 mg/0.5 mL PnIj Inject 15 mg into the skin every 7 days.     Current monitoring regimen:  Unique 3 CGM    The patient's Unique CGM was downloaded and reviewed. For 14 days, patient average glucose was 168 mg/dL. He was above range 38% of the time, in range 62% of the time, and below range 0% of the time. The target range for this patient was 70 - 180 mg/dL. Overall, there was a pattern of post prandial hyperglycemia.          Patient currently taking insulin or sulfonylurea?  yes  Recent hypoglycemic episodes: No.     Patient compliant with glucose checks and medication administration? Yes    DIABETES MANAGEMENT STATUS  Statin: Taking  ACE/ARB: Taking  Screening or  "Prevention Patient's value Goal Complete/Controlled?   HgA1C Testing and Control   Lab Results   Component Value Date    HGBA1C 7.4 (H) 01/03/2025      Annually/Less than 8% No   Lipid profile : 09/06/2024 Annually Yes   LDL control Lab Results   Component Value Date    LDLCALC 45.8 (L) 09/06/2024    Annually/Less than 100 mg/dl  Yes   Nephropathy screening Lab Results   Component Value Date    LABMICR 12.0 09/06/2024     Lab Results   Component Value Date    PROTEINUA Trace (A) 06/30/2016     No results found for: "UTPCR"   Annually Yes   Blood pressure BP Readings from Last 1 Encounters:   03/19/25 137/76    Less than 140/90 Yes   Dilated retinal exam : 09/27/2024 Annually Yes   Foot exam   : 08/25/2023 Annually Yes   Patient's medications, allergies, surgical, social and family histories were reviewed and updated as appropriate.     Review of Systems   Constitutional:  Negative for weight loss.   Eyes:  Negative for blurred vision and double vision.   Cardiovascular:  Negative for chest pain.   Gastrointestinal:  Negative for nausea and vomiting.   Genitourinary:  Negative for frequency.   Musculoskeletal:  Negative for falls.   Neurological:  Negative for dizziness and weakness.   Endo/Heme/Allergies:  Negative for polydipsia.   Psychiatric/Behavioral:  Negative for depression.    All other systems reviewed and are negative.       Physical Exam  Constitutional:       General: He is not in acute distress.     Appearance: Normal appearance.   Cardiovascular:      Pulses:           Dorsalis pedis pulses are 2+ on the right side and 2+ on the left side.        Posterior tibial pulses are 2+ on the right side and 2+ on the left side.   Pulmonary:      Effort: No respiratory distress.   Feet:      Right foot:      Protective Sensation: 10 sites tested.  10 sites sensed.      Skin integrity: Skin integrity normal.      Toenail Condition: Right toenails are normal.      Left foot:      Protective Sensation: 10 sites " tested.  10 sites sensed.      Skin integrity: Skin integrity normal.      Toenail Condition: Left toenails are normal.   Neurological:      Mental Status: He is alert and oriented to person, place, and time.   Psychiatric:         Mood and Affect: Mood normal.         Behavior: Behavior normal.        Blood pressure 137/76, pulse 92, weight 102.1 kg (225 lb 1.4 oz).  Wt Readings from Last 3 Encounters:   03/19/25 102.1 kg (225 lb 1.4 oz)   03/14/25 102 kg (224 lb 13.9 oz)   03/07/25 102.2 kg (225 lb 5 oz)       LAB REVIEW  Lab Results   Component Value Date     01/03/2025    K 4.3 01/03/2025     01/03/2025    CO2 27 01/03/2025    BUN 16 01/03/2025    CREATININE 0.8 01/03/2025    CALCIUM 9.1 01/03/2025    ANIONGAP 9 01/03/2025    EGFRNORACEVR >60.0 01/03/2025     Lab Results   Component Value Date    CPEPTIDE 2.4 04/21/2017    GLUTAMICACID 0.00 04/21/2017     Hemoglobin A1C   Date Value Ref Range Status   01/03/2025 7.4 (H) 4.0 - 5.6 % Final     Comment:     ADA Screening Guidelines:  5.7-6.4%  Consistent with prediabetes  >or=6.5%  Consistent with diabetes    High levels of fetal hemoglobin interfere with the HbA1C  assay. Heterozygous hemoglobin variants (HbS, HgC, etc)do  not significantly interfere with this assay.   However, presence of multiple variants may affect accuracy.     12/06/2024 7.5 (H) 4.0 - 5.6 % Final     Comment:     ADA Screening Guidelines:  5.7-6.4%  Consistent with prediabetes  >or=6.5%  Consistent with diabetes    High levels of fetal hemoglobin interfere with the HbA1C  assay. Heterozygous hemoglobin variants (HbS, HgC, etc)do  not significantly interfere with this assay.   However, presence of multiple variants may affect accuracy.     09/06/2024 8.0 (H) 4.0 - 5.6 % Final     Comment:     ADA Screening Guidelines:  5.7-6.4%  Consistent with prediabetes  >or=6.5%  Consistent with diabetes    High levels of fetal hemoglobin interfere with the HbA1C  assay. Heterozygous hemoglobin  variants (HbS, HgC, etc)do  not significantly interfere with this assay.   However, presence of multiple variants may affect accuracy.        ASSESSMENT    ICD-10-CM ICD-9-CM   1. Type 2 diabetes mellitus with diabetic neuropathy, with long-term current use of insulin  E11.40 250.60    Z79.4 357.2     V58.67   2. Hypertension associated with diabetes  E11.59 250.80    I15.2 401.9   3. Hyperlipidemia associated with type 2 diabetes mellitus  E11.69 250.80    E78.5 272.4           PLAN  Diagnoses and all orders for this visit:    Type 2 diabetes mellitus with diabetic neuropathy, with long-term current use of insulin  -     POCT Glucose, Hand-Held Device  -     XIGDUO XR 10-1,000 mg TBph; Take 1 tablet by mouth once daily.  -     Hemoglobin A1C; Future  -     Basic Metabolic Panel; Future    Hypertension associated with diabetes    Hyperlipidemia associated with type 2 diabetes mellitus          Reviewed pathophysiology of diabetes, complications related to the disease, importance of annual dilated eye exam and daily foot examination. Explained MOA, SE, dosage of medications. Written instructions given and reviewed with patient and patient verbalizes understanding.     PATIENT INSTRUCTIONS    Lifestyle modification with well balanced diet and at least 30 minutes of physical activity daily recommended.   Increase water intake.   Increase protein and non-starchy vegetable servings with meals.   Decrease carbohydrate servings with meals.   Take 10 minute walk after each meal.   Avoid snacking on carbohydrates, choose low carb, high protein snacks.   Incorporate resistance training with weights or resistance bands with exercise regimen at least 3 days a week.      Continue Toujeo 36 units subcutaneously daily.   Continue Novolog 8 units subcutaneously three times daily before meals.   Increase Xigduo  mg by mouth daily.   Continue Prandin 2 mg by mouth twice daily before meals.   Continue Mounjaro 15 mg  subcutaneously every 7 days.      Unique 3 CGM  Blood Sugar Goals:       Fastin-130.       1-2 hours after a meal: Less than 180.     Follow up in about 3 months (around 2025) for Unique, Schedule fasting labs.

## 2025-03-19 NOTE — PATIENT INSTRUCTIONS
PATIENT INSTRUCTIONS    Lifestyle modification with well balanced diet and at least 30 minutes of physical activity daily recommended.   Increase water intake.   Increase protein and non-starchy vegetable servings with meals.   Decrease carbohydrate servings with meals.   Take 10 minute walk after each meal.   Avoid snacking on carbohydrates, choose low carb, high protein snacks.   Incorporate resistance training with weights or resistance bands with exercise regimen at least 3 days a week.      Continue Toujeo 36 units subcutaneously daily.   Continue Novolog 8 units subcutaneously three times daily before meals.   Increase Xigduo  mg by mouth daily.   Continue Prandin 2 mg by mouth twice daily before meals.   Continue Mounjaro 15 mg subcutaneously every 7 days.      Unique 3 CGM  Blood Sugar Goals:       Fastin-130.       1-2 hours after a meal: Less than 180.

## 2025-03-20 RX ORDER — SYRINGE,SAFETY WITH NEEDLE,1ML 25GX1"
1 SYRINGE (EA) MISCELLANEOUS 3 TIMES DAILY
Qty: 300 EACH | Refills: 3 | Status: SHIPPED | OUTPATIENT
Start: 2025-03-20

## 2025-03-20 RX ORDER — INSULIN GLARGINE 300 [IU]/ML
40 INJECTION, SOLUTION SUBCUTANEOUS NIGHTLY
Qty: 3 ML | Refills: 11 | Status: SHIPPED | OUTPATIENT
Start: 2025-03-20 | End: 2025-03-21 | Stop reason: SDUPTHER

## 2025-03-20 NOTE — TELEPHONE ENCOUNTER
Refill Decision Note   Brett Garcia  is requesting a refill authorization.  Brief Assessment and Rationale for Refill:  Approve     Medication Therapy Plan:         Comments:     Note composed:5:28 PM 03/20/2025

## 2025-03-20 NOTE — TELEPHONE ENCOUNTER
No care due was identified.  [facility] Embedded Care Due Messages. Reference number: [id number].   3/19/2025 8:14:55 PM CDT

## 2025-03-21 DIAGNOSIS — Z79.4 TYPE 2 DIABETES MELLITUS WITH DIABETIC NEUROPATHY, WITH LONG-TERM CURRENT USE OF INSULIN: ICD-10-CM

## 2025-03-21 DIAGNOSIS — E11.40 TYPE 2 DIABETES MELLITUS WITH DIABETIC NEUROPATHY, WITH LONG-TERM CURRENT USE OF INSULIN: ICD-10-CM

## 2025-03-21 RX ORDER — INSULIN GLARGINE 300 [IU]/ML
40 INJECTION, SOLUTION SUBCUTANEOUS NIGHTLY
Qty: 3 ML | Refills: 11 | Status: SHIPPED | OUTPATIENT
Start: 2025-03-21 | End: 2026-03-21

## 2025-03-21 NOTE — TELEPHONE ENCOUNTER
----- Message from Dania sent at 3/21/2025 10:34 AM CDT -----  Contact: Brett  Type:  RX Refill RequestWho Called: Dennis or New Rx: RefillRX Name and Strength: insulin glargine U-300 conc (TOUJEO MAX U-300 SOLOSTAR) 300 unit/mL (3 mL) insulin penHow is the patient currently taking it? (ex. 1XDay):Is this a 30 day or 90 day RX:Preferred Pharmacy with phone number: [facility] [id number] [facility] [address] [place] [id number]: [phone] Fax: [phone] or Mail Order:LocalOrdering Provider:CHET LoveWould the patient rather a call back or a response via [facility]? Call [facility] Call Back Number:[phone] Information: Out of medication, the other pharmacy don't / won't have the Rx suggested he go to [facility].  Please call the pt once the request is sent to the [facility].

## 2025-03-23 DIAGNOSIS — E11.59 HYPERTENSION ASSOCIATED WITH DIABETES: ICD-10-CM

## 2025-03-23 DIAGNOSIS — I15.2 HYPERTENSION ASSOCIATED WITH DIABETES: ICD-10-CM

## 2025-03-23 NOTE — TELEPHONE ENCOUNTER
No care due was identified.  [facility] Embedded Care Due Messages. Reference number: [id number].   3/23/2025 5:47:53 PM CDT

## 2025-03-24 RX ORDER — LISINOPRIL 5 MG/1
5 TABLET ORAL DAILY
Qty: 90 TABLET | Refills: 3 | Status: SHIPPED | OUTPATIENT
Start: 2025-03-24

## 2025-03-24 RX ORDER — PANTOPRAZOLE SODIUM 20 MG/1
20 TABLET, DELAYED RELEASE ORAL DAILY
Qty: 90 TABLET | Refills: 1 | Status: SHIPPED | OUTPATIENT
Start: 2025-03-24 | End: 2026-03-24

## 2025-03-24 NOTE — TELEPHONE ENCOUNTER
Refill Routing Note   Medication(s) are not appropriate for processing by [facility] for the following reason(s):        New or recently adjusted medication    [facility] action(s):  Defer  Approve             Appointments  past 12m or future 3m with PCP    Date Provider   Last Visit   1/9/2025 Herb Lord MD   Next Visit   4/15/2025 Herb Lord MD   ED visits in past 90 days: 0        Note composed:3:57 PM 03/24/2025

## 2025-04-03 NOTE — TELEPHONE ENCOUNTER
No care due was identified.  [facility] Embedded Care Due Messages. Reference number: [id number].   4/03/2025 5:29:34 PM CDT

## 2025-04-04 RX ORDER — SYRINGE,SAFETY WITH NEEDLE,1ML 25GX1"
1 SYRINGE (EA) MISCELLANEOUS 3 TIMES DAILY
Qty: 300 EACH | Refills: 3 | Status: SHIPPED | OUTPATIENT
Start: 2025-04-04

## 2025-04-04 NOTE — TELEPHONE ENCOUNTER
Refill Decision Note   Brett Garcia  is requesting a refill authorization.  Brief Assessment and Rationale for Refill:  Approve     Medication Therapy Plan:         Comments:     Note composed:11:10 AM 04/04/2025

## 2025-04-06 DIAGNOSIS — E11.40 TYPE 2 DIABETES MELLITUS WITH DIABETIC NEUROPATHY, WITH LONG-TERM CURRENT USE OF INSULIN: ICD-10-CM

## 2025-04-06 DIAGNOSIS — Z79.4 TYPE 2 DIABETES MELLITUS WITH DIABETIC NEUROPATHY, WITH LONG-TERM CURRENT USE OF INSULIN: ICD-10-CM

## 2025-04-07 ENCOUNTER — TELEPHONE (OUTPATIENT)
Dept: DIABETES | Facility: CLINIC | Age: 60
End: 2025-04-07
Payer: COMMERCIAL

## 2025-04-07 DIAGNOSIS — E11.40 TYPE 2 DIABETES MELLITUS WITH DIABETIC NEUROPATHY, WITH LONG-TERM CURRENT USE OF INSULIN: Primary | ICD-10-CM

## 2025-04-07 DIAGNOSIS — Z79.4 TYPE 2 DIABETES MELLITUS WITH DIABETIC NEUROPATHY, WITH LONG-TERM CURRENT USE OF INSULIN: Primary | ICD-10-CM

## 2025-04-07 RX ORDER — BLOOD-GLUCOSE SENSOR
1 EACH MISCELLANEOUS DAILY
Qty: 2 EACH | Refills: 11 | Status: SHIPPED | OUTPATIENT
Start: 2025-04-07 | End: 2025-04-07

## 2025-04-07 RX ORDER — BLOOD-GLUCOSE SENSOR
1 EACH MISCELLANEOUS
Qty: 2 EACH | Refills: 11 | Status: SHIPPED | OUTPATIENT
Start: 2025-04-07 | End: 2026-04-07

## 2025-04-09 DIAGNOSIS — E11.40 TYPE 2 DIABETES MELLITUS WITH DIABETIC NEUROPATHY, WITH LONG-TERM CURRENT USE OF INSULIN: ICD-10-CM

## 2025-04-09 DIAGNOSIS — Z79.4 TYPE 2 DIABETES MELLITUS WITH DIABETIC NEUROPATHY, WITH LONG-TERM CURRENT USE OF INSULIN: ICD-10-CM

## 2025-04-09 NOTE — TELEPHONE ENCOUNTER
----- Message from Rosangela sent at 4/9/2025  4:24 PM CDT -----  Type:  RX Refill RequestWho Called: Dennis or New Rx:RefillRX Name and Strength:XIGDUO XR 10-1,000 mg TBphHow is the patient currently taking it? (ex. 1XDay):Is this a 30 day or 90 day RX:Preferred Pharmacy with phone number:[facility] [id number] [facility] [address] [place] [id number]: [phone] Fax: [phone] Local or Mail Order:LocalOrdering Provider:Eli Casonould the patient rather a call back or a response via [facility]? [facility] Call Back Number:[phone] Information: Need this switched back to [facility]  other pharmacy can't fill. Please callback out of Medication

## 2025-04-10 ENCOUNTER — PATIENT OUTREACH (OUTPATIENT)
Dept: ADMINISTRATIVE | Facility: HOSPITAL | Age: 60
End: 2025-04-10
Payer: COMMERCIAL

## 2025-04-10 RX ORDER — DAPAGLIFLOZIN AND METFORMIN HYDROCHLORIDE 10; 1000 MG/1; MG/1
1 TABLET, FILM COATED, EXTENDED RELEASE ORAL DAILY
Qty: 90 TABLET | Refills: 3 | Status: SHIPPED | OUTPATIENT
Start: 2025-04-10 | End: 2026-04-10

## 2025-04-10 NOTE — PROGRESS NOTES
VBHM Score: 0     Patient is not due for any topics at this time.    Tetanus Vaccine  RSV Vaccine            Pt has follow up scheduled 5/16/25.  All health maintenance up to date.    [facility]-  Does Not Meet Criteria for Program  [facility] Value Based Care Coordination Program

## 2025-04-24 ENCOUNTER — PROCEDURE VISIT (OUTPATIENT)
Dept: ORTHOPEDICS | Facility: CLINIC | Age: 60
End: 2025-04-24
Payer: COMMERCIAL

## 2025-04-24 VITALS — WEIGHT: 225 LBS | BODY MASS INDEX: 33.33 KG/M2 | HEIGHT: 69 IN

## 2025-04-24 DIAGNOSIS — M25.562 LEFT KNEE PAIN, UNSPECIFIED CHRONICITY: ICD-10-CM

## 2025-04-24 DIAGNOSIS — M17.12 PRIMARY OSTEOARTHRITIS OF LEFT KNEE: Primary | ICD-10-CM

## 2025-04-24 NOTE — PROCEDURES
Large Joint Aspiration/Injection: L knee    Date/Time: 4/24/2025 1:15 PM    Performed by: Olivia Alejandro PA  Authorized by: Olivia Alejandro PA    Consent Done?:  Yes (Verbal)  Indications:  Arthritis, joint swelling and pain  Site marked: the procedure site was marked      Local anesthesia used?: Yes    Local anesthetic:  Topical anesthetic    Details:  Needle Size:  21 G  Approach:  Anterolateral  Location:  Knee  Site:  L knee  Medications:  20 mg sodium hyaluronate (EUFLEXXA) 10 mg/mL(mw 2.4 -3.6 million)  Patient tolerance:  Patient tolerated the procedure well with no immediate complications     Verbal consent was obtained  The patient's ID, site, side was verified  The site was sterile prepped in standard fashion  The injection was performed in the patricia-lateral side without complication  A sterile Band-Aid was applied    Patient was directed to apply ice today at roughly 15 minutes at a time as needed.  It was discussed that they may be sore for the next few days or so.  Please avoid strenuous activity over the next 24 hours.  It was also discussed that the patient may have a increase in glucose if diabetic and should monitor levels.  Patient was instructed to call as needed.

## 2025-05-01 ENCOUNTER — TELEPHONE (OUTPATIENT)
Dept: ORTHOPEDICS | Facility: CLINIC | Age: 60
End: 2025-05-01
Payer: COMMERCIAL

## 2025-05-01 NOTE — TELEPHONE ENCOUNTER
Spoke w/ pt about cx his upcoming apts for Euflexxa series and per Olivia informed him that the inj is good until July. 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 Stated he will reach out next week to try and schedule the rest of the injs.

## 2025-05-06 ENCOUNTER — OFFICE VISIT (OUTPATIENT)
Dept: INTERNAL MEDICINE | Facility: CLINIC | Age: 60
End: 2025-05-06
Payer: COMMERCIAL

## 2025-05-06 VITALS
OXYGEN SATURATION: 96 % | BODY MASS INDEX: 33.52 KG/M2 | RESPIRATION RATE: 18 BRPM | SYSTOLIC BLOOD PRESSURE: 126 MMHG | WEIGHT: 227 LBS | HEART RATE: 94 BPM | TEMPERATURE: 97 F | DIASTOLIC BLOOD PRESSURE: 64 MMHG

## 2025-05-06 DIAGNOSIS — Z79.4 TYPE 2 DIABETES MELLITUS WITH DIABETIC NEUROPATHY, WITH LONG-TERM CURRENT USE OF INSULIN: ICD-10-CM

## 2025-05-06 DIAGNOSIS — K21.9 GASTROESOPHAGEAL REFLUX DISEASE, UNSPECIFIED WHETHER ESOPHAGITIS PRESENT: ICD-10-CM

## 2025-05-06 DIAGNOSIS — Z23 IMMUNIZATION DUE: Primary | ICD-10-CM

## 2025-05-06 DIAGNOSIS — I15.2 HYPERTENSION ASSOCIATED WITH DIABETES: ICD-10-CM

## 2025-05-06 DIAGNOSIS — E78.2 MIXED HYPERLIPIDEMIA: ICD-10-CM

## 2025-05-06 DIAGNOSIS — E11.40 TYPE 2 DIABETES MELLITUS WITH DIABETIC NEUROPATHY, WITH LONG-TERM CURRENT USE OF INSULIN: ICD-10-CM

## 2025-05-06 DIAGNOSIS — M17.12 PRIMARY OSTEOARTHRITIS OF LEFT KNEE: ICD-10-CM

## 2025-05-06 DIAGNOSIS — K76.0 FATTY LIVER: ICD-10-CM

## 2025-05-06 DIAGNOSIS — M71.20 SYNOVIAL CYST OF POPLITEAL SPACE, UNSPECIFIED LATERALITY: ICD-10-CM

## 2025-05-06 DIAGNOSIS — E11.59 HYPERTENSION ASSOCIATED WITH DIABETES: ICD-10-CM

## 2025-05-06 PROCEDURE — 99999 PR PBB SHADOW E&M-EST. PATIENT-LVL V: CPT | Mod: PBBFAC,,, | Performed by: FAMILY MEDICINE

## 2025-05-06 RX ORDER — AMITRIPTYLINE HYDROCHLORIDE 25 MG/1
25 TABLET, FILM COATED ORAL NIGHTLY
Qty: 30 TABLET | Refills: 1 | Status: SHIPPED | OUTPATIENT
Start: 2025-05-06 | End: 2026-05-06

## 2025-05-06 NOTE — LETTER
May 6, 2025      [facility]  [address] [place] [address] [address] [address]  Phone: [phone]  Fax: [phone]       Patient: Brett Garcia   YOB: 1965  Date of Visit: 05/06/2025    To Whom It May Concern:    Aparna Garcia  was at [facility] on 05/06/2025. The patient may return to work/school on 05/07/2025 with no restrictions. If you have any questions or concerns, or if I can be of further assistance, please do not hesitate to contact me.    Sincerely,        MD Iona Tafoya LPN

## 2025-05-06 NOTE — PROGRESS NOTES
Subjective:       Patient ID: Brett Garcia is a 59 y.o. male.    Chief Complaint: f/u on chronic issue    HPI    Problem List[1]    Past Medical History:   Diagnosis Date    Diabetes mellitus with neuropathy 2009    BS 57 am 12/18/2015    Hyperlipidemia 2009    Hypertension associated with diabetes 4/21/2017       Past Surgical History:   Procedure Laterality Date    COLONOSCOPY N/A 11/01/2018    Procedure: COLONOSCOPY;  Surgeon: Kristina Wilson MD;  Location: [facility] ENDO;  Service: Endoscopy;  Laterality: N/A;    COLONOSCOPY N/A 12/01/2023    Procedure: COLONOSCOPY;  Surgeon: Kristina Wilson MD;  Location: [facility] ENDO;  Service: Endoscopy;  Laterality: N/A;    COLONOSCOPY W/ POLYPECTOMY  11/01/2018    Polyp x1, repeat 5 years; Dr. Wilson    SELECTIVE INJECTION OF ANESTHETIC AGENT AROUND LUMBAR SPINAL NERVE ROOT BY TRANSFORAMINAL APPROACH Bilateral 11/02/2023    Procedure: Bilateral L5/S1 TF BRE;  Surgeon: Wilfred Serrano MD;  Location: [facility];  Service: Pain Management;  Laterality: Bilateral;       Family History   Problem Relation Name Age of Onset    Diabetes Father Herman V.     Hypertension Father Herman V.     Heart attack Father Herman V.     Cataracts Father Herman V.     Hearing loss Father Herman V.     Heart disease Father Herman V.     Vision loss Father Herman V.     Hypertension Mother Catia     Hypertension Sister Amanda        Tobacco Use History[2]    Wt Readings from Last 5 Encounters:   05/06/25 103 kg (227 lb)   04/24/25 102.1 kg (225 lb)   03/19/25 102.1 kg (225 lb 1.4 oz)   03/14/25 102 kg (224 lb 13.9 oz)   03/07/25 102.2 kg (225 lb 5 oz)     History of Present Illness    CHIEF COMPLAINT:  Patient presents today for follow up.    DIABETES:  He was last seen by diabetes team in March with follow-up scheduled for June. A1C was 7.4 four months ago, representing the best glycemic control in over two years. He uses Freestyle Unique continuous glucose monitor with good tolerance.  Current insulin regimen consists of Toujeo 34 units and Novolog 5-6 units with meals.    CARDIOVASCULAR:  He has left anterior fascicular block and right bundle branch block. He denies palpitations or irregular heartbeats.    MUSCULOSKELETAL:  He has a Baker's cyst causing pain when bending down or kneeling. He is currently receiving hyaluronic acid knee injections for arthritis management. He wears long socks as part of his knee management strategy.    DIET AND EXERCISE:  His weight is 227 lbs, unchanged from recent visits. He reports eating late in the evening due to work schedule and acknowledges need for dietary changes to avoid eating after 7 PM. He maintains an active lifestyle with 16,000 steps daily.    CURRENT MEDICATIONS:  He takes amitriptyline for previous neuropathy symptoms (now resolved), Flonase and saline nasal spray for allergy management, Methocarbamol for hamstring issues with nocturnal cramping, and Protonix for heartburn management.           Review of Systems    Objective:      Vitals:    05/06/25 1404   BP: 126/64   Pulse: 94   Resp: 18   Temp: 96.6 °F (35.9 °C)       Physical Exam  Constitutional:       General: He is not in acute distress.     Appearance: He is not ill-appearing.   Pulmonary:      Effort: Pulmonary effort is normal. No respiratory distress.   Neurological:      General: No focal deficit present.      Mental Status: He is alert.   Psychiatric:         Mood and Affect: Mood normal.         Behavior: Behavior normal.         Assessment:       1. Immunization due    2. Mixed hyperlipidemia    3. Hypertension associated with diabetes    4. Fatty liver    5. Primary osteoarthritis of left knee    6. Gastroesophageal reflux disease, unspecified whether esophagitis present    7. Type 2 diabetes mellitus with diabetic neuropathy, with long-term current use of insulin    8. Synovial cyst of popliteal space, unspecified laterality        Plan:   Immunization due  -     Discontinue: Td  (Tenivac) IM vaccine (>/= 8 yo)    Mixed hyperlipidemia    Hypertension associated with diabetes    Fatty liver  -     Hepatic Function Panel; Future; Expected date: 05/06/2025    Primary osteoarthritis of left knee    Gastroesophageal reflux disease, unspecified whether esophagitis present    Type 2 diabetes mellitus with diabetic neuropathy, with long-term current use of insulin    Synovial cyst of popliteal space, unspecified laterality    Other orders  -     amitriptyline (ELAVIL) 25 MG tablet; Take 1 tablet (25 mg total) by mouth every evening.  Dispense: 30 tablet; Refill: 1      Assessment & Plan    PLAN SUMMARY:  Discontinue zinc supplement as not taking it  Decrease Protonix to every other day, discontinue if heartburn does not recur  Maintain current statin therapy (atorvastatin 40 mg daily)  Continue current diabetes regimen: Tujeo 34 units, NovoLog 5-6 units with meals, and Prandin  Discontinue Methocarbamol (muscle relaxant).  We will leave on chart in case he decides to use it  Decrease amitriptyline from 50 mg to 25 mg daily, with goal of eventual discontinuation.  Use for neuropathy  Continue aspirin 81 mg daily for primary cardiovascular prevention  Continue lisinopril 5 mg daily for hypertension  Continue Flonase nasal spray as needed for allergy symptoms  Continue hyaluronic acid knee injections for arthritis management.  Encouraged patient to get sugars under control so we can get a steroid shot via ortho for Baker's cyst  Order hepatic function panel to be checked when he does his next diabetes management lab work  Advise maintaining physical activity to preserve muscle mass given use of Mounjaro  Educate on risk of angioedema with lisinopril use  Recommend avoiding late evening meals      TYPE 2 DIABETES MELLITUS:  Noted improvement in diabetes management with A1C of 7.4, best in over 2 years.  Discussed the need to keep A1C below 7 for potential steroid injection for Baker's cyst.  Patient is  using a continuous glucose monitor (Freestyle Unique) and tolerating all diabetes medications well.  Continued current regimen: Toujeo 34 units, NovoLog 5-6 units with meals, and Prandin for glycemic control.  Prescribed insulin pump but noted it has not yet been started  Advised the patient to maintain physical activity to preserve muscle mass while on Mounjaro.    CARDIAC CONDUCTION DISORDERS:  Monitored cardiac status:  Cardiac note indicated left anterior fascicular block and right bundle branch block with no reported symptoms.      BAKER'S CYST (LEFT KNEE):  Patient experiences pain when bending down or on one knee due to Baker's cyst.  Advised against intra-articular steroid injection at this time due to diabetes management concerns (A1C needs to be below 7).  Cautioned the patient against seeking unauthorized steroid injections due to risk of joint infections.  Reviewed intra-articular injections as a future treatment option once diabetes is better controlled.    OSTEOARTHRITIS (LEFT KNEE):  Continued hyaluronic acid knee injections for arthritis management.    ALLERGIC RHINITIS:  Continued Flonase nasal spray as needed for allergy symptoms.  Noted the patient prefers saline nasal spray for allergic rhinitis.    MUSCLE SPASMS:  Discontinued Methocarbamol (muscle relaxant) which patient was using as needed for muscle spasms, particularly for hamstring issues at night.    GASTROESOPHAGEAL REFLUX DISEASE:  Discontinued zinc supplement and decreased Protonix to every other day, with instructions to discontinue if heartburn does not recur.  Educated the patient about importance of gradual discontinuation to avoid rebound symptoms.  Advised patient to avoid eating late in the evenings.    FATTY LIVER DISEASE:  Monitored fatty liver disease: persistent elevation of liver function tests,   Ordered hepatic function panel for continued monitoring.  Advised the patient to continue efforts to reduce  weight.    HYPERTENSION:  Blood pressure within acceptable range with current medication regimen.  Continued lisinopril 5 mg daily, which is effective at current dose.  Educated the patient about risk of angioedema with lisinopril use, emphasizing need to stop medication and seek medical attention if swelling of lips, mouth, or tongue occurs.    HYPERLIPIDEMIA:  Maintained current statin therapy (atorvastatin 40 mg daily) due to well-controlled cholesterol levels.  Last cholesterol check in September showed excellent results.    CARDIOVASCULAR DISEASE PREVENTION:  Continued aspirin 81 mg daily for primary cardiovascular prevention.    NEUROPATHY MANAGEMENT:  Initiated weaning off amitriptyline due to resolution of neuropathy symptoms; decreased from 50 mg to 25 mg daily with instructions to space out to every other day if tolerated, with goal of eventual discontinuation.  Educated patient about importance of gradual discontinuation     Six-month follow up.         This note was verbally dictated, please excuse any type errors.         [1]   Patient Active Problem List  Diagnosis    Hyperlipidemia    BMI 33.0-33.9,adult    Hypertension associated with diabetes    Skin lesions    Diabetic polyneuropathy associated with type 2 diabetes mellitus    Stress reaction    Hyperlipidemia associated with type 2 diabetes mellitus    Type 2 diabetes mellitus with diabetic neuropathy, with long-term current use of insulin    Elevated diaphragm    Decreased range of motion (ROM) of left knee    Weakness of trunk musculature    Lumbar pain    Left leg pain    Abnormal gait    Family history of heart disease    Abnormal EKG    RBBB    LAFB (left anterior fascicular block)   [2]   Social History  Tobacco Use   Smoking Status Never    Passive exposure: Never   Smokeless Tobacco Former    Types: Snuff    Quit date: 2008

## 2025-05-11 DIAGNOSIS — J30.2 SEASONAL ALLERGIC RHINITIS: ICD-10-CM

## 2025-05-12 RX ORDER — FLUTICASONE PROPIONATE 50 MCG
2 SPRAY, SUSPENSION (ML) NASAL DAILY
Qty: 48 G | Refills: 3 | Status: SHIPPED | OUTPATIENT
Start: 2025-05-12

## 2025-05-12 NOTE — TELEPHONE ENCOUNTER
Refill Decision Note   Brett Garcia  is requesting a refill authorization.  Brief Assessment and Rationale for Refill:  Approve     Medication Therapy Plan:         Comments:     Note composed:12:48 PM 05/12/2025

## 2025-05-12 NOTE — TELEPHONE ENCOUNTER
Care Due:                  Date            Visit Type   Department     Provider  --------------------------------------------------------------------------------                                EP -                              PRIMARY      [facility] INTERNAL  Last Visit: 05-      CARE ([facility])   [place] Lord                              EP -                              PRIMARY      [facility] INTERNAL  Next Visit: 11-      CARE ([facility])   [place] Lord                                                            Last  Test          Frequency    Reason                     Performed    Due Date  --------------------------------------------------------------------------------    HBA1C.......  6 months...  repaglinide..............  01- 07-    [facility] Embedded Care Due Messages. Reference number: [id number].   5/11/2025 9:50:01 PM CDT

## 2025-05-15 ENCOUNTER — TELEPHONE (OUTPATIENT)
Dept: DIABETES | Facility: CLINIC | Age: 60
End: 2025-05-15
Payer: COMMERCIAL

## 2025-05-15 ENCOUNTER — PATIENT MESSAGE (OUTPATIENT)
Dept: DIABETES | Facility: CLINIC | Age: 60
End: 2025-05-15
Payer: COMMERCIAL

## 2025-05-15 DIAGNOSIS — E11.40 TYPE 2 DIABETES MELLITUS WITH DIABETIC NEUROPATHY, WITH LONG-TERM CURRENT USE OF INSULIN: Primary | ICD-10-CM

## 2025-05-15 DIAGNOSIS — Z79.4 TYPE 2 DIABETES MELLITUS WITH DIABETIC NEUROPATHY, WITH LONG-TERM CURRENT USE OF INSULIN: Primary | ICD-10-CM

## 2025-05-15 RX ORDER — INSULIN ASPART 100 [IU]/ML
8 INJECTION, SOLUTION INTRAVENOUS; SUBCUTANEOUS
Qty: 21 ML | Refills: 3 | Status: SHIPPED | OUTPATIENT
Start: 2025-05-15 | End: 2025-05-16 | Stop reason: ALTCHOICE

## 2025-05-15 NOTE — TELEPHONE ENCOUNTER
----- Message from IsaiahMessagePartybrandt sent at 5/15/2025 12:48 PM CDT -----  Who called: PtWhat is the request in detail: Pt would like a call back in regards to the CEQUR SIMPLICITY 2. Can the clinic reply by [facility]? No Would the patient rather a call back or a response via [facility]? Call back Best call back number: Telephone Information:Mobile          [phone] Information: Thank you.

## 2025-05-15 NOTE — TELEPHONE ENCOUNTER
----- Message from Moni sent at 5/15/2025  2:18 PM CDT -----  Contact: self  .Type:  Patient Returning CallWho Called:.Brett Hawkins Message for Patient: Does the patient know what this is regarding?:Would the patient rather a call back or a response via [facility]? Call [facility] Call Back Number:.[phone] Information: pt states he is returning a missed call from this office

## 2025-05-15 NOTE — TELEPHONE ENCOUNTER
Spoke with patient where he informed me that his insurance wont pay for insulin pump cequr pt is asking if he can get the vitals pt wants the vitals instead

## 2025-05-16 DIAGNOSIS — Z79.4 TYPE 2 DIABETES MELLITUS WITH DIABETIC NEUROPATHY, WITH LONG-TERM CURRENT USE OF INSULIN: Primary | ICD-10-CM

## 2025-05-16 DIAGNOSIS — E11.40 TYPE 2 DIABETES MELLITUS WITH DIABETIC NEUROPATHY, WITH LONG-TERM CURRENT USE OF INSULIN: Primary | ICD-10-CM

## 2025-05-16 RX ORDER — NAPROXEN SODIUM 220 MG
1 TABLET ORAL
Qty: 100 EACH | Refills: 5 | Status: SHIPPED | OUTPATIENT
Start: 2025-05-16 | End: 2026-05-16

## 2025-05-16 RX ORDER — INSULIN ASPART 100 [IU]/ML
8 INJECTION, SOLUTION INTRAVENOUS; SUBCUTANEOUS
Qty: 20 ML | Refills: 1 | Status: SHIPPED | OUTPATIENT
Start: 2025-05-16 | End: 2026-05-16

## 2025-06-01 DIAGNOSIS — Z79.4 TYPE 2 DIABETES MELLITUS WITH DIABETIC NEUROPATHY, WITH LONG-TERM CURRENT USE OF INSULIN: ICD-10-CM

## 2025-06-01 DIAGNOSIS — E11.40 TYPE 2 DIABETES MELLITUS WITH DIABETIC NEUROPATHY, WITH LONG-TERM CURRENT USE OF INSULIN: ICD-10-CM

## 2025-06-02 RX ORDER — PEN NEEDLE, DIABETIC 32GX 5/32"
NEEDLE, DISPOSABLE MISCELLANEOUS
Qty: 100 EACH | Refills: 0 | Status: SHIPPED | OUTPATIENT
Start: 2025-06-02

## 2025-06-02 RX ORDER — REPAGLINIDE 2 MG/1
TABLET ORAL
Qty: 540 TABLET | Refills: 1 | Status: SHIPPED | OUTPATIENT
Start: 2025-06-02

## 2025-06-08 ENCOUNTER — TELEPHONE (OUTPATIENT)
Dept: PHARMACY | Facility: CLINIC | Age: 60
End: 2025-06-08
Payer: COMMERCIAL

## 2025-06-08 NOTE — TELEPHONE ENCOUNTER
[facility]/Population Health Chart Review & Patient Outreach Details For Medication Adherence Project    Reason for Outreach Encounter: 3rd Party payor non-compliance report ([employer], [employer], [employer], etc)  2.  Patient Outreach Method: Reviewed patient chart  and [employer] message  3.   Medication in question:  Atorvastatin 40mg     LF 90 ds 11/22/24    4.  Reviewed and or Updates Made To: Patient Chart  5. Outreach Outcomes and/or actions taken: Sent inquiry to patient: Waiting for response  Additional Notes:

## 2025-06-18 ENCOUNTER — OFFICE VISIT (OUTPATIENT)
Dept: DERMATOLOGY | Facility: CLINIC | Age: 60
End: 2025-06-18
Payer: COMMERCIAL

## 2025-06-18 DIAGNOSIS — D22.9 MULTIPLE BENIGN NEVI: ICD-10-CM

## 2025-06-18 DIAGNOSIS — L21.9 SEBORRHEIC DERMATITIS: ICD-10-CM

## 2025-06-18 DIAGNOSIS — Z12.83 SCREENING, MALIGNANT NEOPLASM, SKIN: ICD-10-CM

## 2025-06-18 DIAGNOSIS — B35.1 ONYCHOMYCOSIS: Primary | ICD-10-CM

## 2025-06-18 PROCEDURE — 1159F MED LIST DOCD IN RCRD: CPT | Mod: CPTII,S$GLB,, | Performed by: STUDENT IN AN ORGANIZED HEALTH CARE EDUCATION/TRAINING PROGRAM

## 2025-06-18 PROCEDURE — 3072F LOW RISK FOR RETINOPATHY: CPT | Mod: CPTII,S$GLB,, | Performed by: STUDENT IN AN ORGANIZED HEALTH CARE EDUCATION/TRAINING PROGRAM

## 2025-06-18 PROCEDURE — 1160F RVW MEDS BY RX/DR IN RCRD: CPT | Mod: CPTII,S$GLB,, | Performed by: STUDENT IN AN ORGANIZED HEALTH CARE EDUCATION/TRAINING PROGRAM

## 2025-06-18 PROCEDURE — 3051F HG A1C>EQUAL 7.0%<8.0%: CPT | Mod: CPTII,S$GLB,, | Performed by: STUDENT IN AN ORGANIZED HEALTH CARE EDUCATION/TRAINING PROGRAM

## 2025-06-18 PROCEDURE — 99999 PR PBB SHADOW E&M-EST. PATIENT-LVL IV: CPT | Mod: PBBFAC,,, | Performed by: STUDENT IN AN ORGANIZED HEALTH CARE EDUCATION/TRAINING PROGRAM

## 2025-06-18 PROCEDURE — 4010F ACE/ARB THERAPY RXD/TAKEN: CPT | Mod: CPTII,S$GLB,, | Performed by: STUDENT IN AN ORGANIZED HEALTH CARE EDUCATION/TRAINING PROGRAM

## 2025-06-18 PROCEDURE — G2211 COMPLEX E/M VISIT ADD ON: HCPCS | Mod: S$GLB,,, | Performed by: STUDENT IN AN ORGANIZED HEALTH CARE EDUCATION/TRAINING PROGRAM

## 2025-06-18 PROCEDURE — 99214 OFFICE O/P EST MOD 30 MIN: CPT | Mod: S$GLB,,, | Performed by: STUDENT IN AN ORGANIZED HEALTH CARE EDUCATION/TRAINING PROGRAM

## 2025-06-18 RX ORDER — CLOBETASOL PROPIONATE 0.5 MG/ML
SOLUTION TOPICAL DAILY
Qty: 50 ML | Refills: 2 | Status: SHIPPED | OUTPATIENT
Start: 2025-06-18

## 2025-06-18 RX ORDER — FLUCONAZOLE 200 MG/1
200 TABLET ORAL WEEKLY
Qty: 12 TABLET | Refills: 3 | Status: SHIPPED | OUTPATIENT
Start: 2025-06-18 | End: 2025-09-11

## 2025-06-18 RX ORDER — KETOCONAZOLE 20 MG/ML
SHAMPOO, SUSPENSION TOPICAL WEEKLY
Qty: 120 ML | Refills: 12 | Status: SHIPPED | OUTPATIENT
Start: 2025-06-18

## 2025-06-18 NOTE — PROGRESS NOTES
Subjective:       Patient ID:  Brett Garcia is a 59 y.o. male who presents for   Chief Complaint   Patient presents with    Skin Check     FBSE     History of Present Illness: The patient presents for follow up of skin check. Last seen in 6/14/24. Overall doing well. Has a few new brownish growths, but denies any rapidly growing, bleeding or non healing skin lesions. Previously had toenail fungus that had improved with once weekly fluconazole and would like to restart back treatment.         Review of Systems   Constitutional:  Negative for fever and chills.   Skin:  Positive for rash (Reports developing recurring bumps and sores in the scalp).        Objective:    Physical Exam   Constitutional: He appears well-developed and well-nourished. No distress.   Neurological: He is alert and oriented to person, place, and time. He is not disoriented.   Psychiatric: He has a normal mood and affect.   Skin:   Areas Examined (abnormalities noted in diagram):   Scalp / Hair Palpated and Inspected  Head / Face Inspection Performed  Neck Inspection Performed  Chest / Axilla Inspection Performed  Abdomen Inspection Performed  Genitals / Buttocks / Groin Inspection Performed  Back Inspection Performed  RUE Inspected  LUE Inspection Performed  RLE Inspected  LLE Inspection Performed  Nails and Digits Inspection Performed                       Diagram Legend     Erythematous scaling macule/papule c/w actinic keratosis       Vascular papule c/w angioma      Pigmented verrucoid papule/plaque c/w seborrheic keratosis      Yellow umbilicated papule c/w sebaceous hyperplasia      Irregularly shaped tan macule c/w lentigo     1-2 mm smooth white papules consistent with Milia      Movable subcutaneous cyst with punctum c/w epidermal inclusion cyst      Subcutaneous movable cyst c/w pilar cyst      Firm pink to brown papule c/w dermatofibroma      Pedunculated fleshy papule(s) c/w skin tag(s)      Evenly pigmented macule c/w  junctional nevus     Mildly variegated pigmented, slightly irregular-bordered macule c/w mildly atypical nevus      Flesh colored to evenly pigmented papule c/w intradermal nevus       Pink pearly papule/plaque c/w basal cell carcinoma      Erythematous hyperkeratotic cursted plaque c/w SCC      Surgical scar with no sign of skin cancer recurrence      Open and closed comedones      Inflammatory papules and pustules      Verrucoid papule consistent consistent with wart     Erythematous eczematous patches and plaques     Dystrophic onycholytic nail with subungual debris c/w onychomycosis     Umbilicated papule    Erythematous-base heme-crusted tan verrucoid plaque consistent with inflamed seborrheic keratosis     Erythematous Silvery Scaling Plaque c/w Psoriasis     See annotation      Assessment / Plan:        Onychomycosis  -     fluconazole (DIFLUCAN) 200 MG Tab; Take 1 tablet (200 mg total) by mouth once a week.  Dispense: 12 tablet; Refill: 3    Seborrheic dermatitis  -     ketoconazole (NIZORAL) 2 % shampoo; Apply topically once a week.  Dispense: 120 mL; Refill: 12  -     clobetasoL (TEMOVATE) 0.05 % external solution; Apply topically to scalp once daily as needed  Dispense: 50 mL; Refill: 2    Multiple benign nevi  Screening, malignant neoplasm, skin  total body skin examination performed today including at least 12 points as noted in physical examination. No lesions suspicious for malignancy noted.  Reassurance provided.    Instructed patient to observe lesion(s) for changes and follow up in clinic if changes are noted. Patient to monitor skin at home for new or changing lesions and follow up in clinic if noted.             Follow up in about 1 year (around 6/18/2026).

## 2025-06-18 NOTE — LETTER
June 18, 2025      [facility] 4th Floor  [address]  [place] [address]  Phone: [phone]  Fax: [phone]       Patient: Brett Garcia   YOB: 1965  Date of Visit: 06/18/2025    To Whom It May Concern:    Aparna Garcia  was at [facility] on 06/18/2025. The patient may return to work/school on 06/18/2025 with no restrictions. If you have any questions or concerns, or if I can be of further assistance, please do not hesitate to contact me.    Sincerely,      Ruben Browning LPN

## 2025-06-19 ENCOUNTER — OFFICE VISIT (OUTPATIENT)
Dept: ORTHOPEDICS | Facility: CLINIC | Age: 60
End: 2025-06-19
Payer: COMMERCIAL

## 2025-06-19 VITALS — HEIGHT: 69 IN | BODY MASS INDEX: 33.62 KG/M2 | WEIGHT: 227 LBS

## 2025-06-19 DIAGNOSIS — M25.562 LEFT KNEE PAIN, UNSPECIFIED CHRONICITY: ICD-10-CM

## 2025-06-19 DIAGNOSIS — M17.12 PRIMARY OSTEOARTHRITIS OF LEFT KNEE: Primary | ICD-10-CM

## 2025-06-19 PROCEDURE — 99999 PR PBB SHADOW E&M-EST. PATIENT-LVL IV: CPT | Mod: PBBFAC,,, | Performed by: PHYSICIAN ASSISTANT

## 2025-06-19 NOTE — PROCEDURES
Large Joint Aspiration/Injection: L knee    Date/Time: 6/19/2025 1:30 PM    Performed by: Olivia Alejandro PA  Authorized by: Olivia Alejandro PA    Indications:  Arthritis, joint swelling and pain  Site marked: the procedure site was marked      Local anesthesia used?: Yes    Local anesthetic:  Topical anesthetic    Details:  Needle Size:  21 G  Ultrasonic Guidance for needle placement?: Yes    Images are saved and documented.  Approach:  Anterolateral  Location:  Knee  Site:  L knee  Medications:  20 mg sodium hyaluronate (EUFLEXXA) 10 mg/mL(mw 2.4 -3.6 million)  Patient tolerance:  Patient tolerated the procedure well with no immediate complications     Verbal consent was obtained  The patient's ID, site, side was verified  The site was sterile prepped in standard fashion  The injection was performed in the patricia-lateral side without complication  A sterile Band-Aid was applied    Patient was directed to apply ice today at roughly 15 minutes at a time as needed.     If steroid was received today:   It was discussed that they may be sore for the next few days or week   Please avoid strenuous activity over the next 24 hours.   Effectiveness may take 1-2 weeks to show results especially with slow release medication  It was also discussed that the patient may have a increase in glucose if diabetic and should monitor levels.  Patient may also have a rise in blood pressure, monitor symptoms     If gel was received today:  Patient may be sore for 14 days with intermittent swelling  Avoid heavy activity, elevate and ice as need  This will take up to 8 weeks to show full effectiveness  Patient was instructed to call as needed.

## 2025-06-20 ENCOUNTER — LAB VISIT (OUTPATIENT)
Dept: LAB | Facility: HOSPITAL | Age: 60
End: 2025-06-20
Attending: NURSE PRACTITIONER
Payer: COMMERCIAL

## 2025-06-20 DIAGNOSIS — E11.40 TYPE 2 DIABETES MELLITUS WITH DIABETIC NEUROPATHY, WITH LONG-TERM CURRENT USE OF INSULIN: ICD-10-CM

## 2025-06-20 DIAGNOSIS — Z79.4 TYPE 2 DIABETES MELLITUS WITH DIABETIC NEUROPATHY, WITH LONG-TERM CURRENT USE OF INSULIN: ICD-10-CM

## 2025-06-20 DIAGNOSIS — K76.0 FATTY LIVER: ICD-10-CM

## 2025-06-20 LAB
ALBUMIN SERPL BCP-MCNC: 4.2 G/DL (ref 3.5–5.2)
ALP SERPL-CCNC: 78 UNIT/L (ref 40–150)
ALT SERPL W/O P-5'-P-CCNC: 55 UNIT/L (ref 10–44)
ANION GAP (OHS): 8 MMOL/L (ref 8–16)
AST SERPL-CCNC: 44 UNIT/L (ref 11–45)
BILIRUB DIRECT SERPL-MCNC: 0.2 MG/DL (ref 0.1–0.3)
BILIRUB SERPL-MCNC: 0.4 MG/DL (ref 0.1–1)
BUN SERPL-MCNC: 17 MG/DL (ref 8–23)
CALCIUM SERPL-MCNC: 9.1 MG/DL (ref 8.7–10.5)
CHLORIDE SERPL-SCNC: 107 MMOL/L (ref 95–110)
CO2 SERPL-SCNC: 27 MMOL/L (ref 23–29)
CREAT SERPL-MCNC: 0.7 MG/DL (ref 0.5–1.4)
EAG (OHS): 180 MG/DL (ref 68–131)
GFR SERPLBLD CREATININE-BSD FMLA CKD-EPI: >60 ML/MIN/1.73/M2
GLUCOSE SERPL-MCNC: 164 MG/DL (ref 70–110)
HBA1C MFR BLD: 7.9 % (ref 4–5.6)
POTASSIUM SERPL-SCNC: 4.3 MMOL/L (ref 3.5–5.1)
PROT SERPL-MCNC: 7 GM/DL (ref 6–8.4)
SODIUM SERPL-SCNC: 142 MMOL/L (ref 136–145)

## 2025-06-20 PROCEDURE — 82248 BILIRUBIN DIRECT: CPT

## 2025-06-20 PROCEDURE — 83036 HEMOGLOBIN GLYCOSYLATED A1C: CPT

## 2025-06-20 PROCEDURE — 84132 ASSAY OF SERUM POTASSIUM: CPT

## 2025-06-20 PROCEDURE — 36415 COLL VENOUS BLD VENIPUNCTURE: CPT | Mod: PO

## 2025-06-24 ENCOUNTER — RESULTS FOLLOW-UP (OUTPATIENT)
Dept: INTERNAL MEDICINE | Facility: CLINIC | Age: 60
End: 2025-06-24

## 2025-06-24 ENCOUNTER — OFFICE VISIT (OUTPATIENT)
Dept: DIABETES | Facility: CLINIC | Age: 60
End: 2025-06-24
Payer: COMMERCIAL

## 2025-06-24 ENCOUNTER — TELEPHONE (OUTPATIENT)
Dept: DIABETES | Facility: CLINIC | Age: 60
End: 2025-06-24
Payer: COMMERCIAL

## 2025-06-24 VITALS
DIASTOLIC BLOOD PRESSURE: 75 MMHG | HEART RATE: 96 BPM | SYSTOLIC BLOOD PRESSURE: 128 MMHG | WEIGHT: 227.06 LBS | HEIGHT: 69 IN | BODY MASS INDEX: 33.63 KG/M2

## 2025-06-24 DIAGNOSIS — I15.2 HYPERTENSION ASSOCIATED WITH DIABETES: ICD-10-CM

## 2025-06-24 DIAGNOSIS — E78.5 HYPERLIPIDEMIA ASSOCIATED WITH TYPE 2 DIABETES MELLITUS: ICD-10-CM

## 2025-06-24 DIAGNOSIS — E11.59 HYPERTENSION ASSOCIATED WITH DIABETES: ICD-10-CM

## 2025-06-24 DIAGNOSIS — E11.69 HYPERLIPIDEMIA ASSOCIATED WITH TYPE 2 DIABETES MELLITUS: ICD-10-CM

## 2025-06-24 DIAGNOSIS — Z79.4 TYPE 2 DIABETES MELLITUS WITH DIABETIC NEUROPATHY, WITH LONG-TERM CURRENT USE OF INSULIN: Primary | ICD-10-CM

## 2025-06-24 DIAGNOSIS — E11.40 TYPE 2 DIABETES MELLITUS WITH DIABETIC NEUROPATHY, WITH LONG-TERM CURRENT USE OF INSULIN: Primary | ICD-10-CM

## 2025-06-24 LAB — GLUCOSE SERPL-MCNC: 190 MG/DL (ref 70–110)

## 2025-06-24 PROCEDURE — 82962 GLUCOSE BLOOD TEST: CPT | Mod: S$GLB,,, | Performed by: NURSE PRACTITIONER

## 2025-06-24 PROCEDURE — 95251 CONT GLUC MNTR ANALYSIS I&R: CPT | Mod: S$GLB,,, | Performed by: NURSE PRACTITIONER

## 2025-06-24 PROCEDURE — 1159F MED LIST DOCD IN RCRD: CPT | Mod: CPTII,S$GLB,, | Performed by: NURSE PRACTITIONER

## 2025-06-24 PROCEDURE — 3072F LOW RISK FOR RETINOPATHY: CPT | Mod: CPTII,S$GLB,, | Performed by: NURSE PRACTITIONER

## 2025-06-24 PROCEDURE — 3078F DIAST BP <80 MM HG: CPT | Mod: CPTII,S$GLB,, | Performed by: NURSE PRACTITIONER

## 2025-06-24 PROCEDURE — 3008F BODY MASS INDEX DOCD: CPT | Mod: CPTII,S$GLB,, | Performed by: NURSE PRACTITIONER

## 2025-06-24 PROCEDURE — 99214 OFFICE O/P EST MOD 30 MIN: CPT | Mod: S$GLB,,, | Performed by: NURSE PRACTITIONER

## 2025-06-24 PROCEDURE — 3051F HG A1C>EQUAL 7.0%<8.0%: CPT | Mod: CPTII,S$GLB,, | Performed by: NURSE PRACTITIONER

## 2025-06-24 PROCEDURE — 4010F ACE/ARB THERAPY RXD/TAKEN: CPT | Mod: CPTII,S$GLB,, | Performed by: NURSE PRACTITIONER

## 2025-06-24 PROCEDURE — G2211 COMPLEX E/M VISIT ADD ON: HCPCS | Mod: S$GLB,,, | Performed by: NURSE PRACTITIONER

## 2025-06-24 PROCEDURE — 99999 PR PBB SHADOW E&M-EST. PATIENT-LVL V: CPT | Mod: PBBFAC,,, | Performed by: NURSE PRACTITIONER

## 2025-06-24 PROCEDURE — 3074F SYST BP LT 130 MM HG: CPT | Mod: CPTII,S$GLB,, | Performed by: NURSE PRACTITIONER

## 2025-06-24 NOTE — PATIENT INSTRUCTIONS
PATIENT INSTRUCTIONS    Lifestyle modification with well balanced diet and at least 30 minutes of physical activity daily recommended.   Increase water intake.   Increase protein and non-starchy vegetable servings with meals.   Decrease carbohydrate servings with meals.   Take 10 minute walk after each meal.   Avoid snacking on carbohydrates, choose low carb, high protein snacks.   Incorporate resistance training with weights or resistance bands with exercise regimen at least 3 days a week.      Decrease Toujeo to 30 units subcutaneously daily.   Continue Novolog 8 units subcutaneously three times daily before meals with carbs.    Continue Xigduo  mg by mouth daily.   Continue Prandin 2 mg by mouth twice daily before meals.   Continue Mounjaro 15 mg subcutaneously every 7 days.      Unique 3 CGM  Blood Sugar Goals:       Fastin-130.       1-2 hours after a meal: Less than 180.       There are a few things you can do to help keep the sensor on better. Make sure you are cleaning the site well with alcohol and allow to dry. There are also adhesive skin barrier wipes you can purchase to use after the alcohol that help, as well as overlay patches. Below are links to both products available on [employer], copy them to your internet browser.      Overlay patches: https://a.co/d/7WNxTDw  Skin Tac Wipes: https://a.co/d/51Y9UOb

## 2025-06-24 NOTE — PROGRESS NOTES
Brett Garcia is a 59 y.o. male who presents for a follow up evaluation of Type 2 diabetes mellitus.     CHIEF COMPLAINT: Diabetes Consultation    PCP: Herb Lord MD      Initial visit with me - 6/11/2024    The patient was initially diagnosed with diabetes 9 years ago.      Previous failed treatments include:  Trulicity - Ineffective  Ozempic - n/v.      Social Documentation:  Patient lives in [place], with wife.   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   Diet: states has worked hard on diet.       Diabetes related complications:   none.   denies Pancreatitis  denies Gastroparesis  denies DKA  denies Hx/family Hx of MEN2/MTC  denies Frequent UTIs/yeast infections    Diabetes Medications              insulin aspart U-100 (NOVOLOG U-100 INSULIN ASPART) 100 unit/mL injection Inject 8 Units into the skin 3 (three) times daily before meals.    insulin glargine U-300 conc (TOUJEO MAX U-300 SOLOSTAR) 300 unit/mL (3 mL) insulin pen Inject 40 Units into the skin every evening.    repaglinide (PRANDIN) 2 MG tablet TAKE 2 TABLETS BY MOUTH 3 (THREE) TIMES DAILY BEFORE MEALS.    tirzepatide 15 mg/0.5 mL PnIj Inject 15 mg into the skin every 7 days.    XIGDUO XR 10-1,000 mg TBph Take 1 tablet by mouth once daily.       Current monitoring regimen: Unique 3 CGM    The patient's Unique CGM was downloaded and reviewed. For 14 days, patient average glucose was 176 mg/dL. He was above range 43% of the time, in range 57% of the time, and below range 0% of the time. The target range for this patient was 70 - 180 mg/dL. Overall, there was a pattern of post prandial hyperglycemia, dips overnight..           Patient currently taking insulin or sulfonylurea?  yes  Recent hypoglycemic episodes: No.     Patient compliant with glucose checks and medication administration? Yes    DIABETES MANAGEMENT STATUS  Statin: Taking  ACE/ARB: Taking  Screening or Prevention  "Patient's value Goal Complete/Controlled?   HgA1C Testing and Control   Lab Results   Component Value Date    HGBA1C 7.9 (H) 06/20/2025      Annually/Less than 8% No   Lipid profile : 09/06/2024 Annually Yes   LDL control Lab Results   Component Value Date    LDLCALC 45.8 (L) 09/06/2024    Annually/Less than 100 mg/dl  Yes   Nephropathy screening Lab Results   Component Value Date    LABMICR 12.0 09/06/2024     Lab Results   Component Value Date    PROTEINUA Trace (A) 06/30/2016     No results found for: "UTPCR"   Annually Yes   Blood pressure BP Readings from Last 1 Encounters:   06/24/25 128/75    Less than 140/90 Yes   Dilated retinal exam : 09/27/2024 Annually Yes   Foot exam   : 03/19/2025 Annually Yes   Patient's medications, allergies, surgical, social and family histories were reviewed and updated as appropriate.     Review of Systems   Constitutional:  Negative for weight loss.   Eyes:  Negative for blurred vision and double vision.   Cardiovascular:  Negative for chest pain.   Gastrointestinal:  Negative for nausea and vomiting.   Genitourinary:  Negative for frequency.   Musculoskeletal:  Negative for falls.   Neurological:  Negative for dizziness and weakness.   Endo/Heme/Allergies:  Negative for polydipsia.   Psychiatric/Behavioral:  Negative for depression.    All other systems reviewed and are negative.       Physical Exam  Constitutional:       General: He is not in acute distress.     Appearance: Normal appearance.   Eyes:      Extraocular Movements: Extraocular movements intact.      Pupils: Pupils are equal, round, and reactive to light.   Cardiovascular:      Rate and Rhythm: Normal rate and regular rhythm.      Heart sounds: Normal heart sounds.   Pulmonary:      Effort: Pulmonary effort is normal. No respiratory distress.      Breath sounds: Normal breath sounds.   Musculoskeletal:      Cervical back: Normal range of motion and neck supple.   Neurological:      Mental Status: He is alert and " "oriented to person, place, and time.   Psychiatric:         Mood and Affect: Mood normal.         Behavior: Behavior normal.        Blood pressure 128/75, pulse 96, height 5' 9" (1.753 m), weight 103 kg (227 lb 1.2 oz).  Wt Readings from Last 3 Encounters:   06/24/25 103 kg (227 lb 1.2 oz)   06/19/25 103 kg (227 lb)   05/06/25 103 kg (227 lb)       LAB REVIEW  Lab Results   Component Value Date     06/20/2025    K 4.3 06/20/2025     06/20/2025    CO2 27 06/20/2025    BUN 17 06/20/2025    CREATININE 0.7 06/20/2025    CALCIUM 9.1 06/20/2025    ANIONGAP 8 06/20/2025    EGFRNORACEVR >60 06/20/2025     Lab Results   Component Value Date    CPEPTIDE 2.4 04/21/2017    GLUTAMICACID 0.00 04/21/2017     Hemoglobin A1C   Date Value Ref Range Status   01/03/2025 7.4 (H) 4.0 - 5.6 % Final     Comment:     ADA Screening Guidelines:  5.7-6.4%  Consistent with prediabetes  >or=6.5%  Consistent with diabetes    High levels of fetal hemoglobin interfere with the HbA1C  assay. Heterozygous hemoglobin variants (HbS, HgC, etc)do  not significantly interfere with this assay.   However, presence of multiple variants may affect accuracy.     12/06/2024 7.5 (H) 4.0 - 5.6 % Final     Comment:     ADA Screening Guidelines:  5.7-6.4%  Consistent with prediabetes  >or=6.5%  Consistent with diabetes    High levels of fetal hemoglobin interfere with the HbA1C  assay. Heterozygous hemoglobin variants (HbS, HgC, etc)do  not significantly interfere with this assay.   However, presence of multiple variants may affect accuracy.     09/06/2024 8.0 (H) 4.0 - 5.6 % Final     Comment:     ADA Screening Guidelines:  5.7-6.4%  Consistent with prediabetes  >or=6.5%  Consistent with diabetes    High levels of fetal hemoglobin interfere with the HbA1C  assay. Heterozygous hemoglobin variants (HbS, HgC, etc)do  not significantly interfere with this assay.   However, presence of multiple variants may affect accuracy.       Hemoglobin A1c   Date Value " Ref Range Status   06/20/2025 7.9 (H) 4.0 - 5.6 % Final     Comment:     ADA Screening Guidelines:  5.7-6.4%  Consistent with prediabetes  >=6.5%  Consistent with diabetes    High levels of fetal hemoglobin interfere with the HbA1C  assay. Heterozygous hemoglobin variants (HbS, HgC, etc)do  not significantly interfere with this assay.   However, presence of multiple variants may affect accuracy.      ASSESSMENT    ICD-10-CM ICD-9-CM   1. Type 2 diabetes mellitus with diabetic neuropathy, with long-term current use of insulin  E11.40 250.60    Z79.4 357.2     V58.67   2. Hypertension associated with diabetes  E11.59 250.80    I15.2 401.9   3. Hyperlipidemia associated with type 2 diabetes mellitus  E11.69 250.80    E78.5 272.4           PLAN  Diagnoses and all orders for this visit:    Type 2 diabetes mellitus with diabetic neuropathy, with long-term current use of insulin  -     POCT Glucose, Hand-Held Device  -     Hemoglobin A1C; Future  -     Basic Metabolic Panel; Future    Hypertension associated with diabetes    Hyperlipidemia associated with type 2 diabetes mellitus          Reviewed pathophysiology of diabetes, complications related to the disease, importance of annual dilated eye exam and daily foot examination. Explained MOA, SE, dosage of medications. Written instructions given and reviewed with patient and patient verbalizes understanding.     PATIENT INSTRUCTIONS    Lifestyle modification with well balanced diet and at least 30 minutes of physical activity daily recommended.   Increase water intake.   Increase protein and non-starchy vegetable servings with meals.   Decrease carbohydrate servings with meals.   Take 10 minute walk after each meal.   Avoid snacking on carbohydrates, choose low carb, high protein snacks.   Incorporate resistance training with weights or resistance bands with exercise regimen at least 3 days a week.      Decrease Toujeo to 30 units subcutaneously daily.   Continue Novolog 8  units subcutaneously three times daily before meals with carbs.    Continue Xigduo  mg by mouth daily.   Continue Prandin 2 mg by mouth twice daily before meals.   Continue Mounjaro 15 mg subcutaneously every 7 days.      Unique 3 CGM  Blood Sugar Goals:       Fastin-130.       1-2 hours after a meal: Less than 180.     Follow up in about 3 months (around 2025) for Unique, Schedule fasting labs.

## 2025-07-02 RX ORDER — AMITRIPTYLINE HYDROCHLORIDE 25 MG/1
25 TABLET, FILM COATED ORAL NIGHTLY
Qty: 90 TABLET | Refills: 3 | Status: SHIPPED | OUTPATIENT
Start: 2025-07-02

## 2025-07-02 NOTE — TELEPHONE ENCOUNTER
Care Due:                  Date            Visit Type   Department     Provider  --------------------------------------------------------------------------------                                EP -                              PRIMARY      [facility] INTERNAL  Last Visit: 05-      CARE ([facility])   OH Lord                              EP -                              PRIMARY      [facility] INTERNAL  Next Visit: 11-      CARE ([facility])   OH Lord                                                            Last  Test          Frequency    Reason                     Performed    Due Date  --------------------------------------------------------------------------------    Lipid Panel.  12 months..  atorvastatin.............  09- 09-    [facility] Embedded Care Due Messages. Reference number: [id number].   7/02/2025 1:38:10 PM CDT

## 2025-07-02 NOTE — TELEPHONE ENCOUNTER
Refill Routing Note   Medication(s) are not appropriate for processing by [facility] for the following reason(s):        New or recently adjusted medication    [facility] action(s):  Defer     Requires labs : Yes             Appointments  past 12m or future 3m with PCP    Date Provider   Last Visit   5/6/2025 Herb Lord MD   Next Visit   11/7/2025 Herb Lord MD   ED visits in past 90 days: 0        Note composed:1:42 PM 07/02/2025

## 2025-07-03 ENCOUNTER — OFFICE VISIT (OUTPATIENT)
Dept: ORTHOPEDICS | Facility: CLINIC | Age: 60
End: 2025-07-03
Payer: COMMERCIAL

## 2025-07-03 VITALS — HEIGHT: 69 IN | WEIGHT: 227 LBS | BODY MASS INDEX: 33.62 KG/M2

## 2025-07-03 DIAGNOSIS — M17.12 PRIMARY OSTEOARTHRITIS OF LEFT KNEE: Primary | ICD-10-CM

## 2025-07-03 DIAGNOSIS — M25.562 LEFT KNEE PAIN, UNSPECIFIED CHRONICITY: ICD-10-CM

## 2025-07-03 PROCEDURE — 99999 PR PBB SHADOW E&M-EST. PATIENT-LVL III: CPT | Mod: PBBFAC,,, | Performed by: PHYSICIAN ASSISTANT

## 2025-07-03 NOTE — PROCEDURES
Large Joint Aspiration/Injection: L knee    Date/Time: 7/3/2025 12:45 PM    Performed by: Olivia Alejandro PA  Authorized by: Olivia Alejandro PA    Indications:  Arthritis, joint swelling and pain  Site marked: the procedure site was marked      Local anesthesia used?: Yes    Local anesthetic:  Topical anesthetic    Details:  Needle Size:  21 G  Approach:  Anterolateral  Location:  Knee  Site:  L knee  Medications:  20 mg sodium hyaluronate (EUFLEXXA) 10 mg/mL(mw 2.4 -3.6 million)  Patient tolerance:  Patient tolerated the procedure well with no immediate complications     Verbal consent was obtained  The patient's ID, site, side was verified  The site was sterile prepped in standard fashion  The injection was performed in the patricia-lateral side without complication  A sterile Band-Aid was applied    Patient was directed to apply ice today at roughly 15 minutes at a time as needed.     If steroid was received today:   It was discussed that they may be sore for the next few days or week   Please avoid strenuous activity over the next 24 hours.   Effectiveness may take 1-2 weeks to show results especially with slow release medication  It was also discussed that the patient may have a increase in glucose if diabetic and should monitor levels.  Patient may also have a rise in blood pressure, monitor symptoms     If gel was received today:  Patient may be sore for 14 days with intermittent swelling  Avoid heavy activity, elevate and ice as need  This will take up to 8 weeks to show full effectiveness  Patient was instructed to call as needed.

## 2025-07-28 DIAGNOSIS — E78.2 MIXED HYPERLIPIDEMIA: ICD-10-CM

## 2025-07-28 RX ORDER — ATORVASTATIN CALCIUM 40 MG/1
40 TABLET, FILM COATED ORAL DAILY
Qty: 90 TABLET | Refills: 0 | Status: SHIPPED | OUTPATIENT
Start: 2025-07-28

## 2025-07-28 NOTE — TELEPHONE ENCOUNTER
No care due was identified.  [facility] Embedded Care Due Messages. Reference number: [id number].   7/28/2025 8:50:08 AM CDT

## 2025-07-29 NOTE — TELEPHONE ENCOUNTER
Refill Decision Note   Brett Garcia  is requesting a refill authorization.  Brief Assessment and Rationale for Refill:  Approve     Medication Therapy Plan:         Comments:     Note composed:8:58 PM 07/28/2025

## 2025-08-11 ENCOUNTER — TELEPHONE (OUTPATIENT)
Dept: PHARMACY | Facility: CLINIC | Age: 60
End: 2025-08-11
Payer: COMMERCIAL